# Patient Record
Sex: FEMALE | Race: WHITE | NOT HISPANIC OR LATINO | Employment: OTHER | URBAN - METROPOLITAN AREA
[De-identification: names, ages, dates, MRNs, and addresses within clinical notes are randomized per-mention and may not be internally consistent; named-entity substitution may affect disease eponyms.]

---

## 2017-02-23 ENCOUNTER — OFFICE VISIT (OUTPATIENT)
Dept: FAMILY MEDICINE CLINIC | Facility: CLINIC | Age: 67
End: 2017-02-23

## 2017-02-23 VITALS
SYSTOLIC BLOOD PRESSURE: 116 MMHG | HEIGHT: 62 IN | TEMPERATURE: 97.6 F | WEIGHT: 216 LBS | DIASTOLIC BLOOD PRESSURE: 80 MMHG | HEART RATE: 72 BPM | BODY MASS INDEX: 39.75 KG/M2

## 2017-02-23 DIAGNOSIS — M17.11 PRIMARY OSTEOARTHRITIS OF RIGHT KNEE: Primary | ICD-10-CM

## 2017-02-23 PROCEDURE — 99213 OFFICE O/P EST LOW 20 MIN: CPT | Performed by: INTERNAL MEDICINE

## 2017-02-23 RX ORDER — PRAVASTATIN SODIUM 40 MG
40 TABLET ORAL NIGHTLY
COMMUNITY
End: 2018-01-24 | Stop reason: SDUPTHER

## 2017-02-23 RX ORDER — BUMETANIDE 2 MG/1
2 TABLET ORAL 2 TIMES DAILY
COMMUNITY
End: 2018-01-24 | Stop reason: SDUPTHER

## 2017-02-23 RX ORDER — TADALAFIL 20 MG/1
20 TABLET ORAL 3 TIMES DAILY
COMMUNITY
End: 2018-01-24

## 2017-02-23 NOTE — PROGRESS NOTES
Subjective  and plain is right knee pain  Mariangel Nguyen is a 66 y.o. female.     History of Present Illness   Mariangel is here today for complaints of pain in her right knee.  This has been fairly long-standing.  She noticed it worsening after a fall approximately one year ago.  She has a prior history of congestive heart failure and pulmonary hypertension.  She therefore cannot take a whole lot of anti-inflammatory medications.  Her diabetes is currently being followed by .  Last hemoglobin A1c was 6.6.  Her chemistries looked okay.  Her cholesterol was slightly elevated on her current dose of pravastatin.   The following portions of the patient's history were reviewed and updated as appropriate: allergies, current medications, past medical history and problem list.    Review of Systems   Respiratory: Negative for chest tightness and shortness of breath.    Cardiovascular: Negative for chest pain.   Musculoskeletal: Positive for joint swelling.       Objective   Physical Exam   Neck: Carotid bruit is not present.   Cardiovascular: Normal rate, regular rhythm and normal heart sounds.    Pulmonary/Chest: Effort normal.   Musculoskeletal:   There is no joint effusion of the right knee.  There is tenderness to palpation of the medial joint line.  There is bone-on-bone crepitation with flexion and extension.   Vitals reviewed.      Assessment/Plan   Mariangel was seen today for follow-up.    Diagnoses and all orders for this visit:    Primary osteoarthritis of right knee      Mariangel is here today for evaluation of right knee pain.  She does have some bone-on-bone crepitation.  I am going to have her see an orthopedist about some possible injection therapy.  She is not really interested in having a knee replacement at this point in time.  With her history of congestive heart failure I am very reluctant to put her on an anti-inflammatory because of the potential for fluid retention.  Because of her diabetes I am  reluctant to use any steroid.

## 2017-04-14 ENCOUNTER — TELEPHONE (OUTPATIENT)
Dept: FAMILY MEDICINE CLINIC | Facility: CLINIC | Age: 67
End: 2017-04-14

## 2017-04-14 NOTE — TELEPHONE ENCOUNTER
----- Message from Debbie Reyes sent at 4/14/2017 11:24 AM EDT -----  Contact: PT  LAST VISIT SHE HAD KNEE PAIN, SHE IS UNABLE TO SEE DR EDEN. WAS WONDERING IF YOU COULD CALL SOMETHING IN FOR INFLAMMATION ?    [facility] [phone] TIMI   Message was left that I would send some diclofenac to the [facility].

## 2017-06-16 ENCOUNTER — TELEPHONE (OUTPATIENT)
Dept: FAMILY MEDICINE CLINIC | Facility: CLINIC | Age: 67
End: 2017-06-16

## 2017-06-16 NOTE — TELEPHONE ENCOUNTER
----- Message from Sondra Thompson sent at 6/16/2017 12:24 PM EDT -----  Patient would like something for inflammation until she is able to see  a knee specialist. She can be reached at [phone]. She uses [facility] - [phone].    Thanks,  Sandy  The patient did get some relief in April when she took diclofenac.  I will renew this.  She has an appointment with the orthopedist in July.

## 2017-10-12 ENCOUNTER — HOSPITAL ENCOUNTER (EMERGENCY)
Facility: HOSPITAL | Age: 67
Discharge: HOME | End: 2017-10-12
Attending: EMERGENCY MEDICINE | Admitting: EMERGENCY MEDICINE

## 2017-10-12 ENCOUNTER — APPOINTMENT (OUTPATIENT)
Dept: CT IMAGING | Facility: HOSPITAL | Age: 67
End: 2017-10-12

## 2017-10-12 VITALS
OXYGEN SATURATION: 94 % | BODY MASS INDEX: 39.75 KG/M2 | SYSTOLIC BLOOD PRESSURE: 178 MMHG | WEIGHT: 216 LBS | HEART RATE: 63 BPM | DIASTOLIC BLOOD PRESSURE: 102 MMHG | HEIGHT: 62 IN | RESPIRATION RATE: 16 BRPM | TEMPERATURE: 98 F

## 2017-10-12 DIAGNOSIS — N39.0 URINARY TRACT INFECTION IN FEMALE: ICD-10-CM

## 2017-10-12 DIAGNOSIS — N20.0 KIDNEY STONES: Primary | ICD-10-CM

## 2017-10-12 LAB
ALBUMIN SERPL-MCNC: 4.4 G/DL (ref 3.5–5.2)
ALBUMIN/GLOB SERPL: 1.2 G/DL
ALP SERPL-CCNC: 90 U/L (ref 39–117)
ALT SERPL W P-5'-P-CCNC: 21 U/L (ref 1–33)
ANION GAP SERPL CALCULATED.3IONS-SCNC: 12.4 MMOL/L
AST SERPL-CCNC: 14 U/L (ref 1–32)
BACTERIA UR QL AUTO: ABNORMAL /HPF
BASOPHILS # BLD AUTO: 0.06 10*3/MM3 (ref 0–0.2)
BASOPHILS NFR BLD AUTO: 0.6 % (ref 0–1.5)
BILIRUB SERPL-MCNC: 0.2 MG/DL (ref 0.1–1.2)
BILIRUB UR QL STRIP: NEGATIVE
BUN BLD-MCNC: 19 MG/DL (ref 8–23)
BUN/CREAT SERPL: 22.9 (ref 7–25)
CALCIUM SPEC-SCNC: 10 MG/DL (ref 8.6–10.5)
CHLORIDE SERPL-SCNC: 101 MMOL/L (ref 98–107)
CLARITY UR: ABNORMAL
CO2 SERPL-SCNC: 28.6 MMOL/L (ref 22–29)
COLOR UR: YELLOW
CREAT BLD-MCNC: 0.83 MG/DL (ref 0.57–1)
DEPRECATED RDW RBC AUTO: 48.5 FL (ref 37–54)
EOSINOPHIL # BLD AUTO: 0.27 10*3/MM3 (ref 0–0.7)
EOSINOPHIL NFR BLD AUTO: 2.8 % (ref 0.3–6.2)
ERYTHROCYTE [DISTWIDTH] IN BLOOD BY AUTOMATED COUNT: 14.8 % (ref 11.7–13)
GFR SERPL CREATININE-BSD FRML MDRD: 69 ML/MIN/1.73
GLOBULIN UR ELPH-MCNC: 3.7 GM/DL
GLUCOSE BLD-MCNC: 114 MG/DL (ref 65–99)
GLUCOSE UR STRIP-MCNC: NEGATIVE MG/DL
HCT VFR BLD AUTO: 42.3 % (ref 35.6–45.5)
HGB BLD-MCNC: 12.9 G/DL (ref 11.9–15.5)
HGB UR QL STRIP.AUTO: ABNORMAL
HOLD SPECIMEN: NORMAL
HOLD SPECIMEN: NORMAL
HYALINE CASTS UR QL AUTO: ABNORMAL /LPF
IMM GRANULOCYTES # BLD: 0.1 10*3/MM3 (ref 0–0.03)
IMM GRANULOCYTES NFR BLD: 1 % (ref 0–0.5)
KETONES UR QL STRIP: NEGATIVE
LEUKOCYTE ESTERASE UR QL STRIP.AUTO: ABNORMAL
LIPASE SERPL-CCNC: 30 U/L (ref 13–60)
LYMPHOCYTES # BLD AUTO: 2.27 10*3/MM3 (ref 0.9–4.8)
LYMPHOCYTES NFR BLD AUTO: 23.7 % (ref 19.6–45.3)
MCH RBC QN AUTO: 27.3 PG (ref 26.9–32)
MCHC RBC AUTO-ENTMCNC: 30.5 G/DL (ref 32.4–36.3)
MCV RBC AUTO: 89.6 FL (ref 80.5–98.2)
MONOCYTES # BLD AUTO: 0.78 10*3/MM3 (ref 0.2–1.2)
MONOCYTES NFR BLD AUTO: 8.2 % (ref 5–12)
NEUTROPHILS # BLD AUTO: 6.09 10*3/MM3 (ref 1.9–8.1)
NEUTROPHILS NFR BLD AUTO: 63.7 % (ref 42.7–76)
NITRITE UR QL STRIP: NEGATIVE
NRBC BLD MANUAL-RTO: 0.2 /100 WBC (ref 0–0)
PH UR STRIP.AUTO: 6 [PH] (ref 5–8)
PLATELET # BLD AUTO: 431 10*3/MM3 (ref 140–500)
PMV BLD AUTO: 9.6 FL (ref 6–12)
POTASSIUM BLD-SCNC: 4 MMOL/L (ref 3.5–5.2)
PROT SERPL-MCNC: 8.1 G/DL (ref 6–8.5)
PROT UR QL STRIP: ABNORMAL
RBC # BLD AUTO: 4.72 10*6/MM3 (ref 3.9–5.2)
RBC # UR: ABNORMAL /HPF
REF LAB TEST METHOD: ABNORMAL
SODIUM BLD-SCNC: 142 MMOL/L (ref 136–145)
SP GR UR STRIP: 1.02 (ref 1–1.03)
SQUAMOUS #/AREA URNS HPF: ABNORMAL /HPF
UROBILINOGEN UR QL STRIP: ABNORMAL
WBC NRBC COR # BLD: 9.57 10*3/MM3 (ref 4.5–10.7)
WBC UR QL AUTO: ABNORMAL /HPF
WHOLE BLOOD HOLD SPECIMEN: NORMAL
WHOLE BLOOD HOLD SPECIMEN: NORMAL

## 2017-10-12 PROCEDURE — 36415 COLL VENOUS BLD VENIPUNCTURE: CPT | Performed by: EMERGENCY MEDICINE

## 2017-10-12 PROCEDURE — 96365 THER/PROPH/DIAG IV INF INIT: CPT

## 2017-10-12 PROCEDURE — 99284 EMERGENCY DEPT VISIT MOD MDM: CPT

## 2017-10-12 PROCEDURE — 87086 URINE CULTURE/COLONY COUNT: CPT | Performed by: EMERGENCY MEDICINE

## 2017-10-12 PROCEDURE — 74176 CT ABD & PELVIS W/O CONTRAST: CPT

## 2017-10-12 PROCEDURE — 83690 ASSAY OF LIPASE: CPT | Performed by: EMERGENCY MEDICINE

## 2017-10-12 PROCEDURE — 81001 URINALYSIS AUTO W/SCOPE: CPT | Performed by: EMERGENCY MEDICINE

## 2017-10-12 PROCEDURE — 25010000002 CEFTRIAXONE PER 250 MG: Performed by: PHYSICIAN ASSISTANT

## 2017-10-12 PROCEDURE — 85025 COMPLETE CBC W/AUTO DIFF WBC: CPT | Performed by: EMERGENCY MEDICINE

## 2017-10-12 PROCEDURE — 80053 COMPREHEN METABOLIC PANEL: CPT | Performed by: EMERGENCY MEDICINE

## 2017-10-12 RX ORDER — KETOROLAC TROMETHAMINE 15 MG/ML
15 INJECTION, SOLUTION INTRAMUSCULAR; INTRAVENOUS ONCE
Status: DISCONTINUED | OUTPATIENT
Start: 2017-10-12 | End: 2017-10-12

## 2017-10-12 RX ORDER — ONDANSETRON 2 MG/ML
4 INJECTION INTRAMUSCULAR; INTRAVENOUS ONCE
Status: DISCONTINUED | OUTPATIENT
Start: 2017-10-12 | End: 2017-10-12

## 2017-10-12 RX ORDER — SULFAMETHOXAZOLE AND TRIMETHOPRIM 800; 160 MG/1; MG/1
1 TABLET ORAL 2 TIMES DAILY
Qty: 14 TABLET | Refills: 0 | Status: SHIPPED | OUTPATIENT
Start: 2017-10-12 | End: 2018-01-24

## 2017-10-12 RX ORDER — SODIUM CHLORIDE 0.9 % (FLUSH) 0.9 %
10 SYRINGE (ML) INJECTION AS NEEDED
Status: DISCONTINUED | OUTPATIENT
Start: 2017-10-12 | End: 2017-10-12 | Stop reason: HOSPADM

## 2017-10-12 RX ORDER — HYDROCODONE BITARTRATE AND ACETAMINOPHEN 5; 325 MG/1; MG/1
1 TABLET ORAL EVERY 6 HOURS PRN
COMMUNITY
Start: 2017-07-17 | End: 2018-01-24

## 2017-10-12 RX ORDER — CEFTRIAXONE SODIUM 1 G/50ML
1 INJECTION, SOLUTION INTRAVENOUS ONCE
Status: COMPLETED | OUTPATIENT
Start: 2017-10-12 | End: 2017-10-12

## 2017-10-12 RX ADMIN — CEFTRIAXONE SODIUM 1 G: 1 INJECTION, SOLUTION INTRAVENOUS at 19:09

## 2017-10-12 RX ADMIN — SODIUM CHLORIDE 1000 ML: 9 INJECTION, SOLUTION INTRAVENOUS at 19:09

## 2017-10-12 NOTE — ED PROVIDER NOTES
Pt presents complaining of right flank pain. Discussed with Pt that  CT scan looks normal and that the stent may be irritating her ureter and causing pain.  Informed Pt that we have a consult out to Urology for further evaluation. Upon physical exam, Pt has mild right side abdominal tenderness, but no CVA tenderness or fever.    I supervised care provided by the midlevel provider.    We have discussed this patient's history, physical exam, and treatment plan.   I have reviewed the note and personally saw and examined the patient and agree with the plan of care.    Documentation assistance provided by rob Cochran for Dr. Chandra.  Information recorded by the rob was done at my direction and has been verified and validated by me.       Susan Cochran  10/12/17 1932       Eusebio Chandra MD  10/13/17 1300

## 2017-10-13 LAB
BACTERIA SPEC AEROBE CULT: NORMAL
BACTERIA SPEC AEROBE CULT: NORMAL

## 2017-10-13 NOTE — DISCHARGE INSTRUCTIONS
Home, rest, keep well hydrated, medicine as prescribed, follow up with your urologist tomorrow for recheck, call first thing in the morning to schedule appointment.  Return to care if develop fever, unable to control pain, or with further concerns.

## 2017-10-16 ENCOUNTER — TELEPHONE (OUTPATIENT)
Dept: SOCIAL WORK | Facility: HOSPITAL | Age: 67
End: 2017-10-16

## 2017-10-19 ENCOUNTER — APPOINTMENT (OUTPATIENT)
Dept: PREADMISSION TESTING | Facility: HOSPITAL | Age: 67
End: 2017-10-19

## 2017-10-20 ENCOUNTER — APPOINTMENT (OUTPATIENT)
Dept: PREADMISSION TESTING | Facility: HOSPITAL | Age: 67
End: 2017-10-20

## 2017-10-20 VITALS
DIASTOLIC BLOOD PRESSURE: 110 MMHG | SYSTOLIC BLOOD PRESSURE: 152 MMHG | HEIGHT: 62 IN | OXYGEN SATURATION: 95 % | WEIGHT: 224 LBS | TEMPERATURE: 98.3 F | RESPIRATION RATE: 16 BRPM | HEART RATE: 61 BPM | BODY MASS INDEX: 41.22 KG/M2

## 2017-10-20 LAB
ANION GAP SERPL CALCULATED.3IONS-SCNC: 17.1 MMOL/L
BACTERIA UR QL AUTO: ABNORMAL /HPF
BILIRUB UR QL STRIP: NEGATIVE
BUN BLD-MCNC: 23 MG/DL (ref 8–23)
BUN/CREAT SERPL: 19.5 (ref 7–25)
CALCIUM SPEC-SCNC: 9.8 MG/DL (ref 8.6–10.5)
CHLORIDE SERPL-SCNC: 95 MMOL/L (ref 98–107)
CLARITY UR: ABNORMAL
CO2 SERPL-SCNC: 26.9 MMOL/L (ref 22–29)
COLOR UR: YELLOW
CREAT BLD-MCNC: 1.18 MG/DL (ref 0.57–1)
DEPRECATED RDW RBC AUTO: 48.1 FL (ref 37–54)
ERYTHROCYTE [DISTWIDTH] IN BLOOD BY AUTOMATED COUNT: 14.7 % (ref 11.7–13)
GFR SERPL CREATININE-BSD FRML MDRD: 46 ML/MIN/1.73
GLUCOSE BLD-MCNC: 129 MG/DL (ref 65–99)
GLUCOSE UR STRIP-MCNC: NEGATIVE MG/DL
HCT VFR BLD AUTO: 41.8 % (ref 35.6–45.5)
HGB BLD-MCNC: 12.8 G/DL (ref 11.9–15.5)
HGB UR QL STRIP.AUTO: ABNORMAL
HYALINE CASTS UR QL AUTO: ABNORMAL /LPF
KETONES UR QL STRIP: NEGATIVE
LEUKOCYTE ESTERASE UR QL STRIP.AUTO: ABNORMAL
MCH RBC QN AUTO: 27.8 PG (ref 26.9–32)
MCHC RBC AUTO-ENTMCNC: 30.6 G/DL (ref 32.4–36.3)
MCV RBC AUTO: 90.7 FL (ref 80.5–98.2)
NITRITE UR QL STRIP: NEGATIVE
PH UR STRIP.AUTO: <=5 [PH] (ref 5–8)
PLATELET # BLD AUTO: 406 10*3/MM3 (ref 140–500)
PMV BLD AUTO: 9.5 FL (ref 6–12)
POTASSIUM BLD-SCNC: 3.9 MMOL/L (ref 3.5–5.2)
PROT UR QL STRIP: NEGATIVE
RBC # BLD AUTO: 4.61 10*6/MM3 (ref 3.9–5.2)
RBC # UR: ABNORMAL /HPF
REF LAB TEST METHOD: ABNORMAL
SODIUM BLD-SCNC: 139 MMOL/L (ref 136–145)
SP GR UR STRIP: 1.01 (ref 1–1.03)
SQUAMOUS #/AREA URNS HPF: ABNORMAL /HPF
UROBILINOGEN UR QL STRIP: ABNORMAL
WBC NRBC COR # BLD: 7.99 10*3/MM3 (ref 4.5–10.7)
WBC UR QL AUTO: ABNORMAL /HPF

## 2017-10-20 PROCEDURE — 93010 ELECTROCARDIOGRAM REPORT: CPT | Performed by: INTERNAL MEDICINE

## 2017-10-20 NOTE — DISCHARGE INSTRUCTIONS
Take the following medications the morning of surgery with a small sip of water:  NORCO, ADCIRCA    PLEASE HOLD VOLTAREN UNTIL YOUR SURGERY    ARRIVE AT HOSPITAL AT 0700 AM ON October 23, 2017      General Instructions:  • Do not eat or drink anything after midnight the night before surgery.  • Infants may have breast milk up to four hours before surgery.  • Infants drinking formula may drink formula up to six hours before surgery.   • Patients who avoid smoking, chewing tobacco and alcohol for 4 weeks prior to surgery have a reduced risk of post-operative complications.  Quit smoking as many days before surgery as you can.  • Do not smoke, use chewing tobacco or drink alcohol the day of surgery.   • If applicable bring your C-PAP/ BI-PAP machine.  • Bring any papers given to you in the doctor’s office.  • Wear clean comfortable clothes and socks.  • Do not wear contact lenses or make-up.  Bring a case for your glasses.   • Bring crutches or walker if applicable.  • Remove all piercings.  Leave jewelry and any other valuables at home.  • The Pre-Admission Testing nurse will instruct you to bring medications if unable to obtain an accurate list in Pre-Admission Testing.        If you were given a blood bank ID arm band remember to bring it with you the day of surgery.    Preventing a Surgical Site Infection:  • For 2 to 3 days before surgery, avoid shaving with a razor because the razor can irritate skin and make it easier to develop an infection.  • The night prior to surgery sleep in a clean bed with clean clothing.  Do not allow pets to sleep with you.  • Shower on the morning of surgery using a fresh bar of anti-bacterial soap (such as Dial) and clean washcloth.  Dry with a clean towel and dress in clean clothing.  • Ask your surgeon if you will be receiving antibiotics prior to surgery.  • Make sure you, your family, and all healthcare providers clean their hands with soap and water or an alcohol based hand   before caring for you or your wound.    Day of surgery:  Upon arrival, a Pre-op nurse and Anesthesiologist will review your health history, obtain vital signs, and answer questions you may have.  The only belongings needed at this time will be your home medications and if applicable your C-PAP/BI-PAP machine.  If you are staying overnight your family can leave the rest of your belongings in the car and bring them to your room later.  A Pre-op nurse will start an IV and you may receive medication in preparation for surgery, including something to help you relax.  Your family will be able to see you in the Pre-op area.  While you are in surgery your family should notify the waiting room  if they leave the waiting room area and provide a contact phone number.    Please be aware that surgery does come with discomfort.  We want to make every effort to control your discomfort so please discuss any uncontrolled symptoms with your nurse.   Your doctor will most likely have prescribed pain medications.      If you are going home after surgery you will receive individualized written care instructions before being discharged.  A responsible adult must drive you to and from the hospital on the day of your surgery and stay with you for 24 hours.    If you are staying overnight following surgery, you will be transported to your hospital room following the recovery period.  [facility] has all private rooms.    If you have any questions please call Pre-Admission Testing at [phone].  Deductibles and co-payments are collected on the day of service. Please be prepared to pay the required co-pay, deductible or deposit on the day of service as defined by your plan.

## 2017-10-21 LAB
BACTERIA SPEC AEROBE CULT: NORMAL
BACTERIA SPEC AEROBE CULT: NORMAL

## 2017-10-23 ENCOUNTER — ANESTHESIA (OUTPATIENT)
Dept: PERIOP | Facility: HOSPITAL | Age: 67
End: 2017-10-23

## 2017-10-23 ENCOUNTER — HOSPITAL ENCOUNTER (OUTPATIENT)
Facility: HOSPITAL | Age: 67
Setting detail: HOSPITAL OUTPATIENT SURGERY
Discharge: HOME | End: 2017-10-23
Attending: UROLOGY | Admitting: UROLOGY

## 2017-10-23 ENCOUNTER — APPOINTMENT (OUTPATIENT)
Dept: GENERAL RADIOLOGY | Facility: HOSPITAL | Age: 67
End: 2017-10-23

## 2017-10-23 ENCOUNTER — ANESTHESIA EVENT (OUTPATIENT)
Dept: PERIOP | Facility: HOSPITAL | Age: 67
End: 2017-10-23

## 2017-10-23 VITALS
OXYGEN SATURATION: 94 % | RESPIRATION RATE: 16 BRPM | DIASTOLIC BLOOD PRESSURE: 98 MMHG | SYSTOLIC BLOOD PRESSURE: 168 MMHG | HEART RATE: 70 BPM | HEIGHT: 62 IN | BODY MASS INDEX: 41.59 KG/M2 | TEMPERATURE: 97.4 F | WEIGHT: 226 LBS

## 2017-10-23 DIAGNOSIS — N20.0 RENAL STONES: ICD-10-CM

## 2017-10-23 LAB — GLUCOSE BLDC GLUCOMTR-MCNC: 127 MG/DL (ref 70–130)

## 2017-10-23 PROCEDURE — 25010000002 GENTAMICIN PER 80 MG: Performed by: UROLOGY

## 2017-10-23 PROCEDURE — 25010000002 PROPOFOL 10 MG/ML EMULSION: Performed by: NURSE ANESTHETIST, CERTIFIED REGISTERED

## 2017-10-23 PROCEDURE — 0 IOPAMIDOL PER 1 ML: Performed by: UROLOGY

## 2017-10-23 PROCEDURE — C1894 INTRO/SHEATH, NON-LASER: HCPCS | Performed by: UROLOGY

## 2017-10-23 PROCEDURE — 25010000002 HYDRALAZINE PER 20 MG: Performed by: NURSE ANESTHETIST, CERTIFIED REGISTERED

## 2017-10-23 PROCEDURE — 82360 CALCULUS ASSAY QUANT: CPT | Performed by: UROLOGY

## 2017-10-23 PROCEDURE — 25010000002 MIDAZOLAM PER 1 MG: Performed by: ANESTHESIOLOGY

## 2017-10-23 PROCEDURE — 74420 UROGRAPHY RTRGR +-KUB: CPT

## 2017-10-23 PROCEDURE — 25010000002 FENTANYL CITRATE (PF) 100 MCG/2ML SOLUTION: Performed by: NURSE ANESTHETIST, CERTIFIED REGISTERED

## 2017-10-23 PROCEDURE — C1758 CATHETER, URETERAL: HCPCS | Performed by: UROLOGY

## 2017-10-23 PROCEDURE — C2617 STENT, NON-COR, TEM W/O DEL: HCPCS | Performed by: UROLOGY

## 2017-10-23 PROCEDURE — 82962 GLUCOSE BLOOD TEST: CPT

## 2017-10-23 PROCEDURE — 25010000003 CEFAZOLIN IN D5W 1 GM/50ML SOLUTION: Performed by: UROLOGY

## 2017-10-23 PROCEDURE — 25010000002 ONDANSETRON PER 1 MG: Performed by: NURSE ANESTHETIST, CERTIFIED REGISTERED

## 2017-10-23 PROCEDURE — C1769 GUIDE WIRE: HCPCS | Performed by: UROLOGY

## 2017-10-23 DEVICE — URETERAL STENT
Type: IMPLANTABLE DEVICE | Status: FUNCTIONAL
Brand: CONTOUR™

## 2017-10-23 RX ORDER — FENTANYL CITRATE 50 UG/ML
INJECTION, SOLUTION INTRAMUSCULAR; INTRAVENOUS AS NEEDED
Status: DISCONTINUED | OUTPATIENT
Start: 2017-10-23 | End: 2017-10-23 | Stop reason: SURG

## 2017-10-23 RX ORDER — PROMETHAZINE HYDROCHLORIDE 25 MG/ML
12.5 INJECTION, SOLUTION INTRAMUSCULAR; INTRAVENOUS ONCE AS NEEDED
Status: DISCONTINUED | OUTPATIENT
Start: 2017-10-23 | End: 2017-10-23 | Stop reason: HOSPADM

## 2017-10-23 RX ORDER — FENTANYL CITRATE 50 UG/ML
50 INJECTION, SOLUTION INTRAMUSCULAR; INTRAVENOUS
Status: DISCONTINUED | OUTPATIENT
Start: 2017-10-23 | End: 2017-10-23 | Stop reason: HOSPADM

## 2017-10-23 RX ORDER — HYDROCODONE BITARTRATE AND ACETAMINOPHEN 7.5; 325 MG/1; MG/1
1 TABLET ORAL ONCE AS NEEDED
Status: DISCONTINUED | OUTPATIENT
Start: 2017-10-23 | End: 2017-10-23 | Stop reason: HOSPADM

## 2017-10-23 RX ORDER — PROPOFOL 10 MG/ML
VIAL (ML) INTRAVENOUS AS NEEDED
Status: DISCONTINUED | OUTPATIENT
Start: 2017-10-23 | End: 2017-10-23 | Stop reason: SURG

## 2017-10-23 RX ORDER — PROMETHAZINE HYDROCHLORIDE 25 MG/1
25 TABLET ORAL ONCE AS NEEDED
Status: DISCONTINUED | OUTPATIENT
Start: 2017-10-23 | End: 2017-10-23 | Stop reason: HOSPADM

## 2017-10-23 RX ORDER — HYDROMORPHONE HYDROCHLORIDE 1 MG/ML
0.5 INJECTION, SOLUTION INTRAMUSCULAR; INTRAVENOUS; SUBCUTANEOUS
Status: DISCONTINUED | OUTPATIENT
Start: 2017-10-23 | End: 2017-10-23 | Stop reason: HOSPADM

## 2017-10-23 RX ORDER — DIPHENHYDRAMINE HYDROCHLORIDE 50 MG/ML
12.5 INJECTION INTRAMUSCULAR; INTRAVENOUS
Status: DISCONTINUED | OUTPATIENT
Start: 2017-10-23 | End: 2017-10-23 | Stop reason: HOSPADM

## 2017-10-23 RX ORDER — HYDRALAZINE HYDROCHLORIDE 20 MG/ML
5 INJECTION INTRAMUSCULAR; INTRAVENOUS
Status: DISCONTINUED | OUTPATIENT
Start: 2017-10-23 | End: 2017-10-23 | Stop reason: HOSPADM

## 2017-10-23 RX ORDER — SODIUM CHLORIDE 0.9 % (FLUSH) 0.9 %
1-10 SYRINGE (ML) INJECTION AS NEEDED
Status: DISCONTINUED | OUTPATIENT
Start: 2017-10-23 | End: 2017-10-23 | Stop reason: HOSPADM

## 2017-10-23 RX ORDER — PROMETHAZINE HYDROCHLORIDE 25 MG/1
12.5 TABLET ORAL ONCE AS NEEDED
Status: DISCONTINUED | OUTPATIENT
Start: 2017-10-23 | End: 2017-10-23 | Stop reason: HOSPADM

## 2017-10-23 RX ORDER — ONDANSETRON 2 MG/ML
INJECTION INTRAMUSCULAR; INTRAVENOUS AS NEEDED
Status: DISCONTINUED | OUTPATIENT
Start: 2017-10-23 | End: 2017-10-23 | Stop reason: SURG

## 2017-10-23 RX ORDER — FAMOTIDINE 10 MG/ML
20 INJECTION, SOLUTION INTRAVENOUS ONCE
Status: COMPLETED | OUTPATIENT
Start: 2017-10-23 | End: 2017-10-23

## 2017-10-23 RX ORDER — OXYCODONE AND ACETAMINOPHEN 7.5; 325 MG/1; MG/1
1 TABLET ORAL ONCE AS NEEDED
Status: DISCONTINUED | OUTPATIENT
Start: 2017-10-23 | End: 2017-10-23 | Stop reason: HOSPADM

## 2017-10-23 RX ORDER — PROMETHAZINE HYDROCHLORIDE 25 MG/1
25 SUPPOSITORY RECTAL ONCE AS NEEDED
Status: DISCONTINUED | OUTPATIENT
Start: 2017-10-23 | End: 2017-10-23 | Stop reason: HOSPADM

## 2017-10-23 RX ORDER — GENTAMICIN SULFATE 80 MG/100ML
80 INJECTION, SOLUTION INTRAVENOUS ONCE
Status: COMPLETED | OUTPATIENT
Start: 2017-10-23 | End: 2017-10-23

## 2017-10-23 RX ORDER — MIDAZOLAM HYDROCHLORIDE 1 MG/ML
1 INJECTION INTRAMUSCULAR; INTRAVENOUS
Status: DISCONTINUED | OUTPATIENT
Start: 2017-10-23 | End: 2017-10-23 | Stop reason: HOSPADM

## 2017-10-23 RX ORDER — LEVOFLOXACIN 500 MG/1
500 TABLET, FILM COATED ORAL DAILY
Qty: 10 TABLET | Refills: 0
Start: 2017-10-23 | End: 2017-10-30

## 2017-10-23 RX ORDER — FLUMAZENIL 0.1 MG/ML
0.2 INJECTION INTRAVENOUS AS NEEDED
Status: DISCONTINUED | OUTPATIENT
Start: 2017-10-23 | End: 2017-10-23 | Stop reason: HOSPADM

## 2017-10-23 RX ORDER — NALOXONE HCL 0.4 MG/ML
0.2 VIAL (ML) INJECTION AS NEEDED
Status: DISCONTINUED | OUTPATIENT
Start: 2017-10-23 | End: 2017-10-23 | Stop reason: HOSPADM

## 2017-10-23 RX ORDER — LIDOCAINE HYDROCHLORIDE 20 MG/ML
INJECTION, SOLUTION INFILTRATION; PERINEURAL AS NEEDED
Status: DISCONTINUED | OUTPATIENT
Start: 2017-10-23 | End: 2017-10-23 | Stop reason: SURG

## 2017-10-23 RX ORDER — MAGNESIUM HYDROXIDE 1200 MG/15ML
LIQUID ORAL AS NEEDED
Status: DISCONTINUED | OUTPATIENT
Start: 2017-10-23 | End: 2017-10-23 | Stop reason: HOSPADM

## 2017-10-23 RX ORDER — MIDAZOLAM HYDROCHLORIDE 1 MG/ML
2 INJECTION INTRAMUSCULAR; INTRAVENOUS
Status: DISCONTINUED | OUTPATIENT
Start: 2017-10-23 | End: 2017-10-23 | Stop reason: HOSPADM

## 2017-10-23 RX ORDER — LABETALOL HYDROCHLORIDE 5 MG/ML
5 INJECTION, SOLUTION INTRAVENOUS
Status: DISCONTINUED | OUTPATIENT
Start: 2017-10-23 | End: 2017-10-23 | Stop reason: HOSPADM

## 2017-10-23 RX ORDER — EPHEDRINE SULFATE 50 MG/ML
5 INJECTION, SOLUTION INTRAVENOUS ONCE AS NEEDED
Status: DISCONTINUED | OUTPATIENT
Start: 2017-10-23 | End: 2017-10-23 | Stop reason: HOSPADM

## 2017-10-23 RX ORDER — ONDANSETRON 2 MG/ML
4 INJECTION INTRAMUSCULAR; INTRAVENOUS ONCE AS NEEDED
Status: DISCONTINUED | OUTPATIENT
Start: 2017-10-23 | End: 2017-10-23 | Stop reason: HOSPADM

## 2017-10-23 RX ORDER — SODIUM CHLORIDE, SODIUM LACTATE, POTASSIUM CHLORIDE, CALCIUM CHLORIDE 600; 310; 30; 20 MG/100ML; MG/100ML; MG/100ML; MG/100ML
9 INJECTION, SOLUTION INTRAVENOUS CONTINUOUS
Status: DISCONTINUED | OUTPATIENT
Start: 2017-10-23 | End: 2017-10-23 | Stop reason: HOSPADM

## 2017-10-23 RX ADMIN — HYDRALAZINE HYDROCHLORIDE 5 MG: 20 INJECTION INTRAMUSCULAR; INTRAVENOUS at 11:55

## 2017-10-23 RX ADMIN — LIDOCAINE HYDROCHLORIDE 100 MG: 20 INJECTION, SOLUTION INFILTRATION; PERINEURAL at 10:08

## 2017-10-23 RX ADMIN — FENTANYL CITRATE 25 MCG: 50 INJECTION INTRAMUSCULAR; INTRAVENOUS at 10:33

## 2017-10-23 RX ADMIN — FAMOTIDINE 20 MG: 10 INJECTION, SOLUTION INTRAVENOUS at 08:53

## 2017-10-23 RX ADMIN — SODIUM CHLORIDE, POTASSIUM CHLORIDE, SODIUM LACTATE AND CALCIUM CHLORIDE 9 ML/HR: 600; 310; 30; 20 INJECTION, SOLUTION INTRAVENOUS at 07:52

## 2017-10-23 RX ADMIN — MIDAZOLAM 1 MG: 1 INJECTION INTRAMUSCULAR; INTRAVENOUS at 08:57

## 2017-10-23 RX ADMIN — MIDAZOLAM 2 MG: 1 INJECTION INTRAMUSCULAR; INTRAVENOUS at 08:53

## 2017-10-23 RX ADMIN — GENTAMICIN SULFATE 80 MG: 80 INJECTION, SOLUTION INTRAVENOUS at 08:03

## 2017-10-23 RX ADMIN — ONDANSETRON 4 MG: 2 INJECTION INTRAMUSCULAR; INTRAVENOUS at 10:31

## 2017-10-23 RX ADMIN — CEFAZOLIN SODIUM 1 G: 1 INJECTION, SOLUTION INTRAVENOUS at 10:12

## 2017-10-23 RX ADMIN — FENTANYL CITRATE 50 MCG: 50 INJECTION INTRAMUSCULAR; INTRAVENOUS at 10:13

## 2017-10-23 RX ADMIN — FENTANYL CITRATE 25 MCG: 50 INJECTION INTRAMUSCULAR; INTRAVENOUS at 10:40

## 2017-10-23 RX ADMIN — PROPOFOL 200 MG: 10 INJECTION, EMULSION INTRAVENOUS at 10:08

## 2017-10-23 NOTE — OP NOTE
Operative Report     [facility] OR    Patient: Mariangel Nguyen  Age:      67 y.o.  :     1950  Sex:      female    Medical Record:  [id number]    Date of Operation/Procedure:  10/23/2017    Pre-op Diagnosis:   Right renal stones    Post-Op Diagnosis Codes:   same    Pre-operative Diagnosis Free Text:  Right renal stones    Name of Operation/Procedure:  Procedure(s) and Anesthesia Type:     * CYSTO RT URETEROSCOPY LASER  STONE BASKETING RIGHT STENT EXCHANGE - General     * RIGHT RTG PYELOGRAM WITH INTERPRETATION    Findings/Complications:      Two stones in the right lower pole, fragmented and multiple fragments removed  Right stent with no tether  No complications    Description of procedure:     The patient was taken to the OR and placed under GA in lithotomy position.  The genitalia were prepped and draped in sterile fashion.  The 21 Fr cystoscope was introduced and pan-cystoscopy was performed using the 30 degree lens.  No tumors or stones were seen.  The pair of lower pole stones on the right were visible on fluoroscopy.  The previously placed right ureteral stent was grasped and removed to the meatus.  The old right stent was cannulated with a Sensor guidewire which was advanced into the kidney. It was difficult, however, to pass the UPJ with the wire, suggesting narrowing or residual stone fragments.  I used a 5 Fr Putney to help advance the wire into the proper position.  I injected the Putney with contrast to confirm location and calyceal anatomy.  INTERP:  Small renal pelvis, narrowed UPJ, long lower pole infundibulum to the stones, malrotation of the kidney, pair of filling defects in the lower pole.  I then replaced the wire and passed a ureteroscopic access sheath and a 2nd wire.  I then advanced the flexible ureteroscope to the proximal ureter over the working wire, although the UPJ was fairly narrow and initially I had difficulty passing the scope through this area.  I was able to get it to  pass safely in the end.      Once in the kidney, I saw some stone debris and old clot.  All of the calyces were investigated - no stones seen except those in the lower pole.  Using the 270 micron laser fiber, I was able to fragment the 6 mm and 8 mm stone fragments into multiple 3-4 mm stone fragments.  I then repeated passed the scope and removed stones via stone basketing.  Careful evaluation of the calyces again performed, no stone fragments > 2 mm seen.  I re-injected contrast - no residual filling defects seen.  I then placed a 8 x 24 ureteral stent, no tether.    Awakened and taken to RR in good condition, no complications.    Estimated Blood Loss:   minimal    Specimens: None    Fluids/Drains:  Right ureteral stent, no tether    Quinn Cason MD  10/23/2017  11:51 AM

## 2017-10-23 NOTE — PLAN OF CARE
Problem: Patient Care Overview (Adult)  Goal: Plan of Care Review  Outcome: Outcome(s) achieved Date Met:  10/23/17    10/23/17 4627   Coping/Psychosocial Response Interventions   Plan Of Care Reviewed With patient;daughter   Patient Care Overview   Progress improving   Outcome Evaluation   Outcome Summary/Follow up Plan ready for discharge       Goal: Adult Individualization and Mutuality  Outcome: Outcome(s) achieved Date Met:  10/23/17  Goal: Discharge Needs Assessment  Outcome: Outcome(s) achieved Date Met:  10/23/17    Problem: Perioperative Period (Adult)  Goal: Signs and Symptoms of Listed Potential Problems Will be Absent or Manageable (Perioperative Period)  Outcome: Outcome(s) achieved Date Met:  10/23/17

## 2017-10-23 NOTE — ANESTHESIA PROCEDURE NOTES
Airway  Urgency: elective    Date/Time: 10/23/2017 10:10 AM  Airway not difficult    General Information and Staff    Patient location during procedure: OR  Anesthesiologist: MARI SOUZA  CRNA: CLOVIS COPELAND    Indications and Patient Condition  Indications for airway management: airway protection    Preoxygenated: yes  Mask difficulty assessment: 0 - not attempted    Final Airway Details  Final airway type: supraglottic airway      Successful airway: classic  Size 5    Number of attempts at approach: 1    Additional Comments  Atraumatic. No dental damage noted.

## 2017-10-23 NOTE — ANESTHESIA POSTPROCEDURE EVALUATION
"Patient: Mariangel Nguyen    Procedure Summary     Date Anesthesia Start Anesthesia Stop Room / Location    10/23/17 0954 1152  [facility] OR [facility] / [facility]       Procedure Diagnosis Surgeon Provider    CYSTO RT URETEROSCOPY LASER  STONE BASKETING RIGHT STENT EXCHANGE (Right ) No diagnosis on file. MD Stefany Mcdermott MD          Anesthesia Type: general  Last vitals  BP   168/98 (10/23/17 1245)   Temp   36.3 °C (97.4 °F) (10/23/17 1220)   Pulse   70 (10/23/17 1245)   Resp   16 (10/23/17 1245)     SpO2   94 % (10/23/17 1245)     Post Anesthesia Care and Evaluation    Patient location during evaluation: bedside  Patient participation: complete - patient participated  Level of consciousness: awake  Pain score: 2  Pain management: adequate  Airway patency: patent  Anesthetic complications: No anesthetic complications    Cardiovascular status: acceptable  Respiratory status: acceptable  Hydration status: acceptable    Comments: /98  Pulse 70  Temp 36.3 °C (97.4 °F) (Oral)   Resp 16  Ht 62\" (157.5 cm)  Wt 226 lb (103 kg)  SpO2 94%  BMI 41.34 kg/m2        "

## 2017-10-23 NOTE — ANESTHESIA PREPROCEDURE EVALUATION
Anesthesia Evaluation     Patient summary reviewed and Nursing notes reviewed   NPO Solid Status: > 8 hours  NPO Liquid Status: > 2 hours     Airway   Mallampati: II  no difficulty expected  Dental - normal exam     Pulmonary     breath sounds clear to auscultation  (+) sleep apnea,   Cardiovascular     ECG reviewed  Rhythm: regular  Rate: normal    (+) hypertension, CHF, hyperlipidemia    ROS comment: No MI--CHF in past of unknown etiology    Neuro/Psych  GI/Hepatic/Renal/Endo    (+) morbid obesity, diabetes mellitus type 2,     Musculoskeletal     Abdominal    Substance History      OB/GYN          Other   (+) arthritis                                   Anesthesia Plan    ASA 3     general     intravenous induction   Anesthetic plan and risks discussed with patient.

## 2017-10-23 NOTE — H&P
FIRST UROLOGY CONSULT      Patient Identification:  NAME:  Mariangel Nguyen  Age:  67 y.o.   Sex:  female   :  1950   MRN:  [id number]       Chief complaint:  none    History of present illness:      68 yo female with h/o large right lower pole stone.  Had ESWL with stent x 2, partial fragmentation only.  Recent KUB shows 8 mm and 6 mm right lower pole fragments, recent CT suggests 1.2 cm right lower pole stone.  Here for cysto, right uscope/laser/stent exchange.    No recent fever, increased pain.  No n/v, no recent viral illnesses.    Past medical history:  Past Medical History:   Diagnosis Date   • Arthritis     KNEES   • CHF (congestive heart failure)    • Diabetes mellitus    • Hyperlipidemia    • Hypertension    • Kidney stones    • Pulmonary hypertension    • Sleep apnea    • UTI (urinary tract infection) 10/12/2017    ON BACTRIM. FINISH WITH ANBX ON 10/22/2017       Past surgical history:  Past Surgical History:   Procedure Laterality Date   • BLADDER SURGERY      BLADDER LIFT,    • COLON SURGERY      RECONSTRUCTION,    • HYSTERECTOMY         • ROTATOR CUFF REPAIR Right        Allergies:  Review of patient's allergies indicates no known allergies.    Home medications:  Prescriptions Prior to Admission   Medication Sig Dispense Refill Last Dose   • HYDROcodone-acetaminophen (NORCO) 5-325 MG per tablet Take 1 tablet by mouth Every 6 (Six) Hours As Needed for Moderate Pain .   Past Week at Unknown time   • metFORMIN (GLUCOPHAGE) 500 MG tablet Take 500 mg by mouth 2 (Two) Times a Day With Meals.   10/22/2017 at 2100   • potassium chloride (KAYCIEL) 20 MEQ/15ML (10%) solution Take 20 mEq by mouth 2 (Two) Times a Day.   10/22/2017 at 2100   • pravastatin (PRAVACHOL) 40 MG tablet Take 40 mg by mouth Every Night.   10/22/2017 at 0800   • sulfamethoxazole-trimethoprim (BACTRIM DS,SEPTRA DS) 800-160 MG per tablet Take 1 tablet by mouth 2 (Two) Times a Day. 14 tablet 0 10/19/2017   •  tadalafil (ADCIRCA) 20 MG tablet tablet Take 20 mg by mouth 3 (Three) Times a Day.   10/22/2017 at 2100   • bumetanide (BUMEX) 2 MG tablet Take 2 mg by mouth 2 (Two) Times a Day.   10/21/2017   • diclofenac (VOLTAREN) 50 MG EC tablet Take 50 mg by mouth 2 (Two) Times a Day. INSTRUCTED PATIENT TO HOLD PRIOR TO SURGERY   10/17/2017   • Ergocalciferol (VITAMIN D2 PO) Take 50,000 Units by mouth Every 7 (Seven) Days.   10/19/2017   • ONE TOUCH ULTRA TEST test strip    Taking   • ONETOUCH DELICA LANCETS 33G misc    Taking       Hospital medications:    ceFAZolin 1 g Intravenous Once       lactated ringers 9 mL/hr Last Rate: 9 mL/hr (10/23/17 0752)     fentanyl  •  midazolam **OR** midazolam  •  sodium chloride  •  sodium chloride    Family history:  Family History   Problem Relation Age of Onset   • Cancer Other    • Stroke Other    • Other Other      LUPUS ANTICOAGULANT   • Rheum arthritis Other    • Malig Hyperthermia Neg Hx        Social history:  Social History   Substance Use Topics   • Smoking status: Never Smoker   • Smokeless tobacco: Former User   • Alcohol use No       Review of systems:    Negative 12-system ROS except for the following:  No chest pain, cough, SOB, o/w neg      Objective:  TMax 24 hours:   Temp (24hrs), Av.7 °F (36.5 °C), Min:97.7 °F (36.5 °C), Max:97.7 °F (36.5 °C)      Vitals Ranges:   Temp:  [97.7 °F (36.5 °C)] 97.7 °F (36.5 °C)  Heart Rate:  [59-64] 59  Resp:  [20] 20  BP: (164)/(94) 164/94    Physical Exam:     General Appearance:    Alert, cooperative, in no acute distress   Head:    Normocephalic, without obvious abnormality, atraumatic   Eyes:          PERRL, conjunctivae and corneas clear   Back:     No CVA tenderness   Lungs:     Respirations unlabored, symmetric excursion    Heart:    RRR, intact peripheral pulses   Abdomen:     Soft, NDNT, no masses, no guarding   :    No pelvic examination performed   Neuro/Psych:   Orientation intact, mood/affect pleasant, no focal findings        Results review:   I reviewed the patient's new clinical results.    Data review:  Lab Results (last 24 hours)     Procedure Component Value Units Date/Time    POC Glucose Fingerstick [[id number]]  (Normal) Collected:  10/23/17 0710    Specimen:  Blood Updated:  10/23/17 0712     Glucose 127 mg/dL     Narrative:       Meter: [id number] : [id number] Jacquelyn PASCUAL           Imaging:  Imaging Results (last 24 hours)     ** No results found for the last 24 hours. **             Assessment:     Active Problems:    * No active hospital problems. *    Right renal stones    Plan:     Cysto, right uscope/laser, stone extraction, stent exchange  -IV Ancef/gent OCTOR  -RBO explained, signed IC  -All questions answered    Quinn Cason MD  10/23/17  9:48 AM

## 2017-11-02 LAB
CA PHOS CRY STONE QL IR: 5 %
COLOR STONE: NORMAL
COM CRY STONE QL IR: 95 %
COMPN STONE: NORMAL
Lab: NORMAL
Lab: NORMAL
NIDUS STONE QL: NORMAL
PATH REPORT.COMMENTS IMP SPEC: NORMAL
SIZE STONE: NORMAL MM
WT STONE: 153 MG

## 2018-01-23 ENCOUNTER — TELEPHONE (OUTPATIENT)
Dept: FAMILY MEDICINE CLINIC | Facility: CLINIC | Age: 68
End: 2018-01-23

## 2018-01-23 NOTE — TELEPHONE ENCOUNTER
Patient would like for you to call her tomorrow Wednesday it's very important regarding several prescriptions.    Call [phone] cell.    Thank you.    Scripts were called to her pharmacy.  She has an appointment next week.

## 2018-01-24 RX ORDER — POTASSIUM CHLORIDE 20MEQ/15ML
20 LIQUID (ML) ORAL DAILY
Qty: 473 ML | Refills: 5 | Status: SHIPPED | OUTPATIENT
Start: 2018-01-24 | End: 2018-07-18 | Stop reason: SDUPTHER

## 2018-01-24 RX ORDER — BUMETANIDE 2 MG/1
2 TABLET ORAL DAILY
Qty: 30 TABLET | Refills: 5 | Status: SHIPPED | OUTPATIENT
Start: 2018-01-24 | End: 2018-07-18 | Stop reason: SDUPTHER

## 2018-01-24 RX ORDER — PRAVASTATIN SODIUM 40 MG
40 TABLET ORAL NIGHTLY
Qty: 30 TABLET | Refills: 5 | Status: SHIPPED | OUTPATIENT
Start: 2018-01-24 | End: 2018-09-10 | Stop reason: SDUPTHER

## 2018-01-24 RX ORDER — BISOPROLOL FUMARATE 5 MG/1
5 TABLET, FILM COATED ORAL DAILY
Qty: 30 TABLET | Refills: 5 | Status: SHIPPED | OUTPATIENT
Start: 2018-01-24 | End: 2018-09-10 | Stop reason: SDUPTHER

## 2018-01-24 RX ORDER — SACCHAROMYCES BOULARDII 250 MG
250 CAPSULE ORAL 2 TIMES DAILY
Qty: 60 CAPSULE | Refills: 5 | Status: SHIPPED | OUTPATIENT
Start: 2018-01-24 | End: 2019-03-28

## 2018-01-24 RX ORDER — PANTOPRAZOLE SODIUM 40 MG/1
40 TABLET, DELAYED RELEASE ORAL DAILY
Qty: 30 TABLET | Refills: 5 | Status: SHIPPED | OUTPATIENT
Start: 2018-01-24 | End: 2018-09-10 | Stop reason: SDUPTHER

## 2018-01-24 RX ORDER — ERGOCALCIFEROL 1.25 MG/1
50000 CAPSULE ORAL WEEKLY
Qty: 13 CAPSULE | Refills: 1 | Status: SHIPPED | OUTPATIENT
Start: 2018-01-24 | End: 2020-10-09

## 2018-01-31 ENCOUNTER — OFFICE VISIT (OUTPATIENT)
Dept: FAMILY MEDICINE CLINIC | Facility: CLINIC | Age: 68
End: 2018-01-31

## 2018-01-31 VITALS
DIASTOLIC BLOOD PRESSURE: 82 MMHG | HEART RATE: 78 BPM | OXYGEN SATURATION: 87 % | BODY MASS INDEX: 39.75 KG/M2 | HEIGHT: 62 IN | SYSTOLIC BLOOD PRESSURE: 132 MMHG | TEMPERATURE: 97.8 F | WEIGHT: 216 LBS

## 2018-01-31 DIAGNOSIS — K63.1 SMALL BOWEL PERFORATION (HCC): ICD-10-CM

## 2018-01-31 DIAGNOSIS — E11.9 TYPE 2 DIABETES MELLITUS WITHOUT COMPLICATION, WITHOUT LONG-TERM CURRENT USE OF INSULIN (HCC): Primary | ICD-10-CM

## 2018-01-31 DIAGNOSIS — K55.069 OMENTAL INFARCTION (HCC): ICD-10-CM

## 2018-01-31 DIAGNOSIS — N17.9 AKI (ACUTE KIDNEY INJURY) (HCC): ICD-10-CM

## 2018-01-31 PROBLEM — E04.9 GOITER: Status: ACTIVE | Noted: 2017-06-15

## 2018-01-31 PROBLEM — Z98.890 HX OF LITHOTRIPSY: Status: ACTIVE | Noted: 2017-10-25

## 2018-01-31 PROBLEM — R79.89 LOW VITAMIN D LEVEL: Status: ACTIVE | Noted: 2017-02-08

## 2018-01-31 PROBLEM — J44.9 COPD (CHRONIC OBSTRUCTIVE PULMONARY DISEASE) (HCC): Status: ACTIVE | Noted: 2018-01-31

## 2018-01-31 PROBLEM — J44.9 OSA AND COPD OVERLAP SYNDROME (HCC): Status: ACTIVE | Noted: 2017-11-10

## 2018-01-31 PROBLEM — N17.0 ATN (ACUTE TUBULAR NECROSIS) (HCC): Status: ACTIVE | Noted: 2017-12-07

## 2018-01-31 PROBLEM — G47.33 OSA AND COPD OVERLAP SYNDROME (HCC): Status: ACTIVE | Noted: 2017-11-10

## 2018-01-31 PROBLEM — G62.9 NEUROPATHY: Status: ACTIVE | Noted: 2017-02-08

## 2018-01-31 PROBLEM — I50.23 ACUTE ON CHRONIC SYSTOLIC CHF (CONGESTIVE HEART FAILURE) (HCC): Status: ACTIVE | Noted: 2017-10-27

## 2018-01-31 PROCEDURE — 99214 OFFICE O/P EST MOD 30 MIN: CPT | Performed by: INTERNAL MEDICINE

## 2018-01-31 RX ORDER — SILDENAFIL CITRATE 20 MG/1
20 TABLET ORAL 3 TIMES DAILY
COMMUNITY
Start: 2018-01-11 | End: 2021-11-03 | Stop reason: SDUPTHER

## 2018-01-31 NOTE — PROGRESS NOTES
Subjective chief complaint is follow-up from hospitalization.  Mariangel Nguyen is a 67 y.o. female.     History of Present Illness   Mariangel is here today for follow-up.  On January 5 she was admitted to [facility] with an acute bowel obstruction.  There was some infarction and gangrene.  She had sepsis with Klebsiella.  She was on numerous pressors initially and required an intensive care unit stay.  She since has recovered.  She did have a V/Q lung scan consistent with a moderate sized defect is now on some Eliquis.  Her medications have been changed and we have reconciled these.  She has completed antibiotic therapy with Levaquin.  She likely no longer needs the probiotic.  Because of an elevated creatinine her metformin has been on hold.  She does report that she saw her kidney specialist the other day and he said her kidneys had improved.  Her ultrasound in December of the kidneys did not show any obstruction.  She does have a prior history of kidney stones.  Did develop some acute tubular necrosis perioperatively.  The following portions of the patient's history were reviewed and updated as appropriate: allergies, current medications, past family history, past medical history, past social history, past surgical history and problem list.  Current outpatient and discharge medications have been reconciled for the patient.  Waqar Burton MD    Review of Systems   Constitutional: Negative for chills and fever.   Respiratory: Negative for chest tightness and shortness of breath.    Cardiovascular: Negative for chest pain.   Gastrointestinal: Negative for abdominal distention, abdominal pain and diarrhea.       Objective   Physical Exam   Constitutional: She appears well-developed and well-nourished.   Eyes: No scleral icterus.   Cardiovascular: Normal rate and regular rhythm.    Pulmonary/Chest: Effort normal and breath sounds normal.   Abdominal: Soft. Bowel sounds are normal. There is no  tenderness. There is no rebound and no guarding.   Nursing note and vitals reviewed.      Assessment/Plan   Mariangel was seen today for follow-up.    Diagnoses and all orders for this visit:    Type 2 diabetes mellitus without complication, without long-term current use of insulin  -     Comprehensive Metabolic Panel  -     Hemoglobin A1c    BRODY (acute kidney injury)  -     CBC & Differential  -     Comprehensive Metabolic Panel    Small bowel perforation    Omental infarction     Mariangel is here today for follow-up.  We are going to check on status of her diabetes.  If her creatinine is above 1.5 we will not be able to use metformin.  It does appear that Tradjenta may be covered on her plan.

## 2018-02-01 LAB
ALBUMIN SERPL-MCNC: 4.1 G/DL (ref 3.6–4.8)
ALBUMIN/GLOB SERPL: 1.4 {RATIO} (ref 1.2–2.2)
ALP SERPL-CCNC: 88 IU/L (ref 39–117)
ALT SERPL-CCNC: 10 IU/L (ref 0–32)
AST SERPL-CCNC: 9 IU/L (ref 0–40)
BASOPHILS # BLD AUTO: 0 X10E3/UL (ref 0–0.2)
BASOPHILS NFR BLD AUTO: 0 %
BILIRUB SERPL-MCNC: 0.3 MG/DL (ref 0–1.2)
BUN SERPL-MCNC: 23 MG/DL (ref 8–27)
BUN/CREAT SERPL: 26 (ref 12–28)
CALCIUM SERPL-MCNC: 10.1 MG/DL (ref 8.7–10.3)
CHLORIDE SERPL-SCNC: 97 MMOL/L (ref 96–106)
CO2 SERPL-SCNC: 26 MMOL/L (ref 18–29)
CREAT SERPL-MCNC: 0.89 MG/DL (ref 0.57–1)
EOSINOPHIL # BLD AUTO: 0.4 X10E3/UL (ref 0–0.4)
EOSINOPHIL NFR BLD AUTO: 4 %
ERYTHROCYTE [DISTWIDTH] IN BLOOD BY AUTOMATED COUNT: 15.4 % (ref 12.3–15.4)
GFR SERPLBLD CREATININE-BSD FMLA CKD-EPI: 68 ML/MIN/1.73
GFR SERPLBLD CREATININE-BSD FMLA CKD-EPI: 79 ML/MIN/1.73
GLOBULIN SER CALC-MCNC: 3 G/DL (ref 1.5–4.5)
GLUCOSE SERPL-MCNC: 126 MG/DL (ref 65–99)
HBA1C MFR BLD: 6.5 % (ref 4.8–5.6)
HCT VFR BLD AUTO: 38.3 % (ref 34–46.6)
HGB BLD-MCNC: 11.5 G/DL (ref 11.1–15.9)
IMM GRANULOCYTES # BLD: 0 X10E3/UL (ref 0–0.1)
IMM GRANULOCYTES NFR BLD: 0 %
LYMPHOCYTES # BLD AUTO: 1.8 X10E3/UL (ref 0.7–3.1)
LYMPHOCYTES NFR BLD AUTO: 18 %
MCH RBC QN AUTO: 24.5 PG (ref 26.6–33)
MCHC RBC AUTO-ENTMCNC: 30 G/DL (ref 31.5–35.7)
MCV RBC AUTO: 82 FL (ref 79–97)
MONOCYTES # BLD AUTO: 0.7 X10E3/UL (ref 0.1–0.9)
MONOCYTES NFR BLD AUTO: 7 %
NEUTROPHILS # BLD AUTO: 7.2 X10E3/UL (ref 1.4–7)
NEUTROPHILS NFR BLD AUTO: 71 %
PLATELET # BLD AUTO: 582 X10E3/UL (ref 150–379)
POTASSIUM SERPL-SCNC: 3.9 MMOL/L (ref 3.5–5.2)
PROT SERPL-MCNC: 7.1 G/DL (ref 6–8.5)
RBC # BLD AUTO: 4.69 X10E6/UL (ref 3.77–5.28)
SODIUM SERPL-SCNC: 141 MMOL/L (ref 134–144)
WBC # BLD AUTO: 10 X10E3/UL (ref 3.4–10.8)

## 2018-03-12 ENCOUNTER — TELEPHONE (OUTPATIENT)
Dept: FAMILY MEDICINE CLINIC | Facility: CLINIC | Age: 68
End: 2018-03-12

## 2018-04-18 ENCOUNTER — OFFICE VISIT (OUTPATIENT)
Dept: FAMILY MEDICINE CLINIC | Facility: CLINIC | Age: 68
End: 2018-04-18

## 2018-04-18 VITALS
TEMPERATURE: 98.5 F | SYSTOLIC BLOOD PRESSURE: 118 MMHG | BODY MASS INDEX: 43.98 KG/M2 | OXYGEN SATURATION: 85 % | HEIGHT: 62 IN | DIASTOLIC BLOOD PRESSURE: 70 MMHG | HEART RATE: 80 BPM | WEIGHT: 239 LBS

## 2018-04-18 DIAGNOSIS — R09.02 HYPOXIA: ICD-10-CM

## 2018-04-18 DIAGNOSIS — J44.9 CHRONIC OBSTRUCTIVE PULMONARY DISEASE, UNSPECIFIED COPD TYPE (HCC): ICD-10-CM

## 2018-04-18 DIAGNOSIS — I27.20 PULMONARY HTN (HCC): Primary | ICD-10-CM

## 2018-04-18 PROCEDURE — 99213 OFFICE O/P EST LOW 20 MIN: CPT | Performed by: INTERNAL MEDICINE

## 2018-04-18 NOTE — PROGRESS NOTES
Subjective chief complaint is possible disability  Mariangel Nguyen is a 67 y.o. female.     History of Present Illness   Mariangel is here today for possible disability.  She still is considerably weak.  She has significant pulmonary hypertension.  Her oxygen level seems to be worse.  She is not on any home oxygen.  She is recently getting over a bout of bronchitis.  She was treated with a Z-Orion and some Tessalon Perles.  Apparently she was also given an inhaler but could not afford it.  She is seeing a diabetic specialist.  She had some recent laboratories performed.  She is still on the anemic side.  Her hemoglobin has improved from 8.6-10.3.  Her chest x-ray did show a enlarged heart.  It did show an enlargement of the pulmonary artery consistent with her pulmonary hypertension.  The following portions of the patient's history were reviewed and updated as appropriate: allergies, current medications, past medical history and problem list.    Review of Systems   Constitutional: Positive for fatigue.   Respiratory: Positive for shortness of breath.    Musculoskeletal: Positive for arthralgias.   Neurological: Positive for weakness.       Objective   Physical Exam   Constitutional:   She is overweight.   Eyes: Conjunctivae are normal. No scleral icterus.   Cardiovascular: Normal rate, regular rhythm and normal heart sounds.    Pulmonary/Chest: Effort normal and breath sounds normal. She has no wheezes. She has no rales.   Musculoskeletal: She exhibits edema.   She struggles to get up on the exam table.   Nursing note and vitals reviewed.        Assessment/Plan   Mariangel was seen today for medication problem.    Diagnoses and all orders for this visit:    Pulmonary HTN    Chronic obstructive pulmonary disease, unspecified COPD type    Hypoxia    Mariangel is here today for follow-up.  Her oxygen level still appears to be quite low.  She is struggling to get up on the exam table.  This minimal amount of effort seems to  cause incredible tachypnea.  I am going to prescribe some home oxygen.  I am going to give her an inhaler to use and see her back in one month.  I certainly do not think she can return to work.  We did discuss her following for disability.

## 2018-07-17 ENCOUNTER — TELEPHONE (OUTPATIENT)
Dept: FAMILY MEDICINE CLINIC | Facility: CLINIC | Age: 68
End: 2018-07-17

## 2018-07-17 NOTE — TELEPHONE ENCOUNTER
Patient would like to speak with you, in regards to she retired and has no insurance now but needs her BUMEX & POTASSIUM CHLORIDE. She asked for samples.    Please call her at her daughters cell #. She is aware your out of the office today.    DAUGHTER CHELLE #[phone].    Thank you.  I advised that I will refill the medications without an office visit.  We do not have samples.

## 2018-07-18 RX ORDER — POTASSIUM CHLORIDE 20MEQ/15ML
20 LIQUID (ML) ORAL DAILY
Qty: 473 ML | Refills: 5 | Status: SHIPPED | OUTPATIENT
Start: 2018-07-18 | End: 2018-07-23

## 2018-07-18 RX ORDER — BUMETANIDE 2 MG/1
2 TABLET ORAL DAILY
Qty: 30 TABLET | Refills: 5 | Status: SHIPPED | OUTPATIENT
Start: 2018-07-18 | End: 2020-10-09 | Stop reason: ALTCHOICE

## 2018-07-23 RX ORDER — POTASSIUM CHLORIDE 20 MEQ/1
20 TABLET, EXTENDED RELEASE ORAL DAILY
Qty: 30 TABLET | Refills: 5 | Status: SHIPPED | OUTPATIENT
Start: 2018-07-23 | End: 2019-02-11 | Stop reason: SDUPTHER

## 2018-09-11 RX ORDER — PANTOPRAZOLE SODIUM 40 MG/1
TABLET, DELAYED RELEASE ORAL
Qty: 30 TABLET | Refills: 5 | Status: SHIPPED | OUTPATIENT
Start: 2018-09-11 | End: 2019-03-28

## 2018-09-11 RX ORDER — BISOPROLOL FUMARATE 5 MG/1
TABLET, FILM COATED ORAL
Qty: 30 TABLET | Refills: 5 | Status: SHIPPED | OUTPATIENT
Start: 2018-09-11 | End: 2019-01-24

## 2018-09-11 RX ORDER — PRAVASTATIN SODIUM 40 MG
TABLET ORAL
Qty: 30 TABLET | Refills: 5 | Status: SHIPPED | OUTPATIENT
Start: 2018-09-11 | End: 2019-03-28

## 2019-01-24 ENCOUNTER — HOSPITAL ENCOUNTER (OUTPATIENT)
Dept: GENERAL RADIOLOGY | Facility: HOSPITAL | Age: 69
Discharge: HOME | End: 2019-01-24
Admitting: ORTHOPAEDIC SURGERY

## 2019-01-24 ENCOUNTER — HOSPITAL ENCOUNTER (OUTPATIENT)
Dept: GENERAL RADIOLOGY | Facility: HOSPITAL | Age: 69
Discharge: HOME | End: 2019-01-24

## 2019-01-24 ENCOUNTER — APPOINTMENT (OUTPATIENT)
Dept: PREADMISSION TESTING | Facility: HOSPITAL | Age: 69
End: 2019-01-24

## 2019-01-24 VITALS
BODY MASS INDEX: 46.56 KG/M2 | DIASTOLIC BLOOD PRESSURE: 87 MMHG | RESPIRATION RATE: 18 BRPM | SYSTOLIC BLOOD PRESSURE: 161 MMHG | HEART RATE: 65 BPM | HEIGHT: 62 IN | OXYGEN SATURATION: 93 % | WEIGHT: 253 LBS | TEMPERATURE: 97.2 F

## 2019-01-24 LAB
ANION GAP SERPL CALCULATED.3IONS-SCNC: 14.8 MMOL/L
BACTERIA UR QL AUTO: ABNORMAL /HPF
BILIRUB UR QL STRIP: NEGATIVE
BUN BLD-MCNC: 17 MG/DL (ref 8–23)
BUN/CREAT SERPL: 20.5 (ref 7–25)
CALCIUM SPEC-SCNC: 9.4 MG/DL (ref 8.6–10.5)
CHLORIDE SERPL-SCNC: 95 MMOL/L (ref 98–107)
CLARITY UR: CLEAR
CO2 SERPL-SCNC: 30.2 MMOL/L (ref 22–29)
COLOR UR: YELLOW
CREAT BLD-MCNC: 0.83 MG/DL (ref 0.57–1)
DEPRECATED RDW RBC AUTO: 45.5 FL (ref 37–54)
ERYTHROCYTE [DISTWIDTH] IN BLOOD BY AUTOMATED COUNT: 19.1 % (ref 11.7–13)
GFR SERPL CREATININE-BSD FRML MDRD: 69 ML/MIN/1.73
GLUCOSE BLD-MCNC: 150 MG/DL (ref 65–99)
GLUCOSE UR STRIP-MCNC: NEGATIVE MG/DL
HBA1C MFR BLD: 8.29 % (ref 4.8–5.6)
HCT VFR BLD AUTO: 34.2 % (ref 35.6–45.5)
HGB BLD-MCNC: 8.7 G/DL (ref 11.9–15.5)
HGB UR QL STRIP.AUTO: NEGATIVE
HYALINE CASTS UR QL AUTO: ABNORMAL /LPF
KETONES UR QL STRIP: NEGATIVE
LEUKOCYTE ESTERASE UR QL STRIP.AUTO: ABNORMAL
MCH RBC QN AUTO: 16.9 PG (ref 26.9–32)
MCHC RBC AUTO-ENTMCNC: 25.4 G/DL (ref 32.4–36.3)
MCV RBC AUTO: 66.3 FL (ref 80.5–98.2)
NITRITE UR QL STRIP: NEGATIVE
PH UR STRIP.AUTO: 7.5 [PH] (ref 5–8)
PLATELET # BLD AUTO: 534 10*3/MM3 (ref 140–500)
PMV BLD AUTO: 9 FL (ref 6–12)
POTASSIUM BLD-SCNC: 3.6 MMOL/L (ref 3.5–5.2)
PROT UR QL STRIP: ABNORMAL
RBC # BLD AUTO: 5.16 10*6/MM3 (ref 3.9–5.2)
RBC # UR: ABNORMAL /HPF
REF LAB TEST METHOD: ABNORMAL
SODIUM BLD-SCNC: 140 MMOL/L (ref 136–145)
SP GR UR STRIP: 1.01 (ref 1–1.03)
SQUAMOUS #/AREA URNS HPF: ABNORMAL /HPF
UROBILINOGEN UR QL STRIP: ABNORMAL
WBC NRBC COR # BLD: 9.43 10*3/MM3 (ref 4.5–10.7)
WBC UR QL AUTO: ABNORMAL /HPF

## 2019-01-24 PROCEDURE — 87186 SC STD MICRODIL/AGAR DIL: CPT | Performed by: ORTHOPAEDIC SURGERY

## 2019-01-24 PROCEDURE — 87086 URINE CULTURE/COLONY COUNT: CPT | Performed by: ORTHOPAEDIC SURGERY

## 2019-01-24 PROCEDURE — 83036 HEMOGLOBIN GLYCOSYLATED A1C: CPT | Performed by: ORTHOPAEDIC SURGERY

## 2019-01-24 PROCEDURE — 81001 URINALYSIS AUTO W/SCOPE: CPT | Performed by: ORTHOPAEDIC SURGERY

## 2019-01-24 PROCEDURE — 36415 COLL VENOUS BLD VENIPUNCTURE: CPT

## 2019-01-24 PROCEDURE — 87077 CULTURE AEROBIC IDENTIFY: CPT | Performed by: ORTHOPAEDIC SURGERY

## 2019-01-24 PROCEDURE — 80048 BASIC METABOLIC PNL TOTAL CA: CPT | Performed by: ORTHOPAEDIC SURGERY

## 2019-01-24 PROCEDURE — 71046 X-RAY EXAM CHEST 2 VIEWS: CPT

## 2019-01-24 PROCEDURE — 85027 COMPLETE CBC AUTOMATED: CPT | Performed by: ORTHOPAEDIC SURGERY

## 2019-01-24 PROCEDURE — 73560 X-RAY EXAM OF KNEE 1 OR 2: CPT

## 2019-01-24 RX ORDER — CHLORHEXIDINE GLUCONATE 500 MG/1
1 CLOTH TOPICAL TAKE AS DIRECTED
Status: ON HOLD | COMMUNITY
End: 2019-04-08

## 2019-01-24 ASSESSMENT — KOOS JR
KOOS JR SCORE: 20.941
KOOS JR SCORE: 25

## 2019-01-24 NOTE — DISCHARGE INSTRUCTIONS
Take the following medications the morning of surgery with a small sip of water: BRING MEDS AM OF SURGERY    ARRIVE AT 8AM    General Instructions:  • Do not eat solid food after midnight the night before surgery.  • You may drink clear liquids day of surgery but must stop at least one hour before your hospital arrival time.  • It is beneficial for you to have a clear drink that contains carbohydrates the day of surgery.  We suggest a 12 to 20 ounce bottle of Gatorade or Powerade for non-diabetic patients or a 12 to 20 ounce bottle of G2 or Powerade Zero for diabetic patients. (Pediatric patients, are not advised to drink a 12 to 20 ounce carbohydrate drink)    Clear liquids are liquids you can see through.  Nothing red in color.     Plain water                               Sports drinks  Sodas                                   Gelatin (Jell-O)  Fruit juices without pulp such as white grape juice and apple juice  Popsicles that contain no fruit or yogurt  Tea or coffee (no cream or milk added)  Gatorade / Powerade  G2 / Powerade Zero    • Infants may have breast milk up to four hours before surgery.  • Infants drinking formula may drink formula up to six hours before surgery.   • Patients who avoid smoking, chewing tobacco and alcohol for 4 weeks prior to surgery have a reduced risk of post-operative complications.  Quit smoking as many days before surgery as you can.  • Do not smoke, use chewing tobacco or drink alcohol the day of surgery.   • If applicable bring your C-PAP/ BI-PAP machine.  • Bring any papers given to you in the doctor’s office.  • Wear clean comfortable clothes and socks.  • Do not wear contact lenses or make-up.  Bring a case for your glasses.   • Bring crutches or walker if applicable.  • Remove all piercings.  Leave jewelry and any other valuables at home.  • Hair extensions with metal clips must be removed prior to surgery.  • The Pre-Admission Testing nurse will instruct you to bring  medications if unable to obtain an accurate list in Pre-Admission Testing.            Preventing a Surgical Site Infection:  • For 2 to 3 days before surgery, avoid shaving with a razor because the razor can irritate skin and make it easier to develop an infection.    • Any areas of open skin can increase the risk of a post-operative wound infection by allowing bacteria to enter and travel throughout the body.  Notify your surgeon if you have any skin wounds / rashes even if it is not near the expected surgical site.  The area will need assessed to determine if surgery should be delayed until it is healed.  • The night prior to surgery sleep in a clean bed with clean clothing.  Do not allow pets to sleep with you.  • Shower on the morning of surgery using a fresh bar of anti-bacterial soap (such as Dial) and clean washcloth.  Dry with a clean towel and dress in clean clothing.  • Ask your surgeon if you will be receiving antibiotics prior to surgery.  • Make sure you, your family, and all healthcare providers clean their hands with soap and water or an alcohol based hand  before caring for you or your wound.    Day of surgery:  Upon arrival, a Pre-op nurse and Anesthesiologist will review your health history, obtain vital signs, and answer questions you may have.  The only belongings needed at this time will be your home medications and if applicable your C-PAP/BI-PAP machine.  If you are staying overnight your family can leave the rest of your belongings in the car and bring them to your room later.  A Pre-op nurse will start an IV and you may receive medication in preparation for surgery, including something to help you relax.  Your family will be able to see you in the Pre-op area.  While you are in surgery your family should notify the waiting room  if they leave the waiting room area and provide a contact phone number.    Please be aware that surgery does come with discomfort.  We want to  make every effort to control your discomfort so please discuss any uncontrolled symptoms with your nurse.   Your doctor will most likely have prescribed pain medications.      If you are going home after surgery you will receive individualized written care instructions before being discharged.  A responsible adult must drive you to and from the hospital on the day of your surgery and stay with you for 24 hours.    If you are staying overnight following surgery, you will be transported to your hospital room following the recovery period.  [facility] has all private rooms.    You have received a list of surgical assistants for your reference.  If you have any questions please call Pre-Admission Testing at [phone].  Deductibles and co-payments are collected on the day of service. Please be prepared to pay the required co-pay, deductible or deposit on the day of service as defined by your plan.    2% CHLORAHEXIDINE GLUCONATE* CLOTH  Preparing or “prepping” skin before surgery can reduce the risk of infection at the surgical site. To make the process easier, [facility] has chosen disposable cloths moistened with a rinse-free, 2% Chlorhexidine Gluconate (CHG) antiseptic solution. The steps below outline the prepping process and should be carefully followed.        Use the prep cloth on the area that is circled in the diagram             Directions Night before Surgery  1) Shower using a fresh bar of anti-bacterial soap (such as Dial) and clean washcloth.  Use a clean towel to completely dry your skin.  2) Do not use any lotions, oils or creams on your skin.  3) Open the package and remove 1 cloth, wipe your skin for 30 seconds in a circular motion.  Allow to dry for 3 minutes.  4) Repeat #3 with second cloth.  5) Do not touch your eyes, ears, or mouth with the prep cloth.  6) Allow the wet prep solution to air dry.  7) Discard the prep cloth and wash your hands with soap and water.    8) Dress in clean bed clothes and sleep on fresh clean bed sheets.   9) You may experience some temporary itching after the prep.    Directions Day of Surgery  1) Repeat steps 1,2,3,4,5,6,7, and 9.   2) Dress in clean clothes before coming to the hospital.    BACTROBAN NASAL OINTMENT  There are many germs normally in your nose. Bactroban is an ointment that will help reduce these germs. Please follow these instructions for Bactroban use:      ____The day before surgery in the morning  Date________    ____The day before surgery in the evening              Date________    ____The day of surgery in the morning    Date________    **Squirt ½ package of Bactroban Ointment onto a cotton applicator and apply to inside of 1st nostril.  Squirt the remaining Bactroban and apply to the inside of the other nostril.

## 2019-01-26 LAB — BACTERIA SPEC AEROBE CULT: ABNORMAL

## 2019-02-04 ENCOUNTER — OFFICE VISIT (OUTPATIENT)
Dept: FAMILY MEDICINE CLINIC | Facility: CLINIC | Age: 69
End: 2019-02-04

## 2019-02-04 VITALS
DIASTOLIC BLOOD PRESSURE: 76 MMHG | SYSTOLIC BLOOD PRESSURE: 118 MMHG | OXYGEN SATURATION: 93 % | HEIGHT: 62 IN | TEMPERATURE: 97.4 F | BODY MASS INDEX: 46.27 KG/M2 | HEART RATE: 66 BPM

## 2019-02-04 DIAGNOSIS — F32.A ANXIETY AND DEPRESSION: Primary | ICD-10-CM

## 2019-02-04 DIAGNOSIS — F41.9 ANXIETY AND DEPRESSION: Primary | ICD-10-CM

## 2019-02-04 DIAGNOSIS — M17.0 PRIMARY OSTEOARTHRITIS OF BOTH KNEES: ICD-10-CM

## 2019-02-04 PROCEDURE — 99214 OFFICE O/P EST MOD 30 MIN: CPT | Performed by: INTERNAL MEDICINE

## 2019-02-04 RX ORDER — LEVOTHYROXINE SODIUM 0.03 MG/1
25 TABLET ORAL DAILY
COMMUNITY
End: 2020-04-20 | Stop reason: SDUPTHER

## 2019-02-04 RX ORDER — ESCITALOPRAM OXALATE 10 MG/1
10 TABLET ORAL DAILY
Qty: 30 TABLET | Refills: 5 | Status: SHIPPED | OUTPATIENT
Start: 2019-02-04 | End: 2019-03-11 | Stop reason: SDUPTHER

## 2019-02-04 RX ORDER — OXYCODONE HYDROCHLORIDE AND ACETAMINOPHEN 5; 325 MG/1; MG/1
1 TABLET ORAL EVERY 4 HOURS PRN
Qty: 18 TABLET | Refills: 0 | Status: ON HOLD | OUTPATIENT
Start: 2019-02-04 | End: 2019-04-08

## 2019-02-04 NOTE — PROGRESS NOTES
Subjective chief complaint is depression  Mariangel Nguyen is a 68 y.o. female.     History of Present Illness   Mariangel is here today for complaints of depression.  Her daughter is with her.  Her daughter said her mothers usually the happiest person that she knows.  However she recently had to stop working because of heart conditions and knee problems.  She worked in the same place for 32 years Mrs. the people that she worked with.  Her daughter reports that she is more angry and agitated since this happened.  He is going to be having some knee surgery.  She has bone-on-bone crepitation of both knees.  She is going to have her right knee done first.  She has been cleared by her lung doctor and cardiologist.  Past medical history is remarkable for cardiomyopathy and pulmonary hypertension.  The following portions of the patient's history were reviewed and updated as appropriate: allergies, current medications, past medical history and problem list.    Review of Systems   Respiratory: Positive for shortness of breath. Negative for chest tightness.    Cardiovascular: Negative for chest pain.   Musculoskeletal: Positive for gait problem and joint swelling.   Psychiatric/Behavioral: Positive for agitation, dysphoric mood and depressed mood.       Objective   Physical Exam   Constitutional: She is oriented to person, place, and time. She appears well-developed and well-nourished.   Cardiovascular: Normal rate, regular rhythm and normal heart sounds.   Pulmonary/Chest: Effort normal and breath sounds normal.   Musculoskeletal:   There is bilateral significant bone-on-bone crepitation.  There is tenderness of the medial and lateral joint lines bilaterally.   Neurological: She is alert and oriented to person, place, and time.   Nursing note and vitals reviewed.        Assessment/Plan   Mariangel was seen today for depression.    Diagnoses and all orders for this visit:    Anxiety and depression    Primary osteoarthritis of  both knees    Other orders  -     escitalopram (LEXAPRO) 10 MG tablet; Take 1 tablet by mouth Daily.  -     oxyCODONE-acetaminophen (PERCOCET) 5-325 MG per tablet; Take 1 tablet by mouth Every 4 (Four) Hours As Needed for Severe Pain .     Mariangel is here today for evaluation of depression.  I am going to prescribe some generic Lexapro.  She really has no alternatives to treat her severe osteoarthritis other than narcotic medications.  She is going to be very judicious with this.  She is going to try not to use more than one Percocet per day.  I am going to give her prescription for 18 tablets.  She has signed the appropriate agreement.

## 2019-02-06 ENCOUNTER — CONSULT (OUTPATIENT)
Dept: ONCOLOGY | Facility: CLINIC | Age: 69
End: 2019-02-06

## 2019-02-06 ENCOUNTER — APPOINTMENT (OUTPATIENT)
Dept: LAB | Facility: HOSPITAL | Age: 69
End: 2019-02-06

## 2019-02-06 VITALS
BODY MASS INDEX: 45.32 KG/M2 | HEART RATE: 103 BPM | DIASTOLIC BLOOD PRESSURE: 98 MMHG | WEIGHT: 246.3 LBS | RESPIRATION RATE: 18 BRPM | HEIGHT: 62 IN | TEMPERATURE: 98.3 F | OXYGEN SATURATION: 93 % | SYSTOLIC BLOOD PRESSURE: 148 MMHG

## 2019-02-06 DIAGNOSIS — D50.9 MICROCYTIC ANEMIA: ICD-10-CM

## 2019-02-06 DIAGNOSIS — I82.432 ACUTE DEEP VEIN THROMBOSIS (DVT) OF POPLITEAL VEIN OF LEFT LOWER EXTREMITY (HCC): Primary | ICD-10-CM

## 2019-02-06 DIAGNOSIS — D75.839 THROMBOCYTOSIS: ICD-10-CM

## 2019-02-06 DIAGNOSIS — D50.8 OTHER IRON DEFICIENCY ANEMIA: Primary | ICD-10-CM

## 2019-02-06 LAB
BASOPHILS # BLD AUTO: 0.09 10*3/MM3 (ref 0–0.1)
BASOPHILS NFR BLD AUTO: 0.9 % (ref 0–1.1)
DAT POLY-SP REAG RBC QL: NEGATIVE
DEPRECATED RDW RBC AUTO: 44.2 FL (ref 37–49)
EOSINOPHIL # BLD AUTO: 0.21 10*3/MM3 (ref 0–0.36)
EOSINOPHIL NFR BLD AUTO: 2 % (ref 1–5)
ERYTHROCYTE [DISTWIDTH] IN BLOOD BY AUTOMATED COUNT: 20.5 % (ref 11.7–14.5)
ERYTHROCYTE [SEDIMENTATION RATE] IN BLOOD: 30 MM/HR (ref 0–30)
F5 GENE MUT ANL BLD/T: NORMAL
FACTOR II, DNA ANALYSIS: NORMAL
FOLATE SERPL-MCNC: >20 NG/ML (ref 4.78–24.2)
HAPTOGLOB SERPL-MCNC: 324 MG/DL (ref 30–200)
HCT VFR BLD AUTO: 32.6 % (ref 34–45)
HGB BLD-MCNC: 8.6 G/DL (ref 11.5–14.9)
HGB RETIC QN AUTO: 18.2 PG (ref 29.8–36.1)
IMM GRANULOCYTES # BLD AUTO: 0.11 10*3/MM3 (ref 0–0.03)
IMM GRANULOCYTES NFR BLD AUTO: 1.1 % (ref 0–0.5)
IMM RETICS NFR: 36.8 % (ref 3–15.8)
LYMPHOCYTES # BLD AUTO: 1.73 10*3/MM3 (ref 1–3.5)
LYMPHOCYTES NFR BLD AUTO: 16.6 % (ref 20–49)
MCH RBC QN AUTO: 16.8 PG (ref 27–33)
MCHC RBC AUTO-ENTMCNC: 26.4 G/DL (ref 32–35)
MCV RBC AUTO: 63.5 FL (ref 83–97)
MONOCYTES # BLD AUTO: 0.88 10*3/MM3 (ref 0.25–0.8)
MONOCYTES NFR BLD AUTO: 8.4 % (ref 4–12)
NEUTROPHILS # BLD AUTO: 7.41 10*3/MM3 (ref 1.5–7)
NEUTROPHILS NFR BLD AUTO: 71 % (ref 39–75)
NRBC BLD AUTO-RTO: 0.3 /100 WBC (ref 0–0)
PLATELET # BLD AUTO: 694 10*3/MM3 (ref 150–375)
PMV BLD AUTO: 8.6 FL (ref 8.9–12.1)
RBC # BLD AUTO: 5.13 10*6/MM3 (ref 3.9–5)
RETICS/RBC NFR AUTO: 1.88 % (ref 0.6–2)
VIT B12 BLD-MCNC: 380 PG/ML (ref 211–946)
WBC NRBC COR # BLD: 10.43 10*3/MM3 (ref 4–10)

## 2019-02-06 PROCEDURE — 99205 OFFICE O/P NEW HI 60 MIN: CPT | Performed by: INTERNAL MEDICINE

## 2019-02-06 PROCEDURE — 85046 RETICYTE/HGB CONCENTRATE: CPT | Performed by: INTERNAL MEDICINE

## 2019-02-06 PROCEDURE — 80053 COMPREHEN METABOLIC PANEL: CPT | Performed by: INTERNAL MEDICINE

## 2019-02-06 PROCEDURE — 83540 ASSAY OF IRON: CPT | Performed by: INTERNAL MEDICINE

## 2019-02-06 PROCEDURE — 82607 VITAMIN B-12: CPT | Performed by: INTERNAL MEDICINE

## 2019-02-06 PROCEDURE — 83615 LACTATE (LD) (LDH) ENZYME: CPT | Performed by: INTERNAL MEDICINE

## 2019-02-06 PROCEDURE — 82728 ASSAY OF FERRITIN: CPT | Performed by: INTERNAL MEDICINE

## 2019-02-06 PROCEDURE — 36416 COLLJ CAPILLARY BLOOD SPEC: CPT | Performed by: INTERNAL MEDICINE

## 2019-02-06 PROCEDURE — 85652 RBC SED RATE AUTOMATED: CPT | Performed by: INTERNAL MEDICINE

## 2019-02-06 PROCEDURE — 85300 ANTITHROMBIN III ACTIVITY: CPT | Performed by: INTERNAL MEDICINE

## 2019-02-06 PROCEDURE — 85306 CLOT INHIBIT PROT S FREE: CPT | Performed by: INTERNAL MEDICINE

## 2019-02-06 PROCEDURE — 85303 CLOT INHIBIT PROT C ACTIVITY: CPT | Performed by: INTERNAL MEDICINE

## 2019-02-06 PROCEDURE — 82746 ASSAY OF FOLIC ACID SERUM: CPT | Performed by: INTERNAL MEDICINE

## 2019-02-06 PROCEDURE — 84466 ASSAY OF TRANSFERRIN: CPT | Performed by: INTERNAL MEDICINE

## 2019-02-06 PROCEDURE — 83010 ASSAY OF HAPTOGLOBIN QUANT: CPT | Performed by: INTERNAL MEDICINE

## 2019-02-06 PROCEDURE — 36415 COLL VENOUS BLD VENIPUNCTURE: CPT | Performed by: INTERNAL MEDICINE

## 2019-02-06 PROCEDURE — 86880 COOMBS TEST DIRECT: CPT | Performed by: INTERNAL MEDICINE

## 2019-02-06 PROCEDURE — 85025 COMPLETE CBC W/AUTO DIFF WBC: CPT | Performed by: INTERNAL MEDICINE

## 2019-02-07 LAB
ALBUMIN SERPL-MCNC: 4.2 G/DL (ref 3.5–5.2)
ALBUMIN/GLOB SERPL: 1.2 G/DL (ref 1.1–2.4)
ALP SERPL-CCNC: 91 U/L (ref 38–116)
ALT SERPL W P-5'-P-CCNC: 11 U/L (ref 0–33)
ANION GAP SERPL CALCULATED.3IONS-SCNC: 15.8 MMOL/L
AST SERPL-CCNC: 14 U/L (ref 0–32)
BILIRUB SERPL-MCNC: 0.4 MG/DL (ref 0.1–1.2)
BUN BLD-MCNC: 15 MG/DL (ref 6–20)
BUN/CREAT SERPL: 20.3 (ref 7.3–30)
CALCIUM SPEC-SCNC: 9.9 MG/DL (ref 8.5–10.2)
CARDIOLIPIN IGG SER IA-ACNC: <9 GPL U/ML (ref 0–14)
CARDIOLIPIN IGM SER IA-ACNC: <9 MPL U/ML (ref 0–12)
CHLORIDE SERPL-SCNC: 96 MMOL/L (ref 98–107)
CO2 SERPL-SCNC: 27.2 MMOL/L (ref 22–29)
CREAT BLD-MCNC: 0.74 MG/DL (ref 0.6–1.1)
FERRITIN SERPL-MCNC: <5 NG/ML
GFR SERPL CREATININE-BSD FRML MDRD: 79 ML/MIN/1.73
GLOBULIN UR ELPH-MCNC: 3.4 GM/DL (ref 1.8–3.5)
GLUCOSE BLD-MCNC: 123 MG/DL (ref 74–124)
IRON 24H UR-MRATE: 18 MCG/DL (ref 37–145)
IRON SATN MFR SERPL: 3 % (ref 14–48)
LDH SERPL-CCNC: 182 U/L (ref 99–259)
POTASSIUM BLD-SCNC: 3.8 MMOL/L (ref 3.5–4.7)
PROT SERPL-MCNC: 7.6 G/DL (ref 6.3–8)
SODIUM BLD-SCNC: 139 MMOL/L (ref 134–145)
TIBC SERPL-MCNC: 584 MCG/DL (ref 249–505)
TRANSFERRIN SERPL-MCNC: 417 MG/DL (ref 200–360)

## 2019-02-07 NOTE — PROGRESS NOTES
.     REASON FOR CONSULTATION:  Microcytic anemia, thrombocytosis  Provide an opinion on any further workup or treatment                             REQUESTING PHYSICIAN: Farzad Luong MD    RECORDS OBTAINED:  Records of the patients history including those obtained from the referring provider were reviewed and summarized in detail.    HISTORY OF PRESENT ILLNESS:  The patient is a 68 y.o. year old female who is here for an initial visit with the above-mentioned history.  The patient is accompanied by her daughter and grandson today.  The patient is a poor historian.  She has a history of CHF, SVT, diabetes mellitus type 2, obesity, obstructive sleep apnea, pulmonary hypertension, nephrolithiasis, COPD.  She had a previous abdominal surgery for hysterectomy with repair of her colon in 2001.  She developed an incarcerated abdominal hernia that required repair in late 2017 and subsequently was admitted in the [facility] system in early December 2017 with sepsis.  At that time, she was found to have on 12/1/17 DVT of the popliteal and calf veins on the left and on 12/2/17 had a VQ scan showing intermediate probability of pulmonary embolism.  She was anticoagulated and has continued on oral Eliquis.    The patient appears to have had a long-standing history of anemia.  Around the time of her hospitalization in late 2017, she had evidence of anemia with hemoglobin in the 7-80 range with a normal MCV, normal WBC.  She did have thrombocytosis with a platelet count in the high 400s to low 500,000 range.  Subsequent labs from 4/10/18 showed a hemoglobin of 10.5 with MCV that had declined to 77.2, platelet count of 498,000.  9/21/18, hemoglobin was 9.3 with MCV 71.7 and platelet count 568,000.  CBC 12/28/18 with hemoglobin 9.0, MCV 68.4, reticulocyte count 564,000.    The patient is currently being evaluated by Dr. Luong in orthopedics for a right total knee arthroplasty in the near future.  Labs on 1/24/19 showed a  hemoglobin that had declined further to 8.7 with MCV 60.3, platelet count 534,000.  She was referred to our office for additional evaluation prior to proceeding with knee surgery.  She has seen cardiology recently, Dr. Marx, who felt that she was able to undergo surgery.    The patient does know today that she has a family history of thrombosis with a daughter who has had DVT and pulmonary embolism and apparently has had an IVC filter placed.  She began with thrombosis in her 30s she believes.  She is unaware of any details.    The patient today reports continued difficulty with arthritic pain in her right knee and does ambulate with the use of a walker.  She has some chronic dyspnea on exertion.  She does have some chronic fatigue.  She denies evidence of any visible blood in her stool.  She has never undergone a colonoscopy or upper endoscopy in the past.        Past Medical History:   Diagnosis Date   • Arthritis     OSTEOARTHRITIS KNEES   • CHF (congestive heart failure) (CMS/HCC)    • COPD (chronic obstructive pulmonary disease) (CMS/HCC)    • Diabetes mellitus (CMS/HCC)    • Disease of thyroid gland    • H/O Abnormal Pap smear of cervix    • History of DVT (deep vein thrombosis)    • History of kidney stones    • History of pulmonary embolus (PE)    • History of sepsis    • History of small bowel obstruction    • History of snoring    • Hyperlipidemia    • Hypertension    • Knee pain, bilateral    • Neuropathy    • Pulmonary hypertension (CMS/HCC)    • Sleep apnea      Past Surgical History:   Procedure Laterality Date   • ABDOMINAL SURGERY  11/2017    Delayed closure of abdominal wound-wound vac placement, Dr. Nestor Howe   • BLADDER SURGERY      BLADDER LIFT, 2011   • CARDIAC CATHETERIZATION  2017    Normal coronary arteries, normal overall LVSF with wall motion abnormality   • COLON SURGERY      RECONSTRUCTION, 2001   • CYSTOSCOPY URETEROSCOPY LASER LITHOTRIPSY Right 10/23/2017    Procedure: CYSTO RT  URETEROSCOPY LASER  STONE BASKETING RIGHT STENT EXCHANGE;  Surgeon: Quinn Cason MD;  Location: [facility] OR;  Service:    • EXPLORATORY LAPAROTOMY     • HYSTERECTOMY      2001   • ROTATOR CUFF REPAIR Right 2016   • STEROID INJECTION KNEE      Had left knee injection 10/09/2018; right knee injection 10/02/2017       MEDICATIONS    Current Outpatient Medications:   •  apixaban (ELIQUIS) 5 MG tablet tablet, Take 1 tablet by mouth 2 (Two) Times a Day., Disp: 60 tablet, Rfl: 5  •  bumetanide (BUMEX) 2 MG tablet, Take 1 tablet by mouth Daily., Disp: 30 tablet, Rfl: 5  •  escitalopram (LEXAPRO) 10 MG tablet, Take 1 tablet by mouth Daily., Disp: 30 tablet, Rfl: 5  •  levothyroxine (SYNTHROID, LEVOTHROID) 25 MCG tablet, Take 25 mcg by mouth Daily., Disp: , Rfl:   •  mupirocin (BACTROBAN) 2 % nasal ointment, into the nostril(s) as directed by provider Take As Directed., Disp: , Rfl:   •  ONE TOUCH ULTRA TEST test strip, , Disp: , Rfl:   •  ONETOUCH DELICA LANCETS 33G misc, , Disp: , Rfl:   •  oxyCODONE-acetaminophen (PERCOCET) 5-325 MG per tablet, Take 1 tablet by mouth Every 4 (Four) Hours As Needed for Severe Pain ., Disp: 18 tablet, Rfl: 0  •  potassium chloride (K-DUR,KLOR-CON) 20 MEQ CR tablet, Take 1 tablet by mouth Daily. May substitute capsules, Disp: 30 tablet, Rfl: 5  •  pravastatin (PRAVACHOL) 40 MG tablet, TAKE ONE TABLET BY MOUTH EVERY NIGHT, Disp: 30 tablet, Rfl: 5  •  vitamin D (ERGOCALCIFEROL) 78856 units capsule capsule, Take 1 capsule by mouth 1 (One) Time Per Week., Disp: 13 capsule, Rfl: 1  •  Chlorhexidine Gluconate Cloth 2 % pads, Apply  topically Take As Directed., Disp: , Rfl:   •  pantoprazole (PROTONIX) 40 MG EC tablet, TAKE ONE TABLET BY MOUTH ONCE DAILY, Disp: 30 tablet, Rfl: 5  •  saccharomyces boulardii (FLORASTOR) 250 MG capsule, Take 1 capsule by mouth 2 (Two) Times a Day., Disp: 60 capsule, Rfl: 5  •  sildenafil (REVATIO) 20 MG tablet, Take 20 mg by mouth 3 (Three) Times a  "Day., Disp: , Rfl:     ALLERGIES:   No Known Allergies    SOCIAL HISTORY:       Social History     Socioeconomic History   • Marital status: Single     Spouse name: Not on file   • Number of children: Not on file   • Years of education: Not on file   • Highest education level: Not on file   Social Needs   • Financial resource strain: Not on file   • Food insecurity - worry: Not on file   • Food insecurity - inability: Not on file   • Transportation needs - medical: Not on file   • Transportation needs - non-medical: Not on file   Occupational History     Employer: [employer]   Tobacco Use   • Smoking status: Never Smoker   • Smokeless tobacco: Former User   Substance and Sexual Activity   • Alcohol use: No   • Drug use: No   • Sexual activity: Defer   Other Topics Concern   • Not on file   Social History Narrative   • Not on file         FAMILY HISTORY:  Family History   Problem Relation Age of Onset   • Cancer Other    • Stroke Other    • Other Other         LUPUS ANTICOAGULANT   • Rheum arthritis Other    • Heart disease Other    • Heart attack Other    • Lupus Mother    • Stomach cancer Father    • Malig Hyperthermia Neg Hx        REVIEW OF SYSTEMS:  A comprehensive review of systems was performed and was negative except as mentioned above in the HPI.         Vitals:    02/06/19 1557   BP: 148/98   Pulse: 103   Resp: 18   Temp: 98.3 °F (36.8 °C)   TempSrc: Oral   SpO2: 93%   Weight: 112 kg (246 lb 4.8 oz)   Height: 157.5 cm (62\")   PainSc:   9   PainLoc: Knee     Current Status 2/6/2019   ECOG score 1       Physical Exam   Constitutional: She is oriented to person, place, and time. She appears well-developed and well-nourished.   Patient ambulates with the use of a walker   HENT:   Mouth/Throat: Oropharynx is clear and moist.   Eyes: Conjunctivae are normal.   Neck: No thyromegaly present.   Cardiovascular: Normal rate and regular rhythm. Exam reveals no gallop and no friction rub.   No murmur " heard.  Pulmonary/Chest: No respiratory distress.   Decreased breath sounds bilaterally   Abdominal: Soft. Bowel sounds are normal. She exhibits no distension. There is no tenderness.   Midline surgical scar is noted.   Musculoskeletal: She exhibits edema.   Trace bilateral lower extremity edema   Lymphadenopathy:        Head (right side): No submandibular adenopathy present.     She has no cervical adenopathy.     She has no axillary adenopathy.        Right: No inguinal and no supraclavicular adenopathy present.        Left: No inguinal and no supraclavicular adenopathy present.   Neurological: She is alert and oriented to person, place, and time. She displays normal reflexes. No cranial nerve deficit. She exhibits normal muscle tone.   Skin: Skin is warm and dry. No rash noted.   Psychiatric: She has a normal mood and affect. Her behavior is normal.       RECENT LABS:        WBC   Date Value Ref Range Status   02/06/2019 10.43 (H) 4.00 - 10.00 10*3/mm3 Final   04/10/2018 7.3 4.5 - 11.0 10*3/uL Final     Hemoglobin   Date Value Ref Range Status   02/06/2019 8.6 (L) 11.5 - 14.9 g/dL Final   04/10/2018 10.5 (L) 12.0 - 16.0 g/dL Final     Platelets   Date Value Ref Range Status   02/06/2019 694 (H) 150 - 375 10*3/mm3 Final   04/10/2018 498 (H) 150 - 450 10*3/uL Final     Peripheral blood smear: I did review the patient's peripheral blood smear under the microscope today.  RBCs were microcytic and hypochromic with a few scattered elliptocytes noted.  WBCs with normal appearance and differential.  Platelets present in increased numbers with normal appearance.      Assessment/Plan     1. Anemia with severe microcytic indices:  · The patient has had a long-standing degree of anemia.  Anemia appears to have worsened following her hospitalization for bowel obstruction and sepsis in late 2017.  Previous hemoglobin in the 7-8 range during hospitalization in late 2017, subsequent increase into the 9-10 range in early 2018  with gradual decline into the current range 8-9.  Hemoglobin today 8.6.  · Over the past year, the patient has developed declining MCV which has gradually decreased to the current value of 63.5.  Suggestive of worsening iron deficiency.  · Patient has had a concurrent thrombocytosis with platelet count in the high 400-500,000 range, currently increased to 694,000.  Suspect related to underlying iron deficiency.  · There has been no visible evidence of GI blood loss however patient remains on chronic anticoagulation with Eliquis.  She has never undergone EGD or colonoscopy.  · We will send off an anemia evaluation today with iron panel, ferritin, reticulated hemoglobin, B12, folate.  We will also rule out any evidence of hemolysis with REBECCA, haptoglobin, LDH.  There is no evidence of underlying renal insufficiency as a contributing factor.  No obvious underlying inflammatory state/chronic disease.  We will check an ESR.  · I have asked the patient collect stool cards for occult blood ×3.  Given the suspicion for underlying iron deficiency she is being referred to GI to consider endoscopic evaluation with EGD and colonoscopy.  · In order to facilitate rapid correction of her iron deficiency for upcoming knee surgery, I have recommended that we administer IV iron in the form of Injectafer 750mg x 2 doses 1 week apart.  This is of course pending the patient's iron panel and ferritin results from today.  We will tentatively plan to begin iron infusion next week.  Anticipate improvement in hemoglobin within 2-3 weeks of initiation of IV iron.  2. Thrombocytosis:  · As above, the patient has had a platelet count in the high 400-500,000 range since late 2017.  This has progressively increased to the current value of 694.  · No obvious ongoing inflammatory state.  We will check an ESR today.  · Concurrent development of progressive microcytic anemia, suspect worsening iron deficiency.  · Thrombocytosis likely related to  progressive iron deficiency.  We will monitor platelet count after administration of IV iron in correction of hemoglobin.  If platelet count does not improve consider evaluation for underlying myeloproliferative disorder.  3. Left lower extremity DVT (popliteal/calf) with suspected pulmonary embolism:  · Patient was admitted in November/December 2017 with small bowel perforation requiring abdominal surgery and subsequent sepsis  · Lower extremity Doppler 12/1/17 with popliteal and calf DVT on the left  · V/Q scan 12/2/17 with intermediate probability for pulmonary embolism  · Patient was anticoagulated and has continued on treatment with Eliquis 5mg bid.  She is now over one year out from her thrombosis that was provoked.  · Patient reports that her daughter developed DVT and pulmonary embolism in her 30s and apparently has an IVC filter in place.  She is a poor historian, accuracy is unknown.  Unclear whether her daughter has been evaluated with hypercoagulable labs.  · Given the family history, we will proceed today with hypercoagulable evaluation with factor V Leiden, prothrombin gene mutation, protein C activity, protein S activity, antithrombin III, lupus anticoagulant, anticardiolipin antibodies, anti-beta 2G P1 antibodies.  · We will follow up results from the hypercoagulable evaluation when the patient returns in 3 weeks.  If there is no identifiable underlying hypercoagulable risk, we will discuss potential discontinuation of anticoagulation, especially if there is evidence of ongoing GI blood loss.  · We did discuss concern regarding risk for recurrent thrombosis following anticipated future right knee replacement surgery and she likely will require a brief period of anticoagulation until she is fully ambulatory.    Plan:  1. Anemia evaluation today with iron panel, ferritin, B12, folate, ESR, LDH, REBECCA, haptoglobin, reticulated hemoglobin  2. Hypercoagulable evaluation today with Dr. Hilary Teague, prothrombin  gene mutation, protein C activity, protein S activity, antithrombin III, his anticoagulant, anticardiolipin antibodies, anti-beta 2G P1 antibodies  3. Continue on Eliquis 5mg bid for now  4. The patient will collect and return stool cards for occult blood ×3  5. Referral to GI to consider EGD and colonoscopy for suspected iron deficiency  6. Pending iron panel and ferritin results from today we will tentatively plan for the patient to return in 1 week to begin IV Injectafer 750mg IV ×2 doses, one week apart.  7. M.D. visit in 3 weeks with CBC, iron panel, ferritin.  We will discuss management of anticoagulation at that time and also discuss timing and potential postoperative need for anticoagulation in regards to upcoming right knee replacement surgery.

## 2019-02-08 LAB
AT III PPP CHRO-ACNC: >140 % (ref 90–134)
PROT C ACT/NOR PPP: 192 % (ref 86–163)
PROT S ACT/NOR PPP: 127 % (ref 70–127)

## 2019-02-09 LAB
DRVVT IMM 1:2 NP PPP: 51.1 SEC (ref 0–47)
LA NT PLATELET PPP: 31.2 SEC (ref 0–51.9)
LUPUS ANTICOAGULANT REFLEX: ABNORMAL
SCREEN DRVVT: 0.8 RATIO (ref 0.8–1.2)
SCREEN DRVVT: 64.3 SEC (ref 0–47)

## 2019-02-10 LAB
B2 GLYCOPROT1 IGA SER-ACNC: <9 GPI IGA UNITS (ref 0–25)
B2 GLYCOPROT1 IGG SER-ACNC: <9 GPI IGG UNITS (ref 0–20)
B2 GLYCOPROT1 IGM SER-ACNC: <9 GPI IGM UNITS (ref 0–32)

## 2019-02-12 ENCOUNTER — INFUSION (OUTPATIENT)
Dept: ONCOLOGY | Facility: HOSPITAL | Age: 69
End: 2019-02-12

## 2019-02-12 ENCOUNTER — LAB (OUTPATIENT)
Dept: LAB | Facility: HOSPITAL | Age: 69
End: 2019-02-12

## 2019-02-12 VITALS
TEMPERATURE: 98.2 F | HEART RATE: 82 BPM | SYSTOLIC BLOOD PRESSURE: 148 MMHG | BODY MASS INDEX: 44.26 KG/M2 | DIASTOLIC BLOOD PRESSURE: 88 MMHG | WEIGHT: 242 LBS

## 2019-02-12 DIAGNOSIS — I82.432 ACUTE DEEP VEIN THROMBOSIS (DVT) OF POPLITEAL VEIN OF LEFT LOWER EXTREMITY (HCC): ICD-10-CM

## 2019-02-12 DIAGNOSIS — D50.9 MICROCYTIC ANEMIA: ICD-10-CM

## 2019-02-12 DIAGNOSIS — D50.9 IRON DEFICIENCY ANEMIA, UNSPECIFIED IRON DEFICIENCY ANEMIA TYPE: Primary | ICD-10-CM

## 2019-02-12 DIAGNOSIS — D75.839 THROMBOCYTOSIS: ICD-10-CM

## 2019-02-12 LAB
BASOPHILS # BLD AUTO: 0.11 10*3/MM3 (ref 0–0.2)
BASOPHILS NFR BLD AUTO: 1 % (ref 0–1.5)
DEPRECATED RDW RBC AUTO: 44.3 FL (ref 37–54)
EOSINOPHIL # BLD AUTO: 0.29 10*3/MM3 (ref 0–0.4)
EOSINOPHIL NFR BLD AUTO: 2.7 % (ref 0.3–6.2)
ERYTHROCYTE [DISTWIDTH] IN BLOOD BY AUTOMATED COUNT: 20.7 % (ref 12.3–15.4)
HCT VFR BLD AUTO: 32.3 % (ref 34–46.6)
HGB BLD-MCNC: 8.5 G/DL (ref 12–15.9)
IMM GRANULOCYTES # BLD AUTO: 0.08 10*3/MM3 (ref 0–0.05)
IMM GRANULOCYTES NFR BLD AUTO: 0.7 % (ref 0–0.5)
LYMPHOCYTES # BLD AUTO: 1.77 10*3/MM3 (ref 0.7–3.1)
LYMPHOCYTES NFR BLD AUTO: 16.3 % (ref 19.6–45.3)
MCH RBC QN AUTO: 16.6 PG (ref 26.6–33)
MCHC RBC AUTO-ENTMCNC: 26.3 G/DL (ref 31.5–35.7)
MCV RBC AUTO: 63.1 FL (ref 79–97)
MONOCYTES # BLD AUTO: 1.06 10*3/MM3 (ref 0.1–0.9)
MONOCYTES NFR BLD AUTO: 9.8 % (ref 5–12)
NEUTROPHILS # BLD AUTO: 7.53 10*3/MM3 (ref 1.4–7)
NEUTROPHILS NFR BLD AUTO: 69.5 % (ref 42.7–76)
NRBC BLD AUTO-RTO: 0.2 /100 WBC (ref 0–0)
PLATELET # BLD AUTO: 809 10*3/MM3 (ref 140–450)
PMV BLD AUTO: 8.6 FL (ref 6–12)
RBC # BLD AUTO: 5.12 10*6/MM3 (ref 3.77–5.28)
WBC NRBC COR # BLD: 10.84 10*3/MM3 (ref 3.4–10.8)

## 2019-02-12 PROCEDURE — 36416 COLLJ CAPILLARY BLOOD SPEC: CPT | Performed by: INTERNAL MEDICINE

## 2019-02-12 PROCEDURE — 63710000001 PROCHLORPERAZINE MALEATE PER 10 MG: Performed by: INTERNAL MEDICINE

## 2019-02-12 PROCEDURE — 25010000002 FERRIC CARBOXYMALTOSE 750 MG/15ML SOLUTION 15 ML VIAL: Performed by: INTERNAL MEDICINE

## 2019-02-12 PROCEDURE — 85025 COMPLETE CBC W/AUTO DIFF WBC: CPT | Performed by: INTERNAL MEDICINE

## 2019-02-12 PROCEDURE — 96374 THER/PROPH/DIAG INJ IV PUSH: CPT | Performed by: INTERNAL MEDICINE

## 2019-02-12 RX ORDER — PROCHLORPERAZINE MALEATE 10 MG
10 TABLET ORAL ONCE
Status: CANCELLED
Start: 2019-02-19 | End: 2019-02-19

## 2019-02-12 RX ORDER — SODIUM CHLORIDE 9 MG/ML
250 INJECTION, SOLUTION INTRAVENOUS ONCE
Status: COMPLETED | OUTPATIENT
Start: 2019-02-12 | End: 2019-02-12

## 2019-02-12 RX ORDER — SODIUM CHLORIDE 9 MG/ML
250 INJECTION, SOLUTION INTRAVENOUS ONCE
Status: CANCELLED | OUTPATIENT
Start: 2019-02-12

## 2019-02-12 RX ORDER — PROCHLORPERAZINE MALEATE 10 MG
10 TABLET ORAL ONCE
Status: COMPLETED | OUTPATIENT
Start: 2019-02-12 | End: 2019-02-12

## 2019-02-12 RX ORDER — PROCHLORPERAZINE MALEATE 10 MG
10 TABLET ORAL ONCE
Status: CANCELLED
Start: 2019-02-12 | End: 2019-02-12

## 2019-02-12 RX ORDER — SODIUM CHLORIDE 9 MG/ML
250 INJECTION, SOLUTION INTRAVENOUS ONCE
Status: CANCELLED | OUTPATIENT
Start: 2019-02-19

## 2019-02-12 RX ORDER — POTASSIUM CHLORIDE 750 MG/1
CAPSULE, EXTENDED RELEASE ORAL
Qty: 60 CAPSULE | Refills: 2 | Status: SHIPPED | OUTPATIENT
Start: 2019-02-12 | End: 2019-07-05 | Stop reason: SDUPTHER

## 2019-02-12 RX ADMIN — FERRIC CARBOXYMALTOSE INJECTION 750 MG: 50 INJECTION, SOLUTION INTRAVENOUS at 08:55

## 2019-02-12 RX ADMIN — PROCHLORPERAZINE MALEATE 10 MG: 10 TABLET, FILM COATED ORAL at 08:34

## 2019-02-12 RX ADMIN — SODIUM CHLORIDE 250 ML: 9 INJECTION, SOLUTION INTRAVENOUS at 08:55

## 2019-02-18 RX ORDER — APIXABAN 5 MG/1
TABLET, FILM COATED ORAL
Qty: 60 TABLET | Refills: 5 | Status: SHIPPED | OUTPATIENT
Start: 2019-02-18 | End: 2019-03-28

## 2019-02-19 ENCOUNTER — INFUSION (OUTPATIENT)
Dept: ONCOLOGY | Facility: HOSPITAL | Age: 69
End: 2019-02-19

## 2019-02-19 ENCOUNTER — LAB (OUTPATIENT)
Dept: LAB | Facility: HOSPITAL | Age: 69
End: 2019-02-19

## 2019-02-19 VITALS — DIASTOLIC BLOOD PRESSURE: 83 MMHG | HEART RATE: 85 BPM | SYSTOLIC BLOOD PRESSURE: 143 MMHG | TEMPERATURE: 98.1 F

## 2019-02-19 DIAGNOSIS — D50.9 MICROCYTIC ANEMIA: ICD-10-CM

## 2019-02-19 DIAGNOSIS — D50.9 IRON DEFICIENCY ANEMIA, UNSPECIFIED IRON DEFICIENCY ANEMIA TYPE: Primary | ICD-10-CM

## 2019-02-19 DIAGNOSIS — I82.432 ACUTE DEEP VEIN THROMBOSIS (DVT) OF POPLITEAL VEIN OF LEFT LOWER EXTREMITY (HCC): ICD-10-CM

## 2019-02-19 DIAGNOSIS — D75.839 THROMBOCYTOSIS: ICD-10-CM

## 2019-02-19 LAB
BASOPHILS # BLD AUTO: 0.12 10*3/MM3 (ref 0–0.2)
BASOPHILS NFR BLD AUTO: 1 % (ref 0–1.5)
DEPRECATED RDW RBC AUTO: 46.4 FL (ref 37–54)
EOSINOPHIL # BLD AUTO: 0.48 10*3/MM3 (ref 0–0.4)
EOSINOPHIL NFR BLD AUTO: 3.9 % (ref 0.3–6.2)
ERYTHROCYTE [DISTWIDTH] IN BLOOD BY AUTOMATED COUNT: 26.7 % (ref 12.3–15.4)
HCT VFR BLD AUTO: 34.8 % (ref 34–46.6)
HGB BLD-MCNC: 9.5 G/DL (ref 12–15.9)
IMM GRANULOCYTES # BLD AUTO: 0.11 10*3/MM3 (ref 0–0.05)
IMM GRANULOCYTES NFR BLD AUTO: 0.9 % (ref 0–0.5)
LYMPHOCYTES # BLD AUTO: 1.66 10*3/MM3 (ref 0.7–3.1)
LYMPHOCYTES NFR BLD AUTO: 13.7 % (ref 19.6–45.3)
MCH RBC QN AUTO: 18.7 PG (ref 26.6–33)
MCHC RBC AUTO-ENTMCNC: 27.3 G/DL (ref 31.5–35.7)
MCV RBC AUTO: 68.5 FL (ref 79–97)
MONOCYTES # BLD AUTO: 1.01 10*3/MM3 (ref 0.1–0.9)
MONOCYTES NFR BLD AUTO: 8.3 % (ref 5–12)
NEUTROPHILS # BLD AUTO: 8.78 10*3/MM3 (ref 1.4–7)
NEUTROPHILS NFR BLD AUTO: 72.2 % (ref 42.7–76)
NRBC BLD AUTO-RTO: 0.2 /100 WBC (ref 0–0)
PLATELET # BLD AUTO: 517 10*3/MM3 (ref 140–450)
PMV BLD AUTO: 8.7 FL (ref 6–12)
RBC # BLD AUTO: 5.08 10*6/MM3 (ref 3.77–5.28)
WBC NRBC COR # BLD: 12.16 10*3/MM3 (ref 3.4–10.8)

## 2019-02-19 PROCEDURE — 85025 COMPLETE CBC W/AUTO DIFF WBC: CPT

## 2019-02-19 PROCEDURE — 96374 THER/PROPH/DIAG INJ IV PUSH: CPT | Performed by: INTERNAL MEDICINE

## 2019-02-19 PROCEDURE — 63710000001 PROCHLORPERAZINE MALEATE PER 5 MG: Performed by: INTERNAL MEDICINE

## 2019-02-19 PROCEDURE — 25010000002 FERRIC CARBOXYMALTOSE 750 MG/15ML SOLUTION 15 ML VIAL: Performed by: INTERNAL MEDICINE

## 2019-02-19 RX ORDER — SODIUM CHLORIDE 9 MG/ML
250 INJECTION, SOLUTION INTRAVENOUS ONCE
Status: COMPLETED | OUTPATIENT
Start: 2019-02-19 | End: 2019-02-19

## 2019-02-19 RX ORDER — PROCHLORPERAZINE MALEATE 5 MG/1
10 TABLET ORAL ONCE
Status: COMPLETED | OUTPATIENT
Start: 2019-02-19 | End: 2019-02-19

## 2019-02-19 RX ADMIN — FERRIC CARBOXYMALTOSE INJECTION 750 MG: 50 INJECTION, SOLUTION INTRAVENOUS at 08:49

## 2019-02-19 RX ADMIN — PROCHLORPERAZINE MALEATE 10 MG: 5 TABLET, FILM COATED ORAL at 08:29

## 2019-02-19 RX ADMIN — SODIUM CHLORIDE 250 ML: 9 INJECTION, SOLUTION INTRAVENOUS at 08:29

## 2019-02-21 ENCOUNTER — OFFICE VISIT (OUTPATIENT)
Dept: GASTROENTEROLOGY | Facility: CLINIC | Age: 69
End: 2019-02-21

## 2019-02-21 ENCOUNTER — TELEPHONE (OUTPATIENT)
Dept: ONCOLOGY | Facility: HOSPITAL | Age: 69
End: 2019-02-21

## 2019-02-21 ENCOUNTER — TELEPHONE (OUTPATIENT)
Dept: GASTROENTEROLOGY | Facility: CLINIC | Age: 69
End: 2019-02-21

## 2019-02-21 VITALS
SYSTOLIC BLOOD PRESSURE: 146 MMHG | BODY MASS INDEX: 45.3 KG/M2 | WEIGHT: 246.2 LBS | DIASTOLIC BLOOD PRESSURE: 86 MMHG | HEIGHT: 62 IN

## 2019-02-21 DIAGNOSIS — D50.9 IRON DEFICIENCY ANEMIA, UNSPECIFIED IRON DEFICIENCY ANEMIA TYPE: ICD-10-CM

## 2019-02-21 DIAGNOSIS — Z12.11 ENCOUNTER FOR SCREENING FOR MALIGNANT NEOPLASM OF COLON: Primary | ICD-10-CM

## 2019-02-21 PROCEDURE — 99203 OFFICE O/P NEW LOW 30 MIN: CPT | Performed by: INTERNAL MEDICINE

## 2019-02-21 NOTE — TELEPHONE ENCOUNTER
Patient has been scheduled for an EGD and colonoscopy with Dr. Luong on 3/4/19. Is it okay to hold her Eliquis 5 days prior?

## 2019-02-21 NOTE — TELEPHONE ENCOUNTER
Message forwarded to Jamila Eng with GI where message originated from.     ----- Message from Raheem Leone Jr., MD sent at 2/21/2019  1:48 PM EST -----  5 days is not necessary, 2 days should suffice for eliquis.  ----- Message -----  From: Ritu Elder RN  Sent: 2/21/2019   1:28 PM  To: MD Dr. Vasu Araujo Jr., I can't tell if this message was sent to you or not?    Patient has been scheduled for an EGD and colonoscopy with Dr. Luong on 3/4/19. Is it okay to hold her Eliquis 5 days prior?

## 2019-02-21 NOTE — PROGRESS NOTES
PATIENT INFORMATION  Mariangel Nguyen       - 1950    CHIEF COMPLAINT  Chief Complaint   Patient presents with   • Colonoscopy       HISTORY OF PRESENT ILLNESS  HPI    69 yo with history of iron deficiency anemia sent to me for egd and cls. She is on eliquis for 2 years for PE and DVT.  bm are daily. No hematochezia or melena.  Hemoglobin recently:  Hemoglobin 12.0 - 15.9 g/dL 9.5 Abnormally low     Hematocrit 34.0 - 46.6 % 34.8    MCV 79.0 - 97.0 fL 68.5 Abnormally low       Labs trended for 10 months and looks like around 8.5 and 10 around 10 months ago. mcv is around 60's.  No change in bowel habits or stool caliber  No previous egd or cls.  Weight has been stable.  REVIEW OF SYSTEMS  Review of Systems   Respiratory: Positive for shortness of breath.    Psychiatric/Behavioral: The patient is nervous/anxious.    All other systems reviewed and are negative.        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]   • Small bowel perforation (CMS/HCC) [K63.1]   • ATN (acute tubular necrosis) (CMS/HCC) [N17.0]   • BRODY (acute kidney injury) (CMS/HCC) [N17.9]   • KAELA and COPD overlap syndrome (CMS/HCC) [G47.33, J44.9]   • Acute on chronic systolic CHF (congestive heart failure) (CMS/HCC) [I50.23]   • Hx of lithotripsy [Z98.890]   • Goiter [E04.9]   • Low vitamin D level [R79.89]   • Neuropathy [G62.9]   • Lymphedema [I89.0]   • Pulmonary HTN (CMS/HCC) [I27.20]   • Acute pyelonephritis [N10]   • Acute respiratory failure (CMS/HCC) [J96.00]   • Benign essential HTN [I10]   • Recurrent nephrolithiasis [N20.0]   • Diabetes mellitus (CMS/HCC) [E11.9]   • Edema [R60.9]   • Hyperlipidemia [E78.5]   •  Hypertension [I10]   • Rotator cuff tear arthropathy [M75.100, M12.819]   • Venous stasis dermatitis [I87.2]   • Varicose veins [I83.90]         PAST MEDICAL HISTORY  Past Medical History:   Diagnosis Date   • Arthritis     OSTEOARTHRITIS KNEES   • CHF (congestive heart failure) (CMS/HCC)    • COPD (chronic obstructive pulmonary disease) (CMS/HCC)    • Diabetes mellitus (CMS/HCC)    • Disease of thyroid gland    • H/O Abnormal Pap smear of cervix    • History of DVT (deep vein thrombosis)    • History of kidney stones    • History of pulmonary embolus (PE)    • History of sepsis    • History of small bowel obstruction    • History of snoring    • Hyperlipidemia    • Hypertension    • Knee pain, bilateral    • Neuropathy    • Pulmonary hypertension (CMS/[facility])    • Sleep apnea          SURGICAL HISTORY  Past Surgical History:   Procedure Laterality Date   • ABDOMINAL SURGERY  11/2017    Delayed closure of abdominal wound-wound vac placement, Dr. Nestor Howe   • BLADDER SURGERY      BLADDER LIFT, 2011   • CARDIAC CATHETERIZATION  2017    Normal coronary arteries, normal overall LVSF with wall motion abnormality   • COLON SURGERY      RECONSTRUCTION, 2001   • CYSTOSCOPY URETEROSCOPY LASER LITHOTRIPSY Right 10/23/2017    Procedure: CYSTO RT URETEROSCOPY LASER  STONE BASKETING RIGHT STENT EXCHANGE;  Surgeon: Quinn Cason MD;  Location: [facility];  Service:    • EXPLORATORY LAPAROTOMY     • HYSTERECTOMY      2001   • ROTATOR CUFF REPAIR Right 2016   • STEROID INJECTION KNEE      Had left knee injection 10/09/2018; right knee injection 10/02/2017         FAMILY HISTORY  Family History   Problem Relation Age of Onset   • Cancer Other    • Stroke Other    • Other Other         LUPUS ANTICOAGULANT   • Rheum arthritis Other    • Heart disease Other    • Heart attack Other    • Lupus Mother    • Stomach cancer Father    • Malig Hyperthermia Neg Hx    • Colon cancer Neg Hx    • Colon polyps Neg Hx           SOCIAL HISTORY  Social History     Occupational History     Employer: RETIRED   Tobacco Use   • Smoking status: Never Smoker   • Smokeless tobacco: Former User   Substance and Sexual Activity   • Alcohol use: No   • Drug use: No   • Sexual activity: Defer       Debilities/Disabilities Identified: None    Emotional Behavior: Appropriate    CURRENT MEDICATIONS    Current Outpatient Medications:   •  bumetanide (BUMEX) 2 MG tablet, Take 1 tablet by mouth Daily., Disp: 30 tablet, Rfl: 5  •  Chlorhexidine Gluconate Cloth 2 % pads, Apply  topically Take As Directed., Disp: , Rfl:   •  ELIQUIS 5 MG tablet tablet, TAKE ONE TABLET BY MOUTH TWICE DAILY, Disp: 60 tablet, Rfl: 5  •  escitalopram (LEXAPRO) 10 MG tablet, Take 1 tablet by mouth Daily., Disp: 30 tablet, Rfl: 5  •  levothyroxine (SYNTHROID, LEVOTHROID) 25 MCG tablet, Take 25 mcg by mouth Daily., Disp: , Rfl:   •  mupirocin (BACTROBAN) 2 % nasal ointment, into the nostril(s) as directed by provider Take As Directed., Disp: , Rfl:   •  ONE TOUCH ULTRA TEST test strip, , Disp: , Rfl:   •  ONETOUCH DELICA LANCETS 33G misc, , Disp: , Rfl:   •  oxyCODONE-acetaminophen (PERCOCET) 5-325 MG per tablet, Take 1 tablet by mouth Every 4 (Four) Hours As Needed for Severe Pain ., Disp: 18 tablet, Rfl: 0  •  pantoprazole (PROTONIX) 40 MG EC tablet, TAKE ONE TABLET BY MOUTH ONCE DAILY, Disp: 30 tablet, Rfl: 5  •  potassium chloride (MICRO-K) 10 MEQ CR capsule, TAKE 2 CAPSULES BY MOUTH ONCE DAILY, Disp: 60 capsule, Rfl: 2  •  pravastatin (PRAVACHOL) 40 MG tablet, TAKE ONE TABLET BY MOUTH EVERY NIGHT, Disp: 30 tablet, Rfl: 5  •  saccharomyces boulardii (FLORASTOR) 250 MG capsule, Take 1 capsule by mouth 2 (Two) Times a Day., Disp: 60 capsule, Rfl: 5  •  sildenafil (REVATIO) 20 MG tablet, Take 20 mg by mouth 3 (Three) Times a Day., Disp: , Rfl:   •  vitamin D (ERGOCALCIFEROL) 17921 units capsule capsule, Take 1 capsule by mouth 1 (One) Time Per Week., Disp: 13 capsule, Rfl:  "1    ALLERGIES  Patient has no known allergies.    VITALS  Vitals:    02/21/19 1243   BP: 146/86   Weight: 112 kg (246 lb 3.2 oz)   Height: 157.5 cm (62.01\")       LAST RESULTS   Lab on 02/19/2019   Component Date Value Ref Range Status   • WBC 02/19/2019 12.16* 3.40 - 10.80 10*3/mm3 Final   • RBC 02/19/2019 5.08  3.77 - 5.28 10*6/mm3 Final   • Hemoglobin 02/19/2019 9.5* 12.0 - 15.9 g/dL Final   • Hematocrit 02/19/2019 34.8  34.0 - 46.6 % Final   • MCV 02/19/2019 68.5* 79.0 - 97.0 fL Final   • MCH 02/19/2019 18.7* 26.6 - 33.0 pg Final   • MCHC 02/19/2019 27.3* 31.5 - 35.7 g/dL Final   • RDW 02/19/2019 26.7* 12.3 - 15.4 % Final   • RDW-SD 02/19/2019 46.4  37.0 - 54.0 fl Final   • MPV 02/19/2019 8.7  6.0 - 12.0 fL Final   • Platelets 02/19/2019 517* 140 - 450 10*3/mm3 Final   • Neutrophil % 02/19/2019 72.2  42.7 - 76.0 % Final   • Lymphocyte % 02/19/2019 13.7* 19.6 - 45.3 % Final   • Monocyte % 02/19/2019 8.3  5.0 - 12.0 % Final   • Eosinophil % 02/19/2019 3.9  0.3 - 6.2 % Final   • Basophil % 02/19/2019 1.0  0.0 - 1.5 % Final   • Immature Grans % 02/19/2019 0.9* 0.0 - 0.5 % Final   • Neutrophils, Absolute 02/19/2019 8.78* 1.40 - 7.00 10*3/mm3 Final   • Lymphocytes, Absolute 02/19/2019 1.66  0.70 - 3.10 10*3/mm3 Final   • Monocytes, Absolute 02/19/2019 1.01* 0.10 - 0.90 10*3/mm3 Final   • Eosinophils, Absolute 02/19/2019 0.48* 0.00 - 0.40 10*3/mm3 Final   • Basophils, Absolute 02/19/2019 0.12  0.00 - 0.20 10*3/mm3 Final   • Immature Grans, Absolute 02/19/2019 0.11* 0.00 - 0.05 10*3/mm3 Final   • nRBC 02/19/2019 0.2* 0.0 - 0.0 /100 WBC Final     Xr Knee 1 Or 2 View Right    Result Date: 1/24/2019  Narrative: PA AND LATERAL CHEST X-RAY AND 3 VIEWS OF THE RIGHT KNEE - 01/24/2019  CLINICAL HISTORY: Preop for right total knee surgery, history of shortness breath, CHF, diabetes and hypertension.  CHEST: There are no prior chest x-rays for comparison. The cardiomediastinal silhouette are mildly enlarged. Pulmonary " vasculature is within normal limits.  The lungs are clear, the costophrenic angles are sharp.  There is a screw projecting over the right humeral head presumably from prior right rotator cuff surgery.      Impression: 1. No active disease is seen in the chest. 2. Mild cardiomegaly. 3. Screw projects over the right humeral head likely from prior rotator cuff surgery.   RIGHT KNEE:  A total of 3 views of the right knee including AP and lateral views of the right knee and sunrise view of the patella are submitted for interpretation.  There is severe joint space narrowing in the medial compartment of the right knee with moderate marginal spurring. There is mild marginal spurring in the patellofemoral and lateral compartment compatible with tricompartmental osteoarthritic change. I see no acute fracture or malalignment of the bones of the right knee.  IMPRESSION:  Tricompartmental osteoarthritic changes in the right knee most pronounced in medial compartment as described.  No acute osseous abnormality was seen.  This report was finalized on 1/24/2019 9:19 PM by Dr. Wilbert Galeas M.D.      Xr Chest Pa & Lateral    Result Date: 1/24/2019  Narrative: PA AND LATERAL CHEST X-RAY AND 3 VIEWS OF THE RIGHT KNEE - 01/24/2019  CLINICAL HISTORY: Preop for right total knee surgery, history of shortness breath, CHF, diabetes and hypertension.  CHEST: There are no prior chest x-rays for comparison. The cardiomediastinal silhouette are mildly enlarged. Pulmonary vasculature is within normal limits.  The lungs are clear, the costophrenic angles are sharp.  There is a screw projecting over the right humeral head presumably from prior right rotator cuff surgery.      Impression: 1. No active disease is seen in the chest. 2. Mild cardiomegaly. 3. Screw projects over the right humeral head likely from prior rotator cuff surgery.   RIGHT KNEE:  A total of 3 views of the right knee including AP and lateral views of the right knee and sunrise  view of the patella are submitted for interpretation.  There is severe joint space narrowing in the medial compartment of the right knee with moderate marginal spurring. There is mild marginal spurring in the patellofemoral and lateral compartment compatible with tricompartmental osteoarthritic change. I see no acute fracture or malalignment of the bones of the right knee.  IMPRESSION:  Tricompartmental osteoarthritic changes in the right knee most pronounced in medial compartment as described.  No acute osseous abnormality was seen.  This report was finalized on 1/24/2019 9:19 PM by Dr. Wilbert Galeas M.D.        PHYSICAL EXAM  Physical Exam   Constitutional: She is oriented to person, place, and time. She appears well-developed and well-nourished. No distress.   HENT:   Head: Normocephalic and atraumatic.   Mouth/Throat: Oropharynx is clear and moist.   Eyes: EOM are normal. Pupils are equal, round, and reactive to light.   Neck: Normal range of motion. No tracheal deviation present.   Cardiovascular: Normal rate, regular rhythm, normal heart sounds and intact distal pulses. Exam reveals no gallop and no friction rub.   No murmur heard.  Pulmonary/Chest: Effort normal and breath sounds normal. No stridor. No respiratory distress. She has no wheezes. She has no rales. She exhibits no tenderness.   Abdominal: Soft. Bowel sounds are normal. She exhibits no distension. There is no tenderness. There is no rebound and no guarding.   Surgical scars noted, periumbilical hernia, soft and reducible   Musculoskeletal: She exhibits no edema.   Lymphadenopathy:     She has no cervical adenopathy.   Neurological: She is alert and oriented to person, place, and time.   Skin: Skin is warm. She is not diaphoretic.   Psychiatric: She has a normal mood and affect. Her behavior is normal. Judgment and thought content normal.   Nursing note and vitals reviewed.      ASSESSMENT  Diagnoses and all orders for this visit:    Encounter for  screening for malignant neoplasm of colon  -     Case Request; Standing    Iron deficiency anemia, unspecified iron deficiency anemia type  -     Occult Blood X 3, Stool - Stool, Per Rectum  -     Case Request; Standing    Other orders  -     Follow Anesthesia Guidelines / Standing Orders; Future  -     Implement Anesthesia orders day of procedure.; Standing  -     Obtain informed consent; Standing          PLAN  No Follow-up on file.    Indications, risks and procedure were discussed with the patient, including but not limited to, bleeding, infection, possibility of perforation and possible polypectomy. All of the patient's questions were answered, and signed informed consent was obtained and placed on the chart.    Contact Dr. Leone about getting off eliquis prior to scopes  Stool cards to be done-patient will get more cards at Dr. Pruitt office. She is not wanting me to order this/or pick it up here.

## 2019-02-21 NOTE — TELEPHONE ENCOUNTER
----- Message -----   From: Raheem Leone Jr., MD   Sent: 2/21/2019   1:48 PM   To: Ritu Elder RN     5 days is not necessary, 2 days should suffice for eliquis.   ----- Message -----   From: Ritu Elder RN   Sent: 2/21/2019   1:28 PM   To: MD Dr. Vasu Araujo Jr., I can't tell if this message was sent to you or not?     Patient has been scheduled for an EGD and colonoscopy with Dr. Luong on 3/4/19. Is it okay to hold her Eliquis 5 days prior?

## 2019-02-27 ENCOUNTER — LAB (OUTPATIENT)
Dept: LAB | Facility: HOSPITAL | Age: 69
End: 2019-02-27

## 2019-02-27 ENCOUNTER — OFFICE VISIT (OUTPATIENT)
Dept: ONCOLOGY | Facility: CLINIC | Age: 69
End: 2019-02-27

## 2019-02-27 VITALS
BODY MASS INDEX: 46.19 KG/M2 | WEIGHT: 251 LBS | SYSTOLIC BLOOD PRESSURE: 134 MMHG | OXYGEN SATURATION: 95 % | TEMPERATURE: 97.8 F | DIASTOLIC BLOOD PRESSURE: 65 MMHG | RESPIRATION RATE: 16 BRPM | HEART RATE: 80 BPM | HEIGHT: 62 IN

## 2019-02-27 DIAGNOSIS — I82.432 ACUTE DEEP VEIN THROMBOSIS (DVT) OF POPLITEAL VEIN OF LEFT LOWER EXTREMITY (HCC): Primary | ICD-10-CM

## 2019-02-27 DIAGNOSIS — D50.9 MICROCYTIC ANEMIA: ICD-10-CM

## 2019-02-27 DIAGNOSIS — D50.8 OTHER IRON DEFICIENCY ANEMIA: ICD-10-CM

## 2019-02-27 DIAGNOSIS — I82.432 ACUTE DEEP VEIN THROMBOSIS (DVT) OF POPLITEAL VEIN OF LEFT LOWER EXTREMITY (HCC): ICD-10-CM

## 2019-02-27 DIAGNOSIS — D75.839 THROMBOCYTOSIS: ICD-10-CM

## 2019-02-27 LAB
BASOPHILS # BLD AUTO: 0.08 10*3/MM3 (ref 0–0.2)
BASOPHILS NFR BLD AUTO: 1.1 % (ref 0–1.5)
COLLECT DATE SP2 STL: NORMAL
COLLECT DATE STL: NORMAL
EOSINOPHIL # BLD AUTO: 0.22 10*3/MM3 (ref 0–0.4)
EOSINOPHIL NFR BLD AUTO: 2.9 % (ref 0.3–6.2)
FERRITIN SERPL-MCNC: 617.4 NG/ML (ref 13–150)
HCT VFR BLD AUTO: 38.9 % (ref 34–46.6)
HEMOCCULT STL QL: NEGATIVE
HGB BLD-MCNC: 10.4 G/DL (ref 12–15.9)
IMM GRANULOCYTES # BLD AUTO: 0.07 10*3/MM3 (ref 0–0.05)
IMM GRANULOCYTES NFR BLD AUTO: 0.9 % (ref 0–0.5)
IRON 24H UR-MRATE: 59 MCG/DL (ref 37–145)
IRON SATN MFR SERPL: 16 % (ref 14–48)
LYMPHOCYTES # BLD AUTO: 1.16 10*3/MM3 (ref 0.7–3.1)
LYMPHOCYTES NFR BLD AUTO: 15.3 % (ref 19.6–45.3)
Lab: 12
Lab: 12
MCH RBC QN AUTO: 20.8 PG (ref 26.6–33)
MCHC RBC AUTO-ENTMCNC: 26.7 G/DL (ref 31.5–35.7)
MCV RBC AUTO: 78 FL (ref 79–97)
MONOCYTES # BLD AUTO: 0.57 10*3/MM3 (ref 0.1–0.9)
MONOCYTES NFR BLD AUTO: 7.5 % (ref 5–12)
NEUTROPHILS # BLD AUTO: 5.48 10*3/MM3 (ref 1.4–7)
NEUTROPHILS NFR BLD AUTO: 72.3 % (ref 42.7–76)
NRBC BLD AUTO-RTO: 0 /100 WBC (ref 0–0)
PLATELET # BLD AUTO: 404 10*3/MM3 (ref 140–450)
PMV BLD AUTO: 8.9 FL (ref 6–12)
RBC # BLD AUTO: 4.99 10*6/MM3 (ref 3.77–5.28)
TIBC SERPL-MCNC: 361 MCG/DL (ref 249–505)
TRANSFERRIN SERPL-MCNC: 258 MG/DL (ref 200–360)
WBC NRBC COR # BLD: 7.58 10*3/MM3 (ref 3.4–10.8)

## 2019-02-27 PROCEDURE — 85025 COMPLETE CBC W/AUTO DIFF WBC: CPT | Performed by: INTERNAL MEDICINE

## 2019-02-27 PROCEDURE — 36415 COLL VENOUS BLD VENIPUNCTURE: CPT | Performed by: INTERNAL MEDICINE

## 2019-02-27 PROCEDURE — 83540 ASSAY OF IRON: CPT | Performed by: INTERNAL MEDICINE

## 2019-02-27 PROCEDURE — 82728 ASSAY OF FERRITIN: CPT | Performed by: INTERNAL MEDICINE

## 2019-02-27 PROCEDURE — 82272 OCCULT BLD FECES 1-3 TESTS: CPT | Performed by: INTERNAL MEDICINE

## 2019-02-27 PROCEDURE — 99214 OFFICE O/P EST MOD 30 MIN: CPT | Performed by: INTERNAL MEDICINE

## 2019-02-27 PROCEDURE — 84466 ASSAY OF TRANSFERRIN: CPT | Performed by: INTERNAL MEDICINE

## 2019-02-28 ENCOUNTER — APPOINTMENT (OUTPATIENT)
Dept: ONCOLOGY | Facility: CLINIC | Age: 69
End: 2019-02-28

## 2019-02-28 ENCOUNTER — APPOINTMENT (OUTPATIENT)
Dept: LAB | Facility: HOSPITAL | Age: 69
End: 2019-02-28

## 2019-02-28 NOTE — PROGRESS NOTES
Subjective .     REASONS FOR FOLLOWUP:    1. Iron deficiency anemia:   · Long-standing chronic anemia with worsening in 2017 along with declining MCV.    · Associated thrombocytosis and pica symptoms  · Iron deficient 2/6/19 with ferritin less than 5, iron saturation 3%, hemoglobin 8.4, MCV 63.5.  · Received Injectafer 750mg IV 2/12 and 2/19/19  · Stool cards negative for occult blood x3 on 2/27/19  · Scheduled for EGD/colonoscopy 3/4/19  2. Left lower extremity DVT (popliteal/calf) with suspected pulmonary embolism:  · Identified 12/1/17 during hospitalization for small bowel perforation requiring abdominal surgery with subsequent sepsis (provoked).  · Left lower extremity popliteal/calf DVT 12/1/17  · V/Q scan 12/2/17 with intermediate probability for pulmonary embolism  · Continuation of anticoagulation with Eliquis 5 mg twice daily  · Hypercoagulable evaluation 12/6/19 negative  · Due to provoked nature of thrombosis, anticoagulation discontinued 12/27/19    HISTORY OF PRESENT ILLNESS:  The patient is a 68 y.o. year old female who is here for follow-up with the above-mentioned history.    History of Present Illness   the patient is here today for follow-up visit from initial consultation with laboratory studies to review.  In the interval she has continued on anticoagulation with Eliquis.  She has not experienced any obvious GI blood loss medically.  She was referred to GI and is scheduled for EGD and colonoscopy on 3/4/19.  She remains very anxious today.  She did receive IV iron without any complications since her last visit.  Today, she is seated in a wheelchair due to her osteoarthritic symptoms in her knee.  She does note that knee replacement surgery may be performed in the near future.    Past Medical History:   Diagnosis Date   • Arthritis     OSTEOARTHRITIS KNEES   • CHF (congestive heart failure) (CMS/Formerly [facility])    • COPD (chronic obstructive pulmonary disease) (CMS/Formerly [facility])    • Diabetes mellitus (CMS/[facility]ly [facility])     • Disease of thyroid gland    • H/O Abnormal Pap smear of cervix    • History of DVT (deep vein thrombosis)    • History of kidney stones    • History of pulmonary embolus (PE)    • History of sepsis    • History of small bowel obstruction    • History of snoring    • Hyperlipidemia    • Hypertension    • Knee pain, bilateral    • Neuropathy    • Pulmonary hypertension (CMS/HCC)    • Sleep apnea      Past Surgical History:   Procedure Laterality Date   • ABDOMINAL SURGERY  11/2017    Delayed closure of abdominal wound-wound vac placement, Dr. Nestor Howe   • BLADDER SURGERY      BLADDER LIFT, 2011   • CARDIAC CATHETERIZATION  2017    Normal coronary arteries, normal overall LVSF with wall motion abnormality   • COLON SURGERY      RECONSTRUCTION, 2001   • CYSTOSCOPY URETEROSCOPY LASER LITHOTRIPSY Right 10/23/2017    Procedure: CYSTO RT URETEROSCOPY LASER  STONE BASKETING RIGHT STENT EXCHANGE;  Surgeon: Quinn Cason MD;  Location: [facility];  Service:    • EXPLORATORY LAPAROTOMY     • HYSTERECTOMY      2001   • ROTATOR CUFF REPAIR Right 2016   • STEROID INJECTION KNEE      Had left knee injection 10/09/2018; right knee injection 10/02/2017         Current Outpatient Medications on File Prior to Visit   Medication Sig Dispense Refill   • bumetanide (BUMEX) 2 MG tablet Take 1 tablet by mouth Daily. 30 tablet 5   • Chlorhexidine Gluconate Cloth 2 % pads Apply  topically Take As Directed.     • ELIQUIS 5 MG tablet tablet TAKE ONE TABLET BY MOUTH TWICE DAILY 60 tablet 5   • escitalopram (LEXAPRO) 10 MG tablet Take 1 tablet by mouth Daily. 30 tablet 5   • levothyroxine (SYNTHROID, LEVOTHROID) 25 MCG tablet Take 25 mcg by mouth Daily.     • mupirocin (BACTROBAN) 2 % nasal ointment into the nostril(s) as directed by provider Take As Directed.     • ONE TOUCH ULTRA TEST test strip      • ONETOUCH DELICA LANCETS 33G misc      • oxyCODONE-acetaminophen (PERCOCET) 5-325 MG per tablet Take 1 tablet by mouth  Every 4 (Four) Hours As Needed for Severe Pain . 18 tablet 0   • pantoprazole (PROTONIX) 40 MG EC tablet TAKE ONE TABLET BY MOUTH ONCE DAILY 30 tablet 5   • polyethylene glycol (GoLYTELY) 236 g solution Drink 1/2 starting at 2:00pm the day prior. Drink remaining 1/2 at 10:00 pm. May add flavor packet. 4000 mL 0   • potassium chloride (MICRO-K) 10 MEQ CR capsule TAKE 2 CAPSULES BY MOUTH ONCE DAILY 60 capsule 2   • pravastatin (PRAVACHOL) 40 MG tablet TAKE ONE TABLET BY MOUTH EVERY NIGHT 30 tablet 5   • saccharomyces boulardii (FLORASTOR) 250 MG capsule Take 1 capsule by mouth 2 (Two) Times a Day. 60 capsule 5   • sildenafil (REVATIO) 20 MG tablet Take 20 mg by mouth 3 (Three) Times a Day.     • vitamin D (ERGOCALCIFEROL) 04185 units capsule capsule Take 1 capsule by mouth 1 (One) Time Per Week. 13 capsule 1     No current facility-administered medications on file prior to visit.        ALLERGIES:   No Known Allergies    Social History     Socioeconomic History   • Marital status: Single     Spouse name: Not on file   • Number of children: Not on file   • Years of education: High school   • Highest education level: Not on file   Social Needs   • Financial resource strain: Not on file   • Food insecurity - worry: Not on file   • Food insecurity - inability: Not on file   • Transportation needs - medical: Not on file   • Transportation needs - non-medical: Not on file   Occupational History     Employer: RETIRED   Tobacco Use   • Smoking status: Never Smoker   • Smokeless tobacco: Former User   Substance and Sexual Activity   • Alcohol use: No   • Drug use: No   • Sexual activity: Defer   Other Topics Concern   • Not on file   Social History Narrative   • Not on file     Family History   Problem Relation Age of Onset   • Cancer Other    • Stroke Other    • Other Other         LUPUS ANTICOAGULANT   • Rheum arthritis Other    • Heart disease Other    • Heart attack Other    • Lupus Mother    • Stomach cancer Father    •  Malig Hyperthermia Neg Hx    • Colon cancer Neg Hx    • Colon polyps Neg Hx               Review of Systems   Constitutional: Negative for activity change, appetite change, fatigue, fever and unexpected weight change.   HENT: Negative for congestion, mouth sores, nosebleeds, sore throat and voice change.    Respiratory: Negative for cough, shortness of breath and wheezing.    Cardiovascular: Negative for chest pain, palpitations and leg swelling.   Gastrointestinal: Negative for abdominal distention, abdominal pain, blood in stool, constipation, diarrhea, nausea and vomiting.   Endocrine: Negative for cold intolerance and heat intolerance.   Genitourinary: Negative for difficulty urinating, dysuria, frequency and hematuria.   Musculoskeletal: Positive for arthralgias. Negative for back pain, joint swelling and myalgias.   Skin: Negative for rash.   Neurological: Negative for dizziness, syncope, weakness, light-headedness, numbness and headaches.   Hematological: Negative for adenopathy. Does not bruise/bleed easily.   Psychiatric/Behavioral: Negative for confusion and sleep disturbance. The patient is nervous/anxious.          Objective      Vitals:    02/27/19 1004   BP: 134/65   Pulse: 80   Resp: 16   Temp: 97.8 °F (36.6 °C)   SpO2: 95%      Current Status 2/27/2019   ECOG score 2       Physical Exam   Constitutional: She is oriented to person, place, and time. She appears well-developed and well-nourished.   Patient is seated in a wheelchair in the office today.   HENT:   Mouth/Throat: Oropharynx is clear and moist.   Eyes: Conjunctivae are normal.   Neck: No thyromegaly present.   Cardiovascular: Normal rate and regular rhythm. Exam reveals no gallop and no friction rub.   No murmur heard.  Pulmonary/Chest: Breath sounds normal. No respiratory distress.   Abdominal: Soft. Bowel sounds are normal. She exhibits no distension. There is no tenderness.   Musculoskeletal: She exhibits no edema.   Lymphadenopathy:         Head (right side): No submandibular adenopathy present.     She has no cervical adenopathy.     She has no axillary adenopathy.        Right: No inguinal and no supraclavicular adenopathy present.        Left: No inguinal and no supraclavicular adenopathy present.   Neurological: She is alert and oriented to person, place, and time. She displays normal reflexes. No cranial nerve deficit. She exhibits normal muscle tone.   Skin: Skin is warm and dry. No rash noted.   Psychiatric: She has a normal mood and affect. Her behavior is normal.       RECENT LABS:  Hematology WBC   Date Value Ref Range Status   02/27/2019 7.58 3.40 - 10.80 10*3/mm3 Final   04/10/2018 7.3 4.5 - 11.0 10*3/uL Final     RBC   Date Value Ref Range Status   02/27/2019 4.99 3.77 - 5.28 10*6/mm3 Final   04/10/2018 4.69 4.0 - 6.0 10*6/uL Final     Hemoglobin   Date Value Ref Range Status   02/27/2019 10.4 (L) 12.0 - 15.9 g/dL Final   04/10/2018 10.5 (L) 12.0 - 16.0 g/dL Final     Hematocrit   Date Value Ref Range Status   02/27/2019 38.9 34.0 - 46.6 % Final   04/10/2018 36.2 33.0 - 51.0 % Final     Platelets   Date Value Ref Range Status   02/27/2019 404 140 - 450 10*3/mm3 Final   04/10/2018 498 (H) 150 - 450 10*3/uL Final        Lab Results   Component Value Date    GLUCOSE 123 02/06/2019    BUN 15 02/06/2019    CREATININE 0.74 02/06/2019    EGFRIFNONA 79 02/06/2019    EGFRIFAFRI 79 01/31/2018    BCR 20.3 02/06/2019    K 3.8 02/06/2019    CO2 27.2 02/06/2019    CALCIUM 9.9 02/06/2019    PROTENTOTREF 7.1 01/31/2018    ALBUMIN 4.20 02/06/2019    LABIL2 1.4 01/31/2018    AST 14 02/06/2019    ALT 11 02/06/2019           Assessment/Plan     1. Iron deficiency anemia:  · The patient has had a long-standing degree of anemia.  Anemia appears to have worsened following her hospitalization for bowel obstruction and sepsis in late 2017.  Previous hemoglobin in the 7-8 range during hospitalization in late 2017, subsequent increase into the 9-10 range in early  2018 with gradual decline into the current range 8-9.  Hemoglobin today 8.6.  · In 2018, the patient developed declining MCV which has gradually decreased to 63.5 2/6/19    · Patient has had a concurrent thrombocytosis with platelet count in the high 400-500,000 range, currently increased to 694,000. Related to underlying iron deficiency.  · Patient developed associated PICA symptom of ice craving.  · There has been no visible evidence of GI blood loss however patient remains on chronic anticoagulation with Eliquis.  She has never undergone EGD or colonoscopy.  Stool cards negative for occult blood on 2/27/19.  Referred to GI with plans for EGD/colonoscopy on 3/4/19 (no prior scopes).  · Anemia evaluation 2/6/19 at initial visit with hemoglobin 8.6, MCV 63.5, platelet count 694,000, iron 18, ferritin less than 5, iron saturation 3%, TIBC 584, B12 380, folate greater than 20, creatinine 0.74, ESR 30, REBECCA negative, haptoglobin 324, .  · In order to facilitate rapid correction of her iron deficiency for anticipated knee surgery, administered IV iron in the form of Injectafer 750mg on 2/12 and 2/19/19.  · The patient returns today in follow-up with hemoglobin improved at 10.4, MCV up to 78, normalization of platelet count of 404,000, resolution of ice craving following recent IV iron administration.  Stool cards were negative for occult blood today.  She has seen GI and is scheduled for EGD and colonoscopy on 3/4/19.  Labs today show that she is iron replete with ferritin of 617, iron saturation 16%, TIBC 361.  I did suggest that she return in 6 weeks with CBC.  There is a possibility she may undergo knee replacement surgery in the interim.  Anticipate further improvement in hemoglobin in the coming weeks.  2. Thrombocytosis:  · As above, the patient had a platelet count in the high 400-500,000 range since late 2017.  This progressively increased to 694,000 on 2/6/19.  · Secondary to iron deficiency  anemia  · Platelet count today has normalized at 404,000 following administration of IV iron.  3. Left lower extremity DVT (popliteal/calf) with suspected pulmonary embolism:  · Patient was admitted in November/December 2017 with small bowel perforation requiring abdominal surgery and subsequent sepsis  · Lower extremity Doppler 12/1/17 with popliteal and calf DVT on the left  · V/Q scan 12/2/17 with intermediate probability for pulmonary embolism  · Patient was anticoagulated and has continued on treatment with Eliquis 5mg bid.  She is now over one year out from her thrombosis that was provoked.  · Patient reports that her daughter developed DVT and pulmonary embolism in her 30s and apparently has an IVC filter in place.  She is a poor historian, accuracy is unknown.  Unclear whether her daughter has been evaluated with hypercoagulable labs.  · Given the family history, we did proceed with hypercoagulable evaluation 2/6/19: Negative factor V Leiden mutation, negative prothrombin gene mutation, protein C activity 192%, protein S activity 127%, Antithrombin III greater than 140, lupus anticoagulant negative, anticardiolipin antibody panel negative, anti-beta-2 GP 1 antibody panel negative.  · We did review the results from the patient's hypercoagulable evaluation which was negative.  Given the provoked nature of her previous thrombosis, and having been anticoagulated for over one year, I have recommended that she discontinue anticoagulation at this point.  We did discuss signs and symptoms of recurrent DVT and pulmonary embolism and the need to seek immediate medical attention if these occur.  · Patient notes that she may undergo upcoming knee replacement surgery.  We did discuss the need for her to pursue postoperative anticoagulation due to increased risk for recurrent thrombosis.  I have asked her to contact our office if knee replacement surgery is scheduled in the interval and I will discuss with her orthopedic  surgeon Dr. Luong.  Hopefully, she could receive prophylactic dose Eliquis beginning 24-48 hours postoperatively and continue until she is fully ambulatory.     Plan:  1. Discontinue anticoagulation with Eliquis.  Patient has completed over 1 year of anticoagulation for provoked DVT and suspected pulmonary embolism.  2. Proceed with EGD and colonoscopy as scheduled with GI on 3/4/19  3. Patient will notify us if she is scheduled for knee replacement surgery in the near future and I will discuss with her orthopedic surgeon postoperative anticoagulation due to increased risk of thrombosis.  4. MD visit in 6 weeks with CBC

## 2019-03-01 ENCOUNTER — ANESTHESIA EVENT (OUTPATIENT)
Dept: PERIOP | Facility: HOSPITAL | Age: 69
End: 2019-03-01

## 2019-03-04 ENCOUNTER — HOSPITAL ENCOUNTER (OUTPATIENT)
Facility: HOSPITAL | Age: 69
Setting detail: HOSPITAL OUTPATIENT SURGERY
Discharge: HOME | End: 2019-03-04
Attending: INTERNAL MEDICINE | Admitting: INTERNAL MEDICINE

## 2019-03-04 ENCOUNTER — ANESTHESIA (OUTPATIENT)
Dept: PERIOP | Facility: HOSPITAL | Age: 69
End: 2019-03-04

## 2019-03-04 VITALS
TEMPERATURE: 97 F | RESPIRATION RATE: 16 BRPM | SYSTOLIC BLOOD PRESSURE: 178 MMHG | HEART RATE: 76 BPM | DIASTOLIC BLOOD PRESSURE: 91 MMHG | OXYGEN SATURATION: 92 % | BODY MASS INDEX: 45.97 KG/M2 | WEIGHT: 251.4 LBS

## 2019-03-04 DIAGNOSIS — Z12.11 ENCOUNTER FOR SCREENING FOR MALIGNANT NEOPLASM OF COLON: ICD-10-CM

## 2019-03-04 DIAGNOSIS — D50.9 IRON DEFICIENCY ANEMIA, UNSPECIFIED IRON DEFICIENCY ANEMIA TYPE: ICD-10-CM

## 2019-03-04 PROCEDURE — 88305 TISSUE EXAM BY PATHOLOGIST: CPT | Performed by: INTERNAL MEDICINE

## 2019-03-04 PROCEDURE — 25010000002 PROPOFOL 10 MG/ML EMULSION: Performed by: NURSE ANESTHETIST, CERTIFIED REGISTERED

## 2019-03-04 PROCEDURE — 43239 EGD BIOPSY SINGLE/MULTIPLE: CPT | Performed by: INTERNAL MEDICINE

## 2019-03-04 PROCEDURE — 45380 COLONOSCOPY AND BIOPSY: CPT | Performed by: INTERNAL MEDICINE

## 2019-03-04 RX ORDER — MAGNESIUM HYDROXIDE 1200 MG/15ML
LIQUID ORAL AS NEEDED
Status: DISCONTINUED | OUTPATIENT
Start: 2019-03-04 | End: 2019-03-04 | Stop reason: HOSPADM

## 2019-03-04 RX ORDER — SODIUM CHLORIDE, SODIUM LACTATE, POTASSIUM CHLORIDE, CALCIUM CHLORIDE 600; 310; 30; 20 MG/100ML; MG/100ML; MG/100ML; MG/100ML
9 INJECTION, SOLUTION INTRAVENOUS CONTINUOUS PRN
Status: DISCONTINUED | OUTPATIENT
Start: 2019-03-04 | End: 2019-03-04 | Stop reason: HOSPADM

## 2019-03-04 RX ORDER — LIDOCAINE HYDROCHLORIDE 10 MG/ML
0.5 INJECTION, SOLUTION EPIDURAL; INFILTRATION; INTRACAUDAL; PERINEURAL ONCE AS NEEDED
Status: COMPLETED | OUTPATIENT
Start: 2019-03-04 | End: 2019-03-04

## 2019-03-04 RX ORDER — SODIUM CHLORIDE 9 MG/ML
40 INJECTION, SOLUTION INTRAVENOUS AS NEEDED
Status: DISCONTINUED | OUTPATIENT
Start: 2019-03-04 | End: 2019-03-04 | Stop reason: HOSPADM

## 2019-03-04 RX ORDER — PROPOFOL 10 MG/ML
VIAL (ML) INTRAVENOUS CONTINUOUS PRN
Status: DISCONTINUED | OUTPATIENT
Start: 2019-03-04 | End: 2019-03-04 | Stop reason: SURG

## 2019-03-04 RX ORDER — SODIUM CHLORIDE 0.9 % (FLUSH) 0.9 %
1-10 SYRINGE (ML) INJECTION AS NEEDED
Status: DISCONTINUED | OUTPATIENT
Start: 2019-03-04 | End: 2019-03-04 | Stop reason: HOSPADM

## 2019-03-04 RX ORDER — PROPOFOL 10 MG/ML
VIAL (ML) INTRAVENOUS AS NEEDED
Status: DISCONTINUED | OUTPATIENT
Start: 2019-03-04 | End: 2019-03-04 | Stop reason: SURG

## 2019-03-04 RX ORDER — SODIUM CHLORIDE 0.9 % (FLUSH) 0.9 %
3 SYRINGE (ML) INJECTION EVERY 12 HOURS SCHEDULED
Status: DISCONTINUED | OUTPATIENT
Start: 2019-03-04 | End: 2019-03-04 | Stop reason: HOSPADM

## 2019-03-04 RX ADMIN — PROPOFOL 30 MG: 10 INJECTION, EMULSION INTRAVENOUS at 13:11

## 2019-03-04 RX ADMIN — LIDOCAINE HYDROCHLORIDE 0.5 ML: 10 INJECTION, SOLUTION EPIDURAL; INFILTRATION; INTRACAUDAL; PERINEURAL at 12:30

## 2019-03-04 RX ADMIN — PROPOFOL 30 MG: 10 INJECTION, EMULSION INTRAVENOUS at 13:21

## 2019-03-04 RX ADMIN — PROPOFOL 50 MG: 10 INJECTION, EMULSION INTRAVENOUS at 13:06

## 2019-03-04 RX ADMIN — PROPOFOL 30 MG: 10 INJECTION, EMULSION INTRAVENOUS at 13:38

## 2019-03-04 RX ADMIN — PROPOFOL 30 MG: 10 INJECTION, EMULSION INTRAVENOUS at 13:33

## 2019-03-04 RX ADMIN — SODIUM CHLORIDE, POTASSIUM CHLORIDE, SODIUM LACTATE AND CALCIUM CHLORIDE: 600; 310; 30; 20 INJECTION, SOLUTION INTRAVENOUS at 12:57

## 2019-03-04 RX ADMIN — PROPOFOL 100 MCG/KG/MIN: 10 INJECTION, EMULSION INTRAVENOUS at 12:59

## 2019-03-04 RX ADMIN — SODIUM CHLORIDE, POTASSIUM CHLORIDE, SODIUM LACTATE AND CALCIUM CHLORIDE 9 ML/HR: 600; 310; 30; 20 INJECTION, SOLUTION INTRAVENOUS at 12:30

## 2019-03-04 RX ADMIN — PROPOFOL 30 MG: 10 INJECTION, EMULSION INTRAVENOUS at 13:28

## 2019-03-04 RX ADMIN — PROPOFOL 30 MG: 10 INJECTION, EMULSION INTRAVENOUS at 13:17

## 2019-03-04 RX ADMIN — PROPOFOL 50 MG: 10 INJECTION, EMULSION INTRAVENOUS at 12:59

## 2019-03-04 NOTE — ANESTHESIA POSTPROCEDURE EVALUATION
Patient: Mariangel Nguyen    Procedure Summary     Date:  03/04/19 Room / Location:  [facility] ENDOSCOPY 2 /  [facility]    Anesthesia Start:  1257 Anesthesia Stop:  1347    Procedures:       ESOPHAGOGASTRODUODENOSCOPY WITH BIOPSIES (N/A Esophagus)      COLONOSCOPY polypectomy (N/A ) Diagnosis:       Encounter for screening for malignant neoplasm of colon      Iron deficiency anemia, unspecified iron deficiency anemia type      (Encounter for screening for malignant neoplasm of colon [Z12.11])      (Iron deficiency anemia, unspecified iron deficiency anemia type [D50.9])    Surgeon:  Nai Luong MD Provider:  Rodney Low CRNA    Anesthesia Type:  MAC ASA Status:  3          Anesthesia Type: MAC  Last vitals  BP   156/91 (03/04/19 1403)   Temp   97 °F (36.1 °C) (03/04/19 1356)   Pulse   76 (03/04/19 1403)   Resp   16 (03/04/19 1403)     SpO2   91 % (03/04/19 1356)     Post Anesthesia Care and Evaluation    Patient location during evaluation: PHASE II  Patient participation: complete - patient participated  Level of consciousness: awake  Pain management: adequate  Airway patency: patent  Anesthetic complications: No anesthetic complications  PONV Status: none  Cardiovascular status: acceptable  Respiratory status: acceptable  Hydration status: acceptable

## 2019-03-04 NOTE — H&P
PATIENT INFORMATION  Mariangel Nguyen         - 1950     CHIEF COMPLAINT      Chief Complaint   Patient presents with   • Colonoscopy         HISTORY OF PRESENT ILLNESS  HPI     69 yo with history of iron deficiency anemia sent to me for egd and cls. She is on eliquis for 2 years for PE and DVT.  bm are daily. No hematochezia or melena.  Hemoglobin recently:  Hemoglobin 12.0 - 15.9 g/dL 9.5 Abnormally low     Hematocrit 34.0 - 46.6 % 34.8    MCV 79.0 - 97.0 fL 68.5 Abnormally low        Labs trended for 10 months and looks like around 8.5 and 10 around 10 months ago. mcv is around 60's.  No change in bowel habits or stool caliber  No previous egd or cls.  Weight has been stable.  REVIEW OF SYSTEMS  Review of Systems   Respiratory: Positive for shortness of breath.    Psychiatric/Behavioral: The patient is nervous/anxious.    All other systems reviewed and are negative.           ACTIVE PROBLEMS      Patient Active Problem List     Diagnosis   • Iron deficiency anemia [D50.9]   • Acute deep vein thrombosis (DVT) of popliteal vein of left lower extremity (CMS/HCC) [I82.432]   • Microcytic anemia [D50.9]   • Thrombocytosis (CMS/HCC) [D47.3]   • COPD (chronic obstructive pulmonary disease) (CMS/HCC) [J44.9]   • Omental infarction (CMS/HCC) [K55.069]   • Small bowel perforation (CMS/HCC) [K63.1]   • ATN (acute tubular necrosis) (CMS/HCC) [N17.0]   • BRODY (acute kidney injury) (CMS/HCC) [N17.9]   • KAELA and COPD overlap syndrome (CMS/HCC) [G47.33, J44.9]   • Acute on chronic systolic CHF (congestive heart failure) (CMS/HCC) [I50.23]   • Hx of lithotripsy [Z98.890]   • Goiter [E04.9]   • Low vitamin D level [R79.89]   • Neuropathy [G62.9]   • Lymphedema [I89.0]   • Pulmonary HTN (CMS/HCC) [I27.20]   • Acute pyelonephritis [N10]   • Acute respiratory failure (CMS/HCC) [J96.00]   • Benign essential HTN [I10]   • Recurrent nephrolithiasis [N20.0]   • Diabetes mellitus (CMS/HCC) [E11.9]   • Edema [R60.9]   •  Hyperlipidemia [E78.5]   • Hypertension [I10]   • Rotator cuff tear arthropathy [M75.100, M12.819]   • Venous stasis dermatitis [I87.2]   • Varicose veins [I83.90]            PAST MEDICAL HISTORY  Medical History        Past Medical History:   Diagnosis Date   • Arthritis       OSTEOARTHRITIS KNEES   • CHF (congestive heart failure) (CMS/[facility])     • COPD (chronic obstructive pulmonary disease) (CMS/[facility])     • Diabetes mellitus (CMS/[facility])     • Disease of thyroid gland     • H/O Abnormal Pap smear of cervix     • History of DVT (deep vein thrombosis)     • History of kidney stones     • History of pulmonary embolus (PE)     • History of sepsis     • History of small bowel obstruction     • History of snoring     • Hyperlipidemia     • Hypertension     • Knee pain, bilateral     • Neuropathy     • Pulmonary hypertension (CMS/[facility])     • Sleep apnea                 SURGICAL HISTORY  Surgical History         Past Surgical History:   Procedure Laterality Date   • ABDOMINAL SURGERY   11/2017     Delayed closure of abdominal wound-wound vac placement, Dr. Nestor Howe   • BLADDER SURGERY         BLADDER LIFT, 2011   • CARDIAC CATHETERIZATION   2017     Normal coronary arteries, normal overall LVSF with wall motion abnormality   • COLON SURGERY         RECONSTRUCTION, 2001   • CYSTOSCOPY URETEROSCOPY LASER LITHOTRIPSY Right 10/23/2017     Procedure: CYSTO RT URETEROSCOPY LASER  STONE BASKETING RIGHT STENT EXCHANGE;  Surgeon: Quinn Cason MD;  Location: [facility];  Service:    • EXPLORATORY LAPAROTOMY       • HYSTERECTOMY         2001   • ROTATOR CUFF REPAIR Right 2016   • STEROID INJECTION KNEE         Had left knee injection 10/09/2018; right knee injection 10/02/2017               FAMILY HISTORY        Family History   Problem Relation Age of Onset   • Cancer Other     • Stroke Other     • Other Other           LUPUS ANTICOAGULANT   • Rheum arthritis Other     • Heart disease Other     • Heart attack Other      • Lupus Mother     • Stomach cancer Father     • Malig Hyperthermia Neg Hx     • Colon cancer Neg Hx     • Colon polyps Neg Hx              SOCIAL HISTORY  Social History            Occupational History       Employer: RETIRED   Tobacco Use   • Smoking status: Never Smoker   • Smokeless tobacco: Former User   Substance and Sexual Activity   • Alcohol use: No   • Drug use: No   • Sexual activity: Defer         Debilities/Disabilities Identified: None     Emotional Behavior: Appropriate     CURRENT MEDICATIONS     Current Outpatient Medications:   •  bumetanide (BUMEX) 2 MG tablet, Take 1 tablet by mouth Daily., Disp: 30 tablet, Rfl: 5  •  Chlorhexidine Gluconate Cloth 2 % pads, Apply  topically Take As Directed., Disp: , Rfl:   •  ELIQUIS 5 MG tablet tablet, TAKE ONE TABLET BY MOUTH TWICE DAILY, Disp: 60 tablet, Rfl: 5  •  escitalopram (LEXAPRO) 10 MG tablet, Take 1 tablet by mouth Daily., Disp: 30 tablet, Rfl: 5  •  levothyroxine (SYNTHROID, LEVOTHROID) 25 MCG tablet, Take 25 mcg by mouth Daily., Disp: , Rfl:   •  mupirocin (BACTROBAN) 2 % nasal ointment, into the nostril(s) as directed by provider Take As Directed., Disp: , Rfl:   •  ONE TOUCH ULTRA TEST test strip, , Disp: , Rfl:   •  ONETOUCH DELICA LANCETS 33G misc, , Disp: , Rfl:   •  oxyCODONE-acetaminophen (PERCOCET) 5-325 MG per tablet, Take 1 tablet by mouth Every 4 (Four) Hours As Needed for Severe Pain ., Disp: 18 tablet, Rfl: 0  •  pantoprazole (PROTONIX) 40 MG EC tablet, TAKE ONE TABLET BY MOUTH ONCE DAILY, Disp: 30 tablet, Rfl: 5  •  potassium chloride (MICRO-K) 10 MEQ CR capsule, TAKE 2 CAPSULES BY MOUTH ONCE DAILY, Disp: 60 capsule, Rfl: 2  •  pravastatin (PRAVACHOL) 40 MG tablet, TAKE ONE TABLET BY MOUTH EVERY NIGHT, Disp: 30 tablet, Rfl: 5  •  saccharomyces boulardii (FLORASTOR) 250 MG capsule, Take 1 capsule by mouth 2 (Two) Times a Day., Disp: 60 capsule, Rfl: 5  •  sildenafil (REVATIO) 20 MG tablet, Take 20 mg by mouth 3 (Three) Times a  Day., Disp: , Rfl:   •  vitamin D (ERGOCALCIFEROL) 76630 units capsule capsule, Take 1 capsule by mouth 1 (One) Time Per Week., Disp: 13 capsule, Rfl: 1     ALLERGIES  Patient has no known allergies.     VITALS  BP (!) 186/104 (BP Location: Left arm, Patient Position: Lying)   Pulse 64   Temp 97.8 °F (36.6 °C) (Oral)   Resp 16   Wt 114 kg (251 lb 6.4 oz)   SpO2 90%   BMI 45.97 kg/m²               LAST RESULTS                   Lab on 02/19/2019   Component Date Value Ref Range Status   • WBC 02/19/2019 12.16* 3.40 - 10.80 10*3/mm3 Final   • RBC 02/19/2019 5.08  3.77 - 5.28 10*6/mm3 Final   • Hemoglobin 02/19/2019 9.5* 12.0 - 15.9 g/dL Final   • Hematocrit 02/19/2019 34.8  34.0 - 46.6 % Final   • MCV 02/19/2019 68.5* 79.0 - 97.0 fL Final   • MCH 02/19/2019 18.7* 26.6 - 33.0 pg Final   • MCHC 02/19/2019 27.3* 31.5 - 35.7 g/dL Final   • RDW 02/19/2019 26.7* 12.3 - 15.4 % Final   • RDW-SD 02/19/2019 46.4  37.0 - 54.0 fl Final   • MPV 02/19/2019 8.7  6.0 - 12.0 fL Final   • Platelets 02/19/2019 517* 140 - 450 10*3/mm3 Final   • Neutrophil % 02/19/2019 72.2  42.7 - 76.0 % Final   • Lymphocyte % 02/19/2019 13.7* 19.6 - 45.3 % Final   • Monocyte % 02/19/2019 8.3  5.0 - 12.0 % Final   • Eosinophil % 02/19/2019 3.9  0.3 - 6.2 % Final   • Basophil % 02/19/2019 1.0  0.0 - 1.5 % Final   • Immature Grans % 02/19/2019 0.9* 0.0 - 0.5 % Final   • Neutrophils, Absolute 02/19/2019 8.78* 1.40 - 7.00 10*3/mm3 Final   • Lymphocytes, Absolute 02/19/2019 1.66  0.70 - 3.10 10*3/mm3 Final   • Monocytes, Absolute 02/19/2019 1.01* 0.10 - 0.90 10*3/mm3 Final   • Eosinophils, Absolute 02/19/2019 0.48* 0.00 - 0.40 10*3/mm3 Final   • Basophils, Absolute 02/19/2019 0.12  0.00 - 0.20 10*3/mm3 Final   • Immature Grans, Absolute 02/19/2019 0.11* 0.00 - 0.05 10*3/mm3 Final   • nRBC 02/19/2019 0.2* 0.0 - 0.0 /100 WBC Final      Xr Knee 1 Or 2 View Right     Result Date: 1/24/2019  Narrative: PA AND LATERAL CHEST X-RAY AND 3 VIEWS OF THE RIGHT KNEE  - 01/24/2019  CLINICAL HISTORY: Preop for right total knee surgery, history of shortness breath, CHF, diabetes and hypertension.  CHEST: There are no prior chest x-rays for comparison. The cardiomediastinal silhouette are mildly enlarged. Pulmonary vasculature is within normal limits.  The lungs are clear, the costophrenic angles are sharp.  There is a screw projecting over the right humeral head presumably from prior right rotator cuff surgery.       Impression: 1. No active disease is seen in the chest. 2. Mild cardiomegaly. 3. Screw projects over the right humeral head likely from prior rotator cuff surgery.   RIGHT KNEE:  A total of 3 views of the right knee including AP and lateral views of the right knee and sunrise view of the patella are submitted for interpretation.  There is severe joint space narrowing in the medial compartment of the right knee with moderate marginal spurring. There is mild marginal spurring in the patellofemoral and lateral compartment compatible with tricompartmental osteoarthritic change. I see no acute fracture or malalignment of the bones of the right knee.  IMPRESSION:  Tricompartmental osteoarthritic changes in the right knee most pronounced in medial compartment as described.  No acute osseous abnormality was seen.  This report was finalized on 1/24/2019 9:19 PM by Dr. Wilbert Galeas M.D.       Xr Chest Pa & Lateral     Result Date: 1/24/2019  Narrative: PA AND LATERAL CHEST X-RAY AND 3 VIEWS OF THE RIGHT KNEE - 01/24/2019  CLINICAL HISTORY: Preop for right total knee surgery, history of shortness breath, CHF, diabetes and hypertension.  CHEST: There are no prior chest x-rays for comparison. The cardiomediastinal silhouette are mildly enlarged. Pulmonary vasculature is within normal limits.  The lungs are clear, the costophrenic angles are sharp.  There is a screw projecting over the right humeral head presumably from prior right rotator cuff surgery.       Impression: 1. No active  disease is seen in the chest. 2. Mild cardiomegaly. 3. Screw projects over the right humeral head likely from prior rotator cuff surgery.   RIGHT KNEE:  A total of 3 views of the right knee including AP and lateral views of the right knee and sunrise view of the patella are submitted for interpretation.  There is severe joint space narrowing in the medial compartment of the right knee with moderate marginal spurring. There is mild marginal spurring in the patellofemoral and lateral compartment compatible with tricompartmental osteoarthritic change. I see no acute fracture or malalignment of the bones of the right knee.  IMPRESSION:  Tricompartmental osteoarthritic changes in the right knee most pronounced in medial compartment as described.  No acute osseous abnormality was seen.  This report was finalized on 1/24/2019 9:19 PM by Dr. Wilbert Galeas M.D.          PHYSICAL EXAM  Physical Exam   Constitutional: She is oriented to person, place, and time. She appears well-developed and well-nourished. No distress.   HENT:   Head: Normocephalic and atraumatic.   Mouth/Throat: Oropharynx is clear and moist.   Eyes: EOM are normal. Pupils are equal, round, and reactive to light.   Neck: Normal range of motion. No tracheal deviation present.   Cardiovascular: Normal rate, regular rhythm, normal heart sounds and intact distal pulses. Exam reveals no gallop and no friction rub.   No murmur heard.  Pulmonary/Chest: Effort normal and breath sounds normal. No stridor. No respiratory distress. She has no wheezes. She has no rales. She exhibits no tenderness.   Abdominal: Soft. Bowel sounds are normal. She exhibits no distension. There is no tenderness. There is no rebound and no guarding.   Surgical scars noted, periumbilical hernia, soft and reducible   Musculoskeletal: She exhibits no edema.   Lymphadenopathy:     She has no cervical adenopathy.   Neurological: She is alert and oriented to person, place, and time.   Skin: Skin is  warm. She is not diaphoretic.   Psychiatric: She has a normal mood and affect. Her behavior is normal. Judgment and thought content normal.   Nursing note and vitals reviewed.        ASSESSMENT  Diagnoses and all orders for this visit:     Encounter for screening for malignant neoplasm of colon  -     Case Request; Standing     Iron deficiency anemia, unspecified iron deficiency anemia type  -     Occult Blood X 3, Stool - Stool, Per Rectum  -     Case Request; Standing     Other orders  -     Follow Anesthesia Guidelines / Standing Orders; Future  -     Implement Anesthesia orders day of procedure.; Standing  -     Obtain informed consent; Standing              PLAN  No Follow-up on file.     Indications, risks and procedure were discussed with the patient, including but not limited to, bleeding, infection, possibility of perforation and possible polypectomy. All of the patient's questions were answered, and signed informed consent was obtained and placed on the chart.     Contact Dr. Leone about getting off eliquis prior to scopes  Stool cards to be done-patient will get more cards at Dr. Pruitt office. She is not wanting me to order this/or pick it up here.

## 2019-03-04 NOTE — OP NOTE
Patient Name:  Mariangel Nguyen  YOB: 1950    Date of Procedure:  3/4/2019    Procedure Performed:  EGD and Colonoscopy   Indications:  Iron deficiency anemia    Pre-op Diagnosis:   Encounter for screening for malignant neoplasm of colon [Z12.11]  Iron deficiency anemia, unspecified iron deficiency anemia type [D50.9]    Post-Op Diagnosis Codes:     * Encounter for screening for malignant neoplasm of colon [Z12.11]     * Iron deficiency anemia, unspecified iron deficiency anemia type [D50.9]         Staff:  Surgeon(s):  Nai Luong MD         Consent: Procedure EGD and colonoscopy indications, risks and procedure were discussed with the patient, including but not limited to, bleeding, infection, possibility of perforation and possible polypectomy. All of the patient's questions were answered, and signed informed consent was obtained and placed on the chart.      Sedation: Sedation was provided by anesthesia.      Estimated Blood Loss: minimal    Specimens:   ID Type Source Tests Collected by Time   A : bx Tissue Small Intestine TISSUE PATHOLOGY EXAM Nai Luong MD 3/4/2019 1302   B : bx Tissue Stomach TISSUE PATHOLOGY EXAM Nai Luong MD 3/4/2019 1304   C : bx Tissue Esophagus, Distal TISSUE PATHOLOGY EXAM Nai Luong MD 3/4/2019 1306   D :  Polyp Large Intestine, Cecum TISSUE PATHOLOGY EXAM Nai Luong MD 3/4/2019 1326   E :  Polyp Large Intestine, Right / Ascending Colon TISSUE PATHOLOGY EXAM Nai Luong MD 3/4/2019 1329   F :  Polyp Large Intestine, Left / Descending Colon TISSUE PATHOLOGY EXAM Nai Luong MD 3/4/2019 1336   G : polyp at anastomosis Polyp Large Intestine TISSUE PATHOLOGY EXAM Nai Luong MD 3/4/2019 1340         Description of Procedure:   After excellent sedation a flexible endoscope was passed into the oropharynx into the distal esophagus.  The Z line was irregular biopsies are obtained using forceps to rule out Esposito's esophagus.  She does  have a sliding hiatal hernia that is noted.  The scope was then easily traversed into the stomach, into the antrum.  Mild gastritis is noted here this is biopsied using forceps.  The scope was retroflexed here straightened and passed into the duodenal bulb all the way into the second portion with ease.  Small bowel biopsies are obtained using forceps.  The entire examined small bowel mucosa overall appeared normal.  The scope was then slowly withdrawn out the patient with no immediate complications. She had some hypoxemia afterwards such that a face mask was used to help with oxygenation. After adequate recovery,  she was then turned around to the appropriate position and a digital rectal examination was performed on flexible colonoscope was inserted into the rectum passed to the cecum.  In the rectal area and the anastomosis is noted from her previous surgery.  The cecum was identified by both the ileocecal valve and the appendiceal orifice.  Terminal ileum was entered this appeared normal.  In the cecal vault there was a 3 mm polyp removed using forceps.  In the ascending colon there was another 3 mm polyp removed using forceps.  In the descending colon there was a 4 mm polyp removed using forceps.  In the rectal area at the level of her anastomosis there was a 3 mm polyp removed using forceps.  The scope was then slowly withdrawn to the rectum and retroflexed for internal hemorrhoids are noted.  The scope was straightened and withdrawn out the patient with no immediate complications and she tolerated procedure well.      Findings:   Hiatal hernia  Irregular Z line  Gastritis  Colon polyps removed with forceps  Internal hemorrhoids  We will await biopsy results and repeat colonoscopy will likely be recommended for 5 years.  Occult stools were negative.  She will follow-up with hematology for further workup of her anemia.  She is seen back in the office in follow-up also.  Complications: none        Nai Luong  MD     Date: 3/4/2019  Time: 1:50 PM

## 2019-03-04 NOTE — ANESTHESIA PREPROCEDURE EVALUATION
Anesthesia Evaluation     Patient summary reviewed and Nursing notes reviewed   NPO Solid Status: > 8 hours  NPO Liquid Status: > 8 hours           Airway   Mallampati: III  TM distance: >3 FB  Neck ROM: full  no difficulty expected and Possible difficult intubation  Dental - normal exam     Pulmonary     breath sounds clear to auscultation  (+) COPD ( uses o2 2L at home ), shortness of breath, sleep apnea (refuses sleep study.  ), decreased breath sounds,   Cardiovascular     ECG reviewed  Rhythm: regular  Rate: normal    (+) hypertension ( BP meds taken off 1 year ago) poorly controlled, CHF, hyperlipidemia,     ROS comment: No MI--CHF in past of unknown etiology    Neuro/Psych  (+) psychiatric history Anxiety,     GI/Hepatic/Renal/Endo    (+) morbid obesity, GERD well controlled,  diabetes mellitus type 2,     Musculoskeletal     (+) gait problem ( knee pain),   Abdominal    Substance History      OB/GYN          Other   (+) arthritis     ROS/Med Hx Other: Last eliquis 5 days ago    ECG 12 Lead   Order: [id number]   Status:  Final result   Visible to patient:  No (Not Released) Next appt:  03/11/2019 at 08:45 AM in Primary Care (Waqar Burton MD)     Narrative       RR Interval= 896 ms  WI Interval= 156 ms  QRSD Interval= 100 ms  QT Interval= 452 ms  QTc Interval= 478 ms  Heart Rate= 67 ms  P Axis= 72 deg  QRS Axis= -5 deg  T Wave Axis= 80 deg  I: 40 Axis= 29 deg  T: 40 Axis= -26 deg  ST Axis= 211 deg  SINUS RHYTHM  ABNRM R PROG, CONSIDER ASMI OR LEAD PLACEMENT  BORDERLINE T ABNORMALITIES, ANTERIOR LEADS  NO SIGNIFICANT CHANGE FROM PREVIOUS ECG  Electronically Signed by:  Willy Obrien ([facility]) 20-Oct-2017 22:47:25  Date and Time of Study: 2017-10-20 08:07:16    Specimen Collected: 10/20/17 08:07                          Anesthesia Plan    ASA 3     MAC     intravenous induction   Anesthetic plan, all risks, benefits, and alternatives have been provided, discussed and informed consent has been obtained with:  patient.  Use of blood products discussed with patient  Consented to blood products.

## 2019-03-06 LAB
CYTO UR: NORMAL
LAB AP CASE REPORT: NORMAL
PATH REPORT.FINAL DX SPEC: NORMAL
PATH REPORT.GROSS SPEC: NORMAL

## 2019-03-11 ENCOUNTER — OFFICE VISIT (OUTPATIENT)
Dept: FAMILY MEDICINE CLINIC | Facility: CLINIC | Age: 69
End: 2019-03-11

## 2019-03-11 VITALS
HEIGHT: 62 IN | DIASTOLIC BLOOD PRESSURE: 80 MMHG | HEART RATE: 92 BPM | TEMPERATURE: 97.6 F | WEIGHT: 242.4 LBS | BODY MASS INDEX: 44.61 KG/M2 | SYSTOLIC BLOOD PRESSURE: 140 MMHG

## 2019-03-11 DIAGNOSIS — F32.A ANXIETY AND DEPRESSION: Primary | ICD-10-CM

## 2019-03-11 DIAGNOSIS — F41.9 ANXIETY AND DEPRESSION: Primary | ICD-10-CM

## 2019-03-11 DIAGNOSIS — I10 ESSENTIAL HYPERTENSION: ICD-10-CM

## 2019-03-11 PROCEDURE — 99213 OFFICE O/P EST LOW 20 MIN: CPT | Performed by: INTERNAL MEDICINE

## 2019-03-11 RX ORDER — ESCITALOPRAM OXALATE 10 MG/1
15 TABLET ORAL DAILY
Qty: 45 TABLET | Refills: 5 | Status: SHIPPED | OUTPATIENT
Start: 2019-03-11 | End: 2019-09-04 | Stop reason: SDUPTHER

## 2019-03-11 RX ORDER — BISOPROLOL FUMARATE 5 MG/1
5 TABLET, FILM COATED ORAL DAILY
Qty: 30 TABLET | Refills: 5 | Status: SHIPPED | OUTPATIENT
Start: 2019-03-11 | End: 2019-05-09

## 2019-03-11 NOTE — PROGRESS NOTES
Subjective Chief complaint is follow-up for anxiety and depression  Mariangel Nguyen is a 68 y.o. female.     History of Present Illness   Mariangel is here today for follow-up on her anxiety and depression.  At her last visit we did initiate some generic Lexapro.  She does feel better.  She seems to be improved according to family members.  Family members think she may need just a little bit more of a dose.  Her blood pressure is a little bit on the elevated side here today.  Her bisoprolol was stopped when she had low blood pressures related to her anemia.  I believe this may need to be reinstituted.  The following portions of the patient's history were reviewed and updated as appropriate: allergies, current medications, past family history, past medical history, past social history, past surgical history and problem list.    Review of Systems   Respiratory: Negative for chest tightness and shortness of breath.    Cardiovascular: Negative for chest pain.       Objective   Physical Exam   Constitutional: She appears well-developed and well-nourished.   Cardiovascular: Normal rate, regular rhythm and normal heart sounds.   Pulmonary/Chest: Effort normal and breath sounds normal.   Psychiatric: She has a normal mood and affect. Her behavior is normal.   Nursing note and vitals reviewed.        Assessment/Plan   Mariangel was seen today for follow-up.    Diagnoses and all orders for this visit:    Anxiety and depression    Essential hypertension    Other orders  -     bisoprolol (ZEBeta) 5 MG tablet; Take 1 tablet by mouth Daily.  -     escitalopram (LEXAPRO) 10 MG tablet; Take 1.5 tablets by mouth Daily.    Mariangel is here today for follow-up.  She seems to be doing fairly well in terms of her anxiety and depression.  I am going to have her take a pill and a half of the Lexapro and see if that will help a little bit more.  We are going to reinstitute her bisoprolol for her blood pressure.

## 2019-03-26 ENCOUNTER — APPOINTMENT (OUTPATIENT)
Dept: PREADMISSION TESTING | Facility: HOSPITAL | Age: 69
End: 2019-03-26

## 2019-03-27 ENCOUNTER — OFFICE VISIT (OUTPATIENT)
Dept: ONCOLOGY | Facility: CLINIC | Age: 69
End: 2019-03-27

## 2019-03-27 ENCOUNTER — LAB (OUTPATIENT)
Dept: LAB | Facility: HOSPITAL | Age: 69
End: 2019-03-27

## 2019-03-27 VITALS
OXYGEN SATURATION: 93 % | WEIGHT: 237.9 LBS | TEMPERATURE: 97.6 F | DIASTOLIC BLOOD PRESSURE: 82 MMHG | BODY MASS INDEX: 43.78 KG/M2 | HEIGHT: 62 IN | RESPIRATION RATE: 16 BRPM | HEART RATE: 81 BPM | SYSTOLIC BLOOD PRESSURE: 159 MMHG

## 2019-03-27 DIAGNOSIS — D75.839 THROMBOCYTOSIS: ICD-10-CM

## 2019-03-27 DIAGNOSIS — I82.432 ACUTE DEEP VEIN THROMBOSIS (DVT) OF POPLITEAL VEIN OF LEFT LOWER EXTREMITY (HCC): Primary | ICD-10-CM

## 2019-03-27 DIAGNOSIS — D50.8 OTHER IRON DEFICIENCY ANEMIA: ICD-10-CM

## 2019-03-27 LAB
BASOPHILS # BLD AUTO: 0.07 10*3/MM3 (ref 0–0.2)
BASOPHILS NFR BLD AUTO: 0.7 % (ref 0–1.5)
EOSINOPHIL # BLD AUTO: 0.32 10*3/MM3 (ref 0–0.4)
EOSINOPHIL NFR BLD AUTO: 3.1 % (ref 0.3–6.2)
ERYTHROCYTE [DISTWIDTH] IN BLOOD BY AUTOMATED COUNT: ABNORMAL % (ref 12.3–15.4)
HCT VFR BLD AUTO: 39.4 % (ref 34–46.6)
HGB BLD-MCNC: 12.1 G/DL (ref 12–15.9)
IMM GRANULOCYTES # BLD AUTO: 0.06 10*3/MM3 (ref 0–0.05)
IMM GRANULOCYTES NFR BLD AUTO: 0.6 % (ref 0–0.5)
LYMPHOCYTES # BLD AUTO: 1.38 10*3/MM3 (ref 0.7–3.1)
LYMPHOCYTES NFR BLD AUTO: 13.5 % (ref 19.6–45.3)
MCH RBC QN AUTO: 24.5 PG (ref 26.6–33)
MCHC RBC AUTO-ENTMCNC: 30.7 G/DL (ref 31.5–35.7)
MCV RBC AUTO: 79.9 FL (ref 79–97)
MONOCYTES # BLD AUTO: 0.73 10*3/MM3 (ref 0.1–0.9)
MONOCYTES NFR BLD AUTO: 7.2 % (ref 5–12)
NEUTROPHILS # BLD AUTO: 7.63 10*3/MM3 (ref 1.4–7)
NEUTROPHILS NFR BLD AUTO: 74.9 % (ref 42.7–76)
NRBC BLD AUTO-RTO: 0 /100 WBC (ref 0–0)
PLATELET # BLD AUTO: 396 10*3/MM3 (ref 140–450)
PMV BLD AUTO: 9.7 FL (ref 6–12)
RBC # BLD AUTO: 4.93 10*6/MM3 (ref 3.77–5.28)
WBC NRBC COR # BLD: 10.19 10*3/MM3 (ref 3.4–10.8)

## 2019-03-27 PROCEDURE — 99214 OFFICE O/P EST MOD 30 MIN: CPT | Performed by: INTERNAL MEDICINE

## 2019-03-27 PROCEDURE — 36415 COLL VENOUS BLD VENIPUNCTURE: CPT | Performed by: INTERNAL MEDICINE

## 2019-03-27 PROCEDURE — 85025 COMPLETE CBC W/AUTO DIFF WBC: CPT | Performed by: INTERNAL MEDICINE

## 2019-03-27 NOTE — PROGRESS NOTES
Subjective .     REASONS FOR FOLLOWUP:    1. Iron deficiency anemia:   · Long-standing chronic anemia with worsening in 2017 along with declining MCV.    · Associated thrombocytosis and pica symptoms  · Iron deficient 2/6/19 with ferritin less than 5, iron saturation 3%, hemoglobin 8.4, MCV 63.5.  · Received Injectafer 750mg IV 2/12 and 2/19/19  · Stool cards negative for occult blood x3 on 2/27/19  · EGD/colonoscopy 3/4/19 with hiatal hernia, gastritis, benign colonic polyps, no bleeding source identified.  2. Left lower extremity DVT (popliteal/calf) with suspected pulmonary embolism:  · Identified 12/1/17 during hospitalization for small bowel perforation requiring abdominal surgery with subsequent sepsis (provoked).  · Left lower extremity popliteal/calf DVT 12/1/17  · V/Q scan 12/2/17 with intermediate probability for pulmonary embolism  · Continuation of anticoagulation with Eliquis 5 mg twice daily  · Hypercoagulable evaluation 12/6/19 negative  · Due to provoked nature of thrombosis, anticoagulation discontinued 12/27/19  · Plan brief anticoagulation times 6 weeks following right total knee arthroplasty scheduled 4/8/19.    HISTORY OF PRESENT ILLNESS:  The patient is a 68 y.o. year old female who is here for follow-up with the above-mentioned history.    History of Present Illness   the patient is here today for follow-up visit from initial consultation with laboratory studies and endoscopy results to review.  His remained off anticoagulation.  She did undergo EGD and colonoscopy on 3/4/19 with findings of hiatal hernia, gastritis, and colonic polyps, benign.  The patient has improved since receiving IV iron and reports resolution of her ice craving/PICA symptoms.  She is scheduled for her right total knee arthroplasty on 4/8/19.  She remains in a wheelchair today due to her severe osteoarthritis.  She has no new complaints today.    Past Medical History:   Diagnosis Date   • Arthritis     OSTEOARTHRITIS  KNEES   • CHF (congestive heart failure) (CMS/HCC)    • Colon polyp    • COPD (chronic obstructive pulmonary disease) (CMS/HCC)    • Diabetes mellitus (CMS/HCC)    • Disease of thyroid gland    • H/O Abnormal Pap smear of cervix    • History of DVT (deep vein thrombosis)    • History of kidney stones    • History of pulmonary embolus (PE)    • History of sepsis    • History of small bowel obstruction    • History of snoring    • Hyperlipidemia    • Hypertension    • Knee pain, bilateral    • Neuropathy    • Pulmonary hypertension (CMS/HCC)    • Sleep apnea      Past Surgical History:   Procedure Laterality Date   • ABDOMINAL SURGERY  11/2017    Delayed closure of abdominal wound-wound vac placement, Dr. Nestor Howe   • BLADDER SURGERY      BLADDER LIFT, 2011   • CARDIAC CATHETERIZATION  2017    Normal coronary arteries, normal overall LVSF with wall motion abnormality   • COLON SURGERY      RECONSTRUCTION, 2001   • COLONOSCOPY N/A 3/4/2019    Procedure: COLONOSCOPY polypectomy;  Surgeon: Nai Luong MD;  Location: [facility];  Service: Gastroenterology   • CYSTOSCOPY URETEROSCOPY LASER LITHOTRIPSY Right 10/23/2017    Procedure: CYSTO RT URETEROSCOPY LASER  STONE BASKETING RIGHT STENT EXCHANGE;  Surgeon: Quinn Cason MD;  Location: [facility] OR;  Service:    • ENDOSCOPY N/A 3/4/2019    Procedure: ESOPHAGOGASTRODUODENOSCOPY WITH BIOPSIES;  Surgeon: Nai Luong MD;  Location: [facility];  Service: Gastroenterology   • EXPLORATORY LAPAROTOMY     • HYSTERECTOMY      2001   • ROTATOR CUFF REPAIR Right 2016   • STEROID INJECTION KNEE      Had left knee injection 10/09/2018; right knee injection 10/02/2017         Current Outpatient Medications on File Prior to Visit   Medication Sig Dispense Refill   • bisoprolol (ZEBeta) 5 MG tablet Take 1 tablet by mouth Daily. 30 tablet 5   • bumetanide (BUMEX) 2 MG tablet Take 1 tablet by mouth Daily. 30 tablet 5   • escitalopram (LEXAPRO) 10 MG tablet Take 1.5  tablets by mouth Daily. 45 tablet 5   • levothyroxine (SYNTHROID, LEVOTHROID) 25 MCG tablet Take 25 mcg by mouth Daily.     • oxyCODONE-acetaminophen (PERCOCET) 5-325 MG per tablet Take 1 tablet by mouth Every 4 (Four) Hours As Needed for Severe Pain . 18 tablet 0   • pantoprazole (PROTONIX) 40 MG EC tablet TAKE ONE TABLET BY MOUTH ONCE DAILY 30 tablet 5   • potassium chloride (MICRO-K) 10 MEQ CR capsule TAKE 2 CAPSULES BY MOUTH ONCE DAILY 60 capsule 2   • pravastatin (PRAVACHOL) 40 MG tablet TAKE ONE TABLET BY MOUTH EVERY NIGHT 30 tablet 5   • sildenafil (REVATIO) 20 MG tablet Take 20 mg by mouth 3 (Three) Times a Day.     • vitamin D (ERGOCALCIFEROL) 76789 units capsule capsule Take 1 capsule by mouth 1 (One) Time Per Week. 13 capsule 1   • Chlorhexidine Gluconate Cloth 2 % pads Apply  topically Take As Directed.     • ELIQUIS 5 MG tablet tablet TAKE ONE TABLET BY MOUTH TWICE DAILY 60 tablet 5   • mupirocin (BACTROBAN) 2 % nasal ointment into the nostril(s) as directed by provider Take As Directed.     • ONE TOUCH ULTRA TEST test strip      • ONETOUCH DELICA LANCETS 33G misc      • polyethylene glycol (GoLYTELY) 236 g solution Drink 1/2 starting at 2:00pm the day prior. Drink remaining 1/2 at 10:00 pm. May add flavor packet. 4000 mL 0   • saccharomyces boulardii (FLORASTOR) 250 MG capsule Take 1 capsule by mouth 2 (Two) Times a Day. 60 capsule 5     No current facility-administered medications on file prior to visit.        ALLERGIES:   No Known Allergies    Social History     Socioeconomic History   • Marital status: Single     Spouse name: Not on file   • Number of children: Not on file   • Years of education: High school   • Highest education level: Not on file   Occupational History     Employer: RETIRED   Tobacco Use   • Smoking status: Never Smoker   • Smokeless tobacco: Never Used   Substance and Sexual Activity   • Alcohol use: Yes     Comment: rare   • Drug use: No   • Sexual activity: Defer     Family  History   Problem Relation Age of Onset   • Cancer Other    • Stroke Other    • Other Other         LUPUS ANTICOAGULANT   • Rheum arthritis Other    • Heart disease Other    • Heart attack Other    • Lupus Mother    • Stomach cancer Father    • Malig Hyperthermia Neg Hx    • Colon cancer Neg Hx    • Colon polyps Neg Hx               Review of Systems   Constitutional: Negative for activity change, appetite change, fatigue, fever and unexpected weight change.   HENT: Negative for congestion, mouth sores, nosebleeds, sore throat and voice change.    Respiratory: Negative for cough, shortness of breath and wheezing.    Cardiovascular: Negative for chest pain, palpitations and leg swelling.   Gastrointestinal: Negative for abdominal distention, abdominal pain, blood in stool, constipation, diarrhea, nausea and vomiting.   Endocrine: Negative for cold intolerance and heat intolerance.   Genitourinary: Negative for difficulty urinating, dysuria, frequency and hematuria.   Musculoskeletal: Positive for arthralgias. Negative for back pain, joint swelling and myalgias.   Skin: Negative for rash.   Neurological: Negative for dizziness, syncope, weakness, light-headedness, numbness and headaches.   Hematological: Negative for adenopathy. Does not bruise/bleed easily.   Psychiatric/Behavioral: Negative for confusion and sleep disturbance. The patient is nervous/anxious.          Objective      Vitals:    03/27/19 1345   BP: 159/82   Pulse: 81   Resp: 16   Temp: 97.6 °F (36.4 °C)   SpO2: 93%      Current Status 3/27/2019   ECOG score 2   Pain 0/10    Physical Exam   Constitutional: She is oriented to person, place, and time. She appears well-developed and well-nourished.   Patient is seated in a wheelchair in the office today.   HENT:   Mouth/Throat: Oropharynx is clear and moist.   Eyes: Conjunctivae are normal.   Neck: No thyromegaly present.   Cardiovascular: Normal rate and regular rhythm. Exam reveals no gallop and no  friction rub.   No murmur heard.  Pulmonary/Chest: Breath sounds normal. No respiratory distress.   Abdominal: Soft. Bowel sounds are normal. She exhibits no distension. There is no tenderness.   Musculoskeletal: She exhibits no edema.   Lymphadenopathy:        Head (right side): No submandibular adenopathy present.     She has no cervical adenopathy.     She has no axillary adenopathy.        Right: No inguinal and no supraclavicular adenopathy present.        Left: No inguinal and no supraclavicular adenopathy present.   Neurological: She is alert and oriented to person, place, and time. She displays normal reflexes. No cranial nerve deficit. She exhibits normal muscle tone.   Skin: Skin is warm and dry. No rash noted.   Psychiatric: She has a normal mood and affect. Her behavior is normal.       RECENT LABS:  Hematology WBC   Date Value Ref Range Status   03/27/2019 10.19 3.40 - 10.80 10*3/mm3 Final   04/10/2018 7.3 4.5 - 11.0 10*3/uL Final     RBC   Date Value Ref Range Status   03/27/2019 4.93 3.77 - 5.28 10*6/mm3 Final   04/10/2018 4.69 4.0 - 6.0 10*6/uL Final     Hemoglobin   Date Value Ref Range Status   03/27/2019 12.1 12.0 - 15.9 g/dL Final   04/10/2018 10.5 (L) 12.0 - 16.0 g/dL Final     Hematocrit   Date Value Ref Range Status   03/27/2019 39.4 34.0 - 46.6 % Final   04/10/2018 36.2 33.0 - 51.0 % Final     Platelets   Date Value Ref Range Status   03/27/2019 396 140 - 450 10*3/mm3 Final   04/10/2018 498 (H) 150 - 450 10*3/uL Final        Lab Results   Component Value Date    GLUCOSE 123 02/06/2019    BUN 15 02/06/2019    CREATININE 0.74 02/06/2019    EGFRIFNONA 79 02/06/2019    EGFRIFAFRI 79 01/31/2018    BCR 20.3 02/06/2019    K 3.8 02/06/2019    CO2 27.2 02/06/2019    CALCIUM 9.9 02/06/2019    PROTENTOTREF 7.1 01/31/2018    ALBUMIN 4.20 02/06/2019    LABIL2 1.4 01/31/2018    AST 14 02/06/2019    ALT 11 02/06/2019           Assessment/Plan     1. Iron deficiency anemia:  · The patient has had a  long-standing degree of anemia.  Anemia appears to have worsened following her hospitalization for bowel obstruction and sepsis in late 2017.  Previous hemoglobin in the 7-8 range during hospitalization in late 2017, subsequent increase into the 9-10 range in early 2018 with gradual decline into the 8-9 range.  · In 2018, the patient developed declining MCV which has gradually decreased to 63.5 2/6/19    · Patient had concurrent thrombocytosis with platelet count in the high 400-500,000 range, subsequently increased to 600,000 range. Related to underlying iron deficiency.  · Patient developed associated PICA symptom of ice craving.  · There was no visible evidence of GI blood loss however patient remains on chronic anticoagulation with Eliquis.  She had never undergone EGD or colonoscopy.  Stool cards negative for occult blood on 2/27/19.    · Anemia evaluation 2/6/19 at initial visit with hemoglobin 8.6, MCV 63.5, platelet count 694,000, iron 18, ferritin less than 5, iron saturation 3%, TIBC 584, B12 380, folate greater than 20, creatinine 0.74, ESR 30, REBECCA negative, haptoglobin 324, .  · In order to facilitate rapid correction of her iron deficiency for anticipated knee surgery, administered IV iron in the form of Injectafer 750mg on 2/12 and 2/19/19.  · EGD and colonoscopy 3/4/19 with no evidence of bleeding source, evidence of hiatal hernia, gastritis, benign colonic polyps.  · Patient returns today in follow-up with significant improvement in hemoglobin up to 12.1 currently, improvement in MCV up to 79.9.  Her ice craving/pica symptoms have resolved.  She notes improved fatigue.  We will plan to repeat iron studies and hemoglobin in 2 months.  Anticipate transient worsening of anemia following upcoming knee replacement surgery.  2. Thrombocytosis:  · As above, the patient had a platelet count in the high 400-500,000 range since late 2017.  This progressively increased to 809,000 on 2/12/19.  · Secondary  to iron deficiency anemia  · Platelet count improved following IV iron administration  · Platelet count 396,000 today  3. Left lower extremity DVT (popliteal/calf) with suspected pulmonary embolism:  · Patient was admitted in November/December 2017 with small bowel perforation requiring abdominal surgery and subsequent sepsis  · Lower extremity Doppler 12/1/17 with popliteal and calf DVT on the left  · V/Q scan 12/2/17 with intermediate probability for pulmonary embolism  · Patient was anticoagulated and has continued on treatment with Eliquis 5mg bid.  She is now over one year out from her thrombosis that was provoked.  · Patient reports that her daughter developed DVT and pulmonary embolism in her 30s and apparently has an IVC filter in place.  She is a poor historian, accuracy is unknown.  Unclear whether her daughter has been evaluated with hypercoagulable labs.  · Given the family history, we did proceed with hypercoagulable evaluation 2/6/19: Negative factor V Leiden mutation, negative prothrombin gene mutation, protein C activity 192%, protein S activity 127%, Antithrombin III greater than 140, lupus anticoagulant negative, anticardiolipin antibody panel negative, anti-beta-2 GP 1 antibody panel negative.  · Hypercoagulable evaluation was negative.  Given the provoked nature of her previous thrombosis, and having been anticoagulated for over one year, on 2/27/19, recommended that she discontinue anticoagulation.  We did discuss signs and symptoms of recurrent DVT and pulmonary embolism and the need to seek immediate medical attention if these occur.  · Today, the patient is doing well off anticoagulation since 2/27/19.  We did discuss management following her upcoming right total knee arthroplasty scheduled for 4/8/19 with Dr. Luong.  I did contact Dr. Luong' nurse practitioner today and discussed recommendations for postoperative anticoagulation with Eliquis.  She stated they will plan to begin Eliquis at  hospital discharge and continue for 6 weeks postoperatively until the patient is ambulatory.     Plan:  1. Proceed with right total knee arthroplasty as scheduled 4/8/19.  Anticipate patient will resume Eliquis 5 mg twice daily at time of hospital discharge and continue for 6 weeks postoperatively per above discussion with orthopedics today.  2. MD visit in 2 months.  1 week prior we will draw labs with CBC, iron panel, ferritin.

## 2019-03-28 ENCOUNTER — HOSPITAL ENCOUNTER (OUTPATIENT)
Dept: GENERAL RADIOLOGY | Facility: HOSPITAL | Age: 69
Discharge: HOME | End: 2019-03-28

## 2019-03-28 ENCOUNTER — HOSPITAL ENCOUNTER (OUTPATIENT)
Dept: GENERAL RADIOLOGY | Facility: HOSPITAL | Age: 69
Discharge: HOME | End: 2019-03-28
Admitting: ORTHOPAEDIC SURGERY

## 2019-03-28 ENCOUNTER — APPOINTMENT (OUTPATIENT)
Dept: PREADMISSION TESTING | Facility: HOSPITAL | Age: 69
End: 2019-03-28

## 2019-03-28 VITALS
RESPIRATION RATE: 16 BRPM | BODY MASS INDEX: 45.27 KG/M2 | TEMPERATURE: 97.6 F | WEIGHT: 246 LBS | SYSTOLIC BLOOD PRESSURE: 183 MMHG | OXYGEN SATURATION: 94 % | DIASTOLIC BLOOD PRESSURE: 94 MMHG | HEIGHT: 62 IN | HEART RATE: 73 BPM

## 2019-03-28 DIAGNOSIS — M17.11 PRIMARY OSTEOARTHRITIS OF RIGHT KNEE: ICD-10-CM

## 2019-03-28 LAB
BACTERIA UR QL AUTO: ABNORMAL /HPF
BILIRUB UR QL STRIP: NEGATIVE
CLARITY UR: ABNORMAL
COLOR UR: YELLOW
GLUCOSE UR STRIP-MCNC: NEGATIVE MG/DL
HBA1C MFR BLD: 6.02 % (ref 4.8–5.6)
HGB UR QL STRIP.AUTO: ABNORMAL
HYALINE CASTS UR QL AUTO: ABNORMAL /LPF
KETONES UR QL STRIP: NEGATIVE
LEUKOCYTE ESTERASE UR QL STRIP.AUTO: ABNORMAL
NITRITE UR QL STRIP: NEGATIVE
PH UR STRIP.AUTO: 6.5 [PH] (ref 5–8)
PROT UR QL STRIP: ABNORMAL
RBC # UR: ABNORMAL /HPF
REF LAB TEST METHOD: ABNORMAL
SP GR UR STRIP: 1.02 (ref 1–1.03)
SQUAMOUS #/AREA URNS HPF: ABNORMAL /HPF
UROBILINOGEN UR QL STRIP: ABNORMAL
WBC UR QL AUTO: ABNORMAL /HPF

## 2019-03-28 PROCEDURE — 87186 SC STD MICRODIL/AGAR DIL: CPT | Performed by: ORTHOPAEDIC SURGERY

## 2019-03-28 PROCEDURE — 81001 URINALYSIS AUTO W/SCOPE: CPT | Performed by: ORTHOPAEDIC SURGERY

## 2019-03-28 PROCEDURE — 83036 HEMOGLOBIN GLYCOSYLATED A1C: CPT | Performed by: ORTHOPAEDIC SURGERY

## 2019-03-28 PROCEDURE — 87086 URINE CULTURE/COLONY COUNT: CPT | Performed by: ORTHOPAEDIC SURGERY

## 2019-03-28 PROCEDURE — 73560 X-RAY EXAM OF KNEE 1 OR 2: CPT

## 2019-03-28 PROCEDURE — 87077 CULTURE AEROBIC IDENTIFY: CPT | Performed by: ORTHOPAEDIC SURGERY

## 2019-03-28 PROCEDURE — 71046 X-RAY EXAM CHEST 2 VIEWS: CPT

## 2019-03-28 RX ORDER — PRAVASTATIN SODIUM 40 MG
40 TABLET ORAL NIGHTLY
COMMUNITY
End: 2021-03-05 | Stop reason: SDUPTHER

## 2019-03-28 RX ORDER — PANTOPRAZOLE SODIUM 40 MG/1
40 TABLET, DELAYED RELEASE ORAL DAILY
COMMUNITY
End: 2019-05-30 | Stop reason: SDUPTHER

## 2019-03-28 ASSESSMENT — KOOS JR
KOOS JR SCORE: 21
KOOS JR SCORE: 34.174

## 2019-03-30 LAB — BACTERIA SPEC AEROBE CULT: ABNORMAL

## 2019-04-08 ENCOUNTER — ANESTHESIA (OUTPATIENT)
Dept: PERIOP | Facility: HOSPITAL | Age: 69
End: 2019-04-08

## 2019-04-08 ENCOUNTER — APPOINTMENT (OUTPATIENT)
Dept: GENERAL RADIOLOGY | Facility: HOSPITAL | Age: 69
End: 2019-04-08

## 2019-04-08 ENCOUNTER — HOSPITAL ENCOUNTER (INPATIENT)
Facility: HOSPITAL | Age: 69
LOS: 7 days | Discharge: NURSING, REHAB OR LONG-TERM CARE | End: 2019-04-15
Attending: ORTHOPAEDIC SURGERY | Admitting: ORTHOPAEDIC SURGERY

## 2019-04-08 ENCOUNTER — ANESTHESIA EVENT (OUTPATIENT)
Dept: PERIOP | Facility: HOSPITAL | Age: 69
End: 2019-04-08

## 2019-04-08 DIAGNOSIS — Z96.651 HISTORY OF TOTAL RIGHT KNEE REPLACEMENT: Primary | ICD-10-CM

## 2019-04-08 DIAGNOSIS — R26.2 DIFFICULTY WALKING: ICD-10-CM

## 2019-04-08 DIAGNOSIS — J44.9 OSA AND COPD OVERLAP SYNDROME (HCC): ICD-10-CM

## 2019-04-08 DIAGNOSIS — G47.33 OSA AND COPD OVERLAP SYNDROME (HCC): ICD-10-CM

## 2019-04-08 PROBLEM — M17.11 OSTEOARTHRITIS OF RIGHT KNEE: Status: ACTIVE | Noted: 2019-04-08

## 2019-04-08 LAB
GLUCOSE BLDC GLUCOMTR-MCNC: 118 MG/DL (ref 70–130)
GLUCOSE BLDC GLUCOMTR-MCNC: 130 MG/DL (ref 70–130)
GLUCOSE BLDC GLUCOMTR-MCNC: 136 MG/DL (ref 70–130)
GLUCOSE BLDC GLUCOMTR-MCNC: 168 MG/DL (ref 70–130)
GLUCOSE BLDC GLUCOMTR-MCNC: 181 MG/DL (ref 70–130)
HCT VFR BLD AUTO: 38.7 % (ref 34–46.6)
HGB BLD-MCNC: 11.6 G/DL (ref 12–15.9)

## 2019-04-08 PROCEDURE — 97110 THERAPEUTIC EXERCISES: CPT

## 2019-04-08 PROCEDURE — C1776 JOINT DEVICE (IMPLANTABLE): HCPCS | Performed by: ORTHOPAEDIC SURGERY

## 2019-04-08 PROCEDURE — 63710000001 INSULIN LISPRO (HUMAN) PER 5 UNITS: Performed by: ORTHOPAEDIC SURGERY

## 2019-04-08 PROCEDURE — 97161 PT EVAL LOW COMPLEX 20 MIN: CPT

## 2019-04-08 PROCEDURE — C1713 ANCHOR/SCREW BN/BN,TIS/BN: HCPCS | Performed by: ORTHOPAEDIC SURGERY

## 2019-04-08 PROCEDURE — 0SRC0J9 REPLACEMENT OF RIGHT KNEE JOINT WITH SYNTHETIC SUBSTITUTE, CEMENTED, OPEN APPROACH: ICD-10-PCS | Performed by: ORTHOPAEDIC SURGERY

## 2019-04-08 PROCEDURE — 25010000002 FENTANYL CITRATE (PF) 100 MCG/2ML SOLUTION: Performed by: NURSE ANESTHETIST, CERTIFIED REGISTERED

## 2019-04-08 PROCEDURE — 25010000003 BUPIVACAINE LIPOSOME 1.3 % SUSPENSION 20 ML VIAL: Performed by: ORTHOPAEDIC SURGERY

## 2019-04-08 PROCEDURE — 73560 X-RAY EXAM OF KNEE 1 OR 2: CPT

## 2019-04-08 PROCEDURE — 25010000002 PROPOFOL 10 MG/ML EMULSION: Performed by: NURSE ANESTHETIST, CERTIFIED REGISTERED

## 2019-04-08 PROCEDURE — 94799 UNLISTED PULMONARY SVC/PX: CPT

## 2019-04-08 PROCEDURE — 85018 HEMOGLOBIN: CPT | Performed by: ORTHOPAEDIC SURGERY

## 2019-04-08 PROCEDURE — 25010000002 ONDANSETRON PER 1 MG: Performed by: NURSE ANESTHETIST, CERTIFIED REGISTERED

## 2019-04-08 PROCEDURE — 85014 HEMATOCRIT: CPT | Performed by: ORTHOPAEDIC SURGERY

## 2019-04-08 PROCEDURE — 25010000002 CEFAZOLIN PER 500 MG: Performed by: ORTHOPAEDIC SURGERY

## 2019-04-08 PROCEDURE — C9290 INJ, BUPIVACAINE LIPOSOME: HCPCS | Performed by: ORTHOPAEDIC SURGERY

## 2019-04-08 PROCEDURE — 25010000003 CEFAZOLIN IN DEXTROSE 2-4 GM/100ML-% SOLUTION: Performed by: ORTHOPAEDIC SURGERY

## 2019-04-08 PROCEDURE — 82962 GLUCOSE BLOOD TEST: CPT

## 2019-04-08 DEVICE — IMPLANTABLE DEVICE: Type: IMPLANTABLE DEVICE | Site: KNEE | Status: FUNCTIONAL

## 2019-04-08 DEVICE — PALACOS® R IS A FAST-CURING, RADIOPAQUE, POLY(METHYL METHACRYLATE)-BASED BONE CEMENT.PALACOS ® R CONTAINS THE X-RAY CONTRAST MEDIUM ZIRCONIUM DIOXIDE. TO IMPROVE VISIBILITY IN THE SURGICAL FIELD PALACOS ® R HAS BEEN COLOURED WITH CHLOROPHYLL (E141). THE BONE CEMENT IS PREPARED DIRECTLY BEFORE USE BY MIXING A POLYMER POWDER COMPONENT WITH A LIQUID MONOMER COMPONENT. A DUCTILE DOUGH FORMS WHICH CURES WITHIN A FEW MINUTES.
Type: IMPLANTABLE DEVICE | Site: KNEE | Status: FUNCTIONAL
Brand: PALACOS®

## 2019-04-08 DEVICE — CAP PAT KN VE/TM UPCHRG: Type: IMPLANTABLE DEVICE | Site: KNEE | Status: FUNCTIONAL

## 2019-04-08 DEVICE — CAP TOTL KN CMT PRIMARY: Type: IMPLANTABLE DEVICE | Site: KNEE | Status: FUNCTIONAL

## 2019-04-08 DEVICE — PAT KN PERSONA VE CRS/LNK CMT 8X29MM: Type: IMPLANTABLE DEVICE | Site: KNEE | Status: FUNCTIONAL

## 2019-04-08 RX ORDER — TRANEXAMIC ACID 100 MG/ML
INJECTION, SOLUTION INTRAVENOUS AS NEEDED
Status: DISCONTINUED | OUTPATIENT
Start: 2019-04-08 | End: 2019-04-08 | Stop reason: HOSPADM

## 2019-04-08 RX ORDER — LABETALOL HYDROCHLORIDE 5 MG/ML
5 INJECTION, SOLUTION INTRAVENOUS
Status: DISCONTINUED | OUTPATIENT
Start: 2019-04-08 | End: 2019-04-08 | Stop reason: HOSPADM

## 2019-04-08 RX ORDER — PROPOFOL 10 MG/ML
VIAL (ML) INTRAVENOUS AS NEEDED
Status: DISCONTINUED | OUTPATIENT
Start: 2019-04-08 | End: 2019-04-08 | Stop reason: SURG

## 2019-04-08 RX ORDER — CEFAZOLIN SODIUM 2 G/100ML
2 INJECTION, SOLUTION INTRAVENOUS ONCE
Status: COMPLETED | OUTPATIENT
Start: 2019-04-08 | End: 2019-04-08

## 2019-04-08 RX ORDER — DIPHENHYDRAMINE HCL 25 MG
25 CAPSULE ORAL
Status: DISCONTINUED | OUTPATIENT
Start: 2019-04-08 | End: 2019-04-08 | Stop reason: HOSPADM

## 2019-04-08 RX ORDER — UREA 10 %
1 LOTION (ML) TOPICAL NIGHTLY PRN
Status: DISCONTINUED | OUTPATIENT
Start: 2019-04-08 | End: 2019-04-09

## 2019-04-08 RX ORDER — HYDRALAZINE HYDROCHLORIDE 20 MG/ML
5 INJECTION INTRAMUSCULAR; INTRAVENOUS
Status: DISCONTINUED | OUTPATIENT
Start: 2019-04-08 | End: 2019-04-08 | Stop reason: HOSPADM

## 2019-04-08 RX ORDER — FAMOTIDINE 10 MG/ML
20 INJECTION, SOLUTION INTRAVENOUS ONCE
Status: COMPLETED | OUTPATIENT
Start: 2019-04-08 | End: 2019-04-08

## 2019-04-08 RX ORDER — PROMETHAZINE HYDROCHLORIDE 25 MG/ML
12.5 INJECTION, SOLUTION INTRAMUSCULAR; INTRAVENOUS ONCE AS NEEDED
Status: DISCONTINUED | OUTPATIENT
Start: 2019-04-08 | End: 2019-04-08 | Stop reason: HOSPADM

## 2019-04-08 RX ORDER — MIDAZOLAM HYDROCHLORIDE 1 MG/ML
1 INJECTION INTRAMUSCULAR; INTRAVENOUS
Status: DISCONTINUED | OUTPATIENT
Start: 2019-04-08 | End: 2019-04-08 | Stop reason: HOSPADM

## 2019-04-08 RX ORDER — BISACODYL 10 MG
10 SUPPOSITORY, RECTAL RECTAL DAILY PRN
Status: DISCONTINUED | OUTPATIENT
Start: 2019-04-08 | End: 2019-04-15 | Stop reason: HOSPADM

## 2019-04-08 RX ORDER — ONDANSETRON 2 MG/ML
4 INJECTION INTRAMUSCULAR; INTRAVENOUS EVERY 6 HOURS PRN
Status: DISCONTINUED | OUTPATIENT
Start: 2019-04-08 | End: 2019-04-15 | Stop reason: HOSPADM

## 2019-04-08 RX ORDER — FENTANYL CITRATE 50 UG/ML
50 INJECTION, SOLUTION INTRAMUSCULAR; INTRAVENOUS
Status: DISCONTINUED | OUTPATIENT
Start: 2019-04-08 | End: 2019-04-08 | Stop reason: HOSPADM

## 2019-04-08 RX ORDER — ONDANSETRON 2 MG/ML
INJECTION INTRAMUSCULAR; INTRAVENOUS AS NEEDED
Status: DISCONTINUED | OUTPATIENT
Start: 2019-04-08 | End: 2019-04-08 | Stop reason: SURG

## 2019-04-08 RX ORDER — HYDROCODONE BITARTRATE AND ACETAMINOPHEN 7.5; 325 MG/1; MG/1
1 TABLET ORAL ONCE AS NEEDED
Status: DISCONTINUED | OUTPATIENT
Start: 2019-04-08 | End: 2019-04-08 | Stop reason: HOSPADM

## 2019-04-08 RX ORDER — OXYCODONE HYDROCHLORIDE AND ACETAMINOPHEN 5; 325 MG/1; MG/1
2 TABLET ORAL EVERY 4 HOURS PRN
Status: DISCONTINUED | OUTPATIENT
Start: 2019-04-08 | End: 2019-04-09

## 2019-04-08 RX ORDER — NICOTINE POLACRILEX 4 MG
15 LOZENGE BUCCAL
Status: DISCONTINUED | OUTPATIENT
Start: 2019-04-08 | End: 2019-04-15 | Stop reason: HOSPADM

## 2019-04-08 RX ORDER — PRAVASTATIN SODIUM 20 MG
40 TABLET ORAL NIGHTLY
Status: DISCONTINUED | OUTPATIENT
Start: 2019-04-08 | End: 2019-04-15 | Stop reason: HOSPADM

## 2019-04-08 RX ORDER — FAMOTIDINE 40 MG/1
40 TABLET, FILM COATED ORAL DAILY
Status: DISCONTINUED | OUTPATIENT
Start: 2019-04-08 | End: 2019-04-10

## 2019-04-08 RX ORDER — SODIUM CHLORIDE 0.9 % (FLUSH) 0.9 %
3 SYRINGE (ML) INJECTION EVERY 12 HOURS SCHEDULED
Status: DISCONTINUED | OUTPATIENT
Start: 2019-04-08 | End: 2019-04-15 | Stop reason: HOSPADM

## 2019-04-08 RX ORDER — NALOXONE HCL 0.4 MG/ML
0.2 VIAL (ML) INJECTION AS NEEDED
Status: DISCONTINUED | OUTPATIENT
Start: 2019-04-08 | End: 2019-04-08 | Stop reason: HOSPADM

## 2019-04-08 RX ORDER — DEXTROSE MONOHYDRATE 25 G/50ML
25 INJECTION, SOLUTION INTRAVENOUS
Status: DISCONTINUED | OUTPATIENT
Start: 2019-04-08 | End: 2019-04-15 | Stop reason: HOSPADM

## 2019-04-08 RX ORDER — ACETAMINOPHEN 325 MG/1
650 TABLET ORAL ONCE AS NEEDED
Status: DISCONTINUED | OUTPATIENT
Start: 2019-04-08 | End: 2019-04-08 | Stop reason: HOSPADM

## 2019-04-08 RX ORDER — MAGNESIUM HYDROXIDE 1200 MG/15ML
LIQUID ORAL AS NEEDED
Status: DISCONTINUED | OUTPATIENT
Start: 2019-04-08 | End: 2019-04-08 | Stop reason: HOSPADM

## 2019-04-08 RX ORDER — NALOXONE HCL 0.4 MG/ML
0.1 VIAL (ML) INJECTION
Status: DISCONTINUED | OUTPATIENT
Start: 2019-04-08 | End: 2019-04-09

## 2019-04-08 RX ORDER — FLUMAZENIL 0.1 MG/ML
0.2 INJECTION INTRAVENOUS AS NEEDED
Status: DISCONTINUED | OUTPATIENT
Start: 2019-04-08 | End: 2019-04-08 | Stop reason: HOSPADM

## 2019-04-08 RX ORDER — ONDANSETRON 2 MG/ML
4 INJECTION INTRAMUSCULAR; INTRAVENOUS ONCE AS NEEDED
Status: DISCONTINUED | OUTPATIENT
Start: 2019-04-08 | End: 2019-04-08 | Stop reason: HOSPADM

## 2019-04-08 RX ORDER — BISOPROLOL FUMARATE 5 MG/1
5 TABLET, FILM COATED ORAL DAILY
Status: DISCONTINUED | OUTPATIENT
Start: 2019-04-08 | End: 2019-04-14

## 2019-04-08 RX ORDER — PROMETHAZINE HYDROCHLORIDE 25 MG/1
25 TABLET ORAL ONCE AS NEEDED
Status: DISCONTINUED | OUTPATIENT
Start: 2019-04-08 | End: 2019-04-08 | Stop reason: HOSPADM

## 2019-04-08 RX ORDER — HYDROMORPHONE HYDROCHLORIDE 1 MG/ML
0.5 INJECTION, SOLUTION INTRAMUSCULAR; INTRAVENOUS; SUBCUTANEOUS
Status: DISCONTINUED | OUTPATIENT
Start: 2019-04-08 | End: 2019-04-09

## 2019-04-08 RX ORDER — LEVOTHYROXINE SODIUM 0.03 MG/1
25 TABLET ORAL DAILY
Status: DISCONTINUED | OUTPATIENT
Start: 2019-04-08 | End: 2019-04-15 | Stop reason: HOSPADM

## 2019-04-08 RX ORDER — DIPHENHYDRAMINE HYDROCHLORIDE 50 MG/ML
12.5 INJECTION INTRAMUSCULAR; INTRAVENOUS
Status: DISCONTINUED | OUTPATIENT
Start: 2019-04-08 | End: 2019-04-08 | Stop reason: HOSPADM

## 2019-04-08 RX ORDER — BUMETANIDE 2 MG/1
2 TABLET ORAL DAILY
Status: DISCONTINUED | OUTPATIENT
Start: 2019-04-08 | End: 2019-04-08

## 2019-04-08 RX ORDER — LIDOCAINE HYDROCHLORIDE 20 MG/ML
INJECTION, SOLUTION INFILTRATION; PERINEURAL AS NEEDED
Status: DISCONTINUED | OUTPATIENT
Start: 2019-04-08 | End: 2019-04-08 | Stop reason: SURG

## 2019-04-08 RX ORDER — CEFAZOLIN SODIUM IN 0.9 % NACL 3 G/100 ML
3 INTRAVENOUS SOLUTION, PIGGYBACK (ML) INTRAVENOUS EVERY 8 HOURS
Status: COMPLETED | OUTPATIENT
Start: 2019-04-08 | End: 2019-04-09

## 2019-04-08 RX ORDER — FENTANYL CITRATE 50 UG/ML
INJECTION, SOLUTION INTRAMUSCULAR; INTRAVENOUS AS NEEDED
Status: DISCONTINUED | OUTPATIENT
Start: 2019-04-08 | End: 2019-04-08 | Stop reason: SURG

## 2019-04-08 RX ORDER — ALBUTEROL SULFATE 2.5 MG/3ML
2.5 SOLUTION RESPIRATORY (INHALATION) EVERY 4 HOURS PRN
COMMUNITY
End: 2022-05-03 | Stop reason: SDDI

## 2019-04-08 RX ORDER — SODIUM CHLORIDE, SODIUM LACTATE, POTASSIUM CHLORIDE, CALCIUM CHLORIDE 600; 310; 30; 20 MG/100ML; MG/100ML; MG/100ML; MG/100ML
9 INJECTION, SOLUTION INTRAVENOUS CONTINUOUS
Status: DISCONTINUED | OUTPATIENT
Start: 2019-04-08 | End: 2019-04-08 | Stop reason: HOSPADM

## 2019-04-08 RX ORDER — ONDANSETRON 4 MG/1
4 TABLET, FILM COATED ORAL EVERY 6 HOURS PRN
Status: DISCONTINUED | OUTPATIENT
Start: 2019-04-08 | End: 2019-04-15 | Stop reason: HOSPADM

## 2019-04-08 RX ORDER — OXYCODONE AND ACETAMINOPHEN 7.5; 325 MG/1; MG/1
1 TABLET ORAL ONCE AS NEEDED
Status: DISCONTINUED | OUTPATIENT
Start: 2019-04-08 | End: 2019-04-08 | Stop reason: HOSPADM

## 2019-04-08 RX ORDER — SODIUM CHLORIDE 0.9 % (FLUSH) 0.9 %
1-10 SYRINGE (ML) INJECTION AS NEEDED
Status: DISCONTINUED | OUTPATIENT
Start: 2019-04-08 | End: 2019-04-08 | Stop reason: HOSPADM

## 2019-04-08 RX ORDER — SENNA AND DOCUSATE SODIUM 50; 8.6 MG/1; MG/1
2 TABLET, FILM COATED ORAL 2 TIMES DAILY
Status: DISCONTINUED | OUTPATIENT
Start: 2019-04-08 | End: 2019-04-15 | Stop reason: HOSPADM

## 2019-04-08 RX ORDER — PANTOPRAZOLE SODIUM 40 MG/1
40 TABLET, DELAYED RELEASE ORAL DAILY
Status: DISCONTINUED | OUTPATIENT
Start: 2019-04-08 | End: 2019-04-15 | Stop reason: HOSPADM

## 2019-04-08 RX ORDER — POTASSIUM CHLORIDE 750 MG/1
20 CAPSULE, EXTENDED RELEASE ORAL DAILY
Status: DISCONTINUED | OUTPATIENT
Start: 2019-04-08 | End: 2019-04-08

## 2019-04-08 RX ORDER — MIDAZOLAM HYDROCHLORIDE 1 MG/ML
2 INJECTION INTRAMUSCULAR; INTRAVENOUS
Status: DISCONTINUED | OUTPATIENT
Start: 2019-04-08 | End: 2019-04-08 | Stop reason: HOSPADM

## 2019-04-08 RX ORDER — BUMETANIDE 2 MG/1
2 TABLET ORAL DAILY
Status: DISCONTINUED | OUTPATIENT
Start: 2019-04-08 | End: 2019-04-15 | Stop reason: HOSPADM

## 2019-04-08 RX ORDER — SILDENAFIL CITRATE 20 MG/1
20 TABLET ORAL 3 TIMES DAILY
Status: DISCONTINUED | OUTPATIENT
Start: 2019-04-08 | End: 2019-04-15 | Stop reason: HOSPADM

## 2019-04-08 RX ORDER — SODIUM CHLORIDE, SODIUM LACTATE, POTASSIUM CHLORIDE, CALCIUM CHLORIDE 600; 310; 30; 20 MG/100ML; MG/100ML; MG/100ML; MG/100ML
100 INJECTION, SOLUTION INTRAVENOUS CONTINUOUS
Status: CANCELLED | OUTPATIENT
Start: 2019-04-08

## 2019-04-08 RX ORDER — ONDANSETRON 4 MG/1
4 TABLET, ORALLY DISINTEGRATING ORAL EVERY 6 HOURS PRN
Status: DISCONTINUED | OUTPATIENT
Start: 2019-04-08 | End: 2019-04-15 | Stop reason: HOSPADM

## 2019-04-08 RX ORDER — OXYCODONE AND ACETAMINOPHEN 10; 325 MG/1; MG/1
2 TABLET ORAL EVERY 4 HOURS PRN
Status: DISCONTINUED | OUTPATIENT
Start: 2019-04-08 | End: 2019-04-09

## 2019-04-08 RX ORDER — ACETAMINOPHEN 325 MG/1
325 TABLET ORAL EVERY 4 HOURS PRN
Status: DISCONTINUED | OUTPATIENT
Start: 2019-04-08 | End: 2019-04-09

## 2019-04-08 RX ORDER — PROMETHAZINE HYDROCHLORIDE 25 MG/1
25 SUPPOSITORY RECTAL ONCE AS NEEDED
Status: DISCONTINUED | OUTPATIENT
Start: 2019-04-08 | End: 2019-04-08 | Stop reason: HOSPADM

## 2019-04-08 RX ORDER — LIDOCAINE HYDROCHLORIDE 10 MG/ML
0.5 INJECTION, SOLUTION EPIDURAL; INFILTRATION; INTRACAUDAL; PERINEURAL ONCE AS NEEDED
Status: DISCONTINUED | OUTPATIENT
Start: 2019-04-08 | End: 2019-04-08 | Stop reason: HOSPADM

## 2019-04-08 RX ORDER — ROCURONIUM BROMIDE 10 MG/ML
INJECTION, SOLUTION INTRAVENOUS AS NEEDED
Status: DISCONTINUED | OUTPATIENT
Start: 2019-04-08 | End: 2019-04-08 | Stop reason: SURG

## 2019-04-08 RX ORDER — ALBUTEROL SULFATE 2.5 MG/3ML
2.5 SOLUTION RESPIRATORY (INHALATION) EVERY 4 HOURS PRN
Status: DISCONTINUED | OUTPATIENT
Start: 2019-04-08 | End: 2019-04-15 | Stop reason: HOSPADM

## 2019-04-08 RX ORDER — EPHEDRINE SULFATE 50 MG/ML
5 INJECTION, SOLUTION INTRAVENOUS ONCE AS NEEDED
Status: DISCONTINUED | OUTPATIENT
Start: 2019-04-08 | End: 2019-04-08 | Stop reason: HOSPADM

## 2019-04-08 RX ORDER — SODIUM CHLORIDE 0.9 % (FLUSH) 0.9 %
1-10 SYRINGE (ML) INJECTION AS NEEDED
Status: DISCONTINUED | OUTPATIENT
Start: 2019-04-08 | End: 2019-04-15 | Stop reason: HOSPADM

## 2019-04-08 RX ORDER — GLYCOPYRROLATE 0.2 MG/ML
INJECTION INTRAMUSCULAR; INTRAVENOUS AS NEEDED
Status: DISCONTINUED | OUTPATIENT
Start: 2019-04-08 | End: 2019-04-08 | Stop reason: SURG

## 2019-04-08 RX ADMIN — SODIUM CHLORIDE 3 G: 9 INJECTION, SOLUTION INTRAVENOUS at 23:38

## 2019-04-08 RX ADMIN — POTASSIUM CHLORIDE 20 MEQ: 750 CAPSULE, EXTENDED RELEASE ORAL at 12:17

## 2019-04-08 RX ADMIN — ESCITALOPRAM OXALATE 15 MG: 5 TABLET, FILM COATED ORAL at 12:17

## 2019-04-08 RX ADMIN — INSULIN LISPRO 2 UNITS: 100 INJECTION, SOLUTION INTRAVENOUS; SUBCUTANEOUS at 12:17

## 2019-04-08 RX ADMIN — SILDENAFIL 20 MG: 20 TABLET, FILM COATED ORAL at 23:37

## 2019-04-08 RX ADMIN — SUGAMMADEX 500 MG: 100 INJECTION, SOLUTION INTRAVENOUS at 08:23

## 2019-04-08 RX ADMIN — SILDENAFIL 20 MG: 20 TABLET, FILM COATED ORAL at 12:17

## 2019-04-08 RX ADMIN — ROCURONIUM BROMIDE 50 MG: 10 INJECTION INTRAVENOUS at 07:10

## 2019-04-08 RX ADMIN — ONDANSETRON 4 MG: 2 INJECTION INTRAMUSCULAR; INTRAVENOUS at 08:22

## 2019-04-08 RX ADMIN — SODIUM CHLORIDE, PRESERVATIVE FREE 3 ML: 5 INJECTION INTRAVENOUS at 23:37

## 2019-04-08 RX ADMIN — GLYCOPYRROLATE 0.2 MG: 0.2 INJECTION INTRAMUSCULAR; INTRAVENOUS at 07:49

## 2019-04-08 RX ADMIN — METFORMIN HYDROCHLORIDE 1000 MG: 1000 TABLET ORAL at 17:34

## 2019-04-08 RX ADMIN — SUGAMMADEX 200 MG: 100 INJECTION, SOLUTION INTRAVENOUS at 08:53

## 2019-04-08 RX ADMIN — CEFAZOLIN SODIUM 2 G: 2 INJECTION, SOLUTION INTRAVENOUS at 07:06

## 2019-04-08 RX ADMIN — OXYCODONE AND ACETAMINOPHEN 2 TABLET: 5; 325 TABLET ORAL at 15:28

## 2019-04-08 RX ADMIN — PROPOFOL 200 MG: 10 INJECTION, EMULSION INTRAVENOUS at 07:10

## 2019-04-08 RX ADMIN — FENTANYL CITRATE 100 MCG: 50 INJECTION INTRAMUSCULAR; INTRAVENOUS at 07:07

## 2019-04-08 RX ADMIN — SILDENAFIL 20 MG: 20 TABLET, FILM COATED ORAL at 16:34

## 2019-04-08 RX ADMIN — SODIUM CHLORIDE 3 G: 9 INJECTION, SOLUTION INTRAVENOUS at 15:03

## 2019-04-08 RX ADMIN — LIDOCAINE HYDROCHLORIDE 100 MG: 20 INJECTION, SOLUTION INFILTRATION; PERINEURAL at 07:10

## 2019-04-08 RX ADMIN — OXYCODONE HYDROCHLORIDE AND ACETAMINOPHEN 2 TABLET: 10; 325 TABLET ORAL at 10:48

## 2019-04-08 RX ADMIN — LEVOTHYROXINE SODIUM 25 MCG: 25 TABLET ORAL at 12:17

## 2019-04-08 RX ADMIN — FAMOTIDINE 20 MG: 10 INJECTION INTRAVENOUS at 06:41

## 2019-04-08 RX ADMIN — OXYCODONE AND ACETAMINOPHEN 2 TABLET: 5; 325 TABLET ORAL at 19:42

## 2019-04-08 RX ADMIN — SODIUM CHLORIDE, POTASSIUM CHLORIDE, SODIUM LACTATE AND CALCIUM CHLORIDE 9 ML/HR: 600; 310; 30; 20 INJECTION, SOLUTION INTRAVENOUS at 06:41

## 2019-04-08 RX ADMIN — SENNOSIDES AND DOCUSATE SODIUM 2 TABLET: 8.6; 5 TABLET ORAL at 23:37

## 2019-04-08 NOTE — OP NOTE
TOTAL KNEE ARTHROPLASTY  Procedure Note    Mariangel Nguyen  4/8/2019    Pre-op Diagnosis: Osteoarthritis Right  knee primary generalized, vanadium allergy.  Post-op Diagnosis:Same  Procedure: Right  Total Knee Arthroplasty, Posterior stabilized rotating platform, Cemented.  Surgeon:  Farzad Luong MD  Anesthesia: General, Anesthesiologist: Quinn Eng MD  CRNA: Tami Glass CRNA  Staff: Circulator: Ritu Hernandez RN  Scrub Person: Becca Pritchard  Vendor Representative: Jessica Levy Will  Assistant: Raheem Samuel DO CFA  Estimated Blood Loss:minimal  Specimens:* No orders in the log *   Drains: none  Complications: None    Components Utilized:    Implant Name Type Inv. Item Serial No.  Lot No. LRB No. Used   CMT BONE PALACOS R HI/VISC 1X40 - [id number] Implant CMT BONE PALACOS R HI/VISC 1X40  [facility] [id number] Right 2   COMP FEM NXGN GSF/OPTN LPS/FLX E RT - [id number] Implant COMP FEM NXGN GSF/OPTN LPS/FLX E RT  [employer] [id number] Right 1   STEM TIB NEXGEN FLUT MOBL OPTN PLT SZ4 - [id number] Implant STEM TIB NEXGEN FLUT MOBL OPTN PLT SZ4  [employer] [id number] Right 1   PAT KN PERSONA VE CRS/LNK CMT 29MM - [id number] Implant PAT KN PERSONA VE CRS/LNK CMT 29MM  [employer] [id number] Right 1   NEXTGEN LPS MOBILE ARTICULAR SURFACE SIZE E 10MM    [employer] [id number] Right 1       Indication for Procedure:   The patient is a 68 y.o. female presents today for a total knee arthroplasty procedure because of failure to conservatively manage the patients pain for arthritis.  The patient was educated in risks of surgery that could include possible risk of infection, deep venous thrombosis, pulmonary embolism, fracture, neurovascular injury, leg length discrepancy, dislocation, possible persistent pain, need for additional surgeries, anesthetic risks, medical risks including heart attack and stroke, and death.  The discussion occurred in the office  pre-operatively, and patient had the opportunity to ask questions, and concerns about the proposed surgery.  The patient also understood that medicine is not an exact science, and that outcomes of the surgical procedure may be less than desired. The patient wished to proceed.      Protocols for intravenous antibiotics and venous thrombosis were followed for this patient.  IV antibiotics were infused prior to surgery and will be discontinued within 24 hours of completion of the surgical procedure.  Thrombosis prophylaxis will be initiated within 24 hours of the completion of the surgical procedure.      Procedure:   After the patient was identified in the preoperative area, and the surgical site confirmed and marked, the patient was brought to the operating room on a stretcher.  They were placed supine on the operating room table and the above anesthetic was placed uneventfully.  A time-out procedure was performed.  The intravenous ancef antibiotics infusion was completed.  A non-sterile tourniquet was placed on the operative thigh, and was prepped and draped in the usual sterile fashion.  An Esmarch was to exsanguinate the limb, and the tourniquet was inflated.     A 10 blade scalpel was used to make a longitudinal incision from above the patella to medial to the tibial tubercle.  A medial josy-patellar arthrotomy was performed with another 10 blade scalpel.  The medial joint line was elevated subperiosteally with electrocautery and a bruner elevator.  The patellar fat pad was removed.  The patella was everted and osteotomy cut completed with oscillating saw.  The patella guide and drill was used to finish preparing the patella.  A patella protecting guide was placed, and the patella was everted off the side of the femur laterally.     The knee was then flexed and Zia's line was drawn on the distal femur with electrocautery.  Then the drill was placed into the distal femur into the medullary canal.  The  intramedullary distal femoral valgus cutting guide set to 5 degrees of femoral valgus was then placed into the medullary canal.  It was then pinned and the oscillating saw was used to make the osteotomy.  Then the anterior referencing distal femoral guide was then placed and the above femoral size was measured and 3 degrees of external rotation were drilled.  The 4 in 1 femoral cutting guide was placed and pinned and the oscillating saw was used to make the appropriate cuts.     Then the extramedullary cutting guide was placed aligned with the tibial eminence superiorly and the tibial shaft distally, pinned 4 mm from the medial tibial plateau.  The oscillating saw was then used to complete the osteotomy cuts.   A laminar  was then placed medially and then laterally to remove the medial and lateral meniscus and the posterior osteophytes.  Then the tibial baseplate was placed on the tibial surface with a drop jacey to confirm an appropriate cut and size of implant.  It was then pinned externally rotated to the medial third of the tibial tubercle.  The femoral component was impacted and the box cut was completed with the reciprocating saw.  Then trial reduction was then performed with the above implants and was found to be stable in all planes.  The patella tracked centrally on the trochlear groove.    The lug holes were drilled in the distal femur and the tibia was drilled and broached in the typical fashion.  The trial components were then removed and the final implants were confirmed and opened.  The bone surfaces were then irrigated and dried.  Palacos cement was then mixed and placed on the cut bone surfaces and back side of the implants.  The implants were then impacted into place, and the trial polyethylene spacer was placed and the knee was placed into extension.  A stack of towels was placed under the ankle and the cement was allowed to harden. The tourniquet was then dropped.  Hemostasis was obtained.   The wound was then irrigated with the pulse lavage irrigation system with a 3 liter mixture of betadine and normal saline.  The local mixture was injected throughout the knee for post-operative pain control.  The final polyethylene implant was then inserted and the knee was flexed to 90 degrees and the arthrotomy was closed with #1 Strata-fix suture.  The deep dermis was closed with #1 strata-fix, and the skin was closed with 3-0 Monocryl suture.  Skin glue was applied and sterile dressing were applied.   Sponge and needle counts were completed and were correct.  The patient was awaken from anesthetic and was returned to recovery in stable condition.    Farzad Luong MD     Date: 4/8/2019  Time: 8:27 AM

## 2019-04-08 NOTE — ANESTHESIA PROCEDURE NOTES
Airway  Urgency: elective    Date/Time: 4/8/2019 7:12 AM    General Information and Staff    Patient location during procedure: OR  Anesthesiologist: Quinn Eng MD  CRNA: Tami Glass CRNA    Indications and Patient Condition  Indications for airway management: airway protection    Preoxygenated: yes  Mask difficulty assessment: 3 - difficult mask (inadequate, unstable or two providers) +/- NMBA    Final Airway Details  Final airway type: endotracheal airway      Successful airway: ETT  Cuffed: yes   Successful intubation technique: direct laryngoscopy  Facilitating devices/methods: Bougie  Endotracheal tube insertion site: oral  Blade: Tena  Blade size: 2  ETT size (mm): 7.0  Cormack-Lehane Classification: grade IIa - partial view of glottis  Placement verified by: chest auscultation and capnometry   Cuff volume (mL): 7  Measured from: lips  ETT to lips (cm): 21  Number of attempts at approach: 1    Additional Comments  EBBS: ETCO2+: Atraumatic intubation; Lips and teeth same as pre op

## 2019-04-08 NOTE — ANESTHESIA POSTPROCEDURE EVALUATION
"Patient: Mariangel Nguyen    Procedure Summary     Date:  04/08/19 Room / Location:  [facility] OR [address] [place] [address] MAIN OR    Anesthesia Start:  0706 Anesthesia Stop:  0912    Procedure:  RIGHT TOTAL KNEE ARTHROPLASTY (Right Knee) Diagnosis:      Surgeon:  Farzad Luong MD Provider:  Quinn Eng MD    Anesthesia Type:  general ASA Status:  3          Anesthesia Type: general  Last vitals  BP   134/78 (04/08/19 0945)   Temp   36.8 °C (98.3 °F) (04/08/19 0903)   Pulse   76 (04/08/19 0945)   Resp   16 (04/08/19 0945)     SpO2   (!) 87 % (04/08/19 0945)     Post Anesthesia Care and Evaluation    Patient location during evaluation: bedside  Patient participation: complete - patient participated  Level of consciousness: awake and alert  Pain management: adequate  Airway patency: patent  Anesthetic complications: No anesthetic complications    Cardiovascular status: acceptable  Respiratory status: acceptable  Hydration status: acceptable    Comments: /78 (BP Location: Right arm, Patient Position: Lying)   Pulse 76   Temp 36.8 °C (98.3 °F) (Oral)   Resp 16   Ht 157.5 cm (62\")   Wt 111 kg (244 lb 9.6 oz)   SpO2 (!) 87%   BMI 44.74 kg/m²       "

## 2019-04-08 NOTE — H&P
ORTHOPEDIC SURGERY PRE-OP HISTORY AND PHYSICAL      Patient: Mariangel Nguyen  Date of Admission: 4/8/2019  5:22 AM  YOB: 1950  Medical Record Number: [id number]  Attending Physician: Farzad Luong MD  Consulting Physician: Farzad Luong MD    CHIEF COMPLIANT: Right Knee Pain.    HISTORY OF PRESENT ILLINESS: Patient is a 68 y.o. year old female presents to [facility] with above complaints.  The patient failed conservative treatment and patient requested surgical intervention.  The patient presents for a  Right total knee.    ALLERGIES: No Known Allergies    HOME MEDICATIONS:  Medications Prior to Admission   Medication Sig Dispense Refill Last Dose   • albuterol (PROVENTIL) (2.5 MG/3ML) 0.083% nebulizer solution Take 2.5 mg by nebulization Every 4 (Four) Hours As Needed for Wheezing.   4/1/2019 at Unknown time   • bisoprolol (ZEBeta) 5 MG tablet Take 1 tablet by mouth Daily. 30 tablet 5 4/8/2019 at 0400   • bumetanide (BUMEX) 2 MG tablet Take 1 tablet by mouth Daily. 30 tablet 5 4/7/2019 at 1600   • Chlorhexidine Gluconate Cloth 2 % pads Apply 1 application topically Take As Directed.   4/8/2019 at 0300   • escitalopram (LEXAPRO) 10 MG tablet Take 1.5 tablets by mouth Daily. 45 tablet 5 4/7/2019 at 0800   • levothyroxine (SYNTHROID, LEVOTHROID) 25 MCG tablet Take 25 mcg by mouth Daily.   4/7/2019 at 0800   • metFORMIN (GLUCOPHAGE) 1000 MG tablet Take 1,000 mg by mouth 2 (Two) Times a Day With Meals.   4/7/2019 at 2000   • mupirocin (BACTROBAN) 2 % nasal ointment 1 application into the nostril(s) as directed by provider Take As Directed.   4/8/2019 at 0300   • oxyCODONE-acetaminophen (PERCOCET) 5-325 MG per tablet Take 1 tablet by mouth Every 4 (Four) Hours As Needed for Severe Pain . 18 tablet 0 4/6/2019   • pantoprazole (PROTONIX) 40 MG EC tablet Take 40 mg by mouth Daily.   4/8/2019 at 0400   • pravastatin (PRAVACHOL) 40 MG tablet Take 40 mg by mouth Every Night.   4/7/2019  at 2000   • sildenafil (REVATIO) 20 MG tablet Take 20 mg by mouth 3 (Three) Times a Day.   4/8/2019 at 0400   • vitamin D (ERGOCALCIFEROL) 55392 units capsule capsule Take 1 capsule by mouth 1 (One) Time Per Week. 13 capsule 1 4/4/2019   • ONE TOUCH ULTRA TEST test strip    Not Taking   • ONETOUCH DELICA LANCETS 33G misc    Not Taking   • potassium chloride (MICRO-K) 10 MEQ CR capsule TAKE 2 CAPSULES BY MOUTH ONCE DAILY 60 capsule 2 Taking       Past Medical History:   Diagnosis Date   • Arthritis     OSTEOARTHRITIS KNEES   • CHF (congestive heart failure) (CMS/HCC)    • Colon polyp    • COPD (chronic obstructive pulmonary disease) (CMS/HCC)    • Diabetes mellitus (CMS/HCC)    • Disease of thyroid gland    • H/O Abnormal Pap smear of cervix    • History of DVT (deep vein thrombosis)    • History of kidney stones    • History of pulmonary embolus (PE)    • History of sepsis    • History of small bowel obstruction    • History of snoring    • Hyperlipidemia    • Hypertension    • Knee pain, bilateral    • Neuropathy    • On home oxygen therapy     2L NC   • Pulmonary hypertension (CMS/HCC)    • Sleep apnea      Past Surgical History:   Procedure Laterality Date   • ABDOMINAL SURGERY  11/2017    Delayed closure of abdominal wound-wound vac placement, Dr. Nestor Howe   • BLADDER SURGERY      BLADDER LIFT, 2011   • CARDIAC CATHETERIZATION  2017    Normal coronary arteries, normal overall LVSF with wall motion abnormality   • COLON SURGERY      RECONSTRUCTION, 2001   • COLONOSCOPY N/A 3/4/2019    Procedure: COLONOSCOPY polypectomy;  Surgeon: Nai Luong MD;  Location: [facility] OR;  Service: Gastroenterology   • CYSTOSCOPY URETEROSCOPY LASER LITHOTRIPSY Right 10/23/2017    Procedure: CYSTO RT URETEROSCOPY LASER  STONE BASKETING RIGHT STENT EXCHANGE;  Surgeon: Quinn Cason MD;  Location: [facility] OR;  Service:    • ENDOSCOPY N/A 3/4/2019    Procedure: ESOPHAGOGASTRODUODENOSCOPY WITH BIOPSIES;  Surgeon:  Nai Luong MD;  Location: [facility];  Service: Gastroenterology   • EXPLORATORY LAPAROTOMY     • HYSTERECTOMY      2001   • ROTATOR CUFF REPAIR Right 2016   • STEROID INJECTION KNEE      Had left knee injection 10/09/2018; right knee injection 10/02/2017     Social History     Occupational History     Employer: RETIRED   Tobacco Use   • Smoking status: Never Smoker   • Smokeless tobacco: Never Used   Substance and Sexual Activity   • Alcohol use: Yes     Comment: 1-2X YEAR   • Drug use: No   • Sexual activity: Defer      Social History     Social History Narrative   • Not on file     Family History   Problem Relation Age of Onset   • Cancer Other    • Stroke Other    • Other Other         LUPUS ANTICOAGULANT   • Rheum arthritis Other    • Heart disease Other    • Heart attack Other    • Lupus Mother    • Stomach cancer Father    • Malig Hyperthermia Neg Hx    • Colon cancer Neg Hx    • Colon polyps Neg Hx        REVIEW OF SYSTEMS:    HEENT: Patient denies any headaches, vision changes, change in hearing, or tinnitus, Patient denies epistaxis, sinus pain, hoarseness, or dysphagia   Pulmonary: Patient denies any cough, congestion, acute change in SOA or wheezing.   Cardiovascular: Patient denies any change in chest pain, dyspnea, palpitations, weakness, intolerance of exercise, varicosities, change in murmur   Gastrointestinal:  Patient denies change in appetite, melena, change in bowel habits.   Genital/Urinary: Patient denies dysuria, change in color of urine, change in frequency of urination, pain with urgency, change in incontinence, retention.   Musculoskeletal: Patient denies complaints of acute changes in symptoms of other joints not mentioned above.   Neurological: Patient denies changes in dizziness, tremor, ataxia, or difficulty in speaking or changes in memory.   Endocrine system: Patient denies acute changes in tremors, palpitations, polyuria, polydipsia, polyphagia, diaphoresis, exophthalmos, or  "goiter.   Psychological: Patient denies thoughts/plans or harming self or other; denies acute changes in depression,  insomnia, night terrors, yaniv, disorientation.   Skin: Patient denies any bruising, rashes, discoloration, pruritus,or wounds not mentioned in history of present illness or chief complaint above.   Hematopoietic: Patient denies current bleeding, epistaxis, hematuria, or melena.    PHYSICAL EXAM:   Vitals:  Vitals:    04/08/19 0536 04/08/19 0546 04/08/19 0548 04/08/19 0609   BP:  (!) 183/103  150/80   BP Location:  Right arm  Right arm   Patient Position:  Lying  Lying   Pulse:  66  56   Resp:  20  18   Temp: 97.7 °F (36.5 °C)      TempSrc: Oral      SpO2:  (!) 88% 94% 94%   Weight: 111 kg (244 lb 9.6 oz)      Height: 157.5 cm (62\")          General:  68 y.o. female who appears about stated age.    Alert, cooperative, in no acute distress                       Head:    Normocephalic, without obvious abnormality, atraumatic   Eyes:            Lids and lashes normal, conjunctivae and sclerae normal, no         icterus, no pallor, corneas clear, PERRLA   Ears:    Ears appear intact with no abnormalities noted   Throat:   No oral lesions, no thrush, oral mucosa moist   Neck:   No adenopathy, supple, trachea midline, no JVD   Back:     Limited exam shows no severe kyphosis present,no visible           erythema, no excessive  tenderness to palpation.    Lungs:     Respirations regular, even and unlabored.     Heart:    Normal rate, Pulses palpable   Chest Wall:    No abnormalities observed.   Abdomen:     Normal bowel sounds, no masses, no organomegaly, soft              non-tender, non-distended, no guarding, no rebound                      tenderness   Rectal:     Deferred   Pulses:   Pulses palpable and equal bilaterally   Skin:   No bleeding, bruising or rash   Lymph nodes:   No palpable adenopathy   Extremities:     Right Knee: Skin intact.  Painful ROM.  NVI distally.  Has bilateral lower extremity " edema, pitting with diffuse erythema on bilateral lower calves, Non tender erythema.      DIAGNOSTIC TEST:  Admission on 04/08/2019   Component Date Value Ref Range Status   • Glucose 04/08/2019 130  70 - 130 mg/dL Final       No results found.      ASSESSMENT:  Right Knee Osteoarthritis  Vanadium allergy    Patient Active Problem List   Diagnosis   • Diabetes mellitus (CMS/HCC)   • Edema   • Hyperlipidemia   • Hypertension   • Rotator cuff tear arthropathy   • Venous stasis dermatitis   • Varicose veins   • Acute on chronic systolic CHF (congestive heart failure) (CMS/HCC)   • Acute pyelonephritis   • Acute respiratory failure (CMS/HCC)   • BRODY (acute kidney injury) (CMS/HCC)   • ATN (acute tubular necrosis) (CMS/HCC)   • Benign essential HTN   • COPD (chronic obstructive pulmonary disease) (CMS/HCC)   • Goiter   • Hx of lithotripsy   • Low vitamin D level   • Lymphedema   • Neuropathy   • Omental infarction (CMS/HCC)   • KAELA and COPD overlap syndrome (CMS/HCC)   • Pulmonary HTN (CMS/HCC)   • Recurrent nephrolithiasis   • Small bowel perforation (CMS/HCC)   • Acute deep vein thrombosis (DVT) of popliteal vein of left lower extremity (CMS/HCC)   • Microcytic anemia   • Thrombocytosis (CMS/HCC)   • Iron deficiency anemia   • Encounter for screening for malignant neoplasm of colon   • Anxiety and depression   • Osteoarthritis of right knee       PLAN:    Right TKA.  Will use PS rotating platform TKA with cobalt chrome tibia to avoid vanadium allergy.    Risks and benefits of surgical intervention were discussed in detail with the patient.  Risks of infection, fracture, dislocation, extremity length discrepancy, neurovascular injury, persistent pain, medical risks, anesthetic risk, need for additional surgery, deep venous thrombosis, pulmonary embolism and death.      The above diagnosis and treatment plan was discussed with the patient and/or family.  They were educated in both non-surgical and surgical treatment  options for their condition.   They were given the opportunity to ask questions and were answered to their satisfaction.  They agreed to proceed with the above treatment plan.        Farzad Luong MD  Date: 4/8/2019

## 2019-04-08 NOTE — CONSULTS
[facility]    Reason for Consult: pulmonary hypertension    HPI:  Mrs. Nguyen is a 69yo WF, she is a lifelong nonsmoker.  She has a history of pulmonary hypertension.  She is followed by Dr. Hernandez. I reviewed some records from [facility].  She had a critical care admission at one point with volume overload and normal lv function.  She was given diuresis and started on revatio.  I don't see any RHC in the [place] system, she does not recollect one.  She does have a history of KAELA and is non compliant with her CPAP.  She says someone once told her she had COPD, but she is puzzled by this as she is a lifelong non smoker.  She does not appear to be on home inhalers.    Sheis s/p R TKA, now is is doing well post operatively on just a few liters.  She reports her breathing feels at baseline. She has no increased cough or chest pain or shortness of breath.    Past Medical History:   Diagnosis Date   • Arthritis     OSTEOARTHRITIS KNEES   • CHF (congestive heart failure) (CMS/[facility])    • Colon polyp    • COPD (chronic obstructive pulmonary disease) (CMS/[facility])    • Diabetes mellitus (CMS/[facility])    • Disease of thyroid gland    • H/O Abnormal Pap smear of cervix    • History of DVT (deep vein thrombosis)    • History of kidney stones    • History of pulmonary embolus (PE)    • History of sepsis    • History of small bowel obstruction    • History of snoring    • Hyperlipidemia    • Hypertension    • Knee pain, bilateral    • Neuropathy    • On home oxygen therapy     2L NC   • Pulmonary hypertension (CMS/[facility])    • Sleep apnea      Social History     Socioeconomic History   • Marital status: Single     Spouse name: Not on file   • Number of children: Not on file   • Years of education: High school   • Highest education level: Not on file   Occupational History     Employer: RETIRED   Tobacco Use   • Smoking status: Never Smoker   • Smokeless tobacco: Never Used   Substance and Sexual Activity   • Alcohol use: Yes      Comment: 1-2X YEAR   • Drug use: No   • Sexual activity: Defer     Family History   Problem Relation Age of Onset   • Cancer Other    • Stroke Other    • Other Other         LUPUS ANTICOAGULANT   • Rheum arthritis Other    • Heart disease Other    • Heart attack Other    • Lupus Mother    • Stomach cancer Father    • Malig Hyperthermia Neg Hx    • Colon cancer Neg Hx    • Colon polyps Neg Hx      MEDS: reviewed  ALL: NKDA  ROS: 12 point negative except as in HPI    Vital Sign Min/Max for last 24 hours  Temp  Min: 96.8 °F (36 °C)  Max: 98.6 °F (37 °C)   BP  Min: 130/70  Max: 191/95   Pulse  Min: 56  Max: 99   Resp  Min: 16  Max: 20   SpO2  Min: 86 %  Max: 94 %   Flow (L/min)  Min: 2  Max: 15   Weight  Min: 111 kg (244 lb 9.6 oz)  Max: 111 kg (244 lb 9.6 oz)     GEN:  , appears ill, obese, AxOx3  HEENT: PERRL, EOMI, no icterus, mmm, no jvd, trachea midline, neck supple  CHEST: CTA bilat, no wheezes, no crackles, no use of accessory muscles  CV: RRR, no m/g/r  ABD: soft, nt, nd +bs, no hepatosplenomegaly  EXT: no c/c/e  SKIN: no rashes, no xanthomas, nl turgor  LYMPH: no palpable cervical or supraclavicular lymphadenopathy  NEURO: CN 2-12 intact and symmetric bilaterally  PSYCH: nl affect, nl orientation, nl judgement, nl mood  MSK: some kyphosis, moves all 4 extremities.    Lab:  hgb 11.6    A/P:  1. Pulmonary Hypertension - uncertain WHO Group.  Would continue her home revatio and bumex for now.  Suggest outpatient RHC  2. Chronic hypoxemic respiratory failure -- continue oxygen, pulmonary toilet as able  3. KAELA -- non compliant with pap  4. Chronic diastolic chf  5. Obesity --

## 2019-04-08 NOTE — ANESTHESIA PREPROCEDURE EVALUATION
Anesthesia Evaluation     Patient summary reviewed and Nursing notes reviewed   NPO Solid Status: > 8 hours  NPO Liquid Status: > 2 hours           Airway   Mallampati: II  Neck ROM: full  No difficulty expected  Dental - normal exam     Pulmonary     breath sounds clear to auscultation  (+) COPD, sleep apnea,     ROS comment: On home O2  Cardiovascular     Rhythm: regular    (+) hypertension, CHF, PVD, DVT, hyperlipidemia,       Neuro/Psych  (+) psychiatric history Anxiety and Depression,     GI/Hepatic/Renal/Endo    (+) obesity, morbid obesity,  renal disease, diabetes mellitus,     Musculoskeletal     Abdominal   (+) obese,    Substance History      OB/GYN          Other   (+) arthritis                   Anesthesia Plan    ASA 3     general     intravenous induction   Anesthetic plan, all risks, benefits, and alternatives have been provided, discussed and informed consent has been obtained with: patient.

## 2019-04-08 NOTE — PLAN OF CARE
Problem: Patient Care Overview  Goal: Plan of Care Review   04/08/19 6273   Coping/Psychosocial   Plan of Care Reviewed With patient   OTHER   Outcome Summary Pt. presents with typical post op impairments related to TKR surgery that include decreased ROM, strength, and overall balance. Pt. will benefit from skilled inpt. P.T. to address her functional deficits and to assist pt. in regaining her maximum level of independence with functional mobility.

## 2019-04-08 NOTE — PLAN OF CARE
Problem: Patient Care Overview  Goal: Plan of Care Review  Outcome: Ongoing (interventions implemented as appropriate)   04/08/19 1820   Coping/Psychosocial   Plan of Care Reviewed With patient   OTHER   Outcome Summary Postop RTKA. Pt arrived c/o significant pain, more tolerable now after PO analgesic given. Pt remains significantly drowsy but easily arousable. Up to chair. Ambulates with walker. Hx DM, ac/hs accucheck with sliding scale coverage and metformin. Pt wears O2 at 2L at home. Plans to DC home with home health tomorrow if medically stable.   Plan of Care Review   Progress improving     Goal: Individualization and Mutuality  Outcome: Ongoing (interventions implemented as appropriate)    Goal: Discharge Needs Assessment  Outcome: Ongoing (interventions implemented as appropriate)    Goal: Interprofessional Rounds/Family Conf  Outcome: Ongoing (interventions implemented as appropriate)      Problem: Fall Risk (Adult)  Goal: Identify Related Risk Factors and Signs and Symptoms  Outcome: Outcome(s) achieved Date Met: 04/08/19 04/08/19 1820   Fall Risk (Adult)   Related Risk Factors (Fall Risk) age-related changes;culprit medication(s);depression/anxiety;gait/mobility problems;environment unfamiliar   Signs and Symptoms (Fall Risk) presence of risk factors     Goal: Absence of Fall  Outcome: Ongoing (interventions implemented as appropriate)   04/08/19 1820   Fall Risk (Adult)   Absence of Fall achieves outcome       Problem: Knee Arthroplasty (Total, Partial) (Adult)  Goal: Signs and Symptoms of Listed Potential Problems Will be Absent, Minimized or Managed (Knee Arthroplasty)  Outcome: Ongoing (interventions implemented as appropriate)   04/08/19 1820   Goal/Outcome Evaluation   Problems Assessed (Knee Arthroplasty) all   Problems Present (Knee Arthroplasty) functional deficit;pain;range of motion decreased;situational response     Goal: Anesthesia/Sedation Recovery  Outcome: Ongoing (interventions  implemented as appropriate)   04/08/19 1820   Goal/Outcome Evaluation   Anesthesia/Sedation Recovery progressing toward baseline       Problem: Chronic Obstructive Pulmonary Disease (Adult)  Goal: Signs and Symptoms of Listed Potential Problems Will be Absent, Minimized or Managed (Chronic Obstructive Pulmonary Disease)  Outcome: Ongoing (interventions implemented as appropriate)   04/08/19 1820   Goal/Outcome Evaluation   Problems Assessed (Chronic Obstructive Pulmonary Disease (COPD)) all

## 2019-04-08 NOTE — THERAPY EVALUATION
Acute Care - Physical Therapy Initial Evaluation  [facility]     Patient Name: Mariangel Nguyen  : 1950  MRN: [id number]  Today's Date: 2019   Onset of Illness/Injury or Date of Surgery: 19  Date of Referral to PT: 19  Referring Physician: Farzad Drew      Admit Date: 2019    Visit Dx:     ICD-10-CM ICD-9-CM   1. Difficulty walking R26.2 719.7     Patient Active Problem List   Diagnosis   • Diabetes mellitus (CMS/HCC)   • Edema   • Hyperlipidemia   • Hypertension   • Rotator cuff tear arthropathy   • Venous stasis dermatitis   • Varicose veins   • Acute on chronic systolic CHF (congestive heart failure) (CMS/HCC)   • Acute pyelonephritis   • Acute respiratory failure (CMS/HCC)   • BRODY (acute kidney injury) (CMS/HCC)   • ATN (acute tubular necrosis) (CMS/HCC)   • Benign essential HTN   • COPD (chronic obstructive pulmonary disease) (CMS/HCC)   • Goiter   • Hx of lithotripsy   • Low vitamin D level   • Lymphedema   • Neuropathy   • Omental infarction (CMS/HCC)   • KAELA and COPD overlap syndrome (CMS/HCC)   • Pulmonary HTN (CMS/HCC)   • Recurrent nephrolithiasis   • Small bowel perforation (CMS/HCC)   • Acute deep vein thrombosis (DVT) of popliteal vein of left lower extremity (CMS/HCC)   • Microcytic anemia   • Thrombocytosis (CMS/HCC)   • Iron deficiency anemia   • Encounter for screening for malignant neoplasm of colon   • Anxiety and depression   • Osteoarthritis of right knee     Past Medical History:   Diagnosis Date   • Arthritis     OSTEOARTHRITIS KNEES   • CHF (congestive heart failure) (CMS/HCC)    • Colon polyp    • COPD (chronic obstructive pulmonary disease) (CMS/HCC)    • Diabetes mellitus (CMS/HCC)    • Disease of thyroid gland    • H/O Abnormal Pap smear of cervix    • History of DVT (deep vein thrombosis)    • History of kidney stones    • History of pulmonary embolus (PE)    • History of sepsis    • History of small bowel obstruction    • History of snoring    •  Hyperlipidemia    • Hypertension    • Knee pain, bilateral    • Neuropathy    • On home oxygen therapy     2L NC   • Pulmonary hypertension (CMS/HCC)    • Sleep apnea      Past Surgical History:   Procedure Laterality Date   • ABDOMINAL SURGERY  11/2017    Delayed closure of abdominal wound-wound vac placement, Dr. Nestor Howe   • BLADDER SURGERY      BLADDER LIFT, 2011   • CARDIAC CATHETERIZATION  2017    Normal coronary arteries, normal overall LVSF with wall motion abnormality   • COLON SURGERY      RECONSTRUCTION, 2001   • COLONOSCOPY N/A 3/4/2019    Procedure: COLONOSCOPY polypectomy;  Surgeon: Nai Luong MD;  Location: [facility] OR;  Service: Gastroenterology   • CYSTOSCOPY URETEROSCOPY LASER LITHOTRIPSY Right 10/23/2017    Procedure: CYSTO RT URETEROSCOPY LASER  STONE BASKETING RIGHT STENT EXCHANGE;  Surgeon: Quinn Cason MD;  Location: [facility] OR;  Service:    • ENDOSCOPY N/A 3/4/2019    Procedure: ESOPHAGOGASTRODUODENOSCOPY WITH BIOPSIES;  Surgeon: Nai Luong MD;  Location: [facility] OR;  Service: Gastroenterology   • EXPLORATORY LAPAROTOMY     • HYSTERECTOMY      2001   • ROTATOR CUFF REPAIR Right 2016   • STEROID INJECTION KNEE      Had left knee injection 10/09/2018; right knee injection 10/02/2017        PT ASSESSMENT (last 12 hours)      Physical Therapy Evaluation     Row Name 04/08/19 1324          PT Evaluation Time/Intention    Subjective Information  complains of;pain;fatigue  -MS     Document Type  evaluation  -MS     Mode of Treatment  physical therapy;individual therapy  -MS     Patient Effort  good  -MS     Row Name 04/08/19 1322          General Information    Onset of Illness/Injury or Date of Surgery  04/08/19  -MS     Referring Physician  Farzad Drew  -MS     Patient Observations  agree to therapy;cooperative  -MS     Prior Level of Function  independent:  -MS     Equipment Currently Used at Home  none  -MS     Pertinent History of Current Functional Problem   Pt. is s/p Right TKR  -MS     Existing Precautions/Restrictions  fall  (Significant)   -MS     Equipment Ordered for Patient  -- Pt. already owns a Rwx for home use.  -MS     Risks Reviewed  patient:  -MS     Benefits Reviewed  patient:  -MS     Barriers to Rehab  none identified  -MS     Row Name 04/08/19 1324          Cognitive Assessment/Intervention- PT/OT    Orientation Status (Cognition)  oriented x 3  -MS     Follows Commands (Cognition)  WNL  -MS     Personal Safety Interventions  fall prevention program maintained;gait belt;nonskid shoes/slippers when out of bed;supervised activity  -MS     Row Name 04/08/19 1324          Mobility Assessment/Treatment    Extremity Weight-bearing Status  right lower extremity  -MS     Right Lower Extremity (Weight-bearing Status)  weight-bearing as tolerated (WBAT)  -MS     Row Name 04/08/19 1324          Bed Mobility Assessment/Treatment    Bed Mobility Assessment/Treatment  supine-sit;sit-supine  -MS     Supine-Sit [place] (Bed Mobility)  minimum assist (75% patient effort)  -MS     Row Name 04/08/19 1324          Transfer Assessment/Treatment    Transfer Assessment/Treatment  sit-stand transfer;stand-sit transfer  -MS     Sit-Stand [place] (Transfers)  minimum assist (75% patient effort);2 person assist  -MS     Stand-Sit [place] (Transfers)  minimum assist (75% patient effort);2 person assist  -MS     Row Name 04/08/19 1324          Sit-Stand Transfer    Assistive Device (Sit-Stand Transfers)  walker, front-wheeled  -MS     Row Name 04/08/19 1324          Stand-Sit Transfer    Assistive Device (Stand-Sit Transfers)  walker, front-wheeled  -MS     Row Name 04/08/19 1324          Gait/Stairs Assessment/Training    [place] Level (Gait)  minimum assist (75% patient effort);2 person assist  -MS     Assistive Device (Gait)  walker, front-wheeled  -MS     Distance in Feet (Gait)  6 feet  -MS     Pattern (Gait)  step-to  -MS     Deviations/Abnormal Patterns  (Gait)  antalgic;stride length decreased  -MS     Bilateral Gait Deviations  knee buckling, right side  -MS     Row Name 04/08/19 1324          General ROM    GENERAL ROM COMMENTS  BUE/LLE (WFL's)  -MS     Row Name 04/08/19 1324          MMT (Manual Muscle Testing)    General MMT Comments  BUE/LLE (3+/5)  -MS     Row Name 04/08/19 1324          Therapeutic Exercise    Comment (Therapeutic Exercise)  Right TKR protocol x 10 reps completed  -MS     Row Name 04/08/19 1324          Pain Assessment    Additional Documentation  Pain Scale: Numbers Pre/Post-Treatment (Group)  -MS     Row Name 04/08/19 1324          Pain Scale: Numbers Pre/Post-Treatment    Pain Scale: Numbers, Pretreatment  7/10  -MS     Pain Scale: Numbers, Post-Treatment  4/10  -MS     Pain Location - Side  Right  -MS     Pain Location  knee  -MS     Pain Intervention(s)  Medication (See MAR);Cold applied;Repositioned;Elevated;Rest  -MS     Row Name             Wound 04/08/19 0742 Right knee incision    Wound - Properties Group Date first assessed: 04/08/19  -EG Time first assessed: 0742  -EG Side: Right  -EG Location: knee  -EG Type: incision  -EG    Row Name 04/08/19 1324          Physical Therapy Clinical Impression    Date of Referral to PT  04/08/19  -MS     Criteria for Skilled Interventions Met (PT Clinical Impression)  treatment indicated  -MS     Rehab Potential (PT Clinical Summary)  good, to achieve stated therapy goals  -MS     Row Name 04/08/19 1324          Physical Therapy Goals    Bed Mobility Goal Selection (PT)  bed mobility, PT goal 1  -MS     Transfer Goal Selection (PT)  transfer, PT goal 1  -MS     Gait Training Goal Selection (PT)  gait training, PT goal 1  -MS     ROM Goal Selection (PT)  ROM, PT goal 1  -MS     Stairs Goal Selection (PT)  stairs, PT goal 1  -MS     Additional Documentation  ROM Goal Selection (PT) (Row);Stairs Goal Selection (PT) (Row)  -MS     Row Name 04/08/19 1324          Bed Mobility Goal 1 (PT)     Activity/Assistive Device (Bed Mobility Goal 1, PT)  bed mobility activities, all  -MS     [place] Level/Cues Needed (Bed Mobility Goal 1, PT)  independent  -MS     Time Frame (Bed Mobility Goal 1, PT)  long term goal (LTG);3 days  -MS     Row Name 04/08/19 1324          Transfer Goal 1 (PT)    Activity/Assistive Device (Transfer Goal 1, PT)  transfers, all;walker, rolling  -MS     [place] Level/Cues Needed (Transfer Goal 1, PT)  independent  -MS     Time Frame (Transfer Goal 1, PT)  long term goal (LTG);2 - 3 days  -MS     Row Name 04/08/19 1324          Gait Training Goal 1 (PT)    Activity/Assistive Device (Gait Training Goal 1, PT)  gait (walking locomotion);walker, rolling  -MS     [place] Level (Gait Training Goal 1, PT)  independent  -MS     Distance (Gait Goal 1, PT)  100 feet  -MS     Time Frame (Gait Training Goal 1, PT)  long term goal (LTG);2 - 3 days  -MS     Row Name 04/08/19 1324          ROM Goal 1 (PT)    ROM Goal 1 (PT)  Right knee AROM (5, 85)  -MS     Time Frame (ROM Goal 1, PT)  long term goal (LTG);2 - 3 days  -MS     Row Name 04/08/19 1324          Stairs Goal 1 (PT)    Activity/Assistive Device (Stairs Goal 1, PT)  stairs, all skills  -MS     [place] Level/Cues Needed (Stairs Goal 1, PT)  contact guard assist  -MS     Number of Stairs (Stairs Goal 1, PT)  2 No HR  -MS     Time Frame (Stairs Goal 1, PT)  long term goal (LTG);3 days  -MS     Row Name 04/08/19 1324          Positioning and Restraints    Pre-Treatment Position  in bed  -MS     Post Treatment Position  chair  -MS     In Chair  notified nsg;reclined;sitting;call light within reach;encouraged to call for assist;exit alarm on;with family/caregiver All lines intact. Ice pack to Right knee.  -MS       User Key  (r) = Recorded By, (t) = Taken By, (c) = Cosigned By    Initials Name Provider Type    Dillon De Anda, PT Physical Therapist    Ritu العلي RN Registered Nurse        Physical Therapy  Education     Title: PT OT SLP Therapies (Done)     Topic: Physical Therapy (Done)     Point: Mobility training (Done)     Learning Progress Summary           Patient Acceptance, E,D, VU,NR by MS at 4/8/2019  1:30 PM                   Point: Home exercise program (Done)     Learning Progress Summary           Patient Acceptance, E,D, VU,NR by MS at 4/8/2019  1:30 PM                   Point: Body mechanics (Done)     Learning Progress Summary           Patient Acceptance, E,D, VU,NR by MS at 4/8/2019  1:30 PM                   Point: Precautions (Done)     Learning Progress Summary           Patient Acceptance, E,D, VU,NR by MS at 4/8/2019  1:30 PM                               User Key     Initials Effective Dates Name Provider Type Discipline    MS 04/03/18 -  Dillon Menjivar, PT Physical Therapist PT              PT Recommendation and Plan  Anticipated Discharge Disposition (PT): home with assist, home with home health  Planned Therapy Interventions (PT Eval): balance training, bed mobility training, gait training, home exercise program, patient/family education, postural re-education, ROM (range of motion), strengthening, stair training, transfer training  Therapy Frequency (PT Clinical Impression): 2 times/day  Outcome Summary/Treatment Plan (PT)  Anticipated Discharge Disposition (PT): home with assist, home with home health  Plan of Care Reviewed With: patient  Outcome Summary: Pt. presents with typical post op impairments related to TKR surgery that include decreased ROM, strength, and overall balance.  Pt. will benefit from skilled inpt. P.T. to address her functional deficits and to assist pt. in regaining her maximum level of independence with functional mobility.   Outcome Measures     Row Name 04/08/19 1300             How much help from another person do you currently need...    Turning from your back to your side while in flat bed without using bedrails?  3  -MS      Moving from lying on back to  sitting on the side of a flat bed without bedrails?  3  -MS      Moving to and from a bed to a chair (including a wheelchair)?  3  -MS      Standing up from a chair using your arms (e.g., wheelchair, bedside chair)?  3  -MS      Climbing 3-5 steps with a railing?  2  -MS      To walk in hospital room?  3  -MS      AM-PAC 6 Clicks Score  17  -MS         Functional Assessment    Outcome Measure Options  AM-PAC 6 Clicks Basic Mobility (PT)  -MS        User Key  (r) = Recorded By, (t) = Taken By, (c) = Cosigned By    Initials Name Provider Type    Dillon De Anda, PT Physical Therapist         Time Calculation:   PT Charges     Row Name 04/08/19 1331             Time Calculation    Start Time  1305  -MS      Stop Time  1323  -MS      Time Calculation (min)  18 min  -MS      PT Received On  04/08/19  -MS      PT - Next Appointment  04/09/19  -MS      PT Goal Re-Cert Due Date  04/10/19  -MS         Time Calculation- PT    Total Timed Code Minutes- PT  15 minute(s)  -MS        User Key  (r) = Recorded By, (t) = Taken By, (c) = Cosigned By    Initials Name Provider Type    Dillon De Anda, PT Physical Therapist        Therapy Charges for Today     Code Description Service Date Service Provider Modifiers Qty    73341267295 HC PT EVAL LOW COMPLEXITY 1 4/8/2019 Dillon Menjivar, PT GP 1    26453246345 HC PT THER PROC EA 15 MIN 4/8/2019 Dillon Menjivar, PT GP 1    85920991873 HC PT THER SUPP EA 15 MIN 4/8/2019 Dillon Menjivar, PT GP 1          PT G-Codes  Outcome Measure Options: AM-PAC 6 Clicks Basic Mobility (PT)  AM-PAC 6 Clicks Score: 17      Dillon Menjivar, PT  4/8/2019

## 2019-04-08 NOTE — CONSULTS
CONSULT NOTE    INTERNAL MEDICINE   [facility]       Patient Identification:  Name: Mariangel Nguyen  Age: 68 y.o.  Sex: female  :  1950  MRN: [id number]             Date of Consultation: 2019      Primary Care Physician: Waqar Burton MD                               Requesting Physician: Dr. Farzad Luong  Reason for Consultation: Medical management    Chief Complaint:  Knee pain     History of Present Illness:   Ms Nguyen is a 68-year-old female who is now postoperative day 0 from a right total knee arthroplasty secondary to end-stage osteoarthritis.  This patient failed medical management and ultimately surgical correction was endorsed.  She does have a past history of DVT and has been placed on Eliquis for DVT prophylaxis postoperatively.  She is not really voicing any complaints at this time other than some right knee discomfort.  She denies any chest pain shortness of breath nausea or vomiting.  She admits to having diabetes as well.  She has a past history of pulmonary hypertension and claims her pulmonologist is with [facility].  This is the reason she takes the Revatio 3 times daily.  She also admits that she has a past history of obstructive sleep apnea but is noncompliant with use of a CPAP mask.  Her above comorbidities are compounded by diabetes mellitus of which she only uses metformin twice daily.  She denies any current postoperative nausea nor she had any issues with emesis.  She feels quite dry at this time and denies any recent swelling.  Pulmonology has also been consulted while here upon review of the orders.       Past Medical History:  Past Medical History:   Diagnosis Date   • Arthritis     OSTEOARTHRITIS KNEES   • CHF (congestive heart failure) (CMS/HCC)    • Colon polyp    • COPD (chronic obstructive pulmonary disease) (CMS/[facility])    • Diabetes mellitus (CMS/[facility])    • Disease of thyroid gland    • H/O Abnormal Pap smear of cervix    • History of DVT  (deep vein thrombosis)    • History of kidney stones    • History of pulmonary embolus (PE)    • History of sepsis    • History of small bowel obstruction    • History of snoring    • Hyperlipidemia    • Hypertension    • Knee pain, bilateral    • Neuropathy    • On home oxygen therapy     2L NC   • Pulmonary hypertension (CMS/HCC)    • Sleep apnea      Past Surgical History:  Past Surgical History:   Procedure Laterality Date   • ABDOMINAL SURGERY  11/2017    Delayed closure of abdominal wound-wound vac placement, Dr. Nestor Howe   • BLADDER SURGERY      BLADDER LIFT, 2011   • CARDIAC CATHETERIZATION  2017    Normal coronary arteries, normal overall LVSF with wall motion abnormality   • COLON SURGERY      RECONSTRUCTION, 2001   • COLONOSCOPY N/A 3/4/2019    Procedure: COLONOSCOPY polypectomy;  Surgeon: Nai Luong MD;  Location: [facility] OR;  Service: Gastroenterology   • CYSTOSCOPY URETEROSCOPY LASER LITHOTRIPSY Right 10/23/2017    Procedure: CYSTO RT URETEROSCOPY LASER  STONE BASKETING RIGHT STENT EXCHANGE;  Surgeon: Quinn Cason MD;  Location: [facility] OR;  Service:    • ENDOSCOPY N/A 3/4/2019    Procedure: ESOPHAGOGASTRODUODENOSCOPY WITH BIOPSIES;  Surgeon: Nai Luong MD;  Location: [facility] OR;  Service: Gastroenterology   • EXPLORATORY LAPAROTOMY     • HYSTERECTOMY      2001   • ROTATOR CUFF REPAIR Right 2016   • STEROID INJECTION KNEE      Had left knee injection 10/09/2018; right knee injection 10/02/2017      Home Meds:  Medications Prior to Admission   Medication Sig Dispense Refill Last Dose   • albuterol (PROVENTIL) (2.5 MG/3ML) 0.083% nebulizer solution Take 2.5 mg by nebulization Every 4 (Four) Hours As Needed for Wheezing.   4/1/2019 at Unknown time   • Apixaban (ELIQUIS PO) Take 5 mg by mouth 2 (Two) Times a Day. Pt unsure of dose.    3/18/2019   • bisoprolol (ZEBeta) 5 MG tablet Take 1 tablet by mouth Daily. 30 tablet 5 4/8/2019 at 0400   • bumetanide (BUMEX) 2 MG tablet Take  1 tablet by mouth Daily. 30 tablet 5 4/7/2019 at 1600   • escitalopram (LEXAPRO) 10 MG tablet Take 1.5 tablets by mouth Daily. 45 tablet 5 4/7/2019 at 0800   • levothyroxine (SYNTHROID, LEVOTHROID) 25 MCG tablet Take 25 mcg by mouth Daily.   4/7/2019 at 0800   • metFORMIN (GLUCOPHAGE) 1000 MG tablet Take 1,000 mg by mouth 2 (Two) Times a Day With Meals.   4/7/2019 at 2000   • pantoprazole (PROTONIX) 40 MG EC tablet Take 40 mg by mouth Daily.   4/8/2019 at 0400   • pravastatin (PRAVACHOL) 40 MG tablet Take 40 mg by mouth Every Night.   4/7/2019 at 2000   • sildenafil (REVATIO) 20 MG tablet Take 20 mg by mouth 3 (Three) Times a Day.   4/8/2019 at 0400   • vitamin D (ERGOCALCIFEROL) 58137 units capsule capsule Take 1 capsule by mouth 1 (One) Time Per Week. 13 capsule 1 4/4/2019   • ONE TOUCH ULTRA TEST test strip    Not Taking   • ONETOUCH DELICA LANCETS 33G misc    Not Taking   • potassium chloride (MICRO-K) 10 MEQ CR capsule TAKE 2 CAPSULES BY MOUTH ONCE DAILY 60 capsule 2 Taking     Current Meds:     Current Facility-Administered Medications:   •  acetaminophen (TYLENOL) tablet 325 mg, 325 mg, Oral, Q4H PRN, Farzad Luong MD  •  albuterol (PROVENTIL) nebulizer solution 0.083% 2.5 mg/3mL, 2.5 mg, Nebulization, Q4H PRN, Farzad Luong MD  •  [START ON 4/9/2019] apixaban (ELIQUIS) tablet 2.5 mg, 2.5 mg, Oral, Q12H, Farzad Luong MD  •  bisacodyl (DULCOLAX) suppository 10 mg, 10 mg, Rectal, Daily PRN, Farzad Luong MD  •  bisoprolol (ZEBeta) tablet 5 mg, 5 mg, Oral, Daily, Farzad Luong MD  •  bumetanide (BUMEX) tablet 2 mg, 2 mg, Oral, Daily, Farzad Luong MD  •  ceFAZolin in Sodium Chloride (ANCEF) IVPB solution 3 g, 3 g, Intravenous, Q8H, Farzad Luong MD, Last Rate: 200 mL/hr at 04/08/19 1503, 3 g at 04/08/19 1503  •  dextrose (D50W) 25 g/ 50mL Intravenous Solution 25 g, 25 g, Intravenous, Q15 Min PRN, Farzad Luong MD  •  dextrose (GLUTOSE) oral gel 15 g, 15 g, Oral, Q15 Min PRN, Farzad Luong MD  •   escitalopram (LEXAPRO) tablet 15 mg, 15 mg, Oral, Daily, Farzad Luong MD, 15 mg at 04/08/19 1217  •  famotidine (PEPCID) tablet 40 mg, 40 mg, Oral, Daily, Farzad Luong MD  •  glucagon (human recombinant) (GLUCAGEN DIAGNOSTIC) injection 1 mg, 1 mg, Subcutaneous, PRN, Farzad Luong MD  •  HYDROmorphone (DILAUDID) injection 0.5 mg, 0.5 mg, Intravenous, Q2H PRN **AND** naloxone (NARCAN) injection 0.1 mg, 0.1 mg, Intravenous, Q5 Min PRN, Farzad Luong MD  •  insulin lispro (humaLOG) injection 0-9 Units, 0-9 Units, Subcutaneous, 4x Daily With Meals & Nightly, Farzad Luong MD, 2 Units at 04/08/19 1217  •  levothyroxine (SYNTHROID, LEVOTHROID) tablet 25 mcg, 25 mcg, Oral, Daily, Farzad Luong MD, 25 mcg at 04/08/19 1217  •  magnesium hydroxide (MILK OF MAGNESIA) suspension 2400 mg/10mL 10 mL, 10 mL, Oral, Daily PRN, Farzad Luong MD  •  melatonin tablet 1 mg, 1 mg, Oral, Nightly PRN, Farzad Luong MD  •  metFORMIN (GLUCOPHAGE) tablet 1,000 mg, 1,000 mg, Oral, BID With Meals, Farzad Luong MD  •  ondansetron (ZOFRAN) tablet 4 mg, 4 mg, Oral, Q6H PRN **OR** ondansetron ODT (ZOFRAN-ODT) disintegrating tablet 4 mg, 4 mg, Oral, Q6H PRN **OR** ondansetron (ZOFRAN) injection 4 mg, 4 mg, Intravenous, Q6H PRN, Farzad Luong MD  •  oxyCODONE-acetaminophen (PERCOCET)  MG per tablet 2 tablet, 2 tablet, Oral, Q4H PRN, Farzad Luong MD, 2 tablet at 04/08/19 1048  •  oxyCODONE-acetaminophen (PERCOCET) 5-325 MG per tablet 2 tablet, 2 tablet, Oral, Q4H PRN, Farzad Luong MD  •  pantoprazole (PROTONIX) EC tablet 40 mg, 40 mg, Oral, Daily, Farzad Luong MD  •  potassium chloride (MICRO-K) CR capsule 20 mEq, 20 mEq, Oral, Daily, Farzad Luong MD, 20 mEq at 04/08/19 1217  •  pravastatin (PRAVACHOL) tablet 40 mg, 40 mg, Oral, Nightly, Farzad Luong MD  •  sennosides-docusate sodium (SENOKOT-S) 8.6-50 MG tablet 2 tablet, 2 tablet, Oral, BID, Farzad Luong MD  •  sildenafil (REVATIO) tablet 20 mg, 20 mg, Oral, TID, Cherise  "MD Farzad, 20 mg at 04/08/19 1217  •  sodium chloride 0.9 % flush 1-10 mL, 1-10 mL, Intravenous, PRN, Farzad Luong MD  •  sodium chloride 0.9 % flush 3 mL, 3 mL, Intravenous, Q12H, Farzad Luong MD  Allergies:  No Known Allergies  Social History:   Social History     Socioeconomic History   • Marital status: Single     Spouse name: Not on file   • Number of children: Not on file   • Years of education: High school   • Highest education level: Not on file   Occupational History     Employer: RETIRED   Tobacco Use   • Smoking status: Never Smoker   • Smokeless tobacco: Never Used   Substance and Sexual Activity   • Alcohol use: Yes     Comment: 1-2X YEAR   • Drug use: No   • Sexual activity: Defer     Family History:  Family History   Problem Relation Age of Onset   • Cancer Other    • Stroke Other    • Other Other         LUPUS ANTICOAGULANT   • Rheum arthritis Other    • Heart disease Other    • Heart attack Other    • Lupus Mother    • Stomach cancer Father    • Malig Hyperthermia Neg Hx    • Colon cancer Neg Hx    • Colon polyps Neg Hx           Review of Systems  See history of present illness and past medical history.  Patient denies any recent fever chills night sweats nausea vomiting focal change to vision smell taste or sound.  Denies any chest pain palpitations cough or shortness of breath above baseline.  Denies any recent fluid overload abdominal pain or swelling diarrhea dysuria preoperatively denied any issues with bruising or bleeding.  Remainder of ROS is negative.      Vitals:   /68   Pulse 72   Temp 96.8 °F (36 °C) (Oral)   Resp 16   Ht 157.5 cm (62\")   Wt 111 kg (244 lb 9.6 oz)   SpO2 90%   BMI 44.74 kg/m²   I/O:     Intake/Output Summary (Last 24 hours) at 4/8/2019 1516  Last data filed at 4/8/2019 1146  Gross per 24 hour   Intake 800 ml   Output 475 ml   Net 325 ml     Exam:  General Appearance:    Alert, cooperative, no distress, AOx3, family member x2 at bedside   Head:    " Normocephalic, without obvious abnormality, atraumatic   Eyes:    PERRL, conjunctiva/corneas clear, EOM's intact, both eyes   Ears:    Normal external ear canals, both ears   Nose:   Nares normal, septum midline, mucosa normal, no drainage    or sinus tenderness   Throat:   Lips, tongue, gums normal; oral mucosa pink and moist   Neck:   Supple, no LAD/JVD       Lungs:     Clear to auscultation bilaterally, respirations unlabored   Chest Wall:    No tenderness or deformity    Heart:    Regular rate and rhythm, S1 and S2 normal   Abdomen:     Soft, non-tender, bowel sounds active all four quadrants,     no masses, truncally obese   Extremities:  Right knee with Ace wrap and brace with ice, no signs of fluid overload remaining extremities           Neurologic:   CNII-XII intact, no focal deficits noted       Data Review:  Labs in chart were reviewed.            Imaging Results (last 7 days)     Procedure Component Value Units Date/Time    XR Knee 1 or 2 View Right [[id number]] Collected:  04/08/19 0951     Updated:  04/08/19 1011    Narrative:       Right knee radiograph     HISTORY:Postop right knee     TECHNIQUE: AP and lateral radiograph the right knee     COMPARISON:Right knee radiographs 03/28/2019     FINDINGS:  Post surgical changes from recent right total knee arthroplasty are  present. The hardware is intact. There is no new periprosthetic  fracture.       Impression:       Postoperative changes from recent right total knee  arthroplasty without findings of immediate complication.     This report was finalized on 4/8/2019 10:08 AM by Dr. Christopher Kern M.D.           Past Medical History:   Diagnosis Date   • Arthritis     OSTEOARTHRITIS KNEES   • CHF (congestive heart failure) (CMS/HCC)    • Colon polyp    • COPD (chronic obstructive pulmonary disease) (CMS/HCC)    • Diabetes mellitus (CMS/HCC)    • Disease of thyroid gland    • H/O Abnormal Pap smear of cervix    • History of DVT (deep vein thrombosis)    •  History of kidney stones    • History of pulmonary embolus (PE)    • History of sepsis    • History of small bowel obstruction    • History of snoring    • Hyperlipidemia    • Hypertension    • Knee pain, bilateral    • Neuropathy    • On home oxygen therapy     2L NC   • Pulmonary hypertension (CMS/HCC)    • Sleep apnea        Assessment:  Active Hospital Problems    Diagnosis  POA   • Osteoarthritis of right knee [M17.11]  Yes      Resolved Hospital Problems   No resolved problems to display.       Plan:  HTN/Pulm HTN - hold Bumex for now with parameters written of remaining meds   -outpt Pulm w/ [facility]     Noncompliance w/ KAELA/CPAP/Chronic Hypoxic Respiratory failure    PHX DVT -agree w/ Eliquis for DVT ppx     Chronic Diastolic CHF     DM2/Morbid obesity - OK w/ PO - add ssi while postop and monitor     POD0 - R-TKA -monitor hemoglobin daily    Pepcid for GI prophylaxis    Thank you much for the consult.  [facility] will continue to follow give medical recommendations accordingly    Favio Escobar MD   4/8/2019  3:16 PM

## 2019-04-09 LAB
ANION GAP SERPL CALCULATED.3IONS-SCNC: 19.2 MMOL/L
ARTERIAL PATENCY WRIST A: ABNORMAL
ATMOSPHERIC PRESS: 743.1 MMHG
ATMOSPHERIC PRESS: 743.3 MMHG
BASE EXCESS BLDA CALC-SCNC: 6.5 MMOL/L (ref 0–2)
BASE EXCESS BLDV CALC-SCNC: 4.4 MMOL/L
BDY SITE: ABNORMAL
BDY SITE: ABNORMAL
BUN BLD-MCNC: 10 MG/DL (ref 8–23)
BUN/CREAT SERPL: 15.9 (ref 7–25)
CALCIUM SPEC-SCNC: 8.8 MG/DL (ref 8.6–10.5)
CHLORIDE SERPL-SCNC: 95 MMOL/L (ref 98–107)
CO2 SERPL-SCNC: 24.8 MMOL/L (ref 22–29)
CREAT BLD-MCNC: 0.63 MG/DL (ref 0.57–1)
GAS FLOW AIRWAY: 5 LPM
GFR SERPL CREATININE-BSD FRML MDRD: 95 ML/MIN/1.73
GLUCOSE BLD-MCNC: 167 MG/DL (ref 65–99)
GLUCOSE BLDC GLUCOMTR-MCNC: 125 MG/DL (ref 70–130)
GLUCOSE BLDC GLUCOMTR-MCNC: 158 MG/DL (ref 70–130)
GLUCOSE BLDC GLUCOMTR-MCNC: 175 MG/DL (ref 70–130)
GLUCOSE BLDC GLUCOMTR-MCNC: 187 MG/DL (ref 70–130)
HCO3 BLDA-SCNC: 33.7 MMOL/L (ref 22–28)
HCO3 BLDV-SCNC: 35.4 MMOL/L (ref 22–28)
HCT VFR BLD AUTO: 42.7 % (ref 34–46.6)
HGB BLD-MCNC: 12.3 G/DL (ref 12–15.9)
HOROWITZ INDEX BLD+IHG-RTO: 60 %
MODALITY: ABNORMAL
MODALITY: ABNORMAL
O2 A-A PPRESDIFF RESPIRATORY: 0.3 MMHG
PCO2 BLDA: 57.8 MM HG (ref 35–45)
PCO2 BLDV: 83 MM HG (ref 41–51)
PH BLDA: 7.37 PH UNITS (ref 7.35–7.45)
PH BLDV: 7.24 PH UNITS (ref 7.31–7.41)
PO2 BLDA: 112 MM HG (ref 80–100)
PO2 BLDV: 21.7 MM HG (ref 35–45)
POTASSIUM BLD-SCNC: 4.3 MMOL/L (ref 3.5–5.2)
SAO2 % BLDCOA: 25.6 % (ref 92–99)
SAO2 % BLDCOA: 98.1 % (ref 92–99)
SET MECH RESP RATE: 16
SODIUM BLD-SCNC: 139 MMOL/L (ref 136–145)
TOTAL RATE: 18 BREATHS/MINUTE
TOTAL RATE: 20 BREATHS/MINUTE
VENTILATOR MODE: ABNORMAL
VT ON VENT VENT: 500 ML

## 2019-04-09 PROCEDURE — 82803 BLOOD GASES ANY COMBINATION: CPT

## 2019-04-09 PROCEDURE — 85014 HEMATOCRIT: CPT | Performed by: ORTHOPAEDIC SURGERY

## 2019-04-09 PROCEDURE — 80048 BASIC METABOLIC PNL TOTAL CA: CPT | Performed by: ORTHOPAEDIC SURGERY

## 2019-04-09 PROCEDURE — 94799 UNLISTED PULMONARY SVC/PX: CPT

## 2019-04-09 PROCEDURE — 82962 GLUCOSE BLOOD TEST: CPT

## 2019-04-09 PROCEDURE — 25010000003 CEFTRIAXONE PER 250 MG: Performed by: INTERNAL MEDICINE

## 2019-04-09 PROCEDURE — 63710000001 INSULIN LISPRO (HUMAN) PER 5 UNITS: Performed by: ORTHOPAEDIC SURGERY

## 2019-04-09 PROCEDURE — 25010000002 VANCOMYCIN: Performed by: ORTHOPAEDIC SURGERY

## 2019-04-09 PROCEDURE — 94640 AIRWAY INHALATION TREATMENT: CPT

## 2019-04-09 PROCEDURE — 36600 WITHDRAWAL OF ARTERIAL BLOOD: CPT

## 2019-04-09 PROCEDURE — 99223 1ST HOSP IP/OBS HIGH 75: CPT | Performed by: INTERNAL MEDICINE

## 2019-04-09 PROCEDURE — 97110 THERAPEUTIC EXERCISES: CPT

## 2019-04-09 PROCEDURE — 85018 HEMOGLOBIN: CPT | Performed by: ORTHOPAEDIC SURGERY

## 2019-04-09 PROCEDURE — 94660 CPAP INITIATION&MGMT: CPT

## 2019-04-09 PROCEDURE — 25010000002 NALOXONE PER 1 MG: Performed by: ORTHOPAEDIC SURGERY

## 2019-04-09 RX ORDER — CEFTRIAXONE SODIUM 2 G/50ML
2 INJECTION, SOLUTION INTRAVENOUS EVERY 24 HOURS
Status: COMPLETED | OUTPATIENT
Start: 2019-04-09 | End: 2019-04-11

## 2019-04-09 RX ORDER — ACETAMINOPHEN 325 MG/1
650 TABLET ORAL EVERY 4 HOURS PRN
Status: DISCONTINUED | OUTPATIENT
Start: 2019-04-09 | End: 2019-04-10

## 2019-04-09 RX ORDER — TRAMADOL HYDROCHLORIDE 50 MG/1
50 TABLET ORAL EVERY 6 HOURS PRN
Status: DISCONTINUED | OUTPATIENT
Start: 2019-04-09 | End: 2019-04-10

## 2019-04-09 RX ADMIN — VANCOMYCIN HYDROCHLORIDE 2250 MG: 1 INJECTION, POWDER, LYOPHILIZED, FOR SOLUTION INTRAVENOUS at 09:49

## 2019-04-09 RX ADMIN — SENNOSIDES AND DOCUSATE SODIUM 2 TABLET: 8.6; 5 TABLET ORAL at 08:06

## 2019-04-09 RX ADMIN — METFORMIN HYDROCHLORIDE 1000 MG: 1000 TABLET ORAL at 17:43

## 2019-04-09 RX ADMIN — INSULIN LISPRO 2 UNITS: 100 INJECTION, SOLUTION INTRAVENOUS; SUBCUTANEOUS at 08:06

## 2019-04-09 RX ADMIN — SILDENAFIL 20 MG: 20 TABLET, FILM COATED ORAL at 16:36

## 2019-04-09 RX ADMIN — APIXABAN 2.5 MG: 2.5 TABLET, FILM COATED ORAL at 08:06

## 2019-04-09 RX ADMIN — ESCITALOPRAM OXALATE 15 MG: 5 TABLET, FILM COATED ORAL at 08:06

## 2019-04-09 RX ADMIN — METFORMIN HYDROCHLORIDE 1000 MG: 1000 TABLET ORAL at 08:06

## 2019-04-09 RX ADMIN — BUMETANIDE 2 MG: 2 TABLET ORAL at 08:06

## 2019-04-09 RX ADMIN — CEFTRIAXONE SODIUM 2 G: 2 INJECTION, SOLUTION INTRAVENOUS at 14:55

## 2019-04-09 RX ADMIN — PANTOPRAZOLE SODIUM 40 MG: 40 TABLET, DELAYED RELEASE ORAL at 08:08

## 2019-04-09 RX ADMIN — LEVOTHYROXINE SODIUM 25 MCG: 25 TABLET ORAL at 08:06

## 2019-04-09 RX ADMIN — NALOXONE HYDROCHLORIDE 0.1 MG: 0.4 INJECTION, SOLUTION INTRAMUSCULAR; INTRAVENOUS; SUBCUTANEOUS at 09:57

## 2019-04-09 RX ADMIN — SODIUM CHLORIDE, PRESERVATIVE FREE 3 ML: 5 INJECTION INTRAVENOUS at 08:08

## 2019-04-09 RX ADMIN — OXYCODONE AND ACETAMINOPHEN 2 TABLET: 5; 325 TABLET ORAL at 08:06

## 2019-04-09 RX ADMIN — BISOPROLOL FUMARATE 5 MG: 5 TABLET ORAL at 08:06

## 2019-04-09 RX ADMIN — ALBUTEROL SULFATE 2.5 MG: 2.5 SOLUTION RESPIRATORY (INHALATION) at 09:17

## 2019-04-09 RX ADMIN — SILDENAFIL 20 MG: 20 TABLET, FILM COATED ORAL at 08:06

## 2019-04-09 NOTE — PROGRESS NOTES
[facility]     Mar/chart reviewed  F/u chronic respiratory failure.  Was pretty sedated earlier this morning having apneas.  Got placed on pap and given narcan  She is doing better now, she is alert on nasal cannula  Discussed the importance of use of pap therapy with her    Vital Sign Min/Max for last 24 hours  Temp  Min: 97.4 °F (36.3 °C)  Max: 100 °F (37.8 °C)   BP  Min: 126/80  Max: 152/70   Pulse  Min: 59  Max: 83   Resp  Min: 18  Max: 22   SpO2  Min: 85 %  Max: 95 %   Flow (L/min)  Min: 4  Max: 5   No Data Recorded     Nad, axox3,   perrl, eomi, no icterus,  mmm, no jvd, trachea midline, neck supple,  chest decreased bilaterally, no crackles, no wheezes,   rrr,   soft, nt, nd +bs,  no c/c/ trace edema    Labs:  7.37/57/112 on bipap    A/P:  1. Pulmonary Hypertension - uncertain WHO Group.  Would continue her home revatio and bumex for now.  Suggest outpatient [facility]  2. Chronic hypoxemic respiratory failure -- continue oxygen, pulmonary toilet as able  3. KAELA -- non compliant with pap  4. Chronic diastolic chf  5. Obesity --     Agree with Dr. poole regarding narcotics. Her gas shows compensated hypercapnia, likely at baseline.  Ideally she should be wearing her bipap with sleep anyways.

## 2019-04-09 NOTE — PROGRESS NOTES
Pharmacy to dose Vancomycin    Day 1  Consult for Dr. Luong  Treating: Bone and Joint infection  Goal trough: 15-20 mcg/mL  Other antimicrobials: None  Current ordered antibiotics DOT: 7 days, stop date 4/16/19    Per [facility] pharmacy vancomycin dosing policy, pharmacy to dose vancomycin consult order was placed.    Pt  has a past medical history of Arthritis, CHF (congestive heart failure) (CMS/[facility]), Colon polyp, COPD (chronic obstructive pulmonary disease) (CMS/[facility]), Diabetes mellitus (CMS/[facility]), Disease of thyroid gland, H/O Abnormal Pap smear of cervix, History of DVT (deep vein thrombosis), History of kidney stones, History of pulmonary embolus (PE), History of sepsis, History of small bowel obstruction, History of snoring, Hyperlipidemia, Hypertension, Knee pain, bilateral, Neuropathy, On home oxygen therapy, Pulmonary hypertension (CMS/[facility]), and Sleep apnea.    Anti-Infectives (From admission, onward)    Ordered     Dose/Rate Route Frequency Start Stop    04/09/19 0921  vancomycin 1250 mg/250 mL 0.9% NS IVPB ([facility])     Ordering Provider:  Farzad Luong MD    1,250 mg  over 125 Minutes Intravenous Every 12 Hours 04/09/19 2200 04/16/19 0959    04/09/19 0921  vancomycin 2250 mg/500 mL 0.9% NS IVPB ([facility])     Ordering Provider:  Farzad Luong MD    20 mg/kg × 111 kg  over 225 Minutes Intravenous Once 04/09/19 1015      04/09/19 0918  Pharmacy to dose vancomycin     Ordering Provider:  Farzad Luong MD     Does not apply Continuous PRN 04/09/19 0918 04/16/19 0917    04/08/19 1017  ceFAZolin in Sodium Chloride (ANCEF) IVPB solution 3 g     Ordering Provider:  Farzad Luong MD    3 g  200 mL/hr over 30 Minutes Intravenous Every 8 Hours 04/08/19 1500 04/09/19 0008    04/08/19 0527  ceFAZolin in dextrose (ANCEF) IVPB solution 2 g     Ordering Provider:  Farzad Luong MD    2 g  over 30 Minutes Intravenous Once 04/08/19 0529 04/08/19 0706           Relevant clinical data and objective history reviewed:  68 y.o.  "female 157.5 cm (62\") 111 kg (244 lb 9.6 oz)  Body mass index is 44.74 kg/m².  Results from last 7 days   Lab Units 04/09/19  0458   CREATININE mg/dL 0.63     Estimated Creatinine Clearance: 81.4 mL/min (by C-G formula based on SCr of 0.63 mg/dL).    No results found for: WBC  Temp Readings from Last 3 Encounters:   04/09/19 97.7 °F (36.5 °C) (Oral)   03/28/19 97.6 °F (36.4 °C) (Oral)   03/27/19 97.6 °F (36.4 °C) (Oral)        No results found for: VANCOTROUGH  No results found for: JR    Baseline cultures:  3/28 Ucx: >100K Proteus mirabilis (R to Macrobid/tetracycline, S to Amp/Kefzol/LVQ/Bactrim)    Assessment:  POD#1, s/p TKA on 4/8  Dr. Luong noted lapse of sterilization process, ID consulted to assess need for prolonged antibiotics  Per [facility] pharmacy vancomycin dosing policy, pharmacy to dose vancomycin consult ordered and pharmacy will manage vancomycin dosing during pt's hospital stay.    Plan:   1. Considering pt's risk/concern of infection, will stop vancomycin 1750 mg IV Q12H (no doses of vancomycin given yet) and give Vancomycin loading dose of 2250 mg IV X 1 (20 mg/kg) today, followed by Vancomycin 1250 mg IV Q12H (~13 mg/kg/dose) starting tonight, given pt's morbid obese weight (increased risk of accumulation with subsequent dosing).  2. Will order vancomycin trough prior to the 5th total dose on 4/11 @ 0930 to monitor therapeutic drug concentration. (trough goal of 15-20)  3. Will keep monitoring pt's renal function every 24 hours for the first 3 days and then at least every 48 hours per [facility]'s dosing recommendations. SCr with AM labs ordered for tomorrow.  4. Encourage hydration to help prevent toxic accumulation; monitor for s/sxn of toxicity including increase in SCr and decrease in UOP.    Pharmacy will continue to follow daily while on vancomycin and adjust as needed. Thank you for allowing us to participate in this patient's care.    Jade Lundberg, PharmD, " BCPS  04/09/19 9:24 AM

## 2019-04-09 NOTE — PLAN OF CARE
Problem: Patient Care Overview  Goal: Plan of Care Review   04/09/19 0351   Coping/Psychosocial   Plan of Care Reviewed With patient   OTHER   Outcome Summary Post-op RTK on 4/8/19. Pain controlled with PO pain medication.Drowsy after pain medication but easily arousable. Patient more comfortable sleeping in chair. Accu checks, no sliding scale was needed. 02 at 4 L and wears 2L at home. Plan is to D/c with home health.    Plan of Care Review   Progress improving

## 2019-04-09 NOTE — PROGRESS NOTES
[facility]charge Planning Assessment  [facility]     Patient Name: Mariangel Nguyen  MRN: [id number]  Today's Date: 4/9/2019    Admit Date: 4/8/2019    Discharge Needs Assessment     Row Name 04/09/19 1006       Living Environment    Lives With  child(angel), adult;grandchild(angel)    Current Living Arrangements  home/apartment/condo    Primary Care Provided by  self    Provides Primary Care For  no one    Family Caregiver if Needed  child(angel), adult    Family Caregiver Names  Lives with daughter Tita Nguyen and 16 year old grandson    Quality of Family Relationships  helpful    Able to Return to Prior Arrangements  yes       Resource/Environmental Concerns    Resource/Environmental Concerns  none       Transition Planning    Patient/Family Anticipates Transition to  home with family    Patient/Family Anticipated Services at Transition  home health care    Transportation Anticipated  family or friend will provide       Discharge Needs Assessment    Readmission Within the Last 30 Days  no previous admission in last 30 days    Concerns to be Addressed  basic needs    Equipment Currently Used at Home  walker, rolling;cane, straight;shower chair;oxygen;nebulizer O2 from [employer]    Equipment Needed After Discharge  commode    Discharge Facility/Level of Care Needs  home with home health        Discharge Plan     Row Name 04/09/19 1008       Plan    Plan  Return home with daughter and [facility]    Patient/Family in Agreement with Plan  yes    Plan Comments  Spoke with patient at bedside.  Patient lives with daughter Tita Nguyen [phone], and 16 year old grandson.  Patient uses a walker, has a cane, a nebulizer and O2 from [employer].  She has 2 steps into the house and has borrowed a shower chair.  Patient plans to go to [facility] on [facility] in [place] and would like to use [facility] at home also - left message for Key.  She has been to [facility] in the past for rehab.  CCP will  follow.             Home Medical Care      Service Provider Request Status Selected Services Address Phone Number Fax Number    [facility] Pending - Request Sent N/A [address], [place] [id number] [phone] --      Therapy      No service coordination in this encounter.      Community Resources      No service coordination in this encounter.          Demographic Summary     Row Name 04/09/19 1005       General Information    Admission Type  inpatient    Arrived From  home    Referral Source  admission list    Reason for Consult  discharge planning    Preferred Language  English     Used During This Interaction  no        Functional Status     Row Name 04/09/19 1005       Functional Status    Usual Activity Tolerance  moderate    Current Activity Tolerance  fair       Functional Status, IADL    Medications  independent    Meal Preparation  independent    Housekeeping  independent;assistive person    Laundry  assistive person;independent    Shopping  independent;assistive person       Mental Status    General Appearance WDL  WDL       Mental Status Summary    Recent Changes in Mental Status/Cognitive Functioning  no changes                Becky S. [place], RN

## 2019-04-09 NOTE — DISCHARGE PLACEMENT REQUEST
"Jorge Nguyen (68 y.o. Female)     Date of Birth Social Security Number Address Home Phone MRN    1950  [address]  [place] [id number] [phone] [id number]    Church Marital Status          [employer] Single       Admission Date Admission Type Admitting Provider Attending Provider Department, Room/Bed    4/8/19 Elective Farzad Luong MD Rhoads, David, MD [address], [address]    Discharge Date Discharge Disposition Discharge Destination                       Attending Provider:  Farzad Luong MD    Allergies:  No Known Allergies    Isolation:  None   Infection:  None   Code Status:  CPR    Ht:  157.5 cm (62\")   Wt:  111 kg (244 lb 9.6 oz)    Admission Cmt:  None   Principal Problem:  None                Active Insurance as of 4/8/2019     Primary Coverage     Payor Plan Insurance Group Employer/Plan Group    MEDICARE MEDICARE A & B      Payor Plan Address Payor Plan Phone Number Payor Plan Fax Number Effective Dates    [address] [phone]  12/1/2017 - None Entered    [place] [address]       Subscriber Name Subscriber Birth Date Member ID       JORGE NGUYEN 1950 [id number]                 Emergency Contacts      (Rel.) Home Phone Work Phone Mobile Phone    Tita Nguyen (Daughter) [phone] -- --            "

## 2019-04-09 NOTE — PLAN OF CARE
Problem: Patient Care Overview  Goal: Plan of Care Review  Outcome: Ongoing (interventions implemented as appropriate)   04/09/19 4404   Coping/Psychosocial   Plan of Care Reviewed With patient   OTHER   Outcome Summary Pt very drowsy and in and out of sleep this afternoon working with PT. Pt able to perform some TKR protocol exercises with assistance needed.    Plan of Care Review   Progress improving

## 2019-04-09 NOTE — PROGRESS NOTES
Procedure(s):  RIGHT TOTAL KNEE ARTHROPLASTY     LOS: 1 day     Subjective :   Complains of pain, controlled with meds.      I was notified last evening around 10 pm that some of the carmelitat's  operative surgical pans may not have completed the sterilization process in the autoclave.  This increases the patient's risk of infection.  The patient was notified of [facility] lapse of sterilization process.  The patient voiced understanding.      Objective :    Vital signs in last 24 hours:  Vitals:    04/08/19 1923 04/08/19 2015 04/08/19 2358 04/09/19 0317   BP: 149/81  151/82 152/70   BP Location: Right arm  Right arm Right arm   Patient Position: Sitting  Sitting Sitting   Pulse: 68  76 80   Resp: 18  18 20   Temp: 97.4 °F (36.3 °C)  97.7 °F (36.5 °C) 98.6 °F (37 °C)   TempSrc: Oral  Oral Oral   SpO2: 93% 92% 90% 90%   Weight:       Height:           PHYSICAL EXAM:  Patient is calm, in no acute distress, awake and oriented x 3.  Dressing is clean, dry and intact.  No signs of infection.  Swelling is appropriate in amount.  Ecchymosis is appropriate in amount.  Homans test is negative.  Patient is neurovascularly intact distally.    LABS:  Results from last 7 days   Lab Units 04/09/19  0458   HEMOGLOBIN g/dL 12.3   HEMATOCRIT % 42.7     Results from last 7 days   Lab Units 04/09/19  0458   SODIUM mmol/L 139   POTASSIUM mmol/L 4.3   CHLORIDE mmol/L 95*   CO2 mmol/L 24.8   BUN mg/dL 10   CREATININE mg/dL 0.63   GLUCOSE mg/dL 167*   CALCIUM mg/dL 8.8             ASSESSMENT:  Status post Procedure(s):  RIGHT TOTAL KNEE ARTHROPLASTY     Elevated risk of infection due to sub-optimal sterilization processes.      Plan:  Continue Physical Therapy, increase mobility and range of motion as tolerated.  Continue SCDs, Continue Eliquis for DVT prophylaxis while inpatient.    Dispo planning for home vs rehab.    Farzad Luong MD    Date: 4/9/2019  Time: 7:26 AM

## 2019-04-09 NOTE — PLAN OF CARE
Problem: Patient Care Overview  Goal: Plan of Care Review  Outcome: Ongoing (interventions implemented as appropriate)   04/09/19 1945   Coping/Psychosocial   Plan of Care Reviewed With patient   OTHER   Outcome Summary transferred to ICU for increasing CO2 levels, slept well on bipap for 5 hours but CO2 levels still increased, A&Ox4, x3 transfers on bsc, very very anxious regarding care, educated on bipap use and monitoring   Plan of Care Review   Progress improving     Goal: Individualization and Mutuality  Outcome: Ongoing (interventions implemented as appropriate)      Problem: Fall Risk (Adult)  Goal: Absence of Fall  Outcome: Ongoing (interventions implemented as appropriate)      Problem: Knee Arthroplasty (Total, Partial) (Adult)  Goal: Signs and Symptoms of Listed Potential Problems Will be Absent, Minimized or Managed (Knee Arthroplasty)  Outcome: Ongoing (interventions implemented as appropriate)    Goal: Anesthesia/Sedation Recovery  Outcome: Outcome(s) achieved Date Met: 04/09/19      Problem: Skin Injury Risk (Adult)  Goal: Identify Related Risk Factors and Signs and Symptoms  Outcome: Outcome(s) achieved Date Met: 04/09/19    Goal: Skin Health and Integrity  Outcome: Ongoing (interventions implemented as appropriate)

## 2019-04-09 NOTE — THERAPY TREATMENT NOTE
Acute Care - Physical Therapy Treatment Note  [facility]     Patient Name: Mariangel Nguyen  : 1950  MRN: [id number]  Today's Date: 2019  Onset of Illness/Injury or Date of Surgery: 19  Date of Referral to PT: 19  Referring Physician: Farzad Drew    Admit Date: 2019    Visit Dx:    ICD-10-CM ICD-9-CM   1. Difficulty walking R26.2 719.7     Patient Active Problem List   Diagnosis   • Diabetes mellitus (CMS/HCC)   • Edema   • Hyperlipidemia   • Hypertension   • Rotator cuff tear arthropathy   • Venous stasis dermatitis   • Varicose veins   • Acute on chronic systolic CHF (congestive heart failure) (CMS/HCC)   • Acute pyelonephritis   • Acute respiratory failure (CMS/HCC)   • BRODY (acute kidney injury) (CMS/HCC)   • ATN (acute tubular necrosis) (CMS/HCC)   • Benign essential HTN   • COPD (chronic obstructive pulmonary disease) (CMS/HCC)   • Goiter   • Hx of lithotripsy   • Low vitamin D level   • Lymphedema   • Neuropathy   • Omental infarction (CMS/HCC)   • KAELA and COPD overlap syndrome (CMS/HCC)   • Pulmonary HTN (CMS/HCC)   • Recurrent nephrolithiasis   • Small bowel perforation (CMS/HCC)   • Acute deep vein thrombosis (DVT) of popliteal vein of left lower extremity (CMS/HCC)   • Microcytic anemia   • Thrombocytosis (CMS/HCC)   • Iron deficiency anemia   • Encounter for screening for malignant neoplasm of colon   • Anxiety and depression   • Osteoarthritis of right knee       Therapy Treatment    Rehabilitation Treatment Summary     Row Name 19 1635             Treatment Time/Intention    Discipline  physical therapy assistant  -      Document Type  therapy note (daily note)  -      Subjective Information  complains of;fatigue;pain  -      Mode of Treatment  physical therapy  -      Patient/Family Observations  pt sitting up in chair in room   -      Care Plan Review  patient/other agree to care plan  -      Therapy Frequency (PT Clinical Impression)  2  times/day  -EH      Patient Effort  adequate  -      Existing Precautions/Restrictions  fall  -EH      Recorded by [] Vaishali Gerber, PTA 04/09/19 1637      Row Name 04/09/19 1635             Cognitive Assessment/Intervention- PT/OT    Orientation Status (Cognition)  oriented x 3  -      Follows Commands (Cognition)  follows one step commands;75-90% accuracy;delayed response/completion;repetition of directions required;physical/tactile prompts required;verbal cues/prompting required  -      Personal Safety Interventions  fall prevention program maintained;gait belt;nonskid shoes/slippers when out of bed  -EH      Recorded by [] Vaishali Gerber PTA 04/09/19 1637      Row Name 04/09/19 1635             Therapeutic Exercise    Comment (Therapeutic Exercise)  Right LE: quad sets, heel slides, hip abd/add; bilateral AP X10 to 15 reps pt in and out: AAROM  -      Recorded by [] Vaishali Gerber PTA 04/09/19 1637      Row Name 04/09/19 1635             Positioning and Restraints    Pre-Treatment Position  sitting in chair/recliner  -      Post Treatment Position  chair  -EH      In Chair  reclined;call light within reach;encouraged to call for assist;with family/caregiver  -EH      Recorded by [] Vaishali Gerber PTA 04/09/19 1637      Row Name                Wound 04/08/19 0742 Right knee incision    Wound - Properties Group Date first assessed: 04/08/19 [EG] Time first assessed: 0742 [EG] Side: Right [EG] Location: knee [EG] Type: incision [EG] Recorded by:  [EG] Ritu Hernandez RN 04/08/19 0742      User Key  (r) = Recorded By, (t) = Taken By, (c) = Cosigned By    Initials Name Effective Dates Discipline     Vaishali Gerber PTA 08/19/18 -  PT    EG Ritu Hernandez RN 07/10/17 -  Nurse          Wound 04/08/19 0742 Right knee incision (Active)   Dressing Appearance dry;intact 4/9/2019  4:27 PM   Closure JENELLE 4/9/2019  4:27 PM   Base dressing in place, unable to visualize 4/9/2019  4:27 PM   Drainage  Amount none 4/9/2019  4:27 PM   Dressing Care, Wound elastic bandage;multi-layer wrap 4/9/2019  8:06 AM           Physical Therapy Education     Title: PT OT SLP Therapies (Done)     Topic: Physical Therapy (Done)     Point: Mobility training (Done)     Learning Progress Summary           Patient Acceptance, E,D,TB, VU,NR by  at 4/9/2019  4:34 PM    Acceptance, E,D, VU,NR by MS at 4/8/2019  1:30 PM                   Point: Home exercise program (Done)     Learning Progress Summary           Patient Acceptance, E,D,TB, VU,NR by  at 4/9/2019  4:34 PM    Acceptance, E,D, VU,NR by MS at 4/8/2019  1:30 PM                   Point: Body mechanics (Done)     Learning Progress Summary           Patient Acceptance, E,D,TB, VU,NR by  at 4/9/2019  4:34 PM    Acceptance, E,D, VU,NR by MS at 4/8/2019  1:30 PM                   Point: Precautions (Done)     Learning Progress Summary           Patient Acceptance, E,D,TB, VU,NR by  at 4/9/2019  4:34 PM    Acceptance, E,D, VU,NR by MS at 4/8/2019  1:30 PM                               User Key     Initials Effective Dates Name Provider Type Discipline    MS 04/03/18 -  Dillon Menjivar, PT Physical Therapist PT     08/19/18 -  Vaishali Gerber PTA Physical Therapy Assistant PT                PT Recommendation and Plan  Therapy Frequency (PT Clinical Impression): 2 times/day  Plan of Care Reviewed With: patient  Progress: improving  Outcome Summary: Pt very drowsy and in and out of sleep this afternoon working with PT.  Pt able to perform some TKR protocol exercises with assistance needed.   Outcome Measures     Row Name 04/09/19 1600 04/08/19 1300          How much help from another person do you currently need...    Turning from your back to your side while in flat bed without using bedrails?  3  -EH  3  -MS     Moving from lying on back to sitting on the side of a flat bed without bedrails?  3  -EH  3  -MS     Moving to and from a bed to a chair (including a wheelchair)?   3  -EH  3  -MS     Standing up from a chair using your arms (e.g., wheelchair, bedside chair)?  3  -EH  3  -MS     Climbing 3-5 steps with a railing?  2  -EH  2  -MS     To walk in hospital room?  3  -EH  3  -MS     AM-PAC 6 Clicks Score  17  -  17  -MS        Functional Assessment    Outcome Measure Options  --  AM-PAC 6 Clicks Basic Mobility (PT)  -MS       User Key  (r) = Recorded By, (t) = Taken By, (c) = Cosigned By    Initials Name Provider Type    MS Dillon Menjivar MIRYAM, PT Physical Therapist     Vaishali Gerber PTA Physical Therapy Assistant         Time Calculation:   PT Charges     Row Name 04/09/19 1633             Time Calculation    Start Time  1623  -      Stop Time  1632  -      Time Calculation (min)  9 min  -      PT Received On  04/09/19  -      PT - Next Appointment  04/10/19  -         Time Calculation- PT    Total Timed Code Minutes- PT  9 minute(s)  -        User Key  (r) = Recorded By, (t) = Taken By, (c) = Cosigned By    Initials Name Provider Type     Vaishali Gerber PTA Physical Therapy Assistant        Therapy Charges for Today     Code Description Service Date Service Provider Modifiers Qty    [id number] HC PT THER PROC EA 15 MIN 4/9/2019 Vaishali Gerber PTA GP 1          PT G-Codes  Outcome Measure Options: AM-PAC 6 Clicks Basic Mobility (PT)  AM-PAC 6 Clicks Score: 17    Vaishali Gerber PTA  4/9/2019

## 2019-04-09 NOTE — NURSING NOTE
Spoke with Dr Luong and explained patient falls asleep and desats unless constantly stimulated. Sats falling as low as 82% on 5L high flow nasal cannula when sleeping. 92%-94% when awake. Dr Luong agrees with Narcan administration and D/kamlesh narcotics.  As this nurse was preparing to administer Narcan Dr Escobar was at the bedside who also agreed with Narcan administration as well as BiPap which had already been called for just waiting for a response from Dr Hardy (office paged at [phone]), after Narcan administration pt more awake and sats better but still low 90's high 80's at times. Repaged Dr Hardy overhead at 1013 am. Awaiting call from pulmonary

## 2019-04-09 NOTE — PROGRESS NOTES
Dr. MARIANA Collins    [facility] INTENSIVE CARE    4/9/2019    Patient ID:  Name:  Mariangel Nguyen  MRN:  [id number]  1950  68 y.o.  female            CC/Reason for visit: Acute on chronic respiratory failure    HPI: Called due to rapid response.  Patient on [facility] after right total knee arthroplasty.  Receiving narcotics for pain.  She became extremely somnolent, confused.  Sudden in onset.  Blood gas was drawn and her CO2 had been increasing above her usual hypercapnia.  She does have some chronic respiratory failure with retention but after applying bilevel noninvasive ventilation the patient's ABG showed worsening hypercapnia with a PCO2 of 86.  At this point we decided to transfer her down to the intensive care unit where she was going to receive further care.  On interview the patient is somnolent and confused.    I reviewed the chart and reviewed the notes dictated by my colleague earlier today, Dr. Dread Marino.  I reviewed operative note and progress notes of other physicians dictated today.    I discussed the case at the bedside with the family and nurse in the ICU.    ROS: Unobtainable    Vitals:  Vitals:    04/09/19 1144 04/09/19 1535 04/09/19 1627 04/09/19 1844   BP:  126/80  132/74   BP Location:  Right arm  Right arm   Patient Position:  Sitting  Lying   Pulse:  59  60   Resp:  18  20   Temp:  100 °F (37.8 °C)  97.5 °F (36.4 °C)   TempSrc:  Oral  Oral   SpO2: 94% 95% 94% 98%   Weight:       Height:         FiO2 (%): 60 %     Body mass index is 44.74 kg/m².    Intake/Output Summary (Last 24 hours) at 4/9/2019 1922  Last data filed at 4/9/2019 1736  Gross per 24 hour   Intake 360 ml   Output 550 ml   Net -190 ml       Exam:  GEN:  Somnolence.  Barely arousable.  Obese female.  Very confused, not consistently following commands  EYES:    anicteric sclera bilat  ENT:    External ears/nose normal, OP clear  NECK:  Supple, midline trachea  LUNGS: Very shallow breathing and slow  breathing.  Poor air entry bilaterally.  No wheezing  CV:  Normal S1S2, without murmur, 1+ ankle edema bilaterally  ABD:  Obese abdomen, nontender  EXT:  No cyanosis or clubbing    Scheduled meds:    apixaban 2.5 mg Oral Q12H   bisoprolol 5 mg Oral Daily   bumetanide 2 mg Oral Daily   ceftriaxone 2 g Intravenous Q24H   escitalopram 15 mg Oral Daily   famotidine 40 mg Oral Daily   insulin lispro 0-9 Units Subcutaneous 4x Daily With Meals & Nightly   levothyroxine 25 mcg Oral Daily   metFORMIN 1,000 mg Oral BID With Meals   pantoprazole 40 mg Oral Daily   pravastatin 40 mg Oral Nightly   sennosides-docusate sodium 2 tablet Oral BID   sildenafil 20 mg Oral TID   sodium chloride 3 mL Intravenous Q12H   vancomycin 1,250 mg Intravenous Q12H     IV meds:                        Pharmacy to dose vancomycin        Data Review:   I reviewed the patient's medications and new clinical results.  Lab Results   Component Value Date    CALCIUM 8.8 04/09/2019     Results from last 7 days   Lab Units 04/09/19  0458 04/08/19  1047   SODIUM mmol/L 139  --    POTASSIUM mmol/L 4.3  --    CHLORIDE mmol/L 95*  --    CO2 mmol/L 24.8  --    BUN mg/dL 10  --    CREATININE mg/dL 0.63  --    CALCIUM mg/dL 8.8  --    GLUCOSE mg/dL 167*  --    HEMOGLOBIN g/dL 12.3 11.6*                   Results from last 7 days   Lab Units 04/09/19  1750 04/09/19  1031   PH, ARTERIAL pH units  --  7.373   PCO2, ARTERIAL mm Hg  --  57.8*   PO2 ART mm Hg  --  112.0*   FLOW RATE lpm 5  --    MODALITY  Cannula BiPap   O2 SATURATION CALC % 25.6* 98.1         ASSESSMENT:   Acute on chronic respiratory failure exacerbated by narcotics after surgery  Obesity hypoventilation syndrome  Pulmonary hypertension   Osteoarthritis of right knee  Obstructive sleep apnea, noncompliant with positive airway pressure therapy  Chronic diastolic CHF  Obesity complicating all aspects of medical care  Status post right total knee arthroplasty day 0    PLAN:  Patient will be transferred  to the intensive care unit.  We will continue bilevel noninvasive positive pressure ventilation with BiPAP machine.  Stop administration of narcotics.  Give IV Narcan if patient's mental status is not improved.  If the patient's status worsens, we will proceed with intubation and mechanical ventilation.  Keep oxygen saturations between 88 and 92%, avoid hyperoxia which will lead to worsening hypercapnia.    Total critical care time 35 minutes, excluding any separately billable procedure time        Alexandre Collins MD  4/9/2019

## 2019-04-09 NOTE — PROGRESS NOTES
"    DAILY PROGRESS NOTE  [facility]    Patient Identification:  Name: Mariangel Nguyen  Age: 68 y.o.  Sex: female  :  1950  MRN: [id number]         Primary Care Physician: Waqar Burton MD    Subjective:  Interval History: Oversedated this morning.  Discussed case with nurse at bedside.  Oxygen saturations were decreased and patient would had to be stimulated and then O2 levels with subsequently rise.  Narcan was getting ready to be dosed and respiratory was also at bedside.    Objective: Patient quite anxious this morning and asking appropriate questions at times such as \"Am I going to die\" -I reassured patient     Scheduled Meds:  apixaban 2.5 mg Oral Q12H   bisoprolol 5 mg Oral Daily   bumetanide 2 mg Oral Daily   escitalopram 15 mg Oral Daily   famotidine 40 mg Oral Daily   insulin lispro 0-9 Units Subcutaneous 4x Daily With Meals & Nightly   levothyroxine 25 mcg Oral Daily   metFORMIN 1,000 mg Oral BID With Meals   pantoprazole 40 mg Oral Daily   pravastatin 40 mg Oral Nightly   sennosides-docusate sodium 2 tablet Oral BID   sildenafil 20 mg Oral TID   sodium chloride 3 mL Intravenous Q12H   vancomycin 1,250 mg Intravenous Q12H   vancomycin 20 mg/kg Intravenous Once     Continuous Infusions:  Pharmacy to dose vancomycin        Vital signs in last 24 hours:  Temp:  [96.8 °F (36 °C)-98.6 °F (37 °C)] 97.7 °F (36.5 °C)  Heart Rate:  [64-83] 66  Resp:  [16-22] 20  BP: (138-154)/(68-82) 146/78    Intake/Output:    Intake/Output Summary (Last 24 hours) at 2019 1015  Last data filed at 2019  Gross per 24 hour   Intake 600 ml   Output 1050 ml   Net -450 ml       Exam:  /78 (BP Location: Right arm, Patient Position: Lying)   Pulse 66   Temp 97.7 °F (36.5 °C) (Oral)   Resp 20   Ht 157.5 cm (62\")   Wt 111 kg (244 lb 9.6 oz)   SpO2 90%   BMI 44.74 kg/m²     General Appearance:    Alert, cooperative, anxious, conversational though a bit more confused today          "                 Head:    Normocephalic, without obvious abnormality, atraumatic                        Throat:   Lips, tongue, gums normal; oral mucosa pink and moist                           Neck:   Supple, no JVD                         Lungs:    Decreased bases r to auscultation bilaterally, respirations unlabored                 Chest Wall:    No tenderness or deformity                          Heart:    Regular rate and rhythm, S1 and S2 normal                  Abdomen:     Soft, non-tender, bowel sounds active                 Extremities: Right lower extremity in Ace, no cyanosis or edema                        Pulses:   Pulses palpable in lower extremities                  Neurologic:   CNII-XII intact, grossly moving all     Data Review:  Labs in chart were reviewed.    Assessment:  Active Hospital Problems    Diagnosis  POA   • Osteoarthritis of right knee [M17.11]  Yes      Resolved Hospital Problems   No resolved problems to display.       Plan:    Postoperative oversedation/toxic encephalopathy/Acute on Chronic Hypoxic Respiratory failure secondary to pre-existing lung disease compounded by need for pain medication   -Check ABG and notify pulmonology as may need to consider BiPAP   -DC narcotics especially IV Dilaudid -we will give low-dose tramadol and increase Tylenol as needed for discomfort and pain      HTN/Pulm HTN - resumed Bumex with parameters              -Pulm w/ [facility] - LPA also consulted while inpatient     Noncompliance w/ KAELA/CPAP/Chronic Hypoxic Respiratory failure   -Is willing to try BiPAP at this time     PHX DVT -agree w/ Eliquis for DVT ppx      Chronic Diastolic CHF      DM2/Morbid obesity - OK w/ PO - add ssi while postop and monitor      POD1 - R-TKA - no post op acute blood loss     Pepcid for GI prophylaxis        Favio Escobar MD  4/9/2019  10:15 AM

## 2019-04-09 NOTE — CONSULTS
"Referring Provider: Farzad Luong MD  [address] [facility] [address]  [place] [address]    Reason for Consultation: assess need for prolonged antibiotic due to instruments not being properly sterilize by hospital.  s/p TKA    History of present illness:  Mrs Nguyen is a 68 YOF who I am asked to evaluate and give opinion for assess need for prolonged antibiotic due to instruments not being properly sterilize by hospital s/p TKA. History is obtained from the patient, family, and review of the old medical records which I summarize/synthesize as follows: She was admitted on 4/8/19 and underwent R TKA due to failed conservative management. There were no complications mentioned in the operative note. I am asked to see the patient re: an instrument staerility issue. Per my d/w infection control who has discussed the matter in depth with the sterilization department, the sequence of events is that the autoclave proceeded normally through the first two stages of sterilization. Then during the 3rd (drying phase), the machine \"alarmed\" about 6 minutes early as if the process had been completed which was not the case. However, it had completed all but 6 minutes out of a 45+ minute drying procedure. Additionally, there were two indicators that showed the sterilization process had been completed. There were no implantable pieces of material (screws, rods, etc) in this pan that was used nor was the implant itself involved.     The patient has normal, expected post-operative pain but did require Narcan and CPA due to somnolence related to narcotics superimposed upon baseline KAELA.     She denies a history of Hep B, Hep C or HIV.    Past Medical History:   Diagnosis Date   • Arthritis     OSTEOARTHRITIS KNEES   • CHF (congestive heart failure) (CMS/HCC)    • Colon polyp    • COPD (chronic obstructive pulmonary disease) (CMS/HCC)    • Diabetes mellitus (CMS/HCC)    • Disease of thyroid gland    • H/O Abnormal Pap smear of cervix  "   • History of DVT (deep vein thrombosis)    • History of kidney stones    • History of pulmonary embolus (PE)    • History of sepsis    • History of small bowel obstruction    • History of snoring    • Hyperlipidemia    • Hypertension    • Knee pain, bilateral    • Neuropathy    • On home oxygen therapy     2L NC   • Pulmonary hypertension (CMS/HCC)    • Sleep apnea        Past Surgical History:   Procedure Laterality Date   • ABDOMINAL SURGERY  11/2017    Delayed closure of abdominal wound-wound vac placement, Dr. Nestor Howe   • BLADDER SURGERY      BLADDER LIFT, 2011   • CARDIAC CATHETERIZATION  2017    Normal coronary arteries, normal overall LVSF with wall motion abnormality   • COLON SURGERY      RECONSTRUCTION, 2001   • COLONOSCOPY N/A 3/4/2019    Procedure: COLONOSCOPY polypectomy;  Surgeon: Nai Luong MD;  Location: [facility] OR;  Service: Gastroenterology   • CYSTOSCOPY URETEROSCOPY LASER LITHOTRIPSY Right 10/23/2017    Procedure: CYSTO RT URETEROSCOPY LASER  STONE BASKETING RIGHT STENT EXCHANGE;  Surgeon: Quinn Cason MD;  Location: [facility] OR;  Service:    • ENDOSCOPY N/A 3/4/2019    Procedure: ESOPHAGOGASTRODUODENOSCOPY WITH BIOPSIES;  Surgeon: Nai Luong MD;  Location: [facility] OR;  Service: Gastroenterology   • EXPLORATORY LAPAROTOMY     • HYSTERECTOMY      2001   • ROTATOR CUFF REPAIR Right 2016   • STEROID INJECTION KNEE      Had left knee injection 10/09/2018; right knee injection 10/02/2017       Social History:  Retired  Lives in [place], [place]    Family History:  Daughter is living and heatlhy    Allergies:  No Antibiotic Allergies    Medications:    Current Facility-Administered Medications:   •  acetaminophen (TYLENOL) tablet 650 mg, 650 mg, Oral, Q4H PRN, Favio Escobar MD  •  albuterol (PROVENTIL) nebulizer solution 0.083% 2.5 mg/3mL, 2.5 mg, Nebulization, Q4H PRN, Farzad Luong MD, 2.5 mg at 04/09/19 0917  •  apixaban (ELIQUIS) tablet 2.5 mg, 2.5 mg, Oral,  Q12H, Farzad Luong MD, 2.5 mg at 04/09/19 0806  •  bisacodyl (DULCOLAX) suppository 10 mg, 10 mg, Rectal, Daily PRN, Farzad Luong MD  •  bisoprolol (ZEBeta) tablet 5 mg, 5 mg, Oral, Daily, Favio Escobar MD, 5 mg at 04/09/19 0806  •  bumetanide (BUMEX) tablet 2 mg, 2 mg, Oral, Daily, Dread Hardy MD, 2 mg at 04/09/19 0806  •  dextrose (D50W) 25 g/ 50mL Intravenous Solution 25 g, 25 g, Intravenous, Q15 Min PRN, Farzad Luong MD  •  dextrose (GLUTOSE) oral gel 15 g, 15 g, Oral, Q15 Min PRN, Farzad Luong MD  •  escitalopram (LEXAPRO) tablet 15 mg, 15 mg, Oral, Daily, Farzad Luong MD, 15 mg at 04/09/19 0806  •  famotidine (PEPCID) tablet 40 mg, 40 mg, Oral, Daily, Farzad Luong MD  •  glucagon (human recombinant) (GLUCAGEN DIAGNOSTIC) injection 1 mg, 1 mg, Subcutaneous, PRN, Farzad Luong MD  •  insulin lispro (humaLOG) injection 0-9 Units, 0-9 Units, Subcutaneous, 4x Daily With Meals & Nightly, Farzad Luong MD, 2 Units at 04/09/19 0806  •  levothyroxine (SYNTHROID, LEVOTHROID) tablet 25 mcg, 25 mcg, Oral, Daily, Farzad Luong MD, 25 mcg at 04/09/19 0806  •  magnesium hydroxide (MILK OF MAGNESIA) suspension 2400 mg/10mL 10 mL, 10 mL, Oral, Daily PRN, Farzad Luong MD  •  metFORMIN (GLUCOPHAGE) tablet 1,000 mg, 1,000 mg, Oral, BID With Meals, Farzad Luong MD, 1,000 mg at 04/09/19 0806  •  ondansetron (ZOFRAN) tablet 4 mg, 4 mg, Oral, Q6H PRN **OR** ondansetron ODT (ZOFRAN-ODT) disintegrating tablet 4 mg, 4 mg, Oral, Q6H PRN **OR** ondansetron (ZOFRAN) injection 4 mg, 4 mg, Intravenous, Q6H PRN, Farzad Luong MD  •  pantoprazole (PROTONIX) EC tablet 40 mg, 40 mg, Oral, Daily, Farzad Luong MD, 40 mg at 04/09/19 0808  •  Pharmacy to dose vancomycin, , Does not apply, Continuous PRN, Farzad Luong MD  •  pravastatin (PRAVACHOL) tablet 40 mg, 40 mg, Oral, Nightly, Farzad Luong MD  •  sennosides-docusate sodium (SENOKOT-S) 8.6-50 MG tablet 2 tablet, 2 tablet, Oral, BID, Farzad Luong MD, 2  tablet at 04/09/19 0806  •  sildenafil (REVATIO) tablet 20 mg, 20 mg, Oral, TID, Farzad Luong MD, 20 mg at 04/09/19 0806  •  sodium chloride 0.9 % flush 1-10 mL, 1-10 mL, Intravenous, PRN, Farzad Luong MD  •  sodium chloride 0.9 % flush 3 mL, 3 mL, Intravenous, Q12H, Farzad Luong MD, 3 mL at 04/09/19 0808  •  traMADol (ULTRAM) tablet 50 mg, 50 mg, Oral, Q6H PRN, Favio Escobar MD  •  vancomycin 1250 mg/250 mL 0.9% NS IVPB ([facility]), 1,250 mg, Intravenous, Q12H, Farzad Luong MD  •  vancomycin 2250 mg/500 mL 0.9% NS IVPB ([facility]), 20 mg/kg, Intravenous, Once, Farzad Luong MD, 2,250 mg at 04/09/19 0949    Review of Systems  All systems were reviewed and are negative unless otherwise stated above in the HPI    Objective   Vital Signs   Temp:  [97.4 °F (36.3 °C)-98.6 °F (37 °C)] 97.7 °F (36.5 °C)  Heart Rate:  [64-83] 66  Resp:  [16-22] 20  BP: (138-152)/(68-82) 146/78  FiO2 (%):  [60 %] 60 %    Physical Exam:   General: on CPAP, sleepy, NAD  Head: Normocephalic  Eyes: no scleral icterus, no conjunctival pallor, no conjunctival hemorrhages.   ENT: CPAP mask present  Neck: Supple  Cardiovascular: NR, RR, no murmurs, rubs, or gallops; no LE edema  Respiratory: mild tachypnea, no wheezing  GI: Abdomen is soft, non-tender, non-distended, normal bowel sounds in all four quadrants  : no Elizabeth catheter present  Musculoskeletal: R knee bandaged  Skin: No rashes, lesions, or embolic phenomenon  Neurological: Alert and oriented x 3, motor strength 5/5 in all four extremities  Psychiatric: calm and pleasant  Vasc: no cyanosis    Labs:     Lab Results   Component Value Date    WBC 10.19 03/27/2019    HGB 12.3 04/09/2019    HCT 42.7 04/09/2019    MCV 79.9 03/27/2019     03/27/2019       Lab Results   Component Value Date    GLUCOSE 167 (H) 04/09/2019    BUN 10 04/09/2019    CREATININE 0.63 04/09/2019    EGFRIFNONA 95 04/09/2019    EGFRIFAFRI 79 01/31/2018    BCR 15.9 04/09/2019    CO2 24.8 04/09/2019    CALCIUM 8.8  04/09/2019    PROTENTOTREF 7.1 01/31/2018    ALBUMIN 4.20 02/06/2019    LABIL2 1.4 01/31/2018    AST 14 02/06/2019    ALT 11 02/06/2019       Microbiology:  3/28 UCx: >100k Proteus mirabilis (R - nitrofurantoin and doxycyline)    Radiology (personally reviewed report):  XR knee with knee prosthesis in place    Assessment/Plan   1. Possible exposure to infection due to autoclave malfunction  2. Asymptomatic bacteriuria  3. S/P R knee arthroplasty    Based on the fact that the sterilization process was completed and all but 6 minutes of the drying phase was completed, I think it is very unlikely that the patient was exposed to any pathogens. I am going to check a baseline Hep B, Hep C, and HIV test. I will also test again in my clinic in about 6 weeks. Out of an abundance of caution, I am also going to recommend 3 days of vancomycin dosed for goal trough 15-20 and ceftriaxone 2 g IV q24h.     No antibiotics needed for the positive urine culture. She has no urinary symptoms.    Thank you for this consult. ID will follow. Discussed with Dr Luong.

## 2019-04-10 ENCOUNTER — APPOINTMENT (OUTPATIENT)
Dept: GENERAL RADIOLOGY | Facility: HOSPITAL | Age: 69
End: 2019-04-10

## 2019-04-10 LAB
ANION GAP SERPL CALCULATED.3IONS-SCNC: 19.6 MMOL/L
BUN BLD-MCNC: 15 MG/DL (ref 8–23)
BUN/CREAT SERPL: 18.1 (ref 7–25)
CALCIUM SPEC-SCNC: 9.5 MG/DL (ref 8.6–10.5)
CHLORIDE SERPL-SCNC: 96 MMOL/L (ref 98–107)
CO2 SERPL-SCNC: 23.4 MMOL/L (ref 22–29)
CREAT BLD-MCNC: 0.71 MG/DL (ref 0.57–1)
CREAT BLD-MCNC: 0.83 MG/DL (ref 0.57–1)
GFR SERPL CREATININE-BSD FRML MDRD: 69 ML/MIN/1.73
GFR SERPL CREATININE-BSD FRML MDRD: 82 ML/MIN/1.73
GLUCOSE BLD-MCNC: 153 MG/DL (ref 65–99)
GLUCOSE BLDC GLUCOMTR-MCNC: 116 MG/DL (ref 70–130)
GLUCOSE BLDC GLUCOMTR-MCNC: 158 MG/DL (ref 70–130)
GLUCOSE BLDC GLUCOMTR-MCNC: 167 MG/DL (ref 70–130)
HBV SURFACE AB SER RIA-ACNC: NORMAL
HBV SURFACE AG SERPL QL IA: NORMAL
HCT VFR BLD AUTO: 40 % (ref 34–46.6)
HCV AB SER DONR QL: NORMAL
HGB BLD-MCNC: 11.5 G/DL (ref 12–15.9)
HIV1+2 AB SER QL: NORMAL
POTASSIUM BLD-SCNC: 4.8 MMOL/L (ref 3.5–5.2)
SODIUM BLD-SCNC: 139 MMOL/L (ref 136–145)

## 2019-04-10 PROCEDURE — 63710000001 INSULIN LISPRO (HUMAN) PER 5 UNITS: Performed by: ORTHOPAEDIC SURGERY

## 2019-04-10 PROCEDURE — 85018 HEMOGLOBIN: CPT | Performed by: ORTHOPAEDIC SURGERY

## 2019-04-10 PROCEDURE — 82565 ASSAY OF CREATININE: CPT | Performed by: INTERNAL MEDICINE

## 2019-04-10 PROCEDURE — 94799 UNLISTED PULMONARY SVC/PX: CPT

## 2019-04-10 PROCEDURE — 99232 SBSQ HOSP IP/OBS MODERATE 35: CPT | Performed by: INTERNAL MEDICINE

## 2019-04-10 PROCEDURE — 80048 BASIC METABOLIC PNL TOTAL CA: CPT | Performed by: INTERNAL MEDICINE

## 2019-04-10 PROCEDURE — 94660 CPAP INITIATION&MGMT: CPT

## 2019-04-10 PROCEDURE — 71045 X-RAY EXAM CHEST 1 VIEW: CPT

## 2019-04-10 PROCEDURE — 25010000002 VANCOMYCIN 10 G RECONSTITUTED SOLUTION: Performed by: ORTHOPAEDIC SURGERY

## 2019-04-10 PROCEDURE — 97110 THERAPEUTIC EXERCISES: CPT

## 2019-04-10 PROCEDURE — 25010000003 CEFTRIAXONE PER 250 MG: Performed by: INTERNAL MEDICINE

## 2019-04-10 PROCEDURE — 85014 HEMATOCRIT: CPT | Performed by: ORTHOPAEDIC SURGERY

## 2019-04-10 PROCEDURE — 82962 GLUCOSE BLOOD TEST: CPT

## 2019-04-10 RX ORDER — NAPROXEN 500 MG/1
500 TABLET ORAL 2 TIMES DAILY WITH MEALS
Status: DISCONTINUED | OUTPATIENT
Start: 2019-04-10 | End: 2019-04-15 | Stop reason: HOSPADM

## 2019-04-10 RX ORDER — ACETAMINOPHEN 325 MG/1
650 TABLET ORAL EVERY 4 HOURS PRN
Status: DISCONTINUED | OUTPATIENT
Start: 2019-04-10 | End: 2019-04-15 | Stop reason: HOSPADM

## 2019-04-10 RX ORDER — BUMETANIDE 0.25 MG/ML
2 INJECTION INTRAMUSCULAR; INTRAVENOUS ONCE
Status: COMPLETED | OUTPATIENT
Start: 2019-04-10 | End: 2019-04-10

## 2019-04-10 RX ADMIN — SILDENAFIL 20 MG: 20 TABLET, FILM COATED ORAL at 08:15

## 2019-04-10 RX ADMIN — SENNOSIDES AND DOCUSATE SODIUM 2 TABLET: 8.6; 5 TABLET ORAL at 00:31

## 2019-04-10 RX ADMIN — METFORMIN HYDROCHLORIDE 1000 MG: 1000 TABLET ORAL at 08:15

## 2019-04-10 RX ADMIN — CEFTRIAXONE SODIUM 2 G: 2 INJECTION, SOLUTION INTRAVENOUS at 11:58

## 2019-04-10 RX ADMIN — APIXABAN 2.5 MG: 2.5 TABLET, FILM COATED ORAL at 22:15

## 2019-04-10 RX ADMIN — INSULIN LISPRO 2 UNITS: 100 INJECTION, SOLUTION INTRAVENOUS; SUBCUTANEOUS at 08:15

## 2019-04-10 RX ADMIN — NAPROXEN 500 MG: 500 TABLET ORAL at 16:51

## 2019-04-10 RX ADMIN — SENNOSIDES AND DOCUSATE SODIUM 2 TABLET: 8.6; 5 TABLET ORAL at 22:15

## 2019-04-10 RX ADMIN — LEVOTHYROXINE SODIUM 25 MCG: 25 TABLET ORAL at 08:15

## 2019-04-10 RX ADMIN — INSULIN LISPRO 2 UNITS: 100 INJECTION, SOLUTION INTRAVENOUS; SUBCUTANEOUS at 11:58

## 2019-04-10 RX ADMIN — SENNOSIDES AND DOCUSATE SODIUM 2 TABLET: 8.6; 5 TABLET ORAL at 08:15

## 2019-04-10 RX ADMIN — NAPROXEN 500 MG: 500 TABLET ORAL at 08:15

## 2019-04-10 RX ADMIN — SODIUM CHLORIDE, PRESERVATIVE FREE 3 ML: 5 INJECTION INTRAVENOUS at 20:45

## 2019-04-10 RX ADMIN — APIXABAN 2.5 MG: 2.5 TABLET, FILM COATED ORAL at 08:15

## 2019-04-10 RX ADMIN — PRAVASTATIN SODIUM 40 MG: 20 TABLET ORAL at 00:31

## 2019-04-10 RX ADMIN — ACETAMINOPHEN 650 MG: 325 TABLET, FILM COATED ORAL at 16:50

## 2019-04-10 RX ADMIN — BISOPROLOL FUMARATE 5 MG: 5 TABLET ORAL at 08:15

## 2019-04-10 RX ADMIN — ACETAMINOPHEN 650 MG: 325 TABLET, FILM COATED ORAL at 08:15

## 2019-04-10 RX ADMIN — BUMETANIDE 2 MG: 0.25 INJECTION INTRAMUSCULAR; INTRAVENOUS at 13:12

## 2019-04-10 RX ADMIN — METFORMIN HYDROCHLORIDE 1000 MG: 1000 TABLET ORAL at 16:51

## 2019-04-10 RX ADMIN — SILDENAFIL 20 MG: 20 TABLET, FILM COATED ORAL at 22:15

## 2019-04-10 RX ADMIN — BUMETANIDE 2 MG: 2 TABLET ORAL at 08:15

## 2019-04-10 RX ADMIN — SILDENAFIL 20 MG: 20 TABLET, FILM COATED ORAL at 16:50

## 2019-04-10 RX ADMIN — VANCOMYCIN HYDROCHLORIDE 1250 MG: 10 INJECTION, POWDER, LYOPHILIZED, FOR SOLUTION INTRAVENOUS at 00:31

## 2019-04-10 RX ADMIN — PANTOPRAZOLE SODIUM 40 MG: 40 TABLET, DELAYED RELEASE ORAL at 08:15

## 2019-04-10 RX ADMIN — APIXABAN 2.5 MG: 2.5 TABLET, FILM COATED ORAL at 00:31

## 2019-04-10 RX ADMIN — SODIUM CHLORIDE, PRESERVATIVE FREE 3 ML: 5 INJECTION INTRAVENOUS at 08:16

## 2019-04-10 RX ADMIN — SILDENAFIL 20 MG: 20 TABLET, FILM COATED ORAL at 00:31

## 2019-04-10 RX ADMIN — PRAVASTATIN SODIUM 40 MG: 20 TABLET ORAL at 22:15

## 2019-04-10 RX ADMIN — ESCITALOPRAM OXALATE 15 MG: 5 TABLET, FILM COATED ORAL at 08:15

## 2019-04-10 NOTE — THERAPY TREATMENT NOTE
Acute Care - Physical Therapy Treatment Note  [facility]     Patient Name: Mariangel Nguyen  : 1950  MRN: [id number]  Today's Date: 4/10/2019  Onset of Illness/Injury or Date of Surgery: 19  Date of Referral to PT: 19  Referring Physician: Farzad Drew    Admit Date: 2019    Visit Dx:    ICD-10-CM ICD-9-CM   1. Difficulty walking R26.2 719.7     Patient Active Problem List   Diagnosis   • Diabetes mellitus (CMS/HCC)   • Edema   • Hyperlipidemia   • Hypertension   • Rotator cuff tear arthropathy   • Venous stasis dermatitis   • Varicose veins   • Acute on chronic systolic CHF (congestive heart failure) (CMS/HCC)   • Acute pyelonephritis   • Acute respiratory failure (CMS/HCC)   • BRODY (acute kidney injury) (CMS/HCC)   • ATN (acute tubular necrosis) (CMS/HCC)   • Benign essential HTN   • COPD (chronic obstructive pulmonary disease) (CMS/HCC)   • Goiter   • Hx of lithotripsy   • Low vitamin D level   • Lymphedema   • Neuropathy   • Omental infarction (CMS/HCC)   • KAELA and COPD overlap syndrome (CMS/HCC)   • Pulmonary HTN (CMS/HCC)   • Recurrent nephrolithiasis   • Small bowel perforation (CMS/HCC)   • Acute deep vein thrombosis (DVT) of popliteal vein of left lower extremity (CMS/HCC)   • Microcytic anemia   • Thrombocytosis (CMS/HCC)   • Iron deficiency anemia   • Encounter for screening for malignant neoplasm of colon   • Anxiety and depression   • Osteoarthritis of right knee       Therapy Treatment    Rehabilitation Treatment Summary     Row Name 04/10/19 1437 04/10/19 1140          Treatment Time/Intention    Discipline  physical therapist  -CH  physical therapist  -CH     Document Type  therapy note (daily note)  -CH  therapy note (daily note)  -CH     Subjective Information  no complaints  -CH  no complaints  -CH     Mode of Treatment  physical therapy  -CH  physical therapy  -CH     Patient/Family Observations  pt supine in bed, no acute distress noted at rest  -CH  pt supine in  bed, on bipap (RN removed at beginning of treatment), no acute  distress noted at rest  -     Patient Effort  --  good  -CH     Comment  TKA protocol performed this PM as exercises were deferred this AM secondary to bathroom needs  -  --     Existing Precautions/Restrictions  --  fall;weight bearing WBAT on R LE  -CH     Recorded by [] Анна Pruitt, PT 04/10/19 1530 [] Анна Pruitt, PT 04/10/19 1333     Row Name 04/10/19 1437 04/10/19 1140          Cognitive Assessment/Intervention    Additional Documentation  Cognitive Assessment/Intervention (Group)  -CH  Cognitive Assessment/Intervention (Group)  -CH     Recorded by [] Анна Pruitt, PT 04/10/19 1530 [] Анна Pruitt, PT 04/10/19 1333     Row Name 04/10/19 1437 04/10/19 1140          Cognitive Assessment/Intervention- PT/OT    Orientation Status (Cognition)  oriented x 3  -CH  oriented x 3  -CH     Follows Commands (Cognition)  WFL  -  follows one step commands;75-90% accuracy pt anxious and impulsive, had to repeat safety cues  -     Personal Safety Interventions  fall prevention program maintained  -  fall prevention program maintained;gait belt;nonskid shoes/slippers when out of bed  -CH     Recorded by [] Анна Pruitt, PT 04/10/19 1530 [] Анна Pruitt, PT 04/10/19 1333     Row Name 04/10/19 1140             Bed Mobility Assessment/Treatment    Bed Mobility Assessment/Treatment  supine-sit;sit-supine  -      Supine-Sit Climax (Bed Mobility)  verbal cues;nonverbal cues (demo/gesture);maximum assist (25% patient effort);2 person assist  -      Sit-Supine Climax (Bed Mobility)  verbal cues;nonverbal cues (demo/gesture);moderate assist (50% patient effort);2 person assist  -      Recorded by [] Анна Pruitt, PT 04/10/19 1333      Row Name 04/10/19 1140             Sit-Stand Transfer    Sit-Stand Climax (Transfers)  verbal cues;nonverbal cues (demo/gesture);moderate assist (50%  patient effort);2 person assist  -      Assistive Device (Sit-Stand Transfers)  walker, front-wheeled  -CH      Recorded by [CH] Анна Pruitt, PT 04/10/19 1333      Row Name 04/10/19 1140             Stand-Sit Transfer    Stand-Sit Seneca (Transfers)  verbal cues;nonverbal cues (demo/gesture);maximum assist (25% patient effort);2 person assist  -CH      Assistive Device (Stand-Sit Transfers)  walker, front-wheeled  -CH      Recorded by [CH] Анна Pruitt, PT 04/10/19 1333      Row Name 04/10/19 1140             Gait/Stairs Assessment/Training    Seneca Level (Gait)  verbal cues;nonverbal cues (demo/gesture);moderate assist (50% patient effort);maximum assist (25% patient effort);2 person assist  -CH      Assistive Device (Gait)  walker, front-wheeled  -CH      Distance in Feet (Gait)  3 ft attempted to get to chair, but had to return to bed  -      Deviations/Abnormal Patterns (Gait)  kam decreased;gait speed decreased;stride length decreased  -      Bilateral Gait Deviations  weight shift ability decreased;forward flexed posture  -      Comment (Gait/Stairs)  attempted to take steps to chair, pt having difficulty weight shifting and advancing feet. Pt kept pushing walker too far infront of her and letting go of walker. required repeated cues for safety, pt had to be pulled back to bed to avoid fall.  -CH      Recorded by [CH] Анна Pruitt, PT 04/10/19 1333      Row Name 04/10/19 1437 04/10/19 1140          Therapeutic Exercise    Comment (Therapeutic Exercise)  15 reps R TKA protocol  -  exercises deferred during AM session due to pt needing to be cleaned upon return to bed.  -CH     Recorded by [] Анна Pruitt, PT 04/10/19 1530 [] Анна Pruitt, PT 04/10/19 1333     Row Name 04/10/19 1437 04/10/19 1140          Positioning and Restraints    Pre-Treatment Position  in bed  -CH  in bed  -CH     Post Treatment Position  bed  -CH  bed  -CH     In Bed   supine;call light within reach;encouraged to call for assist  -CH  supine;call light within reach;encouraged to call for assist;with nsg  -CH     Recorded by [EMEKA] Анна Pruitt, PT 04/10/19 1530 [EMEKA] Анна Pruitt, PT 04/10/19 1333     Row Name 04/10/19 1437 04/10/19 1140          Pain Assessment    Additional Documentation  Pain Scale: Numbers Pre/Post-Treatment (Group)  -CH  Pain Scale: Numbers Pre/Post-Treatment (Group)  -CH     Recorded by [EMEKA] Анна Pruitt, PT 04/10/19 1530 [CH] Анна Pruitt, PT 04/10/19 1333     Row Name 04/10/19 1437 04/10/19 1140          Pain Scale: Numbers Pre/Post-Treatment    Pain Scale: Numbers, Pretreatment  0/10 - no pain  -CH  0/10 - no pain  -CH     Recorded by [EMEKA] Анна Pruitt, PT 04/10/19 1530 [CH] Анна Pruitt, PT 04/10/19 1333     Row Name                Wound 04/08/19 0742 Right knee incision    Wound - Properties Group Date first assessed: 04/08/19 [EG] Time first assessed: 0742 [EG] Side: Right [EG] Location: knee [EG] Type: incision [EG] Recorded by:  [EG] Ritu Hernandez, RN 04/08/19 0742    Row Name 04/10/19 1437 04/10/19 1140          Plan of Care Review    Plan of Care Reviewed With  patient  -CH  patient  -CH     Recorded by [EMEKA] Анна Pruitt, PT 04/10/19 1530 [CH] Анна Pruitt, PT 04/10/19 1333     Row Name 04/10/19 1437 04/10/19 1140          Outcome Summary/Treatment Plan (PT)    Anticipated Discharge Disposition (PT)  skilled nursing facility  -CH  skilled nursing facility  -CH     Recorded by [EMEKA] Анна Pruitt, PT 04/10/19 1530 [EMEKA] Анна Pruitt, PT 04/10/19 1333       User Key  (r) = Recorded By, (t) = Taken By, (c) = Cosigned By    Initials Name Effective Dates Discipline     Анна Pruitt, PT 04/03/18 -  PT    Ritu العلي, RN 07/10/17 -  Nurse          Wound 04/08/19 0742 Right knee incision (Active)   Dressing Appearance dry;intact 4/10/2019 12:00 PM   Closure JENELLE 4/10/2019  4:00 AM    Base dressing in place, unable to visualize 4/10/2019 12:00 PM   Drainage Amount none 4/10/2019  4:00 AM   Dressing Care, Wound elastic bandage 4/10/2019 12:00 AM       Rehab Goal Summary     Row Name 04/10/19 1500             Physical Therapy Goals    Bed Mobility Goal Selection (PT)  bed mobility, PT goal 1  -CH      Transfer Goal Selection (PT)  transfer, PT goal 1  -CH      Gait Training Goal Selection (PT)  gait training, PT goal 1  -CH      ROM Goal Selection (PT)  ROM, PT goal 1  -CH      Stairs Goal Selection (PT)  stairs, PT goal 1  -CH         Bed Mobility Goal 1 (PT)    Time Frame (Bed Mobility Goal 1, PT)  1 week  -CH      Progress/Outcomes (Bed Mobility Goal 1, PT)  goal ongoing  -CH         Transfer Goal 1 (PT)    Time Frame (Transfer Goal 1, PT)  1 week  -CH      Progress/Outcome (Transfer Goal 1, PT)  goal ongoing  -CH         Gait Training Goal 1 (PT)    Time Frame (Gait Training Goal 1, PT)  1 week  -CH      Progress/Outcome (Gait Training Goal 1, PT)  goal ongoing  -CH         ROM Goal 1 (PT)    Time Frame (ROM Goal 1, PT)  1 week  -CH      Progress/Outcome (ROM Goal 1, PT)  goal ongoing  -CH         Stairs Goal 1 (PT)    Time Frame (Stairs Goal 1, PT)  1 week  -CH      Progress/Outcome (Stairs Goal 1, PT)  goal ongoing  -        User Key  (r) = Recorded By, (t) = Taken By, (c) = Cosigned By    Initials Name Provider Type Discipline     Анна Pruitt PT Physical Therapist PT          Physical Therapy Education     Title: PT OT SLP Therapies (Done)     Topic: Physical Therapy (Done)     Point: Mobility training (Done)     Learning Progress Summary           Patient Acceptance, E,TB,D, VU,NR by  at 4/10/2019  1:34 PM    Acceptance, E,D,TB, VU,NR by  at 4/9/2019  4:34 PM    Acceptance, E,D, VU,NR by MS at 4/8/2019  1:30 PM                   Point: Home exercise program (Done)     Learning Progress Summary           Patient Acceptance, E,TB,D, VU,NR by  at 4/10/2019  3:30 PM     Acceptance, E,D,TB, VU,NR by  at 4/9/2019  4:34 PM    Acceptance, E,D, VU,NR by MS at 4/8/2019  1:30 PM                   Point: Body mechanics (Done)     Learning Progress Summary           Patient Acceptance, E,TB,D, VU,NR by  at 4/10/2019  1:34 PM    Acceptance, E,D,TB, VU,NR by  at 4/9/2019  4:34 PM    Acceptance, E,D, VU,NR by MS at 4/8/2019  1:30 PM                   Point: Precautions (Done)     Learning Progress Summary           Patient Acceptance, E,TB,D, VU,NR by  at 4/10/2019  1:34 PM    Acceptance, E,D,TB, VU,NR by  at 4/9/2019  4:34 PM    Acceptance, E,D, VU,NR by MS at 4/8/2019  1:30 PM                               User Key     Initials Effective Dates Name Provider Type Discipline     04/03/18 -  Анна Pruitt, PT Physical Therapist PT    MS 04/03/18 -  Dillon Menjivar, PT Physical Therapist PT     08/19/18 -  Vaishali Gerber PTA Physical Therapy Assistant PT                PT Recommendation and Plan  Anticipated Discharge Disposition (PT): skilled nursing facility  Outcome Summary/Treatment Plan (PT)  Anticipated Discharge Disposition (PT): skilled nursing facility  Plan of Care Reviewed With: patient  Outcome Summary: Pt with impaired balance and gait due to difficulty weight shifting. Pt also with decreased safety awareness and difficulty following instructions due to anxiety and impulsivity. PT will continue to follwo to address strength, mobility, and gait.  Outcome Measures     Row Name 04/10/19 1300 04/09/19 1600 04/08/19 1300       How much help from another person do you currently need...    Turning from your back to your side while in flat bed without using bedrails?  3  -  3  -EH  3  -MS    Moving from lying on back to sitting on the side of a flat bed without bedrails?  3  -  3  -EH  3  -MS    Moving to and from a bed to a chair (including a wheelchair)?  3  -  3  -EH  3  -MS    Standing up from a chair using your arms (e.g., wheelchair, bedside chair)?  3  -   3  -EH  3  -MS    Climbing 3-5 steps with a railing?  2  -CH  2  -EH  2  -MS    To walk in hospital room?  3  -CH  3  -EH  3  -MS    AM-PAC 6 Clicks Score  17  -CH  17  -EH  17  -MS       Functional Assessment    Outcome Measure Options  AM-PAC 6 Clicks Basic Mobility (PT)  -  --  AM-PAC 6 Clicks Basic Mobility (PT)  -MS      User Key  (r) = Recorded By, (t) = Taken By, (c) = Cosigned By    Initials Name Provider Type     Анна Pruitt, PT Physical Therapist    MS Dillon Menjivar, PT Physical Therapist     Vaishali Gerber, PTA Physical Therapy Assistant         Time Calculation:   PT Charges     Row Name 04/10/19 1532 04/10/19 1336          Time Calculation    Start Time  1426  -  1135  -CH     Stop Time  1437  -CH  1200  -CH     Time Calculation (min)  11 min  -CH  25 min  -     PT Received On  04/10/19  -  04/10/19  -     PT - Next Appointment  04/11/19  -  04/10/19  -     PT Goal Re-Cert Due Date  04/17/19  -  --        Time Calculation- PT    Total Timed Code Minutes- PT  11 minute(s)  -CH  25 minute(s)  -       User Key  (r) = Recorded By, (t) = Taken By, (c) = Cosigned By    Initials Name Provider Type     Анна Pruitt, PT Physical Therapist        Therapy Charges for Today     Code Description Service Date Service Provider Modifiers Qty    35790608237 HC PT THER PROC EA 15 MIN 4/10/2019 Анна Pruitt, PT GP 2    39257271936 HC PT THER SUPP EA 15 MIN 4/10/2019 Анна Pruitt, PT GP 2    87842719151 HC PT THER PROC EA 15 MIN 4/10/2019 Анна Pruitt, PT GP 1          PT G-Codes  Outcome Measure Options: AM-PAC 6 Clicks Basic Mobility (PT)  AM-PAC 6 Clicks Score: Elizabeth Pruitt, PT  4/10/2019

## 2019-04-10 NOTE — PROGRESS NOTES
Procedure(s):  RIGHT TOTAL KNEE ARTHROPLASTY     LOS: 2 days     Subjective :   Complains of pain, doesn't like bipap.  In ICU secondary to respiratory failure.      Objective :    Vital signs in last 24 hours:  Vitals:    04/10/19 0305 04/10/19 0310 04/10/19 0405 04/10/19 0505   BP: 108/59  118/67 105/73   Pulse: 66  67 75   Resp:       Temp:  98.9 °F (37.2 °C)     TempSrc:  Oral     SpO2: 93%  94% 91%   Weight:       Height:           PHYSICAL EXAM:  Patient is calm, in no acute distress, awake and oriented x 3.  Dressing is clean, dry and intact.  No signs of infection.  Swelling is appropriate in amount.  Ecchymosis is appropriate in amount.  Homans test is negative.  Patient is neurovascularly intact distally.    LABS:  Results from last 7 days   Lab Units 04/10/19  0505   HEMOGLOBIN g/dL 11.5*   HEMATOCRIT % 40.0     Results from last 7 days   Lab Units 04/10/19  0505 04/09/19  0458   SODIUM mmol/L  --  139   POTASSIUM mmol/L  --  4.3   CHLORIDE mmol/L  --  95*   CO2 mmol/L  --  24.8   BUN mg/dL  --  10   CREATININE mg/dL 0.71 0.63   GLUCOSE mg/dL  --  167*   CALCIUM mg/dL  --  8.8             ASSESSMENT:  Status post Procedure(s):  RIGHT TOTAL KNEE ARTHROPLASTY      Plan:  All narcotics stopped.  Naprosyn and tylenol for pain.  Continue ICU.  Appreciate other assistance.   Continue Physical Therapy, increase mobility and range of motion as tolerated.  Continue SCDs, Continue Eliquis for DVT prophylaxis while inpatient.    Dispo planning for home vs rehab.    Farzad Luong MD    Date: 4/10/2019  Time: 6:25 AM

## 2019-04-10 NOTE — PROGRESS NOTES
LOS: 2 days     Chief Complaint:  Follow-up possible exposure    Interval History:  Moved to ICU for hypercapneic respiratory failure. Briefly on BiPAP. Off of it this morning. Tolerating vancomycin and ceftriaxone w/o rash or diarrhea. Baseline HIV, HBV, and HCV testing negative. Less SOA this AM. Asking to go home. Dull intermittent R knee pain expected post-op. No associated fevers.    ROS: no n/v    Vital Signs  Temp:  [97.3 °F (36.3 °C)-100 °F (37.8 °C)] 98.9 °F (37.2 °C)  Heart Rate:  [59-83] 75  Resp:  [18-22] 20  BP: (104-146)/(59-80) 105/73  FiO2 (%):  [60 %] 60 %    Physical Exam:  General: awake, alert, eating breakfast  Head: Normocephalic  Eyes: no scleral icterus  ENT: MMM, no thrush  Neck: Supple  Cardiovascular: NR, RR, no murmurs, rubs, or gallops; no LE edema  Respiratory: few scattered rales, no wheeze, on nasal cannula  GI: Abdomen is soft, non-tender, non-distended  Musculoskeletal: R knee bandaged  Skin: No rashes, lesions  Neurological: Alert and oriented x 3, motor strength 5/5 in upper extremities  Psychiatric: calm and pleasant    Antibiotics:  -pharmacy to dose vancomycin  - cefTRIAXone 2 g IV q24h    LABS:  Hct, BMP reviewed today  Lab Results   Component Value Date    WBC 10.19 03/27/2019    HGB 11.5 (L) 04/10/2019    HCT 40.0 04/10/2019    MCV 79.9 03/27/2019     03/27/2019     Lab Results   Component Value Date    GLUCOSE 167 (H) 04/09/2019    BUN 10 04/09/2019    CREATININE 0.71 04/10/2019    EGFRIFNONA 82 04/10/2019    EGFRIFAFRI 79 01/31/2018    BCR 15.9 04/09/2019    CO2 24.8 04/09/2019    CALCIUM 8.8 04/09/2019    PROTENTOTREF 7.1 01/31/2018    ALBUMIN 4.20 02/06/2019    LABIL2 1.4 01/31/2018    AST 14 02/06/2019    ALT 11 02/06/2019   No results found for: ADRIANAAK, VANCJR PEÑA      Hep B sAb neg  Hep B sAg neg  Hep C Ab negative  HIV Ab negative    Microbiology:  3/28 UCx: >100k Proteus mirabilis (R - nitrofurantoin and doxycyline)     Radiology  (prior):  XR knee with knee prosthesis in place    Assessment/Plan   1. Possible exposure to infection due to autoclave malfunction  2. Asymptomatic bacteriuria  3. S/P R knee arthroplasty  4. Hypercapneic respiratory failure - new     Based on the fact that the sterilization process was completed and all but 6 minutes of the drying phase was completed, I think it is very unlikely that the patient was exposed to any pathogens. Out of an abundance of caution, I recommended 3 days of vancomycin dosed for goal trough 15-20 and ceftriaxone 2 g IV q24h with stop date 4/11/19. It looks like the vancomycin was stopped by her orthopedist this morning. I also checked baseline HIV, Hep B and Hep C which were negative. I will check them again in the office in 6 weeks.      No antibiotics needed for the positive urine culture. She has no urinary symptoms.     I'll sign off with plan in place but please call me at [phone] if any further questions or concerns.

## 2019-04-10 NOTE — PLAN OF CARE
Problem: Patient Care Overview  Goal: Plan of Care Review  Outcome: Ongoing (interventions implemented as appropriate)   04/10/19 6717   Coping/Psychosocial   Plan of Care Reviewed With patient   OTHER   Outcome Summary Pt with impaired balance and gait due to difficulty weight shifting. Pt also with decreased safety awareness and difficulty following instructions due to anxiety and impulsivity. PT will continue to follwo to address strength, mobility, and gait.

## 2019-04-10 NOTE — PLAN OF CARE
Problem: Patient Care Overview  Goal: Plan of Care Review   04/10/19 1322   Coping/Psychosocial   Plan of Care Reviewed With patient;family   OTHER   Outcome Summary on and off bipap for meals; still requiring hi-flow of 10 liters off bipap; very limited mobility; will need rehab post hospital stay; ice to knee throughout shift; pain managed with naprosyn and tylenol   Plan of Care Review   Progress improving

## 2019-04-10 NOTE — PLAN OF CARE
Problem: Patient Care Overview  Goal: Plan of Care Review  Outcome: Ongoing (interventions implemented as appropriate)   04/10/19 5800   OTHER   Outcome Summary Pt wearing V-60 for most of the day. On 10 HFNC when eating or taking medication. Will continue to monitor.

## 2019-04-10 NOTE — PROGRESS NOTES
[facility]     Mar/chart reviewed  F/u chronic respiratory failure.  Got too much narcotics, combined with chronic hypercapnia and KAELA she developed acute hypercapnia and transferred to ICU overnight.  Appears better again now.    Vital Sign Min/Max for last 24 hours  Temp  Min: 97.3 °F (36.3 °C)  Max: 100 °F (37.8 °C)   BP  Min: 104/59  Max: 132/74   Pulse  Min: 59  Max: 75   Resp  Min: 18  Max: 22   SpO2  Min: 85 %  Max: 98 %   Flow (L/min)  Min: 4  Max: 10   No Data Recorded     Nad, axox3,   perrl, eomi, no icterus,  mmm, no jvd, trachea midline, neck supple,  Chest decreased bilaterally, no crackles, no wheezes,   rrr,   soft, nt, nd +bs,  no c/c/ 1+ edema    Labs:  hgb 11.5    A/P:  1. Pulmonary Hypertension - uncertain WHO Group.  Would continue her home revatio and bumex for now.  Suggest outpatient RHC  2. Chronic hypoxemic respiratory failure -- continue oxygen, pulmonary toilet as able -- keep oxygen 88-92%  3. KAELA -- continue bipap at HS and prn; avoid hyperoxia and excessive narcotics.   4. Chronic diastolic chf  5. Obesity --     Observe in icu today  Check CXR and give extra dose of bumex.    D/w ALVINO

## 2019-04-10 NOTE — THERAPY TREATMENT NOTE
Acute Care - Physical Therapy Treatment Note  [facility]     Patient Name: Mariangel Nguyen  : 1950  MRN: [id number]  Today's Date: 4/10/2019  Onset of Illness/Injury or Date of Surgery: 19  Date of Referral to PT: 19  Referring Physician: Farzad Drew    Admit Date: 2019    Visit Dx:    ICD-10-CM ICD-9-CM   1. Difficulty walking R26.2 719.7     Patient Active Problem List   Diagnosis   • Diabetes mellitus (CMS/HCC)   • Edema   • Hyperlipidemia   • Hypertension   • Rotator cuff tear arthropathy   • Venous stasis dermatitis   • Varicose veins   • Acute on chronic systolic CHF (congestive heart failure) (CMS/HCC)   • Acute pyelonephritis   • Acute respiratory failure (CMS/HCC)   • BRODY (acute kidney injury) (CMS/HCC)   • ATN (acute tubular necrosis) (CMS/HCC)   • Benign essential HTN   • COPD (chronic obstructive pulmonary disease) (CMS/HCC)   • Goiter   • Hx of lithotripsy   • Low vitamin D level   • Lymphedema   • Neuropathy   • Omental infarction (CMS/HCC)   • KAELA and COPD overlap syndrome (CMS/HCC)   • Pulmonary HTN (CMS/HCC)   • Recurrent nephrolithiasis   • Small bowel perforation (CMS/HCC)   • Acute deep vein thrombosis (DVT) of popliteal vein of left lower extremity (CMS/HCC)   • Microcytic anemia   • Thrombocytosis (CMS/HCC)   • Iron deficiency anemia   • Encounter for screening for malignant neoplasm of colon   • Anxiety and depression   • Osteoarthritis of right knee       Therapy Treatment    Rehabilitation Treatment Summary     Row Name 04/10/19 1140             Treatment Time/Intention    Discipline  physical therapist  -CH      Document Type  therapy note (daily note)  -CH      Subjective Information  no complaints  -CH      Mode of Treatment  physical therapy  -CH      Patient/Family Observations  pt supine in bed, on bipap (RN removed at beginning of treatment), no acute  distress noted at rest  -CH      Patient Effort  good  -CH      Existing Precautions/Restrictions   fall;weight bearing WBAT on R LE  -CH      Recorded by [] Анна Pruitt, PT 04/10/19 1333      Row Name 04/10/19 1140             Cognitive Assessment/Intervention    Additional Documentation  Cognitive Assessment/Intervention (Group)  -CH      Recorded by [] Анна Pruitt, PT 04/10/19 1333      Row Name 04/10/19 1140             Cognitive Assessment/Intervention- PT/OT    Orientation Status (Cognition)  oriented x 3  -CH      Follows Commands (Cognition)  follows one step commands;75-90% accuracy pt anxious and impulsive, had to repeat safety cues  -CH      Personal Safety Interventions  fall prevention program maintained;gait belt;nonskid shoes/slippers when out of bed  -CH      Recorded by [] Анна Pruitt, PT 04/10/19 1333      Row Name 04/10/19 1140             Bed Mobility Assessment/Treatment    Bed Mobility Assessment/Treatment  supine-sit;sit-supine  -CH      Supine-Sit Follett (Bed Mobility)  verbal cues;nonverbal cues (demo/gesture);maximum assist (25% patient effort);2 person assist  -CH      Sit-Supine Follett (Bed Mobility)  verbal cues;nonverbal cues (demo/gesture);moderate assist (50% patient effort);2 person assist  -CH      Recorded by [] Анна Pruitt, PT 04/10/19 1333      Row Name 04/10/19 1140             Sit-Stand Transfer    Sit-Stand Follett (Transfers)  verbal cues;nonverbal cues (demo/gesture);moderate assist (50% patient effort);2 person assist  -      Assistive Device (Sit-Stand Transfers)  walker, front-wheeled  -CH      Recorded by [] Анна Pruitt, PT 04/10/19 1333      Row Name 04/10/19 1140             Stand-Sit Transfer    Stand-Sit Follett (Transfers)  verbal cues;nonverbal cues (demo/gesture);maximum assist (25% patient effort);2 person assist  -      Assistive Device (Stand-Sit Transfers)  walker, front-wheeled  -CH      Recorded by [] Анна Pruitt, PT 04/10/19 1333      Row Name 04/10/19 1140              Gait/Stairs Assessment/Training    Emmalena Level (Gait)  verbal cues;nonverbal cues (demo/gesture);moderate assist (50% patient effort);maximum assist (25% patient effort);2 person assist  -      Assistive Device (Gait)  walker, front-wheeled  -      Distance in Feet (Gait)  3 ft attempted to get to chair, but had to return to bed  -      Deviations/Abnormal Patterns (Gait)  kam decreased;gait speed decreased;stride length decreased  -      Bilateral Gait Deviations  weight shift ability decreased;forward flexed posture  -CH      Comment (Gait/Stairs)  attempted to take steps to chair, pt having difficulty weight shifting and advancing feet. Pt kept pushing walker too far infront of her and letting go of walker. required repeated cues for safety, pt had to be pulled back to bed to avoid fall.  -CH      Recorded by [] Анна Pruitt, PT 04/10/19 1333      Row Name 04/10/19 1140             Therapeutic Exercise    Comment (Therapeutic Exercise)  exercises deferred during AM session due to pt needing to be cleaned upon return to bed.  -CH      Recorded by [] Анна Pruitt, PT 04/10/19 1333      Row Name 04/10/19 1140             Positioning and Restraints    Pre-Treatment Position  in bed  -CH      Post Treatment Position  bed  -CH      In Bed  supine;call light within reach;encouraged to call for assist;with nsg  -CH      Recorded by [] Анна Pruitt, PT 04/10/19 1333      Row Name 04/10/19 1140             Pain Assessment    Additional Documentation  Pain Scale: Numbers Pre/Post-Treatment (Group)  -CH      Recorded by [] Анна Pruitt, PT 04/10/19 1333      Row Name 04/10/19 1140             Pain Scale: Numbers Pre/Post-Treatment    Pain Scale: Numbers, Pretreatment  0/10 - no pain  -CH      Recorded by [] Анна Pruitt, PT 04/10/19 1333      Row Name                Wound 04/08/19 0742 Right knee incision    Wound - Properties Group Date first assessed: 04/08/19 [EG]  Time first assessed: 0742 [EG] Side: Right [EG] Location: knee [EG] Type: incision [EG] Recorded by:  [EG] Ritu Hernandez RN 04/08/19 0742    Row Name 04/10/19 1140             Plan of Care Review    Plan of Care Reviewed With  patient  -CH      Recorded by [] Анна Pruitt, PT 04/10/19 1333      Row Name 04/10/19 1140             Outcome Summary/Treatment Plan (PT)    Anticipated Discharge Disposition (PT)  skilled nursing facility  -      Recorded by [] Анна Pruitt, PT 04/10/19 1333        User Key  (r) = Recorded By, (t) = Taken By, (c) = Cosigned By    Initials Name Effective Dates Discipline     Анна Pruitt, PT 04/03/18 -  PT    EG Ritu Hernandez RN 07/10/17 -  Nurse          Wound 04/08/19 0742 Right knee incision (Active)   Dressing Appearance dry;intact 4/10/2019 12:00 PM   Closure JENELLE 4/10/2019  4:00 AM   Base dressing in place, unable to visualize 4/10/2019 12:00 PM   Drainage Amount none 4/10/2019  4:00 AM   Dressing Care, Wound elastic bandage 4/10/2019 12:00 AM           Physical Therapy Education     Title: PT OT SLP Therapies (Done)     Topic: Physical Therapy (Done)     Point: Mobility training (Done)     Learning Progress Summary           Patient Acceptance, E,TB,D, VU,NR by  at 4/10/2019  1:34 PM    Acceptance, E,D,TB, VU,NR by  at 4/9/2019  4:34 PM    Acceptance, E,D, VU,NR by MS at 4/8/2019  1:30 PM                   Point: Home exercise program (Done)     Learning Progress Summary           Patient Acceptance, E,D,TB, VU,NR by  at 4/9/2019  4:34 PM    Acceptance, E,D, VU,NR by MS at 4/8/2019  1:30 PM                   Point: Body mechanics (Done)     Learning Progress Summary           Patient Acceptance, E,TB,D, VU,NR by  at 4/10/2019  1:34 PM    Acceptance, E,D,TB, VU,NR by  at 4/9/2019  4:34 PM    Acceptance, E,D, VU,NR by MS at 4/8/2019  1:30 PM                   Point: Precautions (Done)     Learning Progress Summary           Patient  Acceptance, E,TB,D, VU,NR by  at 4/10/2019  1:34 PM    Acceptance, E,D,TB, VU,NR by  at 4/9/2019  4:34 PM    Acceptance, E,D, VU,NR by MS at 4/8/2019  1:30 PM                               User Key     Initials Effective Dates Name Provider Type Discipline     04/03/18 -  Анна Pruitt, PT Physical Therapist PT    MS 04/03/18 -  Dillon Menjivar PT Physical Therapist PT     08/19/18 -  Vaishali Gerber PTA Physical Therapy Assistant PT                PT Recommendation and Plan  Anticipated Discharge Disposition (PT): skilled nursing facility  Outcome Summary/Treatment Plan (PT)  Anticipated Discharge Disposition (PT): skilled nursing facility  Plan of Care Reviewed With: patient  Outcome Summary: Pt with impaired balance and gait due to difficulty weight shifting. Pt also with decreased safety awareness and difficulty following instructions due to anxiety and impulsivity. PT will continue to follwo to address strength, mobility, and gait.  Outcome Measures     Row Name 04/10/19 1300 04/09/19 1600 04/08/19 1300       How much help from another person do you currently need...    Turning from your back to your side while in flat bed without using bedrails?  3  -  3  -EH  3  -MS    Moving from lying on back to sitting on the side of a flat bed without bedrails?  3  -  3  -EH  3  -MS    Moving to and from a bed to a chair (including a wheelchair)?  3  -  3  -EH  3  -MS    Standing up from a chair using your arms (e.g., wheelchair, bedside chair)?  3  -  3  -EH  3  -MS    Climbing 3-5 steps with a railing?  2  -  2  -EH  2  -MS    To walk in hospital room?  3  -  3  -EH  3  -MS    AM-PAC 6 Clicks Score  17  -  17  -EH  17  -MS       Functional Assessment    Outcome Measure Options  AM-PAC 6 Clicks Basic Mobility (PT)  -  --  AM-PAC 6 Clicks Basic Mobility (PT)  -MS      User Key  (r) = Recorded By, (t) = Taken By, (c) = Cosigned By    Initials Name Provider Type     Анна Prutit, PT  Physical Therapist    Dillon De Anda, PT Physical Therapist    Vaishali Gutierrez, PTA Physical Therapy Assistant         Time Calculation:   PT Charges     Row Name 04/10/19 1336             Time Calculation    Start Time  1135  -      Stop Time  1200  -      Time Calculation (min)  25 min  -      PT Received On  04/10/19  -      PT - Next Appointment  04/10/19  -         Time Calculation- PT    Total Timed Code Minutes- PT  25 minute(s)  -        User Key  (r) = Recorded By, (t) = Taken By, (c) = Cosigned By    Initials Name Provider Type    Анна Damon, PT Physical Therapist        Therapy Charges for Today     Code Description Service Date Service Provider Modifiers Qty    77089784077 HC PT THER PROC EA 15 MIN 4/10/2019 Анна Pruitt, PT GP 2    14725202413 HC PT THER SUPP EA 15 MIN 4/10/2019 Анна Pruitt, PT GP 2          PT G-Codes  Outcome Measure Options: AM-PAC 6 Clicks Basic Mobility (PT)  AM-PAC 6 Clicks Score: 17    Анна Pruitt, PT  4/10/2019

## 2019-04-10 NOTE — PROGRESS NOTES
Chart reviewed -required BiPAP given hypercapnia made worse by anesthesia and narcotics    Patient transferred to ICU -Dr. Hardy managing his intensivist while in the unit    [facility] to sign off -please call back if additional assistance is needed post ICU transfer

## 2019-04-11 ENCOUNTER — APPOINTMENT (OUTPATIENT)
Dept: CT IMAGING | Facility: HOSPITAL | Age: 69
End: 2019-04-11

## 2019-04-11 LAB
ANION GAP SERPL CALCULATED.3IONS-SCNC: 8.6 MMOL/L
BASOPHILS # BLD AUTO: 0.05 10*3/MM3 (ref 0–0.2)
BASOPHILS NFR BLD AUTO: 0.5 % (ref 0–1.5)
BUN BLD-MCNC: 21 MG/DL (ref 8–23)
BUN/CREAT SERPL: 25 (ref 7–25)
CALCIUM SPEC-SCNC: 8.5 MG/DL (ref 8.6–10.5)
CHLORIDE SERPL-SCNC: 94 MMOL/L (ref 98–107)
CO2 SERPL-SCNC: 33.4 MMOL/L (ref 22–29)
CREAT BLD-MCNC: 0.84 MG/DL (ref 0.57–1)
DEPRECATED RDW RBC AUTO: 82.4 FL (ref 37–54)
EOSINOPHIL # BLD AUTO: 0.5 10*3/MM3 (ref 0–0.4)
EOSINOPHIL NFR BLD AUTO: 5.2 % (ref 0.3–6.2)
ERYTHROCYTE [DISTWIDTH] IN BLOOD BY AUTOMATED COUNT: 27 % (ref 12.3–15.4)
GFR SERPL CREATININE-BSD FRML MDRD: 68 ML/MIN/1.73
GLUCOSE BLD-MCNC: 137 MG/DL (ref 65–99)
GLUCOSE BLDC GLUCOMTR-MCNC: 113 MG/DL (ref 70–130)
GLUCOSE BLDC GLUCOMTR-MCNC: 134 MG/DL (ref 70–130)
GLUCOSE BLDC GLUCOMTR-MCNC: 137 MG/DL (ref 70–130)
GLUCOSE BLDC GLUCOMTR-MCNC: 143 MG/DL (ref 70–130)
GLUCOSE BLDC GLUCOMTR-MCNC: 145 MG/DL (ref 70–130)
HCT VFR BLD AUTO: 36.6 % (ref 34–46.6)
HGB BLD-MCNC: 10.5 G/DL (ref 12–15.9)
IMM GRANULOCYTES # BLD AUTO: 0.05 10*3/MM3 (ref 0–0.05)
IMM GRANULOCYTES NFR BLD AUTO: 0.5 % (ref 0–0.5)
LYMPHOCYTES # BLD AUTO: 0.66 10*3/MM3 (ref 0.7–3.1)
LYMPHOCYTES NFR BLD AUTO: 6.9 % (ref 19.6–45.3)
MCH RBC QN AUTO: 25.4 PG (ref 26.6–33)
MCHC RBC AUTO-ENTMCNC: 28.7 G/DL (ref 31.5–35.7)
MCV RBC AUTO: 88.4 FL (ref 79–97)
MONOCYTES # BLD AUTO: 0.99 10*3/MM3 (ref 0.1–0.9)
MONOCYTES NFR BLD AUTO: 10.3 % (ref 5–12)
NEUTROPHILS # BLD AUTO: 7.38 10*3/MM3 (ref 1.4–7)
NEUTROPHILS NFR BLD AUTO: 76.6 % (ref 42.7–76)
NRBC BLD AUTO-RTO: 0.1 /100 WBC (ref 0–0)
PLATELET # BLD AUTO: 332 10*3/MM3 (ref 140–450)
PMV BLD AUTO: 9.4 FL (ref 6–12)
POTASSIUM BLD-SCNC: 4.1 MMOL/L (ref 3.5–5.2)
RBC # BLD AUTO: 4.14 10*6/MM3 (ref 3.77–5.28)
SODIUM BLD-SCNC: 136 MMOL/L (ref 136–145)
WBC NRBC COR # BLD: 9.63 10*3/MM3 (ref 3.4–10.8)

## 2019-04-11 PROCEDURE — 85025 COMPLETE CBC W/AUTO DIFF WBC: CPT | Performed by: INTERNAL MEDICINE

## 2019-04-11 PROCEDURE — 82962 GLUCOSE BLOOD TEST: CPT

## 2019-04-11 PROCEDURE — 25010000003 CEFTRIAXONE PER 250 MG: Performed by: INTERNAL MEDICINE

## 2019-04-11 PROCEDURE — 80048 BASIC METABOLIC PNL TOTAL CA: CPT | Performed by: INTERNAL MEDICINE

## 2019-04-11 PROCEDURE — 0 IOPAMIDOL PER 1 ML: Performed by: ORTHOPAEDIC SURGERY

## 2019-04-11 PROCEDURE — 94660 CPAP INITIATION&MGMT: CPT

## 2019-04-11 PROCEDURE — 97110 THERAPEUTIC EXERCISES: CPT

## 2019-04-11 PROCEDURE — 71275 CT ANGIOGRAPHY CHEST: CPT

## 2019-04-11 PROCEDURE — 94799 UNLISTED PULMONARY SVC/PX: CPT

## 2019-04-11 RX ORDER — NYSTATIN 100000 [USP'U]/G
POWDER TOPICAL EVERY 12 HOURS SCHEDULED
Status: DISCONTINUED | OUTPATIENT
Start: 2019-04-11 | End: 2019-04-15 | Stop reason: HOSPADM

## 2019-04-11 RX ADMIN — APIXABAN 2.5 MG: 2.5 TABLET, FILM COATED ORAL at 20:27

## 2019-04-11 RX ADMIN — CEFTRIAXONE SODIUM 2 G: 2 INJECTION, SOLUTION INTRAVENOUS at 11:55

## 2019-04-11 RX ADMIN — PANTOPRAZOLE SODIUM 40 MG: 40 TABLET, DELAYED RELEASE ORAL at 08:05

## 2019-04-11 RX ADMIN — SILDENAFIL 20 MG: 20 TABLET, FILM COATED ORAL at 16:58

## 2019-04-11 RX ADMIN — BISOPROLOL FUMARATE 5 MG: 5 TABLET ORAL at 08:05

## 2019-04-11 RX ADMIN — SENNOSIDES AND DOCUSATE SODIUM 2 TABLET: 8.6; 5 TABLET ORAL at 20:27

## 2019-04-11 RX ADMIN — NYSTATIN: 100000 POWDER TOPICAL at 15:30

## 2019-04-11 RX ADMIN — ACETAMINOPHEN 650 MG: 325 TABLET, FILM COATED ORAL at 08:05

## 2019-04-11 RX ADMIN — SODIUM CHLORIDE, PRESERVATIVE FREE 3 ML: 5 INJECTION INTRAVENOUS at 20:28

## 2019-04-11 RX ADMIN — SILDENAFIL 20 MG: 20 TABLET, FILM COATED ORAL at 20:27

## 2019-04-11 RX ADMIN — NAPROXEN 500 MG: 500 TABLET ORAL at 08:05

## 2019-04-11 RX ADMIN — APIXABAN 2.5 MG: 2.5 TABLET, FILM COATED ORAL at 08:05

## 2019-04-11 RX ADMIN — METFORMIN HYDROCHLORIDE 1000 MG: 1000 TABLET ORAL at 08:05

## 2019-04-11 RX ADMIN — LEVOTHYROXINE SODIUM 25 MCG: 25 TABLET ORAL at 08:05

## 2019-04-11 RX ADMIN — ESCITALOPRAM OXALATE 15 MG: 5 TABLET, FILM COATED ORAL at 08:05

## 2019-04-11 RX ADMIN — SODIUM CHLORIDE, PRESERVATIVE FREE 3 ML: 5 INJECTION INTRAVENOUS at 08:06

## 2019-04-11 RX ADMIN — PRAVASTATIN SODIUM 40 MG: 20 TABLET ORAL at 20:27

## 2019-04-11 RX ADMIN — SENNOSIDES AND DOCUSATE SODIUM 2 TABLET: 8.6; 5 TABLET ORAL at 08:05

## 2019-04-11 RX ADMIN — NYSTATIN: 100000 POWDER TOPICAL at 20:29

## 2019-04-11 RX ADMIN — IOPAMIDOL 95 ML: 755 INJECTION, SOLUTION INTRAVENOUS at 15:45

## 2019-04-11 RX ADMIN — NAPROXEN 500 MG: 500 TABLET ORAL at 16:58

## 2019-04-11 RX ADMIN — SILDENAFIL 20 MG: 20 TABLET, FILM COATED ORAL at 08:05

## 2019-04-11 RX ADMIN — BUMETANIDE 2 MG: 2 TABLET ORAL at 08:05

## 2019-04-11 NOTE — DISCHARGE PLACEMENT REQUEST
"Jorge Nguyen (68 y.o. Female)     Date of Birth Social Security Number Address Home Phone MRN    1950  [address]  [place] [id number] [phone] [id number]    [employer]abapt[employer] Marital Status          Mandaen Single       Admission Date Admission Type Admitting Provider Attending Provider Department, Room/Bed    4/8/19 Elective Farzad Luong MD Rhoads, David, MD [facility] INTENSIVE CARE, I372/1    Discharge Date Discharge Disposition Discharge Destination                       Attending Provider:  Farzad Luong MD    Allergies:  No Known Allergies    Isolation:  None   Infection:  None   Code Status:  CPR    Ht:  157.5 cm (62\")   Wt:  111 kg (244 lb 9.6 oz)    Admission Cmt:  None   Principal Problem:  None                Active Insurance as of 4/8/2019     Primary Coverage     Payor Plan Insurance Group Employer/Plan Group    MEDICARE MEDICARE A & B      Payor Plan Address Payor Plan Phone Number Payor Plan Fax Number Effective Dates    [address] [phone]  12/1/2017 - None Entered    [place] [address]       Subscriber Name Subscriber Birth Date Member ID       JORGE NGUYEN 1950 [id number]                 Emergency Contacts      (Rel.) Home Phone Work Phone Mobile Phone    Tita Nguyen (Daughter) [phone] -- --              "

## 2019-04-11 NOTE — THERAPY TREATMENT NOTE
Acute Care - Physical Therapy Treatment Note  [facility]     Patient Name: Mariangel Nguyen  : 1950  MRN: [id number]  Today's Date: 2019  Onset of Illness/Injury or Date of Surgery: 19  Date of Referral to PT: 19  Referring Physician: Farzad Drew    Admit Date: 2019    Visit Dx:    ICD-10-CM ICD-9-CM   1. Difficulty walking R26.2 719.7     Patient Active Problem List   Diagnosis   • Diabetes mellitus (CMS/HCC)   • Edema   • Hyperlipidemia   • Hypertension   • Rotator cuff tear arthropathy   • Venous stasis dermatitis   • Varicose veins   • Acute on chronic systolic CHF (congestive heart failure) (CMS/HCC)   • Acute pyelonephritis   • Acute respiratory failure (CMS/HCC)   • BRODY (acute kidney injury) (CMS/HCC)   • ATN (acute tubular necrosis) (CMS/HCC)   • Benign essential HTN   • COPD (chronic obstructive pulmonary disease) (CMS/HCC)   • Goiter   • Hx of lithotripsy   • Low vitamin D level   • Lymphedema   • Neuropathy   • Omental infarction (CMS/HCC)   • KAELA and COPD overlap syndrome (CMS/HCC)   • Pulmonary HTN (CMS/HCC)   • Recurrent nephrolithiasis   • Small bowel perforation (CMS/HCC)   • Acute deep vein thrombosis (DVT) of popliteal vein of left lower extremity (CMS/HCC)   • Microcytic anemia   • Thrombocytosis (CMS/HCC)   • Iron deficiency anemia   • Encounter for screening for malignant neoplasm of colon   • Anxiety and depression   • Osteoarthritis of right knee       Therapy Treatment    Rehabilitation Treatment Summary     Row Name 19 1555 19 1137          Treatment Time/Intention    Discipline  physical therapist  -CH  physical therapist  -CH     Document Type  therapy note (daily note)  -CH  therapy note (daily note)  -CH     Subjective Information  no complaints  -CH  complains of;pain  -CH     Mode of Treatment  physical therapy  -CH  physical therapy  -CH     Patient/Family Observations  pt supine in bed, no acute distress noted at rest  -CH  pt supine  in bed, no acute distress noted at rest, family present  -CH     Patient Effort  good  -CH  good  -CH     Existing Precautions/Restrictions  fall WBAT  -CH  fall WBAT  -CH     Recorded by [CH] Анна Pruitt, PT 04/11/19 1559 [CH] Анна Pruitt, PT 04/11/19 1150     Row Name 04/11/19 1137             Cognitive Assessment/Intervention    Additional Documentation  Cognitive Assessment/Intervention (Group)  -CH      Recorded by [CH] Анна Pruitt, PT 04/11/19 1150      Row Name 04/11/19 1555 04/11/19 1137          Cognitive Assessment/Intervention- PT/OT    Orientation Status (Cognition)  oriented x 3  -CH  oriented x 3  -CH     Follows Commands (Cognition)  WFL  -CH  WFL  -CH     Personal Safety Interventions  fall prevention program maintained;gait belt;nonskid shoes/slippers when out of bed  -CH  fall prevention program maintained;gait belt;nonskid shoes/slippers when out of bed  -CH     Recorded by [CH] Анна Pruitt, PT 04/11/19 1559 [CH] Анна Pruitt, PT 04/11/19 1150     Row Name 04/11/19 1555 04/11/19 1137          Bed Mobility Assessment/Treatment    Supine-Sit [place] (Bed Mobility)  verbal cues;nonverbal cues (demo/gesture);moderate assist (50% patient effort)  -  verbal cues;nonverbal cues (demo/gesture);maximum assist (25% patient effort)  -     Sit-Supine [place] (Bed Mobility)  not tested sitting in chair  -  verbal cues;nonverbal cues (demo/gesture);moderate assist (50% patient effort);2 person assist  -CH     Recorded by [CH] Анна Pruitt, PT 04/11/19 1559 [CH] Анна Pruitt, PT 04/11/19 1150     Row Name 04/11/19 1555             Transfer Assessment/Treatment    Comment (Transfers)  pt required repeated verbal cuing for hand placement and safety  -CH      Recorded by [CH] Анна Pruitt, PT 04/11/19 1559      Row Name 04/11/19 1555 04/11/19 1137          Sit-Stand Transfer    Sit-Stand [place] (Transfers)  verbal cues;nonverbal cues  (demo/gesture);minimum assist (75% patient effort)  -  verbal cues;nonverbal cues (demo/gesture);moderate assist (50% patient effort)  -     Assistive Device (Sit-Stand Transfers)  walker, front-wheeled  -CH  walker, front-wheeled  -CH     Recorded by [] Анна Pruitt, PT 04/11/19 1559 [CH] Анна Pruitt, PT 04/11/19 1150     Row Name 04/11/19 1555 04/11/19 1137          Stand-Sit Transfer    Stand-Sit [place] (Transfers)  verbal cues;nonverbal cues (demo/gesture);minimum assist (75% patient effort)  -  verbal cues;nonverbal cues (demo/gesture);minimum assist (75% patient effort);2 person assist  -     Assistive Device (Stand-Sit Transfers)  walker, front-wheeled  -CH  walker, front-wheeled  -CH     Recorded by [] Анна Pruitt, PT 04/11/19 1559 [] Анна Pruitt, PT 04/11/19 1150     Row Name 04/11/19 1555 04/11/19 1137          Gait/Stairs Assessment/Training    [place] Level (Gait)  verbal cues;nonverbal cues (demo/gesture);minimum assist (75% patient effort)  -  verbal cues;nonverbal cues (demo/gesture);moderate assist (50% patient effort);2 person assist  -     Assistive Device (Gait)  walker, front-wheeled  -CH  walker, front-wheeled  -CH     Distance in Feet (Gait)  3ft + 5ft bed to BSC to chair  -  3ft x2, bed <-> bsc  -     Deviations/Abnormal Patterns (Gait)  kam decreased;gait speed decreased;stride length decreased  -  antalgic;kam decreased;gait speed decreased;stride length decreased  -     Bilateral Gait Deviations  --  forward flexed posture;weight shift ability decreased  -     Comment (Gait/Stairs)  required verbal cuing for weight shift and L foot advancement  -  still having some difficulty advancing L LE due to pain with weight bearing on R LE  -CH     Recorded by [CH] Анна Pruitt, PT 04/11/19 1559 [] Анна Pruitt, PT 04/11/19 1150     Row Name 04/11/19 1137             Therapeutic Exercise    Comment (Therapeutic  Exercise)  20 reps R TKA protocol with assist  -CH      Recorded by [CH] Анна Pruitt, PT 04/11/19 1150      Row Name 04/11/19 1555 04/11/19 1137          Positioning and Restraints    Pre-Treatment Position  in bed  -CH  in bed  -CH     Post Treatment Position  chair  -CH  bed  -CH     In Bed  --  supine;call light within reach;encouraged to call for assist;with family/caregiver  -CH     In Chair  reclined;call light within reach;encouraged to call for assist;notified nsg  -CH  --     Recorded by [CH] Анна Pruitt, PT 04/11/19 1559 [CH] Анна Pruitt, PT 04/11/19 1150     Row Name 04/11/19 1555 04/11/19 1137          Pain Assessment    Additional Documentation  Pain Scale: Numbers Pre/Post-Treatment (Group)  -CH  Pain Scale: Numbers Pre/Post-Treatment (Group)  -CH     Recorded by [CH] Анна Pruitt, PT 04/11/19 1559 [CH] Анна Pruitt, PT 04/11/19 1150     Row Name 04/11/19 1555 04/11/19 1137          Pain Scale: Numbers Pre/Post-Treatment    Pain Scale: Numbers, Pretreatment  7/10  -CH  5/10  -CH     Pain Location - Side  Right  -CH  Right  -CH     Pain Location  knee  -CH  knee  -CH     Pain Intervention(s)  Repositioned  -CH  Repositioned;Cold applied  -CH     Recorded by [CH] Анна Pruitt, PT 04/11/19 1559 [CH] Анна Pruitt, PT 04/11/19 1150     Row Name                Wound 04/08/19 0742 Right knee incision    Wound - Properties Group Date first assessed: 04/08/19 [EG] Time first assessed: 0742 [EG] Side: Right [EG] Location: knee [EG] Type: incision [EG] Recorded by:  [EG] Ritu Hernandez RN 04/08/19 0742    Row Name 04/11/19 1555 04/11/19 1137          Plan of Care Review    Plan of Care Reviewed With  patient  -CH  patient;daughter  -CH     Recorded by [CH] Анна Pruitt, PT 04/11/19 1559 [CH] Анна Pruitt, PT 04/11/19 1150     Row Name 04/11/19 1555 04/11/19 1137          Outcome Summary/Treatment Plan (PT)    Anticipated Discharge Disposition (PT)   skilled nursing facility  -  skilled nursing facility  -     Recorded by [] Анна Pruitt, PT 04/11/19 1559 [] Анна Pruitt, PT 04/11/19 1150       User Key  (r) = Recorded By, (t) = Taken By, (c) = Cosigned By    Initials Name Effective Dates Discipline     Анна Pruitt, PT 04/03/18 -  PT    Ritu العلي RN 07/10/17 -  Nurse          Wound 04/08/19 0765 Right knee incision (Active)   Dressing Appearance dry;intact 4/11/2019  3:54 PM   Base dressing in place, unable to visualize 4/11/2019  3:54 PM           Physical Therapy Education     Title: PT OT SLP Therapies (Done)     Topic: Physical Therapy (Done)     Point: Mobility training (Done)     Learning Progress Summary           Patient Acceptance, E,TB,D, VU,NR by  at 4/11/2019  3:59 PM    Acceptance, E,TB,D, VU,NR by  at 4/11/2019 11:51 AM    Acceptance, E,TB,D, VU,NR by  at 4/10/2019  1:34 PM    Acceptance, E,D,TB, VU,NR by  at 4/9/2019  4:34 PM    Acceptance, E,D, VU,NR by MS at 4/8/2019  1:30 PM                   Point: Home exercise program (Done)     Learning Progress Summary           Patient Acceptance, E,TB,D, VU,NR by  at 4/11/2019  3:59 PM    Acceptance, E,TB,D, VU,NR by  at 4/11/2019 11:51 AM    Acceptance, E,TB,D, VU,NR by  at 4/10/2019  3:30 PM    Acceptance, E,D,TB, VU,NR by  at 4/9/2019  4:34 PM    Acceptance, E,D, VU,NR by MS at 4/8/2019  1:30 PM                   Point: Body mechanics (Done)     Learning Progress Summary           Patient Acceptance, E,TB,D, VU,NR by  at 4/11/2019  3:59 PM    Acceptance, E,TB,D, VU,NR by  at 4/11/2019 11:51 AM    Acceptance, E,TB,D, VU,NR by  at 4/10/2019  1:34 PM    Acceptance, E,D,TB, VU,NR by  at 4/9/2019  4:34 PM    Acceptance, E,D, VU,NR by MS at 4/8/2019  1:30 PM                   Point: Precautions (Done)     Learning Progress Summary           Patient Acceptance, E,TB,D, VU,NR by  at 4/11/2019  3:59 PM    Acceptance, E,TB,D, VU,NR by  at  4/11/2019 11:51 AM    Acceptance, E,TB,D, VU,NR by  at 4/10/2019  1:34 PM    Acceptance, E,D,TB, VU,NR by  at 4/9/2019  4:34 PM    Acceptance, E,D, VU,NR by MS at 4/8/2019  1:30 PM                               User Key     Initials Effective Dates Name Provider Type Discipline     04/03/18 -  Анна Pruitt, PT Physical Therapist PT    MS 04/03/18 -  Dillon Menjivar, PT Physical Therapist PT     08/19/18 -  Vaishali Gerber PTA Physical Therapy Assistant PT                PT Recommendation and Plan  Anticipated Discharge Disposition (PT): skilled nursing facility  Outcome Summary/Treatment Plan (PT)  Anticipated Discharge Disposition (PT): skilled nursing facility  Plan of Care Reviewed With: patient  Outcome Summary: Pt demonstrates some improved functional mobility and gait a she was able to take steps to and from BSC with less assistance. Pt continues to require a lot of assistance with bed mobility. PT will continue to follow to address strength, mobility, and gait.  Outcome Measures     Row Name 04/11/19 1500 04/11/19 1100 04/10/19 1300       How much help from another person do you currently need...    Turning from your back to your side while in flat bed without using bedrails?  2  -CH  2  -CH  3  -CH    Moving from lying on back to sitting on the side of a flat bed without bedrails?  2  -CH  2  -CH  3  -CH    Moving to and from a bed to a chair (including a wheelchair)?  3  -CH  2  -CH  3  -CH    Standing up from a chair using your arms (e.g., wheelchair, bedside chair)?  3  -CH  2  -CH  3  -CH    Climbing 3-5 steps with a railing?  1  -CH  1  -CH  2  -CH    To walk in hospital room?  2  -CH  2  -CH  3  -CH    AM-PAC 6 Clicks Score  13  -CH  11  -CH  17  -CH       Functional Assessment    Outcome Measure Options  AM-PAC 6 Clicks Basic Mobility (PT)  -CH  AM-PAC 6 Clicks Basic Mobility (PT)  -CH  AM-PAC 6 Clicks Basic Mobility (PT)  -CH    Row Name 04/09/19 1600             How much help from  another person do you currently need...    Turning from your back to your side while in flat bed without using bedrails?  3  -EH      Moving from lying on back to sitting on the side of a flat bed without bedrails?  3  -EH      Moving to and from a bed to a chair (including a wheelchair)?  3  -EH      Standing up from a chair using your arms (e.g., wheelchair, bedside chair)?  3  -EH      Climbing 3-5 steps with a railing?  2  -EH      To walk in hospital room?  3  -EH      AM-PAC 6 Clicks Score  17  -EH        User Key  (r) = Recorded By, (t) = Taken By, (c) = Cosigned By    Initials Name Provider Type    Анна Damon, PT Physical Therapist    EH Vaishali Gerber PTA Physical Therapy Assistant         Time Calculation:   PT Charges     Row Name 04/11/19 1600 04/11/19 1153          Time Calculation    Start Time  1528  -  1112  -CH     Stop Time  1548  -CH  1137  -CH     Time Calculation (min)  20 min  -CH  25 min  -     PT Received On  04/11/19  -CH  04/11/19  -     PT - Next Appointment  04/12/19  -  --        Time Calculation- PT    Total Timed Code Minutes- PT  20 minute(s)  -CH  25 minute(s)  -CH       User Key  (r) = Recorded By, (t) = Taken By, (c) = Cosigned By    Initials Name Provider Type    Анна Damon, PT Physical Therapist        Therapy Charges for Today     Code Description Service Date Service Provider Modifiers Qty    51490984244 HC PT THER PROC EA 15 MIN 4/10/2019 Анна Pruitt, PT GP 2    12987265642 HC PT THER SUPP EA 15 MIN 4/10/2019 Анна Pruitt, PT GP 2    79743364920 HC PT THER PROC EA 15 MIN 4/10/2019 Анна Pruitt, PT GP 1    58812211934 HC PT THER PROC EA 15 MIN 4/11/2019 Анна Pruitt, PT GP 2    81614454641 HC PT THER SUPP EA 15 MIN 4/11/2019 Анна Pruitt, PT GP 1    33557513294 HC PT THER PROC EA 15 MIN 4/11/2019 Анна Pruitt, PT GP 1          PT G-Codes  Outcome Measure Options: AM-PAC 6 Clicks Basic Mobility (PT)  AM-PAC 6  Clicks Score: 13    Анна Pruitt, PT  4/11/2019

## 2019-04-11 NOTE — PROGRESS NOTES
Discharge Planning Assessment  [facility]     Patient Name: Mariangel Nguyen  MRN: [id number]  Today's Date: 4/11/2019    Admit Date: 4/8/2019    Discharge Needs Assessment    No documentation.       Discharge Plan     Row Name 04/11/19 1251       Plan    Plan  SNF    Patient/Family in Agreement with Plan  yes    Plan Comments  Met with patient and daughter at bedside.  Patient is currently on 8L high flow oxygen. Patient and family requesting rehab options.  1. [facility], left message for Jamila.  Family to identify 2 other options and let us know.  Will continue to monitor for new or changing discharge plans.Met with patient and daughter at bedside.  Patient is currently on 8L high flow oxygen. Patient and family requesting rehab options.  1. [facility], left message for Jamila.  Family to identify 2 other options and let us know.  Will continue to monitor for new or changing discharge plans.        Destination      Service Provider Request Status Selected Services Address Phone Number Fax Number    [facility] Pending - Request Sent N/A [address] [place] [address] [phone]       Dasia Perez RN 4/11/2019 1229    4/11/2019 [facility], left message for Hu.  Dasia Perez RN                   Durable Medical Equipment      No service coordination in this encounter.      Dialysis/Infusion      No service coordination in this encounter.      Home Medical Care - Selection Complete      Service Provider Request Status Selected Services Address Phone Number Fax Number    [facility] Selected [facility] Health Services [address] [place] [address] [phone] --      Therapy      No service coordination in this encounter.      Community Resources      No service coordination in this encounter.          Demographic Summary    No documentation.       Functional Status    No documentation.       Psychosocial     No documentation.       Abuse/Neglect    No documentation.       Legal    No documentation.       Substance Abuse    No documentation.       Patient Forms    No documentation.           Dasia Perez RN

## 2019-04-11 NOTE — PLAN OF CARE
Problem: Patient Care Overview  Goal: Plan of Care Review  Outcome: Ongoing (interventions implemented as appropriate)   04/11/19 1151   Coping/Psychosocial   Plan of Care Reviewed With patient   OTHER   Outcome Summary Pt demonstrates some improved functional mobility and gait a she was able to take steps to and from BSC with less assistance. Pt continues to require a lot of assistance with bed mobility. PT will continue to follow to address strength, mobility, and gait.

## 2019-04-11 NOTE — PROGRESS NOTES
Procedure(s):  RIGHT TOTAL KNEE ARTHROPLASTY     LOS: 3 days     Subjective :   No Complaints.  Breathing on [place] O2 8L.      Objective :    Vital signs in last 24 hours:  Vitals:    04/10/19 1905 04/10/19 2005 04/10/19 2100 04/10/19 2105   BP: 109/56 112/64  101/55   Pulse: 65 64  59   Resp:       Temp:   98.2 °F (36.8 °C)    TempSrc:   Oral    SpO2: 94% 97%  97%   Weight:       Height:           PHYSICAL EXAM:  Patient is calm, in no acute distress, awake and oriented x 3.  Dressing is clean, dry and intact.  No signs of infection.  Swelling is appropriate in amount.  Ecchymosis is appropriate in amount.  Homans test is negative.  Patient is neurovascularly intact distally.    LABS:  Results from last 7 days   Lab Units 04/10/19  0505   HEMOGLOBIN g/dL 11.5*   HEMATOCRIT % 40.0     Results from last 7 days   Lab Units 04/10/19  0505   SODIUM mmol/L 139   POTASSIUM mmol/L 4.8   CHLORIDE mmol/L 96*   CO2 mmol/L 23.4   BUN mg/dL 15   CREATININE mg/dL 0.83  0.71   GLUCOSE mg/dL 153*   CALCIUM mg/dL 9.5             ASSESSMENT:  Status post Procedure(s):  RIGHT TOTAL KNEE ARTHROPLASTY      Plan:  Will continue ICU due to continued elevated O2 demands.  D/W intensvist about CT chest plans.  Progressing slowly with P.T.  Will need rehab placement.  Patient agreeable.  Continue Physical Therapy, increase mobility and range of motion as tolerated.  Continue SCDs, Continue Eliquis for DVT prophylaxis.  Dispo planning for rehab when medically stable.    Farzad Luong MD    Date: 4/11/2019  Time: 7:12 AM

## 2019-04-11 NOTE — PLAN OF CARE
Problem: Patient Care Overview  Goal: Plan of Care Review  Outcome: Ongoing (interventions implemented as appropriate)   04/11/19 0512   OTHER   Outcome Summary pt uses call light for assistNCE, leg straight, on pillows, ice packs in place prn , no co discomfort unless moving legs , drsg dry and intact w/o drainage       Problem: Fall Risk (Adult)  Goal: Absence of Fall  Outcome: Ongoing (interventions implemented as appropriate)      Problem: Knee Arthroplasty (Total, Partial) (Adult)  Goal: Signs and Symptoms of Listed Potential Problems Will be Absent, Minimized or Managed (Knee Arthroplasty)  Outcome: Ongoing (interventions implemented as appropriate)      Problem: Chronic Obstructive Pulmonary Disease (Adult)  Goal: Signs and Symptoms of Listed Potential Problems Will be Absent, Minimized or Managed (Chronic Obstructive Pulmonary Disease)  Outcome: Ongoing (interventions implemented as appropriate)      Problem: Skin Injury Risk (Adult)  Goal: Skin Health and Integrity  Outcome: Ongoing (interventions implemented as appropriate)

## 2019-04-11 NOTE — PROGRESS NOTES
[facility]     Mar/chart reviewed  F/u chronic respiratory failure  Off/on bipap  No new complaints.    Vital Sign Min/Max for last 24 hours  Temp  Min: 97.7 °F (36.5 °C)  Max: 98.2 °F (36.8 °C)   BP  Min: 100/52  Max: 134/84   Pulse  Min: 59  Max: 76   Resp  Min: 17  Max: 18   SpO2  Min: 92 %  Max: 99 %   Flow (L/min)  Min: 10  Max: 10   No Data Recorded     Nad, axox3,   perrl, eomi, no icterus,  mmm, no jvd, trachea midline, neck supple,  Chest decreased bilaterally, no crackles, no wheezes,   rrr,   soft, nt, nd +bs,  no c/c/ trace edema     Labs:  Pending    CXR: read limited by body habitus    A/P:  1. Pulmonary Hypertension - uncertain WHO Group.  Would continue her home revatio and bumex for now.  Suggest outpatient [facility]HC  2. Chronic hypoxemic respiratory failure -- continue oxygen, pulmonary toilet as able -- keep oxygen 88-92%  3. KAELA -- continue bipap at HS and prn; avoid hyperoxia and excessive narcotics.   4. Chronic diastolic chf  5. Obesity --      improve mobility as able. Check ct chest

## 2019-04-11 NOTE — PLAN OF CARE
Problem: Patient Care Overview  Goal: Plan of Care Review   04/11/19 1902   Coping/Psychosocial   Plan of Care Reviewed With patient;family   OTHER   Outcome Summary much more mobile today; limited discomfort; off bipap all day; still 8-10 liter per high flow; out of bed to chair for several hours; ct for pe negative   Plan of Care Review   Progress improving

## 2019-04-11 NOTE — THERAPY TREATMENT NOTE
Acute Care - Physical Therapy Treatment Note  [facility]     Patient Name: Mariangel Nguyen  : 1950  MRN: [id number]  Today's Date: 2019  Onset of Illness/Injury or Date of Surgery: 19  Date of Referral to PT: 19  Referring Physician: Farzad Drew    Admit Date: 2019    Visit Dx:    ICD-10-CM ICD-9-CM   1. Difficulty walking R26.2 719.7     Patient Active Problem List   Diagnosis   • Diabetes mellitus (CMS/HCC)   • Edema   • Hyperlipidemia   • Hypertension   • Rotator cuff tear arthropathy   • Venous stasis dermatitis   • Varicose veins   • Acute on chronic systolic CHF (congestive heart failure) (CMS/HCC)   • Acute pyelonephritis   • Acute respiratory failure (CMS/HCC)   • BRODY (acute kidney injury) (CMS/HCC)   • ATN (acute tubular necrosis) (CMS/HCC)   • Benign essential HTN   • COPD (chronic obstructive pulmonary disease) (CMS/HCC)   • Goiter   • Hx of lithotripsy   • Low vitamin D level   • Lymphedema   • Neuropathy   • Omental infarction (CMS/HCC)   • KAELA and COPD overlap syndrome (CMS/HCC)   • Pulmonary HTN (CMS/HCC)   • Recurrent nephrolithiasis   • Small bowel perforation (CMS/HCC)   • Acute deep vein thrombosis (DVT) of popliteal vein of left lower extremity (CMS/HCC)   • Microcytic anemia   • Thrombocytosis (CMS/HCC)   • Iron deficiency anemia   • Encounter for screening for malignant neoplasm of colon   • Anxiety and depression   • Osteoarthritis of right knee       Therapy Treatment    Rehabilitation Treatment Summary     Row Name 19 1137             Treatment Time/Intention    Discipline  physical therapist  -CH      Document Type  therapy note (daily note)  -CH      Subjective Information  complains of;pain  -CH      Mode of Treatment  physical therapy  -CH      Patient/Family Observations  pt supine in bed, no acute distress noted at rest, family present  -CH      Patient Effort  good  -CH      Existing Precautions/Restrictions  fall WBAT  -CH      Recorded  by [] Анна Pruitt, PT 04/11/19 1150      Row Name 04/11/19 1137             Cognitive Assessment/Intervention    Additional Documentation  Cognitive Assessment/Intervention (Group)  -CH      Recorded by [] Анна Pruitt, PT 04/11/19 1150      Row Name 04/11/19 1137             Cognitive Assessment/Intervention- PT/OT    Orientation Status (Cognition)  oriented x 3  -CH      Follows Commands (Cognition)  WFL  -      Personal Safety Interventions  fall prevention program maintained;gait belt;nonskid shoes/slippers when out of bed  -CH      Recorded by [] Анна Pruitt, PT 04/11/19 1150      Row Name 04/11/19 1137             Bed Mobility Assessment/Treatment    Supine-Sit Evans (Bed Mobility)  verbal cues;nonverbal cues (demo/gesture);maximum assist (25% patient effort)  -      Sit-Supine Evans (Bed Mobility)  verbal cues;nonverbal cues (demo/gesture);moderate assist (50% patient effort);2 person assist  -CH      Recorded by [] Анна Pruitt, PT 04/11/19 1150      Row Name 04/11/19 1137             Sit-Stand Transfer    Sit-Stand Evans (Transfers)  verbal cues;nonverbal cues (demo/gesture);moderate assist (50% patient effort)  -      Assistive Device (Sit-Stand Transfers)  walker, front-wheeled  -CH      Recorded by [] Анна Pruitt, PT 04/11/19 1150      Row Name 04/11/19 1137             Stand-Sit Transfer    Stand-Sit Evans (Transfers)  verbal cues;nonverbal cues (demo/gesture);minimum assist (75% patient effort);2 person assist  -      Assistive Device (Stand-Sit Transfers)  walker, front-wheeled  -CH      Recorded by [] Анна Pruitt, PT 04/11/19 1150      Row Name 04/11/19 1137             Gait/Stairs Assessment/Training    Evans Level (Gait)  verbal cues;nonverbal cues (demo/gesture);moderate assist (50% patient effort);2 person assist  -      Assistive Device (Gait)  walker, front-wheeled  -      Distance in Feet (Gait)   3ft x2, bed <-> bsc  -CH      Deviations/Abnormal Patterns (Gait)  antalgic;kam decreased;gait speed decreased;stride length decreased  -CH      Bilateral Gait Deviations  forward flexed posture;weight shift ability decreased  -CH      Comment (Gait/Stairs)  still having some difficulty advancing L LE due to pain with weight bearing on R LE  -CH      Recorded by [] нАна Pruitt, PT 04/11/19 1150      Row Name 04/11/19 1137             Therapeutic Exercise    Comment (Therapeutic Exercise)  20 reps R TKA protocol with assist  -CH      Recorded by [] Анна Pruitt, PT 04/11/19 1150      Row Name 04/11/19 1137             Positioning and Restraints    Pre-Treatment Position  in bed  -CH      Post Treatment Position  bed  -CH      In Bed  supine;call light within reach;encouraged to call for assist;with family/caregiver  -CH      Recorded by [] Анна Pruitt, PT 04/11/19 1150      Row Name 04/11/19 1137             Pain Assessment    Additional Documentation  Pain Scale: Numbers Pre/Post-Treatment (Group)  -CH      Recorded by [] Анна Pruitt, PT 04/11/19 1150      Row Name 04/11/19 1137             Pain Scale: Numbers Pre/Post-Treatment    Pain Scale: Numbers, Pretreatment  5/10  -CH      Pain Location - Side  Right  -CH      Pain Location  knee  -CH      Pain Intervention(s)  Repositioned;Cold applied  -CH      Recorded by [] Анна Pruitt, PT 04/11/19 1150      Row Name                Wound 04/08/19 0742 Right knee incision    Wound - Properties Group Date first assessed: 04/08/19 [EG] Time first assessed: 0742 [EG] Side: Right [EG] Location: knee [EG] Type: incision [EG] Recorded by:  [EG] Ritu Hernandez RN 04/08/19 0742    Row Name 04/11/19 1137             Plan of Care Review    Plan of Care Reviewed With  patient;daughter  -CH      Recorded by [] Анна Pruitt, PT 04/11/19 1150      Row Name 04/11/19 1137             Outcome Summary/Treatment Plan (PT)     Anticipated Discharge Disposition (PT)  skilled nursing facility  -      Recorded by [] Анна Pruitt, PT 04/11/19 1150        User Key  (r) = Recorded By, (t) = Taken By, (c) = Cosigned By    Initials Name Effective Dates Discipline     Анна Pruitt, PT 04/03/18 -  PT    Ritu العلي, RN 07/10/17 -  Nurse          Wound 04/08/19 0756 Right knee incision (Active)   Dressing Appearance dry;intact 4/11/2019  8:00 AM   Base dressing in place, unable to visualize 4/11/2019  8:00 AM           Physical Therapy Education     Title: PT OT SLP Therapies (Done)     Topic: Physical Therapy (Done)     Point: Mobility training (Done)     Learning Progress Summary           Patient Acceptance, E,TB,D, VU,NR by  at 4/11/2019 11:51 AM    Acceptance, E,TB,D, VU,NR by  at 4/10/2019  1:34 PM    Acceptance, E,D,TB, VU,NR by  at 4/9/2019  4:34 PM    Acceptance, E,D, VU,NR by MS at 4/8/2019  1:30 PM                   Point: Home exercise program (Done)     Learning Progress Summary           Patient Acceptance, E,TB,D, VU,NR by  at 4/11/2019 11:51 AM    Acceptance, E,TB,D, VU,NR by  at 4/10/2019  3:30 PM    Acceptance, E,D,TB, VU,NR by  at 4/9/2019  4:34 PM    Acceptance, E,D, VU,NR by MS at 4/8/2019  1:30 PM                   Point: Body mechanics (Done)     Learning Progress Summary           Patient Acceptance, E,TB,D, VU,NR by  at 4/11/2019 11:51 AM    Acceptance, E,TB,D, VU,NR by  at 4/10/2019  1:34 PM    Acceptance, E,D,TB, VU,NR by  at 4/9/2019  4:34 PM    Acceptance, E,D, VU,NR by MS at 4/8/2019  1:30 PM                   Point: Precautions (Done)     Learning Progress Summary           Patient Acceptance, E,TB,D, VU,NR by  at 4/11/2019 11:51 AM    Acceptance, E,TB,D, VU,NR by  at 4/10/2019  1:34 PM    Acceptance, RAUL MEDINA TB, VU,NR by  at 4/9/2019  4:34 PM    Acceptance, RAUL MEDINA VU, NR by MS at 4/8/2019  1:30 PM                               User Key     Initials Effective Dates Name  Provider Type Discipline     04/03/18 -  Анна Pruitt, PT Physical Therapist PT    MS 04/03/18 -  Dillon Menjivar, PT Physical Therapist PT     08/19/18 -  Vaishali Gerber PTA Physical Therapy Assistant PT                PT Recommendation and Plan  Anticipated Discharge Disposition (PT): skilled nursing facility  Outcome Summary/Treatment Plan (PT)  Anticipated Discharge Disposition (PT): skilled nursing facility  Plan of Care Reviewed With: patient  Outcome Summary: Pt demonstrates some improved functional mobility and gait a she was able to take steps to and from [facility] with less assistance. Pt continues to require a lot of assistance with bed mobility. PT will continue to follow to address strength, mobility, and gait.  Outcome Measures     Row Name 04/11/19 1100 04/10/19 1300 04/09/19 1600       How much help from another person do you currently need...    Turning from your back to your side while in flat bed without using bedrails?  2  -CH  3  -CH  3  -EH    Moving from lying on back to sitting on the side of a flat bed without bedrails?  2  -CH  3  -CH  3  -EH    Moving to and from a bed to a chair (including a wheelchair)?  2  -CH  3  -CH  3  -EH    Standing up from a chair using your arms (e.g., wheelchair, bedside chair)?  2  -CH  3  -CH  3  -EH    Climbing 3-5 steps with a railing?  1  -CH  2  -CH  2  -EH    To walk in hospital room?  2  -CH  3  -CH  3  -EH    AM-PAC 6 Clicks Score  11  -CH  17  -CH  17  -EH       Functional Assessment    Outcome Measure Options  AM-PAC 6 Clicks Basic Mobility (PT)  -CH  AM-PAC 6 Clicks Basic Mobility (PT)  -CH  --    Row Name 04/08/19 1300             How much help from another person do you currently need...    Turning from your back to your side while in flat bed without using bedrails?  3  -MS      Moving from lying on back to sitting on the side of a flat bed without bedrails?  3  -MS      Moving to and from a bed to a chair (including a wheelchair)?  3  -MS       Standing up from a chair using your arms (e.g., wheelchair, bedside chair)?  3  -MS      Climbing 3-5 steps with a railing?  2  -MS      To walk in hospital room?  3  -MS      AM-PAC 6 Clicks Score  17  -MS         Functional Assessment    Outcome Measure Options  AM-PAC 6 Clicks Basic Mobility (PT)  -MS        User Key  (r) = Recorded By, (t) = Taken By, (c) = Cosigned By    Initials Name Provider Type     Анна Pruitt, PT Physical Therapist    Dillon De Anda, PT Physical Therapist     Vaishali Gerber, PTA Physical Therapy Assistant         Time Calculation:   PT Charges     Row Name 04/11/19 1153             Time Calculation    Start Time  1112  -CH      Stop Time  1137  -      Time Calculation (min)  25 min  -      PT Received On  04/11/19  -         Time Calculation- PT    Total Timed Code Minutes- PT  25 minute(s)  -        User Key  (r) = Recorded By, (t) = Taken By, (c) = Cosigned By    Initials Name Provider Type     Анна Pruitt, PT Physical Therapist        Therapy Charges for Today     Code Description Service Date Service Provider Modifiers Qty    87657700217 HC PT THER PROC EA 15 MIN 4/10/2019 Анна Priutt, PT GP 2    47632592222 HC PT THER SUPP EA 15 MIN 4/10/2019 Анна Pruitt, PT GP 2    44302890874 HC PT THER PROC EA 15 MIN 4/10/2019 Анна Pruitt, PT GP 1    05278397604 HC PT THER PROC EA 15 MIN 4/11/2019 Анна Pruitt, PT GP 2    56980109476 HC PT THER SUPP EA 15 MIN 4/11/2019 Анна Pruitt, PT GP 1          PT G-Codes  Outcome Measure Options: AM-PAC 6 Clicks Basic Mobility (PT)  AM-PAC 6 Clicks Score: 11    Анна Pruitt PT  4/11/2019

## 2019-04-12 LAB
GLUCOSE BLDC GLUCOMTR-MCNC: 150 MG/DL (ref 70–130)
GLUCOSE BLDC GLUCOMTR-MCNC: 156 MG/DL (ref 70–130)
GLUCOSE BLDC GLUCOMTR-MCNC: 157 MG/DL (ref 70–130)
GLUCOSE BLDC GLUCOMTR-MCNC: 164 MG/DL (ref 70–130)
HCT VFR BLD AUTO: 37.5 % (ref 34–46.6)
HGB BLD-MCNC: 10.7 G/DL (ref 12–15.9)

## 2019-04-12 PROCEDURE — 97110 THERAPEUTIC EXERCISES: CPT

## 2019-04-12 PROCEDURE — 85018 HEMOGLOBIN: CPT | Performed by: ORTHOPAEDIC SURGERY

## 2019-04-12 PROCEDURE — 85014 HEMATOCRIT: CPT | Performed by: ORTHOPAEDIC SURGERY

## 2019-04-12 PROCEDURE — 94799 UNLISTED PULMONARY SVC/PX: CPT

## 2019-04-12 PROCEDURE — 94640 AIRWAY INHALATION TREATMENT: CPT

## 2019-04-12 PROCEDURE — 82962 GLUCOSE BLOOD TEST: CPT

## 2019-04-12 PROCEDURE — 63710000001 INSULIN LISPRO (HUMAN) PER 5 UNITS: Performed by: ORTHOPAEDIC SURGERY

## 2019-04-12 RX ORDER — IPRATROPIUM BROMIDE AND ALBUTEROL SULFATE 2.5; .5 MG/3ML; MG/3ML
3 SOLUTION RESPIRATORY (INHALATION) 3 TIMES DAILY
Status: DISCONTINUED | OUTPATIENT
Start: 2019-04-12 | End: 2019-04-15 | Stop reason: HOSPADM

## 2019-04-12 RX ADMIN — PRAVASTATIN SODIUM 40 MG: 20 TABLET ORAL at 21:02

## 2019-04-12 RX ADMIN — SODIUM CHLORIDE, PRESERVATIVE FREE 3 ML: 5 INJECTION INTRAVENOUS at 09:29

## 2019-04-12 RX ADMIN — LEVOTHYROXINE SODIUM 25 MCG: 25 TABLET ORAL at 08:56

## 2019-04-12 RX ADMIN — SILDENAFIL 20 MG: 20 TABLET, FILM COATED ORAL at 21:02

## 2019-04-12 RX ADMIN — INSULIN LISPRO 2 UNITS: 100 INJECTION, SOLUTION INTRAVENOUS; SUBCUTANEOUS at 13:45

## 2019-04-12 RX ADMIN — NAPROXEN 500 MG: 500 TABLET ORAL at 08:57

## 2019-04-12 RX ADMIN — IPRATROPIUM BROMIDE AND ALBUTEROL SULFATE 3 ML: 2.5; .5 SOLUTION RESPIRATORY (INHALATION) at 14:53

## 2019-04-12 RX ADMIN — ESCITALOPRAM OXALATE 15 MG: 5 TABLET, FILM COATED ORAL at 08:56

## 2019-04-12 RX ADMIN — SENNOSIDES AND DOCUSATE SODIUM 2 TABLET: 8.6; 5 TABLET ORAL at 21:02

## 2019-04-12 RX ADMIN — NYSTATIN 1 APPLICATION: 100000 POWDER TOPICAL at 21:03

## 2019-04-12 RX ADMIN — NAPROXEN 500 MG: 500 TABLET ORAL at 19:08

## 2019-04-12 RX ADMIN — INSULIN LISPRO 2 UNITS: 100 INJECTION, SOLUTION INTRAVENOUS; SUBCUTANEOUS at 09:25

## 2019-04-12 RX ADMIN — SILDENAFIL 20 MG: 20 TABLET, FILM COATED ORAL at 15:30

## 2019-04-12 RX ADMIN — BUMETANIDE 2 MG: 2 TABLET ORAL at 08:56

## 2019-04-12 RX ADMIN — IPRATROPIUM BROMIDE AND ALBUTEROL SULFATE 3 ML: 2.5; .5 SOLUTION RESPIRATORY (INHALATION) at 18:59

## 2019-04-12 RX ADMIN — INSULIN LISPRO 2 UNITS: 100 INJECTION, SOLUTION INTRAVENOUS; SUBCUTANEOUS at 19:08

## 2019-04-12 RX ADMIN — SENNOSIDES AND DOCUSATE SODIUM 2 TABLET: 8.6; 5 TABLET ORAL at 08:56

## 2019-04-12 RX ADMIN — BISOPROLOL FUMARATE 5 MG: 5 TABLET ORAL at 08:56

## 2019-04-12 RX ADMIN — INSULIN LISPRO 2 UNITS: 100 INJECTION, SOLUTION INTRAVENOUS; SUBCUTANEOUS at 20:55

## 2019-04-12 RX ADMIN — NYSTATIN 1 APPLICATION: 100000 POWDER TOPICAL at 09:26

## 2019-04-12 RX ADMIN — ACETAMINOPHEN 325 MG: 325 TABLET, FILM COATED ORAL at 15:28

## 2019-04-12 RX ADMIN — SODIUM CHLORIDE, PRESERVATIVE FREE 3 ML: 5 INJECTION INTRAVENOUS at 20:58

## 2019-04-12 RX ADMIN — SILDENAFIL 20 MG: 20 TABLET, FILM COATED ORAL at 08:56

## 2019-04-12 RX ADMIN — PANTOPRAZOLE SODIUM 40 MG: 40 TABLET, DELAYED RELEASE ORAL at 08:56

## 2019-04-12 RX ADMIN — APIXABAN 2.5 MG: 2.5 TABLET, FILM COATED ORAL at 21:02

## 2019-04-12 RX ADMIN — APIXABAN 2.5 MG: 2.5 TABLET, FILM COATED ORAL at 08:56

## 2019-04-12 RX ADMIN — IPRATROPIUM BROMIDE AND ALBUTEROL SULFATE 3 ML: 2.5; .5 SOLUTION RESPIRATORY (INHALATION) at 10:21

## 2019-04-12 NOTE — THERAPY TREATMENT NOTE
Acute Care - Physical Therapy Treatment Note  [facility]     Patient Name: Mariangel Nguyen  : 1950  MRN: [id number]  Today's Date: 2019  Onset of Illness/Injury or Date of Surgery: 19  Date of Referral to PT: 19  Referring Physician: Farzad Drew    Admit Date: 2019    Visit Dx:    ICD-10-CM ICD-9-CM   1. Difficulty walking R26.2 719.7     Patient Active Problem List   Diagnosis   • Diabetes mellitus (CMS/HCC)   • Edema   • Hyperlipidemia   • Hypertension   • Rotator cuff tear arthropathy   • Venous stasis dermatitis   • Varicose veins   • Acute on chronic systolic CHF (congestive heart failure) (CMS/HCC)   • Acute pyelonephritis   • Acute respiratory failure (CMS/HCC)   • BRODY (acute kidney injury) (CMS/HCC)   • ATN (acute tubular necrosis) (CMS/HCC)   • Benign essential HTN   • COPD (chronic obstructive pulmonary disease) (CMS/HCC)   • Goiter   • Hx of lithotripsy   • Low vitamin D level   • Lymphedema   • Neuropathy   • Omental infarction (CMS/HCC)   • KAELA and COPD overlap syndrome (CMS/HCC)   • Pulmonary HTN (CMS/HCC)   • Recurrent nephrolithiasis   • Small bowel perforation (CMS/HCC)   • Acute deep vein thrombosis (DVT) of popliteal vein of left lower extremity (CMS/HCC)   • Microcytic anemia   • Thrombocytosis (CMS/HCC)   • Iron deficiency anemia   • Encounter for screening for malignant neoplasm of colon   • Anxiety and depression   • Osteoarthritis of right knee       Therapy Treatment    Rehabilitation Treatment Summary     Row Name 19 1119             Treatment Time/Intention    Discipline  physical therapist  -EF      Document Type  therapy note (daily note)  -EF      Subjective Information  complains of;pain  -EF      Mode of Treatment  physical therapy  -EF      Patient/Family Observations  reclined in chair  -EF      Patient Effort  good  -EF      Existing Precautions/Restrictions  fall WBAT R LE  -EF      Recorded by [EF] Ritu Nicole, PT 19 1121       Row Name 04/12/19 1119             Vital Signs    O2 Delivery Pre Treatment  hi-flow  -EF      O2 Delivery Intra Treatment  hi-flow  -EF      O2 Delivery Post Treatment  hi-flow  -EF      Pre Patient Position  Sitting  -EF      Intra Patient Position  Standing  -EF      Post Patient Position  Sitting  -EF      Recorded by [EF] Ritu Nicole, PT 04/12/19 1124      Row Name 04/12/19 1119             Bed Mobility Assessment/Treatment    Comment (Bed Mobility)  not tested-up in chair  -EF      Recorded by [EF] Ritu Nicole, PT 04/12/19 1124      Row Name 04/12/19 1119             Sit-Stand Transfer    Sit-Stand [place]iken (Transfers)  minimum assist (75% patient effort);2 person assist;verbal cues  -EF      Assistive Device (Sit-Stand Transfers)  walker, front-wheeled  -EF      Recorded by [EF] Ritu Nicole, PT 04/12/19 1124      Row Name 04/12/19 1119             Stand-Sit Transfer    Stand-Sit [place] (Transfers)  minimum assist (75% patient effort);2 person assist;verbal cues  -EF      Assistive Device (Stand-Sit Transfers)  walker, front-wheeled  -EF      Recorded by [EF] Ritu Nicole, PT 04/12/19 1124      Row Name 04/12/19 1119             Gait/Stairs Assessment/Training    [place]iken Level (Gait)  minimum assist (75% patient effort);2 person assist;verbal cues  -EF      Assistive Device (Gait)  walker, front-wheeled  -EF      Distance in Feet (Gait)  20  -EF      Pattern (Gait)  step-to  -EF      Deviations/Abnormal Patterns (Gait)  right sided deviations;gait speed decreased;stride length decreased  -EF      Bilateral Gait Deviations  forward flexed posture;weight shift ability decreased  -EF      Right Sided Gait Deviations  weight shift ability decreased  -EF      Recorded by [EF] Ritu Nicole, PT 04/12/19 1124      Row Name 04/12/19 1119             Therapeutic Exercise    Comment (Therapeutic Exercise)  20-25 reps TKA protocol with assist  -EF      Recorded by [EF]  Ritu Nicole, PT 04/12/19 1124      Row Name 04/12/19 1119             Positioning and Restraints    Pre-Treatment Position  sitting in chair/recliner  -EF      Post Treatment Position  chair  -EF      In Chair  reclined;call light within reach;encouraged to call for assist;with family/caregiver  -EF      Recorded by [EF] Ritu Nicole, PT 04/12/19 1124      Row Name 04/12/19 1119             Pain Assessment    Additional Documentation  Pain Scale: Word Pre/Post-Treatment (Group)  -EF      Recorded by [EF] Ritu Nicole, PT 04/12/19 1124      Row Name 04/12/19 1119             Pain Scale: Numbers Pre/Post-Treatment    Pain Location - Side  Right  -EF      Pain Location  knee  -EF      Pain Intervention(s)  Cold applied;Repositioned;Ambulation/increased activity  -EF      Recorded by [EF] Ritu Nicole, PT 04/12/19 1124      Row Name 04/12/19 1119             Pain Scale: Word Pre/Post-Treatment    Pain: Word Scale, Pretreatment  4 - moderate pain  -EF      Pain: Word Scale, Post-Treatment  4 - moderate pain  -EF      Recorded by [EF] Ritu Nicole, PT 04/12/19 1124      Row Name                Wound 04/08/19 0742 Right knee incision    Wound - Properties Group Date first assessed: 04/08/19 [EG] Time first assessed: 0742 [EG] Side: Right [EG] Location: knee [EG] Type: incision [EG] Recorded by:  [EG] Ritu Hernandez RN 04/08/19 0742      User Key  (r) = Recorded By, (t) = Taken By, (c) = Cosigned By    Initials Name Effective Dates Discipline    EF Ritu Nicole, PT 06/08/18 -  PT    EG Ritu Hernandez RN 07/10/17 -  Nurse          Wound 04/08/19 0742 Right knee incision (Active)   Dressing Appearance dry;intact 4/12/2019  7:45 AM   Closure JENELLE 4/12/2019  7:45 AM   Base dressing in place, unable to visualize 4/12/2019  7:45 AM   Drainage Amount none 4/11/2019  8:00 PM           Physical Therapy Education     Title: PT OT SLP Therapies (Done)     Topic: Physical Therapy (Done)      Point: Mobility training (Done)     Learning Progress Summary           Patient Acceptance, E, VU,NR by  at 4/12/2019 11:24 AM    Acceptance, E,TB,D, VU,NR by  at 4/11/2019  3:59 PM    Acceptance, E,TB,D, VU,NR by  at 4/11/2019 11:51 AM    Acceptance, E,TB,D, VU,NR by  at 4/10/2019  1:34 PM    Acceptance, E,D,TB, VU,NR by  at 4/9/2019  4:34 PM    Acceptance, E,D, VU,NR by MS at 4/8/2019  1:30 PM   Family Acceptance, E, VU,NR by  at 4/12/2019 11:24 AM                   Point: Home exercise program (Done)     Learning Progress Summary           Patient Acceptance, E, VU,NR by  at 4/12/2019 11:24 AM    Acceptance, E,TB,D, VU,NR by  at 4/11/2019  3:59 PM    Acceptance, E,TB,D, VU,NR by  at 4/11/2019 11:51 AM    Acceptance, E,TB,D, VU,NR by  at 4/10/2019  3:30 PM    Acceptance, E,D,TB, VU,NR by  at 4/9/2019  4:34 PM    Acceptance, E,D, VU,NR by MS at 4/8/2019  1:30 PM   Family Acceptance, E, VU,NR by  at 4/12/2019 11:24 AM                   Point: Body mechanics (Done)     Learning Progress Summary           Patient Acceptance, E, VU,NR by  at 4/12/2019 11:24 AM    Acceptance, E,TB,D, VU,NR by  at 4/11/2019  3:59 PM    Acceptance, E,TB,D, VU,NR by  at 4/11/2019 11:51 AM    Acceptance, E,TB,D, VU,NR by  at 4/10/2019  1:34 PM    Acceptance, E,D,TB, VU,NR by  at 4/9/2019  4:34 PM    Acceptance, E,D, VU,NR by MS at 4/8/2019  1:30 PM   Family Acceptance, E, VU,NR by  at 4/12/2019 11:24 AM                   Point: Precautions (Done)     Learning Progress Summary           Patient Acceptance, E, VU,NR by  at 4/12/2019 11:24 AM    Acceptance, E,TB,D, VU,NR by CH at 4/11/2019  3:59 PM    Acceptance, E,TB,D, VU,NR by CH at 4/11/2019 11:51 AM    Acceptance, E,TB,D, VU,NR by CH at 4/10/2019  1:34 PM    Acceptance, E,D,TB, VU,NR by  at 4/9/2019  4:34 PM    Acceptance, E,D, VU,NR by MS at 4/8/2019  1:30 PM   Family Acceptance, E, VU,NR by EF at 4/12/2019 11:24 AM                                User Key     Initials Effective Dates Name Provider Type Discipline    EF 06/08/18 -  Ritu Nicole, PT Physical Therapist PT    CH 04/03/18 -  Анна Pruitt, PT Physical Therapist PT    MS 04/03/18 -  Dillon Menjivar, PT Physical Therapist PT     08/19/18 -  Vaishali Gerber PTA Physical Therapy Assistant PT                PT Recommendation and Plan     Plan of Care Reviewed With: patient  Progress: improving  Outcome Summary: Pt able to ambulate 20' with rwx & min-2, demonstrating improving ambulation & endurance.  Outcome Measures     Row Name 04/12/19 1100 04/11/19 1500 04/11/19 1100       How much help from another person do you currently need...    Turning from your back to your side while in flat bed without using bedrails?  2  -EF  2  -CH  2  -CH    Moving from lying on back to sitting on the side of a flat bed without bedrails?  2  -EF  2  -CH  2  -CH    Moving to and from a bed to a chair (including a wheelchair)?  3  -EF  3  -CH  2  -CH    Standing up from a chair using your arms (e.g., wheelchair, bedside chair)?  3  -EF  3  -CH  2  -CH    Climbing 3-5 steps with a railing?  1  -EF  1  -CH  1  -CH    To walk in hospital room?  2  -EF  2  -CH  2  -CH    AM-PAC 6 Clicks Score  13  -EF  13  -CH  11  -CH       Functional Assessment    Outcome Measure Options  AM-PAC 6 Clicks Basic Mobility (PT)  -EF  AM-PAC 6 Clicks Basic Mobility (PT)  -CH  AM-PAC 6 Clicks Basic Mobility (PT)  -CH    Row Name 04/10/19 1300 04/09/19 1600          How much help from another person do you currently need...    Turning from your back to your side while in flat bed without using bedrails?  3  -CH  3  -EH     Moving from lying on back to sitting on the side of a flat bed without bedrails?  3  -CH  3  -EH     Moving to and from a bed to a chair (including a wheelchair)?  3  -CH  3  -EH     Standing up from a chair using your arms (e.g., wheelchair, bedside chair)?  3  -CH  3  -EH     Climbing 3-5 steps with a railing?   2  -  2  -     To walk in hospital room?  3  -  3  -EH     AM-PAC 6 Clicks Score  17  -  17  -        Functional Assessment    Outcome Measure Options  AM-PAC 6 Clicks Basic Mobility (PT)  -  --       User Key  (r) = Recorded By, (t) = Taken By, (c) = Cosigned By    Initials Name Provider Type    Ritu Castillo, PT Physical Therapist    CH Анна Pruitt, PT Physical Therapist     Vaishali Gerber, NICOLE Physical Therapy Assistant         Time Calculation:   PT Charges     Row Name 04/12/19 1126             Time Calculation    Start Time  1056  -EF      Stop Time  1117  -EF      Time Calculation (min)  21 min  -EF      PT Received On  04/12/19  -EF         Time Calculation- PT    Total Timed Code Minutes- PT  21 minute(s)  -EF        User Key  (r) = Recorded By, (t) = Taken By, (c) = Cosigned By    Initials Name Provider Type    Ritu Castillo, PT Physical Therapist        Therapy Charges for Today     Code Description Service Date Service Provider Modifiers Qty    73954412569 HC PT THER PROC EA 15 MIN 4/12/2019 Ritu Nicole, PT GP 1    76519942935 HC PT THER SUPP EA 15 MIN 4/12/2019 Ritu Nicole, PT GP 1          PT G-Codes  Outcome Measure Options: AM-PAC 6 Clicks Basic Mobility (PT)  AM-PAC 6 Clicks Score: 13    Ritu Nicole, PT  4/12/2019

## 2019-04-12 NOTE — PROGRESS NOTES
[facility]      Mar/chart reviewed  F/u chronic respiratory failure  Off/on bipap  No new complaints    Vital Sign Min/Max for last 24 hours  Temp  Min: 97.6 °F (36.4 °C)  Max: 97.9 °F (36.6 °C)   BP  Min: 102/56  Max: 152/72   Pulse  Min: 55  Max: 75   No Data Recorded   SpO2  Min: 88 %  Max: 99 %   Flow (L/min)  Min: 8  Max: 10   No Data Recorded     Nad, axox3,   perrl, eomi, no icterus,  mmm, no jvd, trachea midline, neck supple,  Chest decreased bilaterally, no crackles, no wheezes,   rrr,   soft, nt, nd +bs,  no c/c/ trace edema     Labs:  hgb 10.7    Ct angio: atelectasis      A/P:  1. Pulmonary Hypertension - uncertain WHO Group.  Would continue her home revatio and bumex for now.  Suggest outpatient [facility]  2. Chronic hypoxemic respiratory failure -- continue oxygen, pulmonary toilet as able -- keep oxygen 88-92%  3. KAELA -- continue bipap at HS and prn; avoid hyperoxia and excessive narcotics.   4. Chronic diastolic chf  5. Obesity --   6. Atelectasis.    Ok to transfer out.  She may continue to require a good deal of oxygen will have to be aggressive about pulm toilet, add ez pap

## 2019-04-12 NOTE — PROGRESS NOTES
Continued Stay Note  [facility]     Patient Name: Mariangel Nguyen  MRN: [id number]  Today's Date: 4/12/2019    Admit Date: 4/8/2019    Discharge Plan     Row Name 04/12/19 1629       Plan    Plan  [facility]    Plan Comments  Spoke with Jamila/ [facility] and she will accept patient if O2 requirement continues to improve, reports she will be on call over the weekend, if patient appears ready for discharge please call. ALVINO Caban        Discharge Codes    No documentation.             Christa Smith RN

## 2019-04-12 NOTE — PLAN OF CARE
Problem: Patient Care Overview  Goal: Plan of Care Review  Outcome: Ongoing (interventions implemented as appropriate)   04/12/19 1638   Coping/Psychosocial   Plan of Care Reviewed With patient   OTHER   Outcome Summary Increased pain this afternoon during exercise & weight bearing.

## 2019-04-12 NOTE — THERAPY TREATMENT NOTE
Acute Care - Physical Therapy Treatment Note  [facility]     Patient Name: Mariangel Nguyen  : 1950  MRN: [id number]  Today's Date: 2019  Onset of Illness/Injury or Date of Surgery: 19  Date of Referral to PT: 19  Referring Physician: Farzad Drew    Admit Date: 2019    Visit Dx:    ICD-10-CM ICD-9-CM   1. Difficulty walking R26.2 719.7     Patient Active Problem List   Diagnosis   • Diabetes mellitus (CMS/HCC)   • Edema   • Hyperlipidemia   • Hypertension   • Rotator cuff tear arthropathy   • Venous stasis dermatitis   • Varicose veins   • Acute on chronic systolic CHF (congestive heart failure) (CMS/HCC)   • Acute pyelonephritis   • Acute respiratory failure (CMS/HCC)   • BRODY (acute kidney injury) (CMS/HCC)   • ATN (acute tubular necrosis) (CMS/HCC)   • Benign essential HTN   • COPD (chronic obstructive pulmonary disease) (CMS/HCC)   • Goiter   • Hx of lithotripsy   • Low vitamin D level   • Lymphedema   • Neuropathy   • Omental infarction (CMS/HCC)   • KAELA and COPD overlap syndrome (CMS/HCC)   • Pulmonary HTN (CMS/HCC)   • Recurrent nephrolithiasis   • Small bowel perforation (CMS/HCC)   • Acute deep vein thrombosis (DVT) of popliteal vein of left lower extremity (CMS/HCC)   • Microcytic anemia   • Thrombocytosis (CMS/HCC)   • Iron deficiency anemia   • Encounter for screening for malignant neoplasm of colon   • Anxiety and depression   • Osteoarthritis of right knee       Therapy Treatment    Rehabilitation Treatment Summary     Row Name 19 1628 19 1119          Treatment Time/Intention    Discipline  physical therapist  -EF  physical therapist  -EF     Document Type  therapy note (daily note)  -EF  therapy note (daily note)  -EF     Subjective Information  complains of;pain  -EF  complains of;pain  -EF     Mode of Treatment  physical therapy  -EF  physical therapy  -EF     Patient/Family Observations  reclined in chair, sleeping but woke easily  -EF  reclined in  chair  -EF     Patient Effort  good  -EF  good  -EF     Existing Precautions/Restrictions  fall WBAT R LE  -EF  fall WBAT R LE  -EF     Recorded by [EF] Ritu Nicole, PT 04/12/19 1631 [EF] Ritu Nicole, PT 04/12/19 1124     Row Name 04/12/19 1119             Vital Signs    O2 Delivery Pre Treatment  hi-flow  -EF      O2 Delivery Intra Treatment  hi-flow  -EF      O2 Delivery Post Treatment  hi-flow  -EF      Pre Patient Position  Sitting  -EF      Intra Patient Position  Standing  -EF      Post Patient Position  Sitting  -EF      Recorded by [EF] Ritu Nicole, PT 04/12/19 1124      Row Name 04/12/19 1628 04/12/19 1119          Bed Mobility Assessment/Treatment    Comment (Bed Mobility)  not tested-up in chair  -EF  not tested-up in chair  -EF     Recorded by [EF] Ritu Nicole, PT 04/12/19 1631 [EF] Ritu Nicole, PT 04/12/19 1124     Row Name 04/12/19 1628 04/12/19 1119          Sit-Stand Transfer    Sit-Stand Holmes (Transfers)  moderate assist (50% patient effort);verbal cues  -EF  minimum assist (75% patient effort);2 person assist;verbal cues  -EF     Assistive Device (Sit-Stand Transfers)  walker, front-wheeled  -EF  walker, front-wheeled  -EF     Recorded by [EF] Ritu Nicole, PT 04/12/19 1631 [EF] Ritu Nicole, PT 04/12/19 1124     Row Name 04/12/19 1628 04/12/19 1119          Stand-Sit Transfer    Stand-Sit Holmes (Transfers)  minimum assist (75% patient effort);verbal cues  -EF  minimum assist (75% patient effort);2 person assist;verbal cues  -EF     Assistive Device (Stand-Sit Transfers)  walker, front-wheeled  -EF  walker, front-wheeled  -EF     Recorded by [EF] Ritu Nicole, PT 04/12/19 1631 [EF] Ritu Nicole, PT 04/12/19 1124     Row Name 04/12/19 1628 04/12/19 1119          Gait/Stairs Assessment/Training    Holmes Level (Gait)  minimum assist (75% patient effort);moderate assist (50% patient effort)  -EF  minimum assist (75%  patient effort);2 person assist;verbal cues  -EF     Assistive Device (Gait)  walker, front-wheeled  -EF  walker, front-wheeled  -EF     Distance in Feet (Gait)  6  -EF  20  -EF     Pattern (Gait)  step-to  -EF  step-to  -EF     Deviations/Abnormal Patterns (Gait)  right sided deviations;gait speed decreased;stride length decreased  -EF  right sided deviations;gait speed decreased;stride length decreased  -EF     Bilateral Gait Deviations  forward flexed posture;weight shift ability decreased  -EF  forward flexed posture;weight shift ability decreased  -EF     Right Sided Gait Deviations  weight shift ability decreased  -EF  weight shift ability decreased  -EF     Recorded by [EF] Ritu Nicole, PT 04/12/19 1631 [EF] Ritu Nicole, PT 04/12/19 1124     Row Name 04/12/19 1628 04/12/19 1119          Therapeutic Exercise    Comment (Therapeutic Exercise)  20-25 reps TKA protocol with assist  -EF  20-25 reps TKA protocol with assist  -EF     Recorded by [EF] Ritu Nicole, PT 04/12/19 1631 [EF] Ritu Nicole, PT 04/12/19 1124     Row Name 04/12/19 1628 04/12/19 1119          Positioning and Restraints    Pre-Treatment Position  sitting in chair/recliner  -EF  sitting in chair/recliner  -EF     Post Treatment Position  chair  -EF  chair  -EF     In Chair  notified nsg;reclined;call light within reach;encouraged to call for assist  -EF  reclined;call light within reach;encouraged to call for assist;with family/caregiver  -EF     Recorded by [EF] Ritu Nicole, PT 04/12/19 1631 [EF] Riut Nicole, PT 04/12/19 1124     Row Name 04/12/19 1119             Pain Assessment    Additional Documentation  Pain Scale: Word Pre/Post-Treatment (Group)  -EF      Recorded by [EF] Ritu Nicole, PT 04/12/19 1124      Row Name 04/12/19 1628 04/12/19 1119          Pain Scale: Numbers Pre/Post-Treatment    Pain Location - Side  Right  -EF  Right  -EF     Pain Location  knee  -EF  knee  -EF     Pain  Intervention(s)  Repositioned;Ambulation/increased activity;Cold applied  -EF  Cold applied;Repositioned;Ambulation/increased activity  -EF     Recorded by [EF] Ritu Nicole, PT 04/12/19 1631 [EF] Ritu Nicole, PT 04/12/19 1124     Row Name 04/12/19 1628 04/12/19 1119          Pain Scale: Word Pre/Post-Treatment    Pain: Word Scale, Pretreatment  4 - moderate pain  -EF  4 - moderate pain  -EF     Pain: Word Scale, Post-Treatment  4 - moderate pain  -EF  4 - moderate pain  -EF     Pre/Post Treatment Pain Comment  increased during exercise & ambulation  -EF  --     Recorded by [EF] Ritu Nicole, PT 04/12/19 1631 [EF] Ritu Nicole, PT 04/12/19 1124     Row Name                Wound 04/08/19 0742 Right knee incision    Wound - Properties Group Date first assessed: 04/08/19 [EG] Time first assessed: 0742 [EG] Side: Right [EG] Location: knee [EG] Type: incision [EG] Recorded by:  [EG] Ritu Hernandez RN 04/08/19 0742      User Key  (r) = Recorded By, (t) = Taken By, (c) = Cosigned By    Initials Name Effective Dates Discipline    EF Ritu Nicole, PT 06/08/18 -  PT    EG Ritu Hernandez, RN 07/10/17 -  Nurse          Wound 04/08/19 0742 Right knee incision (Active)   Dressing Appearance dry;intact 4/12/2019  2:05 PM   Closure JENELLE 4/12/2019  2:05 PM   Base dressing in place, unable to visualize 4/12/2019  2:05 PM   Drainage Amount none 4/11/2019  8:00 PM           Physical Therapy Education     Title: PT OT SLP Therapies (Done)     Topic: Physical Therapy (Done)     Point: Mobility training (Done)     Learning Progress Summary           Patient Acceptance, E, VU,NR by  at 4/12/2019 11:24 AM    Acceptance, E,TB,D, VU,NR by  at 4/11/2019  3:59 PM    Acceptance, E,TB,D, VU,NR by  at 4/11/2019 11:51 AM    Acceptance, E,TB,D, VU,NR by  at 4/10/2019  1:34 PM    Acceptance, RAUL MEDINA TB, VU,NR by  at 4/9/2019  4:34 PM    Acceptance, RAUL MEDINA VU,NR by MS at 4/8/2019  1:30 PM   Family Acceptance,  E, VU,NR by  at 4/12/2019 11:24 AM                   Point: Home exercise program (Done)     Learning Progress Summary           Patient Acceptance, E, VU,NR by  at 4/12/2019 11:24 AM    Acceptance, E,TB,D, VU,NR by  at 4/11/2019  3:59 PM    Acceptance, E,TB,D, VU,NR by  at 4/11/2019 11:51 AM    Acceptance, E,TB,D, VU,NR by  at 4/10/2019  3:30 PM    Acceptance, E,D,TB, VU,NR by  at 4/9/2019  4:34 PM    Acceptance, E,D, VU,NR by MS at 4/8/2019  1:30 PM   Family Acceptance, E, VU,NR by  at 4/12/2019 11:24 AM                   Point: Body mechanics (Done)     Learning Progress Summary           Patient Acceptance, E, VU,NR by  at 4/12/2019 11:24 AM    Acceptance, E,TB,D, VU,NR by  at 4/11/2019  3:59 PM    Acceptance, E,TB,D, VU,NR by  at 4/11/2019 11:51 AM    Acceptance, E,TB,D, VU,NR by  at 4/10/2019  1:34 PM    Acceptance, E,D,TB, VU,NR by  at 4/9/2019  4:34 PM    Acceptance, E,D, VU,NR by MS at 4/8/2019  1:30 PM   Family Acceptance, E, VU,NR by  at 4/12/2019 11:24 AM                   Point: Precautions (Done)     Learning Progress Summary           Patient Acceptance, E, VU,NR by  at 4/12/2019 11:24 AM    Acceptance, E,TB,D, VU,NR by  at 4/11/2019  3:59 PM    Acceptance, E,TB,D, VU,NR by  at 4/11/2019 11:51 AM    Acceptance, E,TB,D, VU,NR by  at 4/10/2019  1:34 PM    Acceptance, E,D,TB, VU,NR by  at 4/9/2019  4:34 PM    Acceptance, E,D, VU,NR by MS at 4/8/2019  1:30 PM   Family Acceptance, E, VU,NR by  at 4/12/2019 11:24 AM                               User Key     Initials Effective Dates Name Provider Type Discipline    EF 06/08/18 -  Ritu Nicole, PT Physical Therapist PT    CH 04/03/18 -  Анна Pruitt, PT Physical Therapist PT    MS 04/03/18 -  Dillon Menjivar, PT Physical Therapist PT     08/19/18 -  Vaishali Gerber PTA Physical Therapy Assistant PT                PT Recommendation and Plan     Plan of Care Reviewed With: patient  Progress:  improving  Outcome Summary: Increased pain this afternoon during exercise & weight bearing.  Outcome Measures     Row Name 04/12/19 1600 04/12/19 1100 04/11/19 1500       How much help from another person do you currently need...    Turning from your back to your side while in flat bed without using bedrails?  2  -EF  2  -EF  2  -CH    Moving from lying on back to sitting on the side of a flat bed without bedrails?  2  -EF  2  -EF  2  -CH    Moving to and from a bed to a chair (including a wheelchair)?  3  -EF  3  -EF  3  -CH    Standing up from a chair using your arms (e.g., wheelchair, bedside chair)?  3  -EF  3  -EF  3  -CH    Climbing 3-5 steps with a railing?  1  -EF  1  -EF  1  -CH    To walk in hospital room?  2  -EF  2  -EF  2  -CH    AM-PAC 6 Clicks Score  13  -EF  13  -EF  13  -CH       Functional Assessment    Outcome Measure Options  AM-PAC 6 Clicks Basic Mobility (PT)  -EF  AM-PAC 6 Clicks Basic Mobility (PT)  -EF  AM-PAC 6 Clicks Basic Mobility (PT)  -CH    Row Name 04/11/19 1100 04/10/19 1300          How much help from another person do you currently need...    Turning from your back to your side while in flat bed without using bedrails?  2  -CH  3  -CH     Moving from lying on back to sitting on the side of a flat bed without bedrails?  2  -CH  3  -CH     Moving to and from a bed to a chair (including a wheelchair)?  2  -CH  3  -CH     Standing up from a chair using your arms (e.g., wheelchair, bedside chair)?  2  -CH  3  -CH     Climbing 3-5 steps with a railing?  1  -CH  2  -CH     To walk in hospital room?  2  -CH  3  -CH     AM-PAC 6 Clicks Score  11  -CH  17  -CH        Functional Assessment    Outcome Measure Options  AM-PAC 6 Clicks Basic Mobility (PT)  -CH  AM-PAC 6 Clicks Basic Mobility (PT)  -CH       User Key  (r) = Recorded By, (t) = Taken By, (c) = Cosigned By    Initials Name Provider Type    EF Ritu Nicole, PT Physical Therapist    CH Анна Pruitt, PT Physical Therapist          Time Calculation:   PT Charges     Row Name 04/12/19 1632 04/12/19 1126          Time Calculation    Start Time  1611  -EF  1056  -EF     Stop Time  1626  -EF  1117  -EF     Time Calculation (min)  15 min  -EF  21 min  -EF     PT Received On  04/12/19  -EF  04/12/19  -EF     PT - Next Appointment  04/13/19  -EF  --        Time Calculation- PT    Total Timed Code Minutes- PT  5 minute(s)  -EF  21 minute(s)  -EF       User Key  (r) = Recorded By, (t) = Taken By, (c) = Cosigned By    Initials Name Provider Type    EF Ritu Nicole, PT Physical Therapist        Therapy Charges for Today     Code Description Service Date Service Provider Modifiers Qty    93334049179 HC PT THER PROC EA 15 MIN 4/12/2019 Ritu Nicole, PT GP 1    34494068653 HC PT THER SUPP EA 15 MIN 4/12/2019 Ritu Nicole, PT GP 1    74464338256 HC PT THER PROC EA 15 MIN 4/12/2019 Ritu Nicole, PT GP 1          PT G-Codes  Outcome Measure Options: AM-PAC 6 Clicks Basic Mobility (PT)  AM-PAC 6 Clicks Score: 13    Ritu Nicole PT  4/12/2019

## 2019-04-12 NOTE — PROGRESS NOTES
Procedure(s):  RIGHT TOTAL KNEE ARTHROPLASTY     LOS: 4 days     Subjective :   No new complaints.    Objective :    Vital signs in last 24 hours:  Vitals:    04/12/19 0405 04/12/19 0505 04/12/19 0605 04/12/19 0641   BP: 132/73 132/67 124/73    Pulse: 69 58 66    Resp:       Temp:       TempSrc:       SpO2: 92% 96% 95% 96%   Weight:       Height:           PHYSICAL EXAM:  Patient is calm, in no acute distress, awake and oriented x 3.  Dressing is clean, dry and intact.  No signs of infection.  Swelling is appropriate in amount.  Ecchymosis is appropriate in amount.  Homans test is negative.  Patient is neurovascularly intact distally.    LABS:  Results from last 7 days   Lab Units 04/11/19  0726   WBC 10*3/mm3 9.63   HEMOGLOBIN g/dL 10.5*   HEMATOCRIT % 36.6   PLATELETS 10*3/mm3 332     Results from last 7 days   Lab Units 04/11/19  0726   SODIUM mmol/L 136   POTASSIUM mmol/L 4.1   CHLORIDE mmol/L 94*   CO2 mmol/L 33.4*   BUN mg/dL 21   CREATININE mg/dL 0.84   GLUCOSE mg/dL 137*   CALCIUM mg/dL 8.5*         CT chest negative for PE    ASSESSMENT:  Status post Procedure(s):  RIGHT TOTAL KNEE ARTHROPLASTY   Improved mobility with P.T.  Continued High supplemental oxygen demands    Plan:  Continue Physical Therapy, increase mobility and range of motion as tolerated.  Continue SCDs, Continue Eliquis for DVT prophylaxis.    Dispo planning for nursing home when medically stable.  Possible transfer to floor today, await Dr. Hardy evaluation.      Farzad Luong MD    Date: 4/12/2019  Time: 6:52 AM

## 2019-04-12 NOTE — PLAN OF CARE
Problem: Patient Care Overview  Goal: Plan of Care Review  Outcome: Ongoing (interventions implemented as appropriate)   04/12/19 0703   OTHER   Outcome Summary uses call light for assistance, no co discomfort in leg unless moved, ppp, drsg dry and intact, no bleeding, remains ion bipap at night, 8-10 l hiflo when awake,        Problem: Fall Risk (Adult)  Goal: Absence of Fall  Outcome: Ongoing (interventions implemented as appropriate)      Problem: Knee Arthroplasty (Total, Partial) (Adult)  Goal: Signs and Symptoms of Listed Potential Problems Will be Absent, Minimized or Managed (Knee Arthroplasty)  Outcome: Ongoing (interventions implemented as appropriate)      Problem: Chronic Obstructive Pulmonary Disease (Adult)  Goal: Signs and Symptoms of Listed Potential Problems Will be Absent, Minimized or Managed (Chronic Obstructive Pulmonary Disease)  Outcome: Ongoing (interventions implemented as appropriate)      Problem: Skin Injury Risk (Adult)  Goal: Skin Health and Integrity  Outcome: Ongoing (interventions implemented as appropriate)

## 2019-04-12 NOTE — PLAN OF CARE
Problem: Patient Care Overview  Goal: Plan of Care Review  Outcome: Ongoing (interventions implemented as appropriate)   04/12/19 1125   Coping/Psychosocial   Plan of Care Reviewed With patient   OTHER   Outcome Summary Pt able to ambulate 20' with rwx & min-2, demonstrating improving ambulation & endurance.   Plan of Care Review   Progress improving

## 2019-04-13 LAB
GLUCOSE BLDC GLUCOMTR-MCNC: 127 MG/DL (ref 70–130)
GLUCOSE BLDC GLUCOMTR-MCNC: 164 MG/DL (ref 70–130)
GLUCOSE BLDC GLUCOMTR-MCNC: 168 MG/DL (ref 70–130)
HCT VFR BLD AUTO: 35.4 % (ref 34–46.6)
HGB BLD-MCNC: 10.2 G/DL (ref 12–15.9)

## 2019-04-13 PROCEDURE — 94799 UNLISTED PULMONARY SVC/PX: CPT

## 2019-04-13 PROCEDURE — 85018 HEMOGLOBIN: CPT | Performed by: ORTHOPAEDIC SURGERY

## 2019-04-13 PROCEDURE — 82962 GLUCOSE BLOOD TEST: CPT

## 2019-04-13 PROCEDURE — 94660 CPAP INITIATION&MGMT: CPT

## 2019-04-13 PROCEDURE — 63710000001 INSULIN LISPRO (HUMAN) PER 5 UNITS: Performed by: ORTHOPAEDIC SURGERY

## 2019-04-13 PROCEDURE — 97110 THERAPEUTIC EXERCISES: CPT

## 2019-04-13 PROCEDURE — 85014 HEMATOCRIT: CPT | Performed by: ORTHOPAEDIC SURGERY

## 2019-04-13 RX ADMIN — BISOPROLOL FUMARATE 5 MG: 5 TABLET ORAL at 08:42

## 2019-04-13 RX ADMIN — INSULIN LISPRO 2 UNITS: 100 INJECTION, SOLUTION INTRAVENOUS; SUBCUTANEOUS at 12:29

## 2019-04-13 RX ADMIN — PRAVASTATIN SODIUM 40 MG: 20 TABLET ORAL at 21:36

## 2019-04-13 RX ADMIN — IPRATROPIUM BROMIDE AND ALBUTEROL SULFATE 3 ML: 2.5; .5 SOLUTION RESPIRATORY (INHALATION) at 09:09

## 2019-04-13 RX ADMIN — BUMETANIDE 2 MG: 2 TABLET ORAL at 08:43

## 2019-04-13 RX ADMIN — SILDENAFIL 20 MG: 20 TABLET, FILM COATED ORAL at 21:36

## 2019-04-13 RX ADMIN — ESCITALOPRAM OXALATE 15 MG: 5 TABLET, FILM COATED ORAL at 08:42

## 2019-04-13 RX ADMIN — APIXABAN 2.5 MG: 2.5 TABLET, FILM COATED ORAL at 08:42

## 2019-04-13 RX ADMIN — ACETAMINOPHEN 650 MG: 325 TABLET, FILM COATED ORAL at 06:56

## 2019-04-13 RX ADMIN — INSULIN LISPRO 2 UNITS: 100 INJECTION, SOLUTION INTRAVENOUS; SUBCUTANEOUS at 08:42

## 2019-04-13 RX ADMIN — SENNOSIDES AND DOCUSATE SODIUM 2 TABLET: 8.6; 5 TABLET ORAL at 08:42

## 2019-04-13 RX ADMIN — PANTOPRAZOLE SODIUM 40 MG: 40 TABLET, DELAYED RELEASE ORAL at 08:42

## 2019-04-13 RX ADMIN — METFORMIN HYDROCHLORIDE 1000 MG: 1000 TABLET ORAL at 17:19

## 2019-04-13 RX ADMIN — SODIUM CHLORIDE, PRESERVATIVE FREE 3 ML: 5 INJECTION INTRAVENOUS at 08:43

## 2019-04-13 RX ADMIN — SILDENAFIL 20 MG: 20 TABLET, FILM COATED ORAL at 08:43

## 2019-04-13 RX ADMIN — INSULIN LISPRO 2 UNITS: 100 INJECTION, SOLUTION INTRAVENOUS; SUBCUTANEOUS at 17:19

## 2019-04-13 RX ADMIN — SENNOSIDES AND DOCUSATE SODIUM 2 TABLET: 8.6; 5 TABLET ORAL at 21:36

## 2019-04-13 RX ADMIN — NAPROXEN 500 MG: 500 TABLET ORAL at 08:42

## 2019-04-13 RX ADMIN — NYSTATIN: 100000 POWDER TOPICAL at 21:48

## 2019-04-13 RX ADMIN — APIXABAN 2.5 MG: 2.5 TABLET, FILM COATED ORAL at 21:36

## 2019-04-13 RX ADMIN — NYSTATIN: 100000 POWDER TOPICAL at 12:29

## 2019-04-13 RX ADMIN — IPRATROPIUM BROMIDE AND ALBUTEROL SULFATE 3 ML: 2.5; .5 SOLUTION RESPIRATORY (INHALATION) at 15:49

## 2019-04-13 RX ADMIN — NAPROXEN 500 MG: 500 TABLET ORAL at 17:19

## 2019-04-13 RX ADMIN — LEVOTHYROXINE SODIUM 25 MCG: 25 TABLET ORAL at 08:42

## 2019-04-13 RX ADMIN — SODIUM CHLORIDE, PRESERVATIVE FREE 3 ML: 5 INJECTION INTRAVENOUS at 21:48

## 2019-04-13 RX ADMIN — SILDENAFIL 20 MG: 20 TABLET, FILM COATED ORAL at 17:19

## 2019-04-13 RX ADMIN — IPRATROPIUM BROMIDE AND ALBUTEROL SULFATE 3 ML: 2.5; .5 SOLUTION RESPIRATORY (INHALATION) at 19:51

## 2019-04-13 NOTE — PLAN OF CARE
Problem: Patient Care Overview  Goal: Plan of Care Review  Outcome: Ongoing (interventions implemented as appropriate)   04/13/19 1029   Coping/Psychosocial   Plan of Care Reviewed With patient   OTHER   Outcome Summary Pt walked to the door and then to the chair where she did several exercises with min-modA functional mobility.

## 2019-04-13 NOTE — PROGRESS NOTES
Procedure(s):  RIGHT TOTAL KNEE ARTHROPLASTY     LOS: 5 days     Subjective :   Complains of pain though controlled. Continues to improve.     Objective :    Vital signs in last 24 hours:  Vitals:    04/13/19 0329 04/13/19 0728 04/13/19 0909 04/13/19 0913   BP: 134/74 143/82     BP Location: Right arm Left arm     Patient Position: Lying Lying     Pulse: 64 71 60    Resp: 18 16 16 16   Temp: 97.3 °F (36.3 °C) 97.4 °F (36.3 °C)     TempSrc: Oral Oral     SpO2: 90% 95% 94%    Weight:       Height:           PHYSICAL EXAM:  Patient is calm, in no acute distress, awake and oriented x 3.  Dressing is clean, dry and intact.  No signs of infection.  Swelling is appropriate in amount.  Ecchymosis is appropriate in amount.  Homans test is negative.  Patient is neurovascularly intact distally.    LABS:  Results from last 7 days   Lab Units 04/13/19  0538  04/11/19  0726   WBC 10*3/mm3  --   --  9.63   HEMOGLOBIN g/dL 10.2*   < > 10.5*   HEMATOCRIT % 35.4   < > 36.6   PLATELETS 10*3/mm3  --   --  332    < > = values in this interval not displayed.     Results from last 7 days   Lab Units 04/11/19  0726   SODIUM mmol/L 136   POTASSIUM mmol/L 4.1   CHLORIDE mmol/L 94*   CO2 mmol/L 33.4*   BUN mg/dL 21   CREATININE mg/dL 0.84   GLUCOSE mg/dL 137*   CALCIUM mg/dL 8.5*             ASSESSMENT:  Status post Procedure(s):  RIGHT TOTAL KNEE ARTHROPLASTY      Plan:  Continue Physical Therapy, increase mobility and range of motion as tolerated.  Continue SCDs, Continue  Eliquis.  Dispo planning for nursing home when medically stable.     Dejah Denise PA-C    Date: 4/13/2019  Time: 9:38 AM

## 2019-04-13 NOTE — THERAPY TREATMENT NOTE
Acute Care - Physical Therapy Treatment Note  [facility]     Patient Name: Mariangel Nguyen  : 1950  MRN: [id number]  Today's Date: 2019  Onset of Illness/Injury or Date of Surgery: 19  Date of Referral to PT: 19  Referring Physician: Farzad Drew    Admit Date: 2019    Visit Dx:    ICD-10-CM ICD-9-CM   1. Difficulty walking R26.2 719.7     Patient Active Problem List   Diagnosis   • Diabetes mellitus (CMS/HCC)   • Edema   • Hyperlipidemia   • Hypertension   • Rotator cuff tear arthropathy   • Venous stasis dermatitis   • Varicose veins   • Acute on chronic systolic CHF (congestive heart failure) (CMS/HCC)   • Acute pyelonephritis   • Acute respiratory failure (CMS/HCC)   • BRODY (acute kidney injury) (CMS/HCC)   • ATN (acute tubular necrosis) (CMS/HCC)   • Benign essential HTN   • COPD (chronic obstructive pulmonary disease) (CMS/HCC)   • Goiter   • Hx of lithotripsy   • Low vitamin D level   • Lymphedema   • Neuropathy   • Omental infarction (CMS/HCC)   • KAELA and COPD overlap syndrome (CMS/HCC)   • Pulmonary HTN (CMS/HCC)   • Recurrent nephrolithiasis   • Small bowel perforation (CMS/HCC)   • Acute deep vein thrombosis (DVT) of popliteal vein of left lower extremity (CMS/HCC)   • Microcytic anemia   • Thrombocytosis (CMS/HCC)   • Iron deficiency anemia   • Encounter for screening for malignant neoplasm of colon   • Anxiety and depression   • Osteoarthritis of right knee       Therapy Treatment    Rehabilitation Treatment Summary     Row Name 19 0943             Treatment Time/Intention    Discipline  physical therapist  -CS      Document Type  therapy note (daily note)  -CS      Subjective Information  complains of;pain  -CS      Mode of Treatment  physical therapy  -CS      Therapy Frequency (PT Clinical Impression)  2 times/day  -CS      Patient Effort  good  -CS      Existing Precautions/Restrictions  fall  -CS      Recorded by [CS] Sixto Irvin, PT 19 2770       Row Name 04/13/19 0943             Vital Signs    O2 Delivery Pre Treatment  supplemental O2  -CS      O2 Delivery Intra Treatment  supplemental O2  -CS      O2 Delivery Post Treatment  supplemental O2  -CS      Recorded by [CS] Sixto Irvin, PT 04/13/19 0945      Row Name 04/13/19 0943             Cognitive Assessment/Intervention- PT/OT    Orientation Status (Cognition)  oriented x 3  -CS      Follows Commands (Cognition)  WFL  -CS      Personal Safety Interventions  fall prevention program maintained;gait belt;nonskid shoes/slippers when out of bed;supervised activity  -CS      Recorded by [CS] Sixto Irvin, PT 04/13/19 0945      Row Name 04/13/19 0943             Mobility Assessment/Intervention    Extremity Weight-bearing Status  right lower extremity  -CS      Right Lower Extremity (Weight-bearing Status)  weight-bearing as tolerated (WBAT)  -CS      Recorded by [CS] Sixto Irvin, PT 04/13/19 0945      Row Name 04/13/19 0943             Bed Mobility Assessment/Treatment    Bed Mobility Assessment/Treatment  supine-sit  -CS      Supine-Sit [place] (Bed Mobility)  minimum assist (75% patient effort)  -CS      Recorded by [CS] Sixto Irvin, PT 04/13/19 0945      Row Name 04/13/19 0943             Transfer Assessment/Treatment    Transfer Assessment/Treatment  sit-stand transfer;stand-sit transfer  -CS      Recorded by [CS] Sixto Irvin, PT 04/13/19 0945      Row Name 04/13/19 0943             Sit-Stand Transfer    Sit-Stand [place] (Transfers)  minimum assist (75% patient effort)  -CS      Assistive Device (Sit-Stand Transfers)  walker, front-wheeled  -CS      Recorded by [CS] Sixto Irvin, PT 04/13/19 0945      Row Name 04/13/19 0943             Stand-Sit Transfer    Stand-Sit [place] (Transfers)  minimum assist (75% patient effort);verbal cues  -CS      Assistive Device (Stand-Sit Transfers)  walker, front-wheeled  -CS      Recorded by [CS] Sixto Irvin, PT 04/13/19  0945      Row Name 04/13/19 0943             Gait/Stairs Assessment/Training    [place] Level (Gait)  minimum assist (75% patient effort)  -CS      Assistive Device (Gait)  walker, front-wheeled  -CS      Distance in Feet (Gait)  25  -CS      Pattern (Gait)  step-to  -CS      Deviations/Abnormal Patterns (Gait)  right sided deviations;gait speed decreased;stride length decreased  -CS      Bilateral Gait Deviations  forward flexed posture;weight shift ability decreased  -CS      Right Sided Gait Deviations  weight shift ability decreased  -CS      Recorded by [CS] Sixto Irvin, PT 04/13/19 0945      Row Name 04/13/19 0943             Therapeutic Exercise    Comment (Therapeutic Exercise)  TKA protocol x15  -CS      Recorded by [CS] Sixto Irvin, PT 04/13/19 1029      Row Name 04/13/19 0943             Positioning and Restraints    Pre-Treatment Position  in bed  -CS      Post Treatment Position  chair  -CS      In Chair  reclined;call light within reach;encouraged to call for assist;with nsg  -CS      Recorded by [CS] Sixto Irvin, PT 04/13/19 1029      Row Name 04/13/19 0943             Pain Scale: Numbers Pre/Post-Treatment    Pain Location  knee  -CS      Pain Intervention(s)  Repositioned;Ambulation/increased activity  -CS      Recorded by [CS] Sixto Irvin, PT 04/13/19 1029      Row Name 04/13/19 0943             Pain Scale: Word Pre/Post-Treatment    Pain: Word Scale, Pretreatment  4 - moderate pain  -CS      Pain: Word Scale, Post-Treatment  4 - moderate pain  -CS      Recorded by [CS] Sixto Irvin, PT 04/13/19 1029      Row Name                Wound 04/08/19 0742 Right knee incision    Wound - Properties Group Date first assessed: 04/08/19 [EG] Time first assessed: 0742 [EG] Side: Right [EG] Location: knee [EG] Type: incision [EG] Recorded by:  [EG] Ritu Hernandez RN 04/08/19 0742    Row Name 04/13/19 0943             Coping    Observed Emotional State  accepting;calm;cooperative   -CS      Verbalized Emotional State  acceptance  -CS      Recorded by [CS] Sixto Irvin, PT 04/13/19 1029      Row Name 04/13/19 0943             Plan of Care Review    Plan of Care Reviewed With  patient  -CS      Recorded by [CS] Sixto Irvin, PT 04/13/19 1029      Row Name 04/13/19 0943             Outcome Summary/Treatment Plan (PT)    Anticipated Discharge Disposition (PT)  skilled nursing facility  -CS      Recorded by [CS] Sixto Irvin, PT 04/13/19 1029        User Key  (r) = Recorded By, (t) = Taken By, (c) = Cosigned By    Initials Name Effective Dates Discipline    EG Ritu Hernandez, RN 07/10/17 -  Nurse    CS Sixto Irvin, PT 05/14/18 -  PT          Wound 04/08/19 0742 Right knee incision (Active)   Dressing Appearance dry;intact 4/12/2019 10:15 PM   Closure JENELLE 4/12/2019 10:15 PM   Base dressing in place, unable to visualize 4/12/2019 10:15 PM   Drainage Amount none 4/12/2019 10:15 PM   Dressing Care, Wound elastic bandage 4/12/2019  8:00 PM           Physical Therapy Education     Title: PT OT SLP Therapies (Done)     Topic: Physical Therapy (Done)     Point: Mobility training (Done)     Learning Progress Summary           Patient Acceptance, E,TB, VU by CS at 4/13/2019 10:29 AM    Acceptance, E, VU,NR by EF at 4/12/2019 11:24 AM    Acceptance, E,TB,D, VU,NR by  at 4/11/2019  3:59 PM    Acceptance, E,TB,D, VU,NR by  at 4/11/2019 11:51 AM    Acceptance, E,TB,D, VU,NR by  at 4/10/2019  1:34 PM    Acceptance, E,D,TB, VU,NR by  at 4/9/2019  4:34 PM    Acceptance, E,D, VU,NR by MS at 4/8/2019  1:30 PM   Family Acceptance, E, VU,NR by  at 4/12/2019 11:24 AM                   Point: Home exercise program (Done)     Learning Progress Summary           Patient Acceptance, E,TB, VU by CS at 4/13/2019 10:29 AM    Acceptance, E, VU,NR by EF at 4/12/2019 11:24 AM    Acceptance, LORENA MEDINA D, RUSSEL,NR by  at 4/11/2019  3:59 PM    Acceptance, LORENA MEDINA D, VU,NR by  at 4/11/2019 11:51 AM     Acceptance, E,TB,D, VU,NR by  at 4/10/2019  3:30 PM    Acceptance, E,D,TB, VU,NR by  at 4/9/2019  4:34 PM    Acceptance, E,D, VU,NR by MS at 4/8/2019  1:30 PM   Family Acceptance, E, VU,NR by  at 4/12/2019 11:24 AM                   Point: Body mechanics (Done)     Learning Progress Summary           Patient Acceptance, E,TB, VU by  at 4/13/2019 10:29 AM    Acceptance, E, VU,NR by  at 4/12/2019 11:24 AM    Acceptance, E,TB,D, VU,NR by  at 4/11/2019  3:59 PM    Acceptance, E,TB,D, VU,NR by  at 4/11/2019 11:51 AM    Acceptance, E,TB,D, VU,NR by  at 4/10/2019  1:34 PM    Acceptance, E,D,TB, VU,NR by  at 4/9/2019  4:34 PM    Acceptance, E,D, VU,NR by MS at 4/8/2019  1:30 PM   Family Acceptance, E, VU,NR by  at 4/12/2019 11:24 AM                   Point: Precautions (Done)     Learning Progress Summary           Patient Acceptance, E,TB, VU by  at 4/13/2019 10:29 AM    Acceptance, E, VU,NR by  at 4/12/2019 11:24 AM    Acceptance, E,TB,D, VU,NR by  at 4/11/2019  3:59 PM    Acceptance, E,TB,D, VU,NR by  at 4/11/2019 11:51 AM    Acceptance, E,TB,D, VU,NR by  at 4/10/2019  1:34 PM    Acceptance, E,D,TB, VU,NR by  at 4/9/2019  4:34 PM    Acceptance, E,D, VU,NR by MS at 4/8/2019  1:30 PM   Family Acceptance, E, VU,NR by  at 4/12/2019 11:24 AM                               User Key     Initials Effective Dates Name Provider Type Discipline     06/08/18 -  Ritu Nicole, PT Physical Therapist PT     04/03/18 -  Анна Pruitt, PT Physical Therapist PT    MS 04/03/18 -  Dillon Menjivar, PT Physical Therapist PT     08/19/18 -  Vaishali Gerber, NICOLE Physical Therapy Assistant PT     05/14/18 -  Sixto Irvin, PT Physical Therapist PT                PT Recommendation and Plan  Anticipated Discharge Disposition (PT): skilled nursing facility  Therapy Frequency (PT Clinical Impression): 2 times/day  Outcome Summary/Treatment Plan (PT)  Anticipated Discharge Disposition (PT):  skilled nursing facility  Plan of Care Reviewed With: patient  Outcome Summary: Pt walked to the door and then to the chair where she did several exercises with min-modA functional mobility.   Outcome Measures     Row Name 04/13/19 1000 04/12/19 1600 04/12/19 1100       How much help from another person do you currently need...    Turning from your back to your side while in flat bed without using bedrails?  3  -CS  2  -EF  2  -EF    Moving from lying on back to sitting on the side of a flat bed without bedrails?  2  -CS  2  -EF  2  -EF    Moving to and from a bed to a chair (including a wheelchair)?  3  -CS  3  -EF  3  -EF    Standing up from a chair using your arms (e.g., wheelchair, bedside chair)?  3  -CS  3  -EF  3  -EF    Climbing 3-5 steps with a railing?  2  -CS  1  -EF  1  -EF    To walk in hospital room?  2  -CS  2  -EF  2  -EF    AM-PAC 6 Clicks Score  15  -CS  13  -EF  13  -EF       Functional Assessment    Outcome Measure Options  AM-PAC 6 Clicks Basic Mobility (PT)  -CS  AM-PAC 6 Clicks Basic Mobility (PT)  -EF  AM-PAC 6 Clicks Basic Mobility (PT)  -EF    Row Name 04/11/19 1500 04/11/19 1100 04/10/19 1300       How much help from another person do you currently need...    Turning from your back to your side while in flat bed without using bedrails?  2  -CH  2  -CH  3  -CH    Moving from lying on back to sitting on the side of a flat bed without bedrails?  2  -CH  2  -CH  3  -CH    Moving to and from a bed to a chair (including a wheelchair)?  3  -CH  2  -CH  3  -CH    Standing up from a chair using your arms (e.g., wheelchair, bedside chair)?  3  -CH  2  -CH  3  -CH    Climbing 3-5 steps with a railing?  1  -CH  1  -CH  2  -CH    To walk in hospital room?  2  -CH  2  -CH  3  -CH    AM-PAC 6 Clicks Score  13  -CH  11  -CH  17  -CH       Functional Assessment    Outcome Measure Options  AM-PAC 6 Clicks Basic Mobility (PT)  -CH  AM-PAC 6 Clicks Basic Mobility (PT)  -CH  AM-PAC 6 Clicks Basic Mobility (PT)   -      User Key  (r) = Recorded By, (t) = Taken By, (c) = Cosigned By    Initials Name Provider Type     Ritu Nicole, PT Physical Therapist    CH Анна Pruitt, PT Physical Therapist    CS Sixto Irvin, PT Physical Therapist         Time Calculation:   PT Charges     Row Name 04/13/19 1030             Time Calculation    Start Time  0916  -CS      Stop Time  0943  -CS      Time Calculation (min)  27 min  -CS      PT Received On  04/13/19  -CS      PT - Next Appointment  04/13/19  -CS        User Key  (r) = Recorded By, (t) = Taken By, (c) = Cosigned By    Initials Name Provider Type    Sixto Mccullough, PT Physical Therapist        Therapy Charges for Today     Code Description Service Date Service Provider Modifiers Qty    06143364285 HC PT THER PROC EA 15 MIN 4/13/2019 Sixto Irvin, PT GP 2          PT G-Codes  Outcome Measure Options: AM-PAC 6 Clicks Basic Mobility (PT)  AM-PAC 6 Clicks Score: 15    Sixto Irvin PT  4/13/2019

## 2019-04-13 NOTE — PLAN OF CARE
Problem: Patient Care Overview  Goal: Plan of Care Review  Outcome: Ongoing (interventions implemented as appropriate)   04/12/19 1957   Coping/Psychosocial   Plan of Care Reviewed With patient   OTHER   Outcome Summary pt up x2 to bedside commode. up with PT as well. Continues to c/o pain - treated with PRN Tylenol. VSS. OF. Continuing to monitor.   Plan of Care Review   Progress no change     Goal: Individualization and Mutuality  Outcome: Ongoing (interventions implemented as appropriate)    Goal: Discharge Needs Assessment  Outcome: Ongoing (interventions implemented as appropriate)    Goal: Interprofessional Rounds/Family Conf  Outcome: Ongoing (interventions implemented as appropriate)      Problem: Fall Risk (Adult)  Goal: Absence of Fall  Outcome: Ongoing (interventions implemented as appropriate)      Problem: Knee Arthroplasty (Total, Partial) (Adult)  Goal: Signs and Symptoms of Listed Potential Problems Will be Absent, Minimized or Managed (Knee Arthroplasty)  Outcome: Ongoing (interventions implemented as appropriate)      Problem: Chronic Obstructive Pulmonary Disease (Adult)  Goal: Signs and Symptoms of Listed Potential Problems Will be Absent, Minimized or Managed (Chronic Obstructive Pulmonary Disease)  Outcome: Ongoing (interventions implemented as appropriate)      Problem: Skin Injury Risk (Adult)  Goal: Skin Health and Integrity  Outcome: Ongoing (interventions implemented as appropriate)

## 2019-04-13 NOTE — THERAPY TREATMENT NOTE
Acute Care - Physical Therapy Treatment Note  [facility]     Patient Name: Mariangel Nguyen  : 1950  MRN: [id number]  Today's Date: 2019  Onset of Illness/Injury or Date of Surgery: 19  Date of Referral to PT: 19  Referring Physician: Farzad Drew    Admit Date: 2019    Visit Dx:    ICD-10-CM ICD-9-CM   1. Difficulty walking R26.2 719.7     Patient Active Problem List   Diagnosis   • Diabetes mellitus (CMS/HCC)   • Edema   • Hyperlipidemia   • Hypertension   • Rotator cuff tear arthropathy   • Venous stasis dermatitis   • Varicose veins   • Acute on chronic systolic CHF (congestive heart failure) (CMS/HCC)   • Acute pyelonephritis   • Acute respiratory failure (CMS/HCC)   • BRODY (acute kidney injury) (CMS/HCC)   • ATN (acute tubular necrosis) (CMS/HCC)   • Benign essential HTN   • COPD (chronic obstructive pulmonary disease) (CMS/HCC)   • Goiter   • Hx of lithotripsy   • Low vitamin D level   • Lymphedema   • Neuropathy   • Omental infarction (CMS/HCC)   • KAELA and COPD overlap syndrome (CMS/HCC)   • Pulmonary HTN (CMS/HCC)   • Recurrent nephrolithiasis   • Small bowel perforation (CMS/HCC)   • Acute deep vein thrombosis (DVT) of popliteal vein of left lower extremity (CMS/HCC)   • Microcytic anemia   • Thrombocytosis (CMS/HCC)   • Iron deficiency anemia   • Encounter for screening for malignant neoplasm of colon   • Anxiety and depression   • Osteoarthritis of right knee       Therapy Treatment    Rehabilitation Treatment Summary     Row Name 19 1311 19 0943          Treatment Time/Intention    Discipline  physical therapist  -CS  physical therapist  -CS     Document Type  therapy note (daily note)  -CS  therapy note (daily note)  -CS     Subjective Information  complains of;pain  -CS  complains of;pain  -CS     Mode of Treatment  physical therapy  -CS  physical therapy  -CS     Patient/Family Observations  Pt in recliner no distress.   -CS  --     Therapy Frequency  (PT Clinical Impression)  2 times/day  -CS  2 times/day  -CS     Patient Effort  good  -CS  good  -CS     Existing Precautions/Restrictions  fall  -CS  fall  -CS     Recorded by [CS] Sixto Irvin, PT 04/13/19 1318 [CS] Sixto Irvin, PT 04/13/19 0945     Row Name 04/13/19 1311 04/13/19 0943          Vital Signs    O2 Delivery Pre Treatment  supplemental O2  -CS  supplemental O2  -CS     O2 Delivery Intra Treatment  supplemental O2  -CS  supplemental O2  -CS     O2 Delivery Post Treatment  supplemental O2  -CS  supplemental O2  -CS     Recorded by [CS] Sixto Irvin, PT 04/13/19 1323 [CS] Sixto Irvin, PT 04/13/19 0945     Row Name 04/13/19 1311 04/13/19 0943          Cognitive Assessment/Intervention- PT/OT    Orientation Status (Cognition)  oriented x 3  -CS  oriented x 3  -CS     Follows Commands (Cognition)  WFL  -CS  WFL  -CS     Personal Safety Interventions  fall prevention program maintained;gait belt;supervised activity;nonskid shoes/slippers when out of bed  -CS  fall prevention program maintained;gait belt;nonskid shoes/slippers when out of bed;supervised activity  -CS     Recorded by [CS] Sixto Irvin, PT 04/13/19 1323 [CS] Sixto Irvin, PT 04/13/19 0945     Row Name 04/13/19 1311 04/13/19 0943          Mobility Assessment/Intervention    Extremity Weight-bearing Status  right lower extremity  -CS  right lower extremity  -CS     Right Lower Extremity (Weight-bearing Status)  weight-bearing as tolerated (WBAT)  -CS  weight-bearing as tolerated (WBAT)  -CS     Recorded by [CS] Sixto Irvin, PT 04/13/19 1323 [CS] Sixto Irvin, PT 04/13/19 0945     Row Name 04/13/19 0943             Bed Mobility Assessment/Treatment    Bed Mobility Assessment/Treatment  supine-sit  -CS      Supine-Sit [place] (Bed Mobility)  minimum assist (75% patient effort)  -CS      Recorded by [CS] Sixto Irvin, PT 04/13/19 0945      Row Name 04/13/19 1311 04/13/19 0943          Transfer  Assessment/Treatment    Transfer Assessment/Treatment  sit-stand transfer;stand-sit transfer  -CS  sit-stand transfer;stand-sit transfer  -CS     Recorded by [CS] Sixto Irvin, PT 04/13/19 1323 [CS] Sixto Irvin, PT 04/13/19 0945     Row Name 04/13/19 1311 04/13/19 0943          Sit-Stand Transfer    Sit-Stand [place] (Transfers)  minimum assist (75% patient effort)  -CS  minimum assist (75% patient effort)  -CS     Assistive Device (Sit-Stand Transfers)  walker, front-wheeled  -CS  walker, front-wheeled  -CS     Recorded by [CS] Sixto Irvin, PT 04/13/19 1323 [CS] Sixto Irvin, PT 04/13/19 0945     Row Name 04/13/19 1311 04/13/19 0943          Stand-Sit Transfer    Stand-Sit [place] (Transfers)  minimum assist (75% patient effort);verbal cues  -CS  minimum assist (75% patient effort);verbal cues  -CS     Assistive Device (Stand-Sit Transfers)  walker, front-wheeled  -CS  walker, front-wheeled  -CS     Recorded by [CS] Sixto Irvin, PT 04/13/19 1323 [CS] Sixto Irvin, PT 04/13/19 0945     Row Name 04/13/19 1311 04/13/19 0943          Gait/Stairs Assessment/Training    [place] Level (Gait)  minimum assist (75% patient effort)  -CS  minimum assist (75% patient effort)  -CS     Assistive Device (Gait)  walker, front-wheeled  -CS  walker, front-wheeled  -CS     Distance in Feet (Gait)  50  -CS  25  -CS     Pattern (Gait)  step-to  -CS  step-to  -CS     Deviations/Abnormal Patterns (Gait)  right sided deviations;gait speed decreased;stride length decreased  -CS  right sided deviations;gait speed decreased;stride length decreased  -CS     Bilateral Gait Deviations  forward flexed posture;weight shift ability decreased  -CS  forward flexed posture;weight shift ability decreased  -CS     Right Sided Gait Deviations  weight shift ability decreased  -CS  weight shift ability decreased  -CS     Recorded by [CS] Sixto Irvin, PT 04/13/19 1323 [CS] Sixto Irvin, PT 04/13/19 0930      Row Name 04/13/19 1311 04/13/19 0943          Therapeutic Exercise    Comment (Therapeutic Exercise)  TKA protocol x10  -CS  TKA protocol x15  -CS     Recorded by [CS] Sixto Irvin, PT 04/13/19 1323 [CS] Sixto Irvin, PT 04/13/19 1029     Row Name 04/13/19 1311 04/13/19 0943          Positioning and Restraints    Pre-Treatment Position  sitting in chair/recliner  -CS  in bed  -CS     Post Treatment Position  chair  -CS  chair  -CS     In Chair  reclined;call light within reach;encouraged to call for assist  -CS  reclined;call light within reach;encouraged to call for assist;with nsg  -CS     Recorded by [CS] Sixto Irvin, PT 04/13/19 1323 [CS] Sixto Irvin, PT 04/13/19 1029     Row Name 04/13/19 1311 04/13/19 0943          Pain Scale: Numbers Pre/Post-Treatment    Pain Location  --  knee  -CS     Pain Intervention(s)  Repositioned;Ambulation/increased activity  -CS  Repositioned;Ambulation/increased activity  -CS     Recorded by [CS] Sixto Irvin, PT 04/13/19 1323 [CS] Sixto Irvin, PT 04/13/19 1029     Row Name 04/13/19 1311 04/13/19 0943          Pain Scale: Word Pre/Post-Treatment    Pain: Word Scale, Pretreatment  4 - moderate pain  -CS  4 - moderate pain  -CS     Pain: Word Scale, Post-Treatment  4 - moderate pain  -CS  4 - moderate pain  -CS     Recorded by [CS] Sixto Irvin, PT 04/13/19 1323 [CS] Sixto Irvin, PT 04/13/19 1029     Row Name                Wound 04/08/19 0742 Right knee incision    Wound - Properties Group Date first assessed: 04/08/19 [EG] Time first assessed: 0742 [EG] Side: Right [EG] Location: knee [EG] Type: incision [EG] Recorded by:  [EG] Ritu Hernandez RN 04/08/19 0742    Row Name 04/13/19 1311 04/13/19 0943          Coping    Observed Emotional State  accepting;calm;cooperative  -CS  accepting;calm;cooperative  -CS     Verbalized Emotional State  acceptance  -CS  acceptance  -CS     Recorded by [CS] Sixto Irvin, PT 04/13/19 1166 [ERICH] Albaro,  Sixto BEGUM, PT 04/13/19 1029     Row Name 04/13/19 1311 04/13/19 0943          Plan of Care Review    Plan of Care Reviewed With  patient  -CS  patient  -CS     Recorded by [CS] Sixto Irvin, PT 04/13/19 1323 [CS] AlbaroSixto, PT 04/13/19 1029     Row Name 04/13/19 1311 04/13/19 0943          Outcome Summary/Treatment Plan (PT)    Anticipated Discharge Disposition (PT)  skilled nursing facility  -CS  skilled nursing facility  -CS     Recorded by [CS] Sixto Irvin, PT 04/13/19 1323 [CS] AlbaroSixto, PT 04/13/19 1029       User Key  (r) = Recorded By, (t) = Taken By, (c) = Cosigned By    Initials Name Effective Dates Discipline    EG Ritu Hernandez, RN 07/10/17 -  Nurse    CS Albaro Sixto KEL, PT 05/14/18 -  PT          Wound 04/08/19 0742 Right knee incision (Active)   Dressing Appearance dry;intact 4/13/2019  8:45 AM   Closure JENELLE 4/13/2019  8:45 AM   Base dressing in place, unable to visualize 4/13/2019  8:45 AM   Drainage Amount none 4/13/2019  8:45 AM   Dressing Care, Wound elastic bandage 4/12/2019  8:00 PM           Physical Therapy Education     Title: PT OT SLP Therapies (Done)     Topic: Physical Therapy (Done)     Point: Mobility training (Done)     Learning Progress Summary           Patient Acceptance, E,TB, VU by CS at 4/13/2019 10:29 AM    Acceptance, E, VU,NR by EF at 4/12/2019 11:24 AM    Acceptance, E,TB,D, VU,NR by  at 4/11/2019  3:59 PM    Acceptance, E,TB,D, VU,NR by  at 4/11/2019 11:51 AM    Acceptance, E,TB,D, VU,NR by  at 4/10/2019  1:34 PM    Acceptance, E,D,TB, VU,NR by  at 4/9/2019  4:34 PM    Acceptance, E,D, VU,NR by MS at 4/8/2019  1:30 PM   Family Acceptance, E, VU,NR by  at 4/12/2019 11:24 AM                   Point: Home exercise program (Done)     Learning Progress Summary           Patient Acceptance, LORENA MEDINA VU by CS at 4/13/2019 10:29 AM    AcceptanceCAROL VU,NR by EF at 4/12/2019 11:24 AM    AcceptanceCAROL TB, D, VU,NR by  at 4/11/2019  3:59 PM     Acceptance, E,TB,D, VU,NR by  at 4/11/2019 11:51 AM    Acceptance, E,TB,D, VU,NR by  at 4/10/2019  3:30 PM    Acceptance, E,D,TB, VU,NR by  at 4/9/2019  4:34 PM    Acceptance, E,D, VU,NR by MS at 4/8/2019  1:30 PM   Family Acceptance, E, VU,NR by  at 4/12/2019 11:24 AM                   Point: Body mechanics (Done)     Learning Progress Summary           Patient Acceptance, E,TB, VU by  at 4/13/2019 10:29 AM    Acceptance, E, VU,NR by  at 4/12/2019 11:24 AM    Acceptance, E,TB,D, VU,NR by  at 4/11/2019  3:59 PM    Acceptance, E,TB,D, VU,NR by  at 4/11/2019 11:51 AM    Acceptance, E,TB,D, VU,NR by  at 4/10/2019  1:34 PM    Acceptance, E,D,TB, VU,NR by  at 4/9/2019  4:34 PM    Acceptance, E,D, VU,NR by MS at 4/8/2019  1:30 PM   Family Acceptance, E, VU,NR by  at 4/12/2019 11:24 AM                   Point: Precautions (Done)     Learning Progress Summary           Patient Acceptance, E,TB, VU by  at 4/13/2019 10:29 AM    Acceptance, E, VU,NR by  at 4/12/2019 11:24 AM    Acceptance, E,TB,D, VU,NR by  at 4/11/2019  3:59 PM    Acceptance, E,TB,D, VU,NR by  at 4/11/2019 11:51 AM    Acceptance, E,TB,D, VU,NR by  at 4/10/2019  1:34 PM    Acceptance, E,D,TB, VU,NR by  at 4/9/2019  4:34 PM    Acceptance, E,D, VU,NR by MS at 4/8/2019  1:30 PM   Family Acceptance, E, VU,NR by  at 4/12/2019 11:24 AM                               User Key     Initials Effective Dates Name Provider Type Discipline     06/08/18 -  Ritu Nicole, PT Physical Therapist PT    CH 04/03/18 -  Анна Pruitt, PT Physical Therapist PT    MS 04/03/18 -  Dillon Menjivar, PT Physical Therapist PT     08/19/18 -  Vaishali Gerber PTA Physical Therapy Assistant PT     05/14/18 -  Sixto Irvin, PT Physical Therapist PT                PT Recommendation and Plan  Anticipated Discharge Disposition (PT): skilled nursing facility  Therapy Frequency (PT Clinical Impression): 2 times/day  Outcome Summary/Treatment  Plan (PT)  Anticipated Discharge Disposition (PT): skilled nursing facility  Plan of Care Reviewed With: patient  Outcome Summary: Pt walked to the door and then to the chair where she did several exercises with min-modA functional mobility.   Outcome Measures     Row Name 04/13/19 1000 04/12/19 1600 04/12/19 1100       How much help from another person do you currently need...    Turning from your back to your side while in flat bed without using bedrails?  3  -CS  2  -EF  2  -EF    Moving from lying on back to sitting on the side of a flat bed without bedrails?  2  -CS  2  -EF  2  -EF    Moving to and from a bed to a chair (including a wheelchair)?  3  -CS  3  -EF  3  -EF    Standing up from a chair using your arms (e.g., wheelchair, bedside chair)?  3  -CS  3  -EF  3  -EF    Climbing 3-5 steps with a railing?  2  -CS  1  -EF  1  -EF    To walk in hospital room?  2  -CS  2  -EF  2  -EF    AM-PAC 6 Clicks Score  15  -CS  13  -EF  13  -EF       Functional Assessment    Outcome Measure Options  AM-PAC 6 Clicks Basic Mobility (PT)  -CS  AM-PAC 6 Clicks Basic Mobility (PT)  -EF  AM-PAC 6 Clicks Basic Mobility (PT)  -EF    Row Name 04/11/19 1500 04/11/19 1100          How much help from another person do you currently need...    Turning from your back to your side while in flat bed without using bedrails?  2  -CH  2  -CH     Moving from lying on back to sitting on the side of a flat bed without bedrails?  2  -CH  2  -CH     Moving to and from a bed to a chair (including a wheelchair)?  3  -CH  2  -CH     Standing up from a chair using your arms (e.g., wheelchair, bedside chair)?  3  -CH  2  -CH     Climbing 3-5 steps with a railing?  1  -CH  1  -CH     To walk in hospital room?  2  -CH  2  -CH     AM-PAC 6 Clicks Score  13  -CH  11  -CH        Functional Assessment    Outcome Measure Options  AM-PAC 6 Clicks Basic Mobility (PT)  -CH  AM-PAC 6 Clicks Basic Mobility (PT)  -CH       User Key  (r) = Recorded By, (t) = Taken  By, (c) = Cosigned By    Initials Name Provider Type    Ritu Castillo, PT Physical Therapist    CH Анна Pruitt, PT Physical Therapist    CS Sixto Irvin, PT Physical Therapist         Time Calculation:   PT Charges     Row Name 04/13/19 1323 04/13/19 1030          Time Calculation    Start Time  1308  -CS  0916  -CS     Stop Time  1324  -CS  0943  -CS     Time Calculation (min)  16 min  -CS  27 min  -CS     PT Received On  04/13/19  -CS  04/13/19  -CS     PT - Next Appointment  04/14/19  -CS  04/13/19  -CS       User Key  (r) = Recorded By, (t) = Taken By, (c) = Cosigned By    Initials Name Provider Type    Sixto Mccullough, PT Physical Therapist        Therapy Charges for Today     Code Description Service Date Service Provider Modifiers Qty    73354121639 HC PT THER PROC EA 15 MIN 4/13/2019 Sixto Irvin, PT GP 2    01811901016 HC PT THER PROC EA 15 MIN 4/13/2019 Sixto Irvin, PT GP 1          PT G-Codes  Outcome Measure Options: AM-PAC 6 Clicks Basic Mobility (PT)  AM-PAC 6 Clicks Score: 15    Sixto Irvin PT  4/13/2019

## 2019-04-13 NOTE — PROGRESS NOTES
LOS: 5 days   Patient Care Team:  Waqar Burton MD as PCP - General  Waqar Burton MD as PCP - Family Medicine  Al Farnaz Celis MD (Pulmonary Disease)  Simon Marx MD as Consulting Physician (Cardiology)  Farzad Luong MD as Referring Physician (Orthopedic Surgery)  Raheem Leone Jr., MD as Consulting Physician (Hematology and Oncology)    Subjective     Patient reports he is doing much better today she was able to get up a little bit with therapy and walk a short distance.  She does not feel short of breath.  At home she has 2 L net she uses mostly at night only occasionally during the day.    Review of Systems:          Objective     Vital Signs  Vital Sign Min/Max for last 24 hours  Temp  Min: 97.3 °F (36.3 °C)  Max: 98.4 °F (36.9 °C)   BP  Min: 132/76  Max: 143/82   Pulse  Min: 59  Max: 71   Resp  Min: 16  Max: 20   SpO2  Min: 90 %  Max: 98 %   Flow (L/min)  Min: 4  Max: 5   Weight  Min: 111 kg (245 lb 6 oz)  Max: 111 kg (245 lb 6 oz)        Ventilator/Non-Invasive Ventilation Settings (From admission, onward)    Start     Ordered    04/09/19 1026  NIPPV (CPAP or BIPAP)  Until Discontinued,   Status:  Canceled     Question Answer Comment   Type: AutoBIPAP    NIPPV Mask Interface: Per Patient Preference    Max IPAP 24    Max EPAP 8        04/09/19 1025    04/09/19 1021  NIPPV (CPAP or BIPAP)  As Needed-RT     Question Answer Comment   Indication: Sleep Apnea    Type: BIPAP    NIPPV Mask Interface: Per Patient Preference    Titrate for SPO2 Greater Than or Equal To    Titrate for SPO2 90%        04/09/19 1021                       Body mass index is 44.88 kg/m².  I/O last 3 completed shifts:  In: -   Out: 400 [Urine:400]  I/O this shift:  In: 360 [P.O.:360]  Out: 600 [Urine:600]        Physical Exam:  General Appearance: Morbidly obese white female resting comfortably in bed does not appear in any acute distress she is on 5 L nasal cannula O2 although it is only half in her  nose and her oxygen saturation is 97%  Eyes: Conjunctiva are clear and anicteric  ENT: Mucous membranes are moist no erythema exudates Mallampati type III airway  Neck: No adenopathy no jugular venous distention trachea midline neck is obese  Lungs: Clear nonlabored symmetric expansion no wheezes rales or rhonchi  Cardiac: Regular rate rhythm no murmur  Abdomen: Obese soft nontender no palpable organomegaly or masses  : Not examined  Musculoskeletal: Grossly normal except for the postoperative changes in the knee  Skin: No jaundice no rashes  Neuro: She is alert oriented cooperative  Extremities/P Vascular: Palpable radial dorsalis pedis pulses bilaterally  MSE: Seems to be in very good spirits       Labs:  Results from last 7 days   Lab Units 04/11/19  0726 04/10/19  0505 04/09/19  0458   GLUCOSE mg/dL 137* 153* 167*   SODIUM mmol/L 136 139 139   POTASSIUM mmol/L 4.1 4.8 4.3   CO2 mmol/L 33.4* 23.4 24.8   CHLORIDE mmol/L 94* 96* 95*   ANION GAP mmol/L 8.6 19.6 19.2   CREATININE mg/dL 0.84 0.83  0.71 0.63   BUN mg/dL 21 15 10   BUN / CREAT RATIO  25.0 18.1 15.9   CALCIUM mg/dL 8.5* 9.5 8.8   EGFR IF NONAFRICN AM mL/min/1.73 68 69  82 95     Estimated Creatinine Clearance: 77.5 mL/min (by C-G formula based on SCr of 0.84 mg/dL).      Results from last 7 days   Lab Units 04/13/19  0538 04/12/19  0606 04/11/19  0726 04/10/19  0505 04/09/19  0458 04/08/19  1047   WBC 10*3/mm3  --   --  9.63  --   --   --    RBC 10*6/mm3  --   --  4.14  --   --   --    HEMOGLOBIN g/dL 10.2* 10.7* 10.5* 11.5* 12.3 11.6*   HEMATOCRIT % 35.4 37.5 36.6 40.0 42.7 38.7   MCV fL  --   --  88.4  --   --   --    MCH pg  --   --  25.4*  --   --   --    MCHC g/dL  --   --  28.7*  --   --   --    RDW %  --   --  27.0*  --   --   --    RDW-SD fl  --   --  82.4*  --   --   --    MPV fL  --   --  9.4  --   --   --    PLATELETS 10*3/mm3  --   --  332  --   --   --    NEUTROPHIL % %  --   --  76.6*  --   --   --    LYMPHOCYTE % %  --   --  6.9*  --    --   --    MONOCYTES % %  --   --  10.3  --   --   --    EOSINOPHIL % %  --   --  5.2  --   --   --    BASOPHIL % %  --   --  0.5  --   --   --    IMM GRAN % %  --   --  0.5  --   --   --    NEUTROS ABS 10*3/mm3  --   --  7.38*  --   --   --    LYMPHS ABS 10*3/mm3  --   --  0.66*  --   --   --    MONOS ABS 10*3/mm3  --   --  0.99*  --   --   --    EOS ABS 10*3/mm3  --   --  0.50*  --   --   --    BASOS ABS 10*3/mm3  --   --  0.05  --   --   --    IMMATURE GRANS (ABS) 10*3/mm3  --   --  0.05  --   --   --    NRBC /100 WBC  --   --  0.1*  --   --   --      Results from last 7 days   Lab Units 04/09/19  1031   PH, ARTERIAL pH units 7.373   PO2 ART mm Hg 112.0*   PCO2, ARTERIAL mm Hg 57.8*   HCO3 ART mmol/L 33.7*                         Microbiology Results (last 10 days)     ** No results found for the last 240 hours. **                apixaban 2.5 mg Oral Q12H   bisoprolol 5 mg Oral Daily   bumetanide 2 mg Oral Daily   escitalopram 15 mg Oral Daily   insulin lispro 0-9 Units Subcutaneous 4x Daily With Meals & Nightly   ipratropium-albuterol 3 mL Nebulization TID   levothyroxine 25 mcg Oral Daily   metFORMIN 1,000 mg Oral BID With Meals   naproxen 500 mg Oral BID With Meals   nystatin  Topical Q12H   pantoprazole 40 mg Oral Daily   pravastatin 40 mg Oral Nightly   sennosides-docusate sodium 2 tablet Oral BID   sildenafil 20 mg Oral TID   sodium chloride 3 mL Intravenous Q12H          Diagnostics:  Xr Knee 1 Or 2 View Right    Result Date: 4/8/2019  Right knee radiograph  HISTORY:Postop right knee  TECHNIQUE: AP and lateral radiograph the right knee  COMPARISON:Right knee radiographs 03/28/2019  FINDINGS: Post surgical changes from recent right total knee arthroplasty are present. The hardware is intact. There is no new periprosthetic fracture.      Postoperative changes from recent right total knee arthroplasty without findings of immediate complication.  This report was finalized on 4/8/2019 10:08 AM by Dr. White  JUAN C Kern      Xr Knee 1 Or 2 View Right    Result Date: 3/29/2019  PA AND LATERAL CHEST, RIGHT KNEE  HISTORY:  Preop chest x-ray, knee pain.  CHEST: PA and lateral views of the chest were obtained and compared to 01/24/2019.  There is moderate cardiomegaly.  A calcified granuloma is noted at the left lung base. There is mild-to-moderate prominence of the pulmonary vasculature in the perihilar regions bilaterally with minimal cephalization. There is no evidence of focal infiltrate, consolidation or effusion.  RIGHT KNEE: Two views of the right knee demonstrates obliteration of joint space medially. There is mild lateral subluxation of the proximal tibia relative to the distal femur (by approximately 8 mm). There is sclerosis involving the articular surfaces of the knee medially and small marginal osteophytes appreciated. Mild patellofemoral degenerative disease is noted. There is no evidence of fracture.  This report was finalized on 3/29/2019 7:23 AM by Dr. Fernando Moreno M.D.      Ct Angiogram Chest With & Without Contrast    Result Date: 4/11/2019  CT ANGIOGRAM CHEST WITH/WITHOUT CONTRAST  Radiation dose reduction techniques were utilized, including automated exposure control and exposure modulation based on body size.  CLINICAL INFORMATION: Postop knee arthroplasty, shortness of breath, pulmonary hypertension.  CT angiogram of the chest with coronal, sagittal and 3-dimensional reconstruction.  FINDINGS: Main pulmonary artery diameter is 4 cm compatible with pulmonary arterial hypertension. There is cardiac enlargement, no pericardial abnormality. Atherosclerotic calcification of normal caliber aorta, no aneurysm nor dissection. The esophagus is normal in course and caliber.  Thin axial and CT angiographic reconstructed images failed to demonstrate pulmonary embolus.  There are linear areas demonstrated along the costophrenic sulci of both lower lobes, likely discoid atelectasis versus scar. No pleural  effusion demonstrated. Located at the right upper lobe apex are several small areas of groundglass infiltrates.  CONCLUSION: 1. No aortic aneurysm or dissection. 2. No pulmonary embolus. Diameter of the main pulmonary artery is 4 cm consistent with pulmonary arterial hypertension. 3. Low lung volumes with likely discoid atelectasis at both lung bases, scar is less likely. 4. Subtle areas of groundglass infiltrate right upper lobe.   This report was finalized on 4/11/2019 3:53 PM by Dr. Danielito Garcia M.D.      Xr Chest 1 View    Result Date: 4/10/2019  XR CHEST 1 VW-  HISTORY: Female who is 68 years-old,  hypoxemia  TECHNIQUE: Frontal view of the chest  COMPARISON: 03/28/2019  FINDINGS: Heart is enlarged. Mild prominence of vascular and interstitial markings. Left hemidiaphragm is partly obscured that may reflect atelectasis/infiltrate/effusion. No pneumothorax. No acute osseous process.      Partial obscuration of the left hemidiaphragm may reflect atelectasis/infiltrate/effusion, follow-up recommended. Cardiomegaly with mild prominence of vascular and interstitial markings.  This report was finalized on 4/10/2019 1:51 PM by Dr. Anjel Garcia M.D.      Xr Chest Pa & Lateral    Result Date: 3/29/2019  PA AND LATERAL CHEST, RIGHT KNEE  HISTORY:  Preop chest x-ray, knee pain.  CHEST: PA and lateral views of the chest were obtained and compared to 01/24/2019.  There is moderate cardiomegaly.  A calcified granuloma is noted at the left lung base. There is mild-to-moderate prominence of the pulmonary vasculature in the perihilar regions bilaterally with minimal cephalization. There is no evidence of focal infiltrate, consolidation or effusion.  RIGHT KNEE: Two views of the right knee demonstrates obliteration of joint space medially. There is mild lateral subluxation of the proximal tibia relative to the distal femur (by approximately 8 mm). There is sclerosis involving the articular surfaces of the knee medially  and small marginal osteophytes appreciated. Mild patellofemoral degenerative disease is noted. There is no evidence of fracture.  This report was finalized on 3/29/2019 7:23 AM by Dr. Fernando Moreno M.D.               Active Hospital Problems    Diagnosis  POA   • Osteoarthritis of right knee [M17.11]  Yes      Resolved Hospital Problems   No resolved problems to display.         Assessment/Plan     1. Pulmonary hypertension etiology unclear patient on revatio and diuretics at home she should continue these.   she should have a full workup for her pulmonary hypertension including a right heart catheterization after discharge, discussing with her does not sound like she is ever had a right heart catheterization and it appears she has had a diastolic CHF untreated sleep apnea both of which can cause pulmonary hypertension and could be worsened by the use of of revatio  2. Chronic hypoxemic respiratory failure wean O2 as tolerated  3. Obstructive sleep apnea history of sleep apnea noncompliant with Pap machine this certainly could be a cause of her pulmonary hypertension she absolutely should be reevaluated.  She is tolerating noninvasive type ventilation here she says at discharge she will follow-up with her usual pulmonologist about getting a new machine.  4. Chronic diastolic CHF  5. Morbid obesity  6. Atelectasis continue pulmonary hygiene   7.  status post right total knee arthroplasty    Plan for disposition: I think if we can get her weaned down to her home 2 L O2 she probably can go home from a pulmonary standpoint    Tyrone Stewart MD  04/13/19  3:39 PM    Time:

## 2019-04-14 LAB
ANION GAP SERPL CALCULATED.3IONS-SCNC: 14.8 MMOL/L
BUN BLD-MCNC: 12 MG/DL (ref 8–23)
BUN/CREAT SERPL: 18.5 (ref 7–25)
CALCIUM SPEC-SCNC: 9.4 MG/DL (ref 8.6–10.5)
CHLORIDE SERPL-SCNC: 91 MMOL/L (ref 98–107)
CO2 SERPL-SCNC: 31.2 MMOL/L (ref 22–29)
CREAT BLD-MCNC: 0.65 MG/DL (ref 0.57–1)
GFR SERPL CREATININE-BSD FRML MDRD: 91 ML/MIN/1.73
GLUCOSE BLD-MCNC: 174 MG/DL (ref 65–99)
GLUCOSE BLDC GLUCOMTR-MCNC: 122 MG/DL (ref 70–130)
GLUCOSE BLDC GLUCOMTR-MCNC: 171 MG/DL (ref 70–130)
GLUCOSE BLDC GLUCOMTR-MCNC: 178 MG/DL (ref 70–130)
GLUCOSE BLDC GLUCOMTR-MCNC: 180 MG/DL (ref 70–130)
HCT VFR BLD AUTO: 37.2 % (ref 34–46.6)
HGB BLD-MCNC: 10.5 G/DL (ref 12–15.9)
POTASSIUM BLD-SCNC: 3.2 MMOL/L (ref 3.5–5.2)
SODIUM BLD-SCNC: 137 MMOL/L (ref 136–145)

## 2019-04-14 PROCEDURE — 80048 BASIC METABOLIC PNL TOTAL CA: CPT | Performed by: ORTHOPAEDIC SURGERY

## 2019-04-14 PROCEDURE — 94799 UNLISTED PULMONARY SVC/PX: CPT

## 2019-04-14 PROCEDURE — 85018 HEMOGLOBIN: CPT | Performed by: ORTHOPAEDIC SURGERY

## 2019-04-14 PROCEDURE — 97110 THERAPEUTIC EXERCISES: CPT

## 2019-04-14 PROCEDURE — 93005 ELECTROCARDIOGRAM TRACING: CPT | Performed by: INTERNAL MEDICINE

## 2019-04-14 PROCEDURE — 85014 HEMATOCRIT: CPT | Performed by: ORTHOPAEDIC SURGERY

## 2019-04-14 PROCEDURE — 93010 ELECTROCARDIOGRAM REPORT: CPT | Performed by: INTERNAL MEDICINE

## 2019-04-14 PROCEDURE — 82962 GLUCOSE BLOOD TEST: CPT

## 2019-04-14 PROCEDURE — 63710000001 INSULIN LISPRO (HUMAN) PER 5 UNITS: Performed by: ORTHOPAEDIC SURGERY

## 2019-04-14 RX ORDER — BISOPROLOL FUMARATE 5 MG/1
10 TABLET, FILM COATED ORAL DAILY
Status: DISCONTINUED | OUTPATIENT
Start: 2019-04-15 | End: 2019-04-15

## 2019-04-14 RX ORDER — BISOPROLOL FUMARATE 5 MG/1
5 TABLET, FILM COATED ORAL ONCE
Status: COMPLETED | OUTPATIENT
Start: 2019-04-14 | End: 2019-04-14

## 2019-04-14 RX ORDER — DILTIAZEM HCL IN NACL,ISO-OSM 125 MG/125
10 PLASTIC BAG, INJECTION (ML) INTRAVENOUS
Status: DISCONTINUED | OUTPATIENT
Start: 2019-04-14 | End: 2019-04-14

## 2019-04-14 RX ORDER — POTASSIUM CHLORIDE 1.5 G/1.77G
40 POWDER, FOR SOLUTION ORAL AS NEEDED
Status: DISCONTINUED | OUTPATIENT
Start: 2019-04-14 | End: 2019-04-14 | Stop reason: CLARIF

## 2019-04-14 RX ORDER — POTASSIUM CHLORIDE 7.45 MG/ML
10 INJECTION INTRAVENOUS
Status: DISCONTINUED | OUTPATIENT
Start: 2019-04-14 | End: 2019-04-15 | Stop reason: HOSPADM

## 2019-04-14 RX ORDER — DILTIAZEM HYDROCHLORIDE 5 MG/ML
10 INJECTION INTRAVENOUS ONCE
Status: COMPLETED | OUTPATIENT
Start: 2019-04-14 | End: 2019-04-14

## 2019-04-14 RX ORDER — POTASSIUM CHLORIDE 750 MG/1
40 CAPSULE, EXTENDED RELEASE ORAL AS NEEDED
Status: DISCONTINUED | OUTPATIENT
Start: 2019-04-14 | End: 2019-04-15 | Stop reason: HOSPADM

## 2019-04-14 RX ADMIN — SILDENAFIL 20 MG: 20 TABLET, FILM COATED ORAL at 21:51

## 2019-04-14 RX ADMIN — PRAVASTATIN SODIUM 40 MG: 20 TABLET ORAL at 21:50

## 2019-04-14 RX ADMIN — METFORMIN HYDROCHLORIDE 1000 MG: 1000 TABLET ORAL at 17:46

## 2019-04-14 RX ADMIN — INSULIN LISPRO 2 UNITS: 100 INJECTION, SOLUTION INTRAVENOUS; SUBCUTANEOUS at 08:44

## 2019-04-14 RX ADMIN — ESCITALOPRAM OXALATE 15 MG: 5 TABLET, FILM COATED ORAL at 08:43

## 2019-04-14 RX ADMIN — POTASSIUM CHLORIDE 40 MEQ: 750 CAPSULE, EXTENDED RELEASE ORAL at 21:51

## 2019-04-14 RX ADMIN — DILTIAZEM HYDROCHLORIDE 10 MG: 5 INJECTION, SOLUTION INTRAVENOUS at 19:48

## 2019-04-14 RX ADMIN — BISOPROLOL FUMARATE 5 MG: 5 TABLET ORAL at 21:51

## 2019-04-14 RX ADMIN — APIXABAN 2.5 MG: 2.5 TABLET, FILM COATED ORAL at 21:51

## 2019-04-14 RX ADMIN — INSULIN LISPRO 2 UNITS: 100 INJECTION, SOLUTION INTRAVENOUS; SUBCUTANEOUS at 21:58

## 2019-04-14 RX ADMIN — SILDENAFIL 20 MG: 20 TABLET, FILM COATED ORAL at 08:43

## 2019-04-14 RX ADMIN — SODIUM CHLORIDE, PRESERVATIVE FREE 3 ML: 5 INJECTION INTRAVENOUS at 08:52

## 2019-04-14 RX ADMIN — METFORMIN HYDROCHLORIDE 1000 MG: 1000 TABLET ORAL at 08:52

## 2019-04-14 RX ADMIN — SODIUM CHLORIDE, PRESERVATIVE FREE 3 ML: 5 INJECTION INTRAVENOUS at 21:50

## 2019-04-14 RX ADMIN — IPRATROPIUM BROMIDE AND ALBUTEROL SULFATE 3 ML: 2.5; .5 SOLUTION RESPIRATORY (INHALATION) at 08:29

## 2019-04-14 RX ADMIN — BISOPROLOL FUMARATE 5 MG: 5 TABLET ORAL at 08:43

## 2019-04-14 RX ADMIN — INSULIN LISPRO 2 UNITS: 100 INJECTION, SOLUTION INTRAVENOUS; SUBCUTANEOUS at 12:22

## 2019-04-14 RX ADMIN — NAPROXEN 500 MG: 500 TABLET ORAL at 17:46

## 2019-04-14 RX ADMIN — DILTIAZEM HYDROCHLORIDE 10 MG/HR: 100 INJECTION, POWDER, LYOPHILIZED, FOR SOLUTION INTRAVENOUS at 19:47

## 2019-04-14 RX ADMIN — NYSTATIN: 100000 POWDER TOPICAL at 21:52

## 2019-04-14 RX ADMIN — NAPROXEN 500 MG: 500 TABLET ORAL at 08:44

## 2019-04-14 RX ADMIN — LEVOTHYROXINE SODIUM 25 MCG: 25 TABLET ORAL at 08:43

## 2019-04-14 RX ADMIN — SILDENAFIL 20 MG: 20 TABLET, FILM COATED ORAL at 16:49

## 2019-04-14 RX ADMIN — IPRATROPIUM BROMIDE AND ALBUTEROL SULFATE 3 ML: 2.5; .5 SOLUTION RESPIRATORY (INHALATION) at 14:35

## 2019-04-14 RX ADMIN — PANTOPRAZOLE SODIUM 40 MG: 40 TABLET, DELAYED RELEASE ORAL at 08:43

## 2019-04-14 RX ADMIN — IPRATROPIUM BROMIDE AND ALBUTEROL SULFATE 3 ML: 2.5; .5 SOLUTION RESPIRATORY (INHALATION) at 19:25

## 2019-04-14 RX ADMIN — APIXABAN 2.5 MG: 2.5 TABLET, FILM COATED ORAL at 08:43

## 2019-04-14 RX ADMIN — BUMETANIDE 2 MG: 2 TABLET ORAL at 08:44

## 2019-04-14 RX ADMIN — SENNOSIDES AND DOCUSATE SODIUM 2 TABLET: 8.6; 5 TABLET ORAL at 08:44

## 2019-04-14 RX ADMIN — NYSTATIN: 100000 POWDER TOPICAL at 12:23

## 2019-04-14 NOTE — THERAPY TREATMENT NOTE
Acute Care - Physical Therapy Treatment Note  [facility]     Patient Name: Mariangel Nguyen  : 1950  MRN: [id number]  Today's Date: 2019  Onset of Illness/Injury or Date of Surgery: 19  Date of Referral to PT: 19  Referring Physician: Farzad Drew    Admit Date: 2019    Visit Dx:    ICD-10-CM ICD-9-CM   1. Difficulty walking R26.2 719.7     Patient Active Problem List   Diagnosis   • Diabetes mellitus (CMS/HCC)   • Edema   • Hyperlipidemia   • Hypertension   • Rotator cuff tear arthropathy   • Venous stasis dermatitis   • Varicose veins   • Acute on chronic systolic CHF (congestive heart failure) (CMS/HCC)   • Acute pyelonephritis   • Acute respiratory failure (CMS/HCC)   • BRODY (acute kidney injury) (CMS/HCC)   • ATN (acute tubular necrosis) (CMS/HCC)   • Benign essential HTN   • COPD (chronic obstructive pulmonary disease) (CMS/HCC)   • Goiter   • Hx of lithotripsy   • Low vitamin D level   • Lymphedema   • Neuropathy   • Omental infarction (CMS/HCC)   • KAELA and COPD overlap syndrome (CMS/HCC)   • Pulmonary HTN (CMS/HCC)   • Recurrent nephrolithiasis   • Small bowel perforation (CMS/HCC)   • Acute deep vein thrombosis (DVT) of popliteal vein of left lower extremity (CMS/HCC)   • Microcytic anemia   • Thrombocytosis (CMS/HCC)   • Iron deficiency anemia   • Encounter for screening for malignant neoplasm of colon   • Anxiety and depression   • Osteoarthritis of right knee       Therapy Treatment    Rehabilitation Treatment Summary     Row Name 19 1340 19 0904          Treatment Time/Intention    Discipline  physical therapist  -CS  physical therapist  -CS     Document Type  therapy note (daily note)  -CS  therapy note (daily note)  -CS     Subjective Information  complains of;pain  -CS  complains of;pain  -CS     Mode of Treatment  physical therapy  -CS  physical therapy  -CS     Patient/Family Observations  Pt up in recliner no acute distress  -CS  Pt up in recliner  no distress  -CS     Therapy Frequency (PT Clinical Impression)  2 times/day  -CS  2 times/day  -CS     Patient Effort  good  -CS  good  -CS     Existing Precautions/Restrictions  fall  -CS  fall  -CS     Recorded by [CS] Sixto Irvin, PT 04/14/19 1343 [CS] Sixto Irvin, PT 04/14/19 0906     Row Name 04/14/19 1340 04/14/19 0904          Vital Signs    O2 Delivery Pre Treatment  supplemental O2  -CS  supplemental O2  -CS     O2 Delivery Intra Treatment  supplemental O2  -CS  supplemental O2  -CS     O2 Delivery Post Treatment  supplemental O2  -CS  supplemental O2  -CS     Recorded by [CS] Sixto Irvin, PT 04/14/19 1343 [CS] Sixto rIvin, PT 04/14/19 0906     Row Name 04/14/19 0904             Cognitive Assessment/Intervention    Additional Documentation  Cognitive Assessment/Intervention (Group)  -CS      Recorded by [CS] Sixto Irvin, PT 04/14/19 0906      Row Name 04/14/19 1340 04/14/19 0904          Cognitive Assessment/Intervention- PT/OT    Orientation Status (Cognition)  oriented x 3  -CS  oriented x 3  -CS     Follows Commands (Cognition)  WFL  -CS  WFL  -CS     Personal Safety Interventions  fall prevention program maintained;gait belt;nonskid shoes/slippers when out of bed;supervised activity  -CS  fall prevention program maintained;gait belt;nonskid shoes/slippers when out of bed;supervised activity  -CS     Recorded by [CS] Sixto Irvin, PT 04/14/19 1343 [CS] Sixto Irvin, PT 04/14/19 0906     Row Name 04/14/19 1340 04/14/19 0904          Mobility Assessment/Intervention    Extremity Weight-bearing Status  right lower extremity  -CS  right lower extremity  -CS     Right Lower Extremity (Weight-bearing Status)  weight-bearing as tolerated (WBAT)  -CS  weight-bearing as tolerated (WBAT)  -CS     Recorded by [CS] Sixto Irvin, PT 04/14/19 1343 [CS] Sixto Irvin, PT 04/14/19 0906     Row Name 04/14/19 1340 04/14/19 0904          Transfer Assessment/Treatment    Transfer  Assessment/Treatment  sit-stand transfer;stand-sit transfer  -CS  sit-stand transfer;stand-sit transfer  -CS     Recorded by [CS] Sixto Irvin, PT 04/14/19 1343 [CS] Sixto Irvin, PT 04/14/19 0906     Row Name 04/14/19 1340 04/14/19 0904          Sit-Stand Transfer    Sit-Stand [place] (Transfers)  minimum assist (75% patient effort)  -CS  minimum assist (75% patient effort)  -CS     Assistive Device (Sit-Stand Transfers)  walker, front-wheeled  -CS  walker, front-wheeled  -CS     Recorded by [CS] Sixto Irvin, PT 04/14/19 1343 [CS] Sixto Irvin, PT 04/14/19 0906     Row Name 04/14/19 1340 04/14/19 0904          Stand-Sit Transfer    Stand-Sit [place] (Transfers)  minimum assist (75% patient effort);verbal cues  -CS  minimum assist (75% patient effort);verbal cues  -CS     Assistive Device (Stand-Sit Transfers)  walker, front-wheeled  -CS  walker, front-wheeled  -CS     Recorded by [CS] Sixto Irvin, PT 04/14/19 1343 [CS] Sixto Irvin, PT 04/14/19 0906     Row Name 04/14/19 1340 04/14/19 0904          Gait/Stairs Assessment/Training    [place] Level (Gait)  minimum assist (75% patient effort)  -CS  minimum assist (75% patient effort)  -CS     Assistive Device (Gait)  walker, front-wheeled  -CS  walker, front-wheeled  -CS     Distance in Feet (Gait)  100  -CS  80  -CS     Pattern (Gait)  step-to  -CS  step-to  -CS     Deviations/Abnormal Patterns (Gait)  right sided deviations;gait speed decreased;stride length decreased  -CS  right sided deviations;gait speed decreased;stride length decreased  -CS     Bilateral Gait Deviations  forward flexed posture;weight shift ability decreased  -CS  forward flexed posture;weight shift ability decreased  -CS     Right Sided Gait Deviations  weight shift ability decreased  -CS  weight shift ability decreased  -CS     Recorded by [CS] Sixto Irvin, PT 04/14/19 1343 [CS] Sixto Irvin, PT 04/14/19 0906     Row Name 04/14/19 1340 04/14/19  0904          Therapeutic Exercise    Comment (Therapeutic Exercise)  TKA protocol x15  -CS  TKA protocol x15  -CS     Recorded by [CS] Sixto Irvin, PT 04/14/19 1343 [CS] Sixto Irvin, PT 04/14/19 0906     Row Name 04/14/19 1340 04/14/19 0904          Positioning and Restraints    Pre-Treatment Position  sitting in chair/recliner  -CS  sitting in chair/recliner  -CS     Post Treatment Position  chair  -CS  chair  -CS     In Chair  reclined;call light within reach;encouraged to call for assist  -CS  reclined;call light within reach;encouraged to call for assist  -CS     Recorded by [CS] Sixto Irvin, PT 04/14/19 1343 [CS] Sixto Irvin, PT 04/14/19 0906     Row Name 04/14/19 1340 04/14/19 0904          Pain Scale: Numbers Pre/Post-Treatment    Pain Location  knee  -CS  knee  -CS     Pain Intervention(s)  Repositioned;Ambulation/increased activity  -CS  Repositioned;Ambulation/increased activity  -CS     Recorded by [CS] Sixto Ivrin, PT 04/14/19 1343 [CS] Sixto Irvin, PT 04/14/19 0906     Row Name 04/14/19 1340 04/14/19 0904          Pain Scale: Word Pre/Post-Treatment    Pain: Word Scale, Pretreatment  4 - moderate pain  -CS  4 - moderate pain  -CS     Pain: Word Scale, Post-Treatment  4 - moderate pain  -CS  4 - moderate pain  -CS     Recorded by [CS] Sixto Irvin, PT 04/14/19 1343 [CS] Sixto Irvin, PT 04/14/19 0906     Row Name                Wound 04/08/19 0742 Right knee incision    Wound - Properties Group Date first assessed: 04/08/19 [EG] Time first assessed: 0742 [EG] Side: Right [EG] Location: knee [EG] Type: incision [EG] Recorded by:  [EG] Ritu Hernandez RN 04/08/19 0742    Row Name 04/14/19 1340 04/14/19 0904          Coping    Observed Emotional State  accepting;calm;cooperative  -CS  accepting;calm;cooperative  -CS     Verbalized Emotional State  acceptance  -CS  acceptance  -CS     Recorded by [CS] Sixto Irvin, PT 04/14/19 1343 [CS] Sixto Irvin, PT  04/14/19 0906     Row Name 04/14/19 1340 04/14/19 0904          Plan of Care Review    Plan of Care Reviewed With  patient  -CS  patient  -CS     Recorded by [CS] Sixto Irvin, PT 04/14/19 1343 [CS] Sixto Irvin, PT 04/14/19 0906     Row Name 04/14/19 1340 04/14/19 0904          Outcome Summary/Treatment Plan (PT)    Anticipated Discharge Disposition (PT)  skilled nursing facility  -CS  skilled nursing facility  -CS     Recorded by [CS] Sixto Irvin, PT 04/14/19 1343 [CS] Sixto Irvin, PT 04/14/19 0906       User Key  (r) = Recorded By, (t) = Taken By, (c) = Cosigned By    Initials Name Effective Dates Discipline    EG Ritu Hernandez, RN 07/10/17 -  Nurse    CS Sixto Irvin, PT 05/14/18 -  PT          Wound 04/08/19 0742 Right knee incision (Active)   Dressing Appearance dry;intact 4/14/2019 12:15 AM   Closure JENELLE 4/14/2019 12:15 AM   Base dressing in place, unable to visualize 4/14/2019 12:15 AM   Drainage Amount none 4/14/2019 12:15 AM           Physical Therapy Education     Title: PT OT SLP Therapies (Done)     Topic: Physical Therapy (Done)     Point: Mobility training (Done)     Learning Progress Summary           Patient Acceptance, E,TB, VU by CS at 4/14/2019  9:07 AM    Acceptance, E,TB, VU by CS at 4/13/2019 10:29 AM    Acceptance, E, VU,NR by  at 4/12/2019 11:24 AM    Acceptance, E,TB,D, VU,NR by  at 4/11/2019  3:59 PM    Acceptance, E,TB,D, VU,NR by  at 4/11/2019 11:51 AM    Acceptance, E,TB,D, VU,NR by  at 4/10/2019  1:34 PM    Acceptance, E,D,TB, VU,NR by  at 4/9/2019  4:34 PM    Acceptance, E,D, VU,NR by MS at 4/8/2019  1:30 PM   Family Acceptance, E, VU,NR by  at 4/12/2019 11:24 AM                   Point: Home exercise program (Done)     Learning Progress Summary           Patient Acceptance, LORENA MEDINA, VU by CS at 4/14/2019  9:07 AM    Acceptance, LORENA MEDINA, VU by CS at 4/13/2019 10:29 AM    Acceptance, CAROL VU,NR by  at 4/12/2019 11:24 AM    Acceptance, ELORENA,D, VU,NR by   at 4/11/2019  3:59 PM    Acceptance, E,TB,D, VU,NR by  at 4/11/2019 11:51 AM    Acceptance, E,TB,D, VU,NR by  at 4/10/2019  3:30 PM    Acceptance, E,D,TB, VU,NR by  at 4/9/2019  4:34 PM    Acceptance, E,D, VU,NR by MS at 4/8/2019  1:30 PM   Family Acceptance, E, VU,NR by  at 4/12/2019 11:24 AM                   Point: Body mechanics (Done)     Learning Progress Summary           Patient Acceptance, E,TB, VU by CS at 4/14/2019  9:07 AM    Acceptance, E,TB, VU by CS at 4/13/2019 10:29 AM    Acceptance, E, VU,NR by EF at 4/12/2019 11:24 AM    Acceptance, E,TB,D, VU,NR by  at 4/11/2019  3:59 PM    Acceptance, E,TB,D, VU,NR by  at 4/11/2019 11:51 AM    Acceptance, E,TB,D, VU,NR by  at 4/10/2019  1:34 PM    Acceptance, E,D,TB, VU,NR by  at 4/9/2019  4:34 PM    Acceptance, E,D, VU,NR by MS at 4/8/2019  1:30 PM   Family Acceptance, E, VU,NR by EF at 4/12/2019 11:24 AM                   Point: Precautions (Done)     Learning Progress Summary           Patient Acceptance, E,TB, VU by CS at 4/14/2019  9:07 AM    Acceptance, E,TB, VU by CS at 4/13/2019 10:29 AM    Acceptance, E, VU,NR by EF at 4/12/2019 11:24 AM    Acceptance, E,TB,D, VU,NR by  at 4/11/2019  3:59 PM    Acceptance, E,TB,D, VU,NR by  at 4/11/2019 11:51 AM    Acceptance, E,TB,D, VU,NR by  at 4/10/2019  1:34 PM    Acceptance, E,D,TB, VU,NR by  at 4/9/2019  4:34 PM    Acceptance, E,D, VU,NR by MS at 4/8/2019  1:30 PM   Family Acceptance, E, VU,NR by EF at 4/12/2019 11:24 AM                               User Key     Initials Effective Dates Name Provider Type Discipline    EF 06/08/18 -  Ritu Nicole, PT Physical Therapist PT    CH 04/03/18 -  Анна Pruitt, PT Physical Therapist PT    MS 04/03/18 -  Dillon Menjivar, PT Physical Therapist PT     08/19/18 -  Vaishali Gerber, NICOLE Physical Therapy Assistant PT     05/14/18 -  Sixto Irvin, PT Physical Therapist PT                PT Recommendation and Plan  Anticipated  Discharge Disposition (PT): skilled nursing facility  Therapy Frequency (PT Clinical Impression): 2 times/day  Outcome Summary/Treatment Plan (PT)  Anticipated Discharge Disposition (PT): skilled nursing facility  Plan of Care Reviewed With: patient  Outcome Summary: Pt walked further today, 80'Laureen and did TKA exercises after. Mild complaints of pain.   Outcome Measures     Row Name 04/14/19 0900 04/13/19 1000 04/12/19 1600       How much help from another person do you currently need...    Turning from your back to your side while in flat bed without using bedrails?  3  -CS  3  -CS  2  -EF    Moving from lying on back to sitting on the side of a flat bed without bedrails?  3  -CS  2  -CS  2  -EF    Moving to and from a bed to a chair (including a wheelchair)?  3  -CS  3  -CS  3  -EF    Standing up from a chair using your arms (e.g., wheelchair, bedside chair)?  3  -CS  3  -CS  3  -EF    Climbing 3-5 steps with a railing?  2  -CS  2  -CS  1  -EF    To walk in hospital room?  3  -CS  2  -CS  2  -EF    AM-PAC 6 Clicks Score  17  -CS  15  -CS  13  -EF       Functional Assessment    Outcome Measure Options  AM-PAC 6 Clicks Basic Mobility (PT)  -CS  AM-PAC 6 Clicks Basic Mobility (PT)  -CS  AM-PAC 6 Clicks Basic Mobility (PT)  -EF    Row Name 04/12/19 1100 04/11/19 1500          How much help from another person do you currently need...    Turning from your back to your side while in flat bed without using bedrails?  2  -EF  2  -CH     Moving from lying on back to sitting on the side of a flat bed without bedrails?  2  -EF  2  -CH     Moving to and from a bed to a chair (including a wheelchair)?  3  -EF  3  -CH     Standing up from a chair using your arms (e.g., wheelchair, bedside chair)?  3  -EF  3  -CH     Climbing 3-5 steps with a railing?  1  -EF  1  -CH     To walk in hospital room?  2  -EF  2  -CH     AM-PAC 6 Clicks Score  13  -EF  13  -CH        Functional Assessment    Outcome Measure Options  AM-PAC 6 Clicks  Basic Mobility (PT)  -EF  AM-PAC 6 Clicks Basic Mobility (PT)  -       User Key  (r) = Recorded By, (t) = Taken By, (c) = Cosigned By    Initials Name Provider Type    EF Ritu Nicole, PT Physical Therapist    CH Анна Pruitt, PT Physical Therapist    CS Sixto Irvin, PT Physical Therapist         Time Calculation:   PT Charges     Row Name 04/14/19 1343 04/14/19 0909          Time Calculation    Start Time  1329  -CS  0846  -CS     Stop Time  1344  -CS  0910  -CS     Time Calculation (min)  15 min  -CS  24 min  -CS     PT Received On  04/14/19  -  --     PT - Next Appointment  04/15/19  -  --       User Key  (r) = Recorded By, (t) = Taken By, (c) = Cosigned By    Initials Name Provider Type    Sixto Mccullough, PT Physical Therapist        Therapy Charges for Today     Code Description Service Date Service Provider Modifiers Qty    53228033768 HC PT THER PROC EA 15 MIN 4/13/2019 Sixto Irvin, PT GP 2    98888622160 HC PT THER PROC EA 15 MIN 4/13/2019 Sixto Irvin, PT GP 1    29250229506 HC PT THER PROC EA 15 MIN 4/14/2019 Sixto Irvin, PT GP 2    45339790464 HC PT THER PROC EA 15 MIN 4/14/2019 Sixto Irvin, PT GP 1          PT G-Codes  Outcome Measure Options: AM-PAC 6 Clicks Basic Mobility (PT)  AM-PAC 6 Clicks Score: 17    Sixto Irvin PT  4/14/2019

## 2019-04-14 NOTE — THERAPY TREATMENT NOTE
Acute Care - Physical Therapy Treatment Note  [facility]     Patient Name: Mariangel Nguyen  : 1950  MRN: [id number]  Today's Date: 2019  Onset of Illness/Injury or Date of Surgery: 19  Date of Referral to PT: 19  Referring Physician: Farzad rDew    Admit Date: 2019    Visit Dx:    ICD-10-CM ICD-9-CM   1. Difficulty walking R26.2 719.7     Patient Active Problem List   Diagnosis   • Diabetes mellitus (CMS/HCC)   • Edema   • Hyperlipidemia   • Hypertension   • Rotator cuff tear arthropathy   • Venous stasis dermatitis   • Varicose veins   • Acute on chronic systolic CHF (congestive heart failure) (CMS/HCC)   • Acute pyelonephritis   • Acute respiratory failure (CMS/HCC)   • BRODY (acute kidney injury) (CMS/HCC)   • ATN (acute tubular necrosis) (CMS/HCC)   • Benign essential HTN   • COPD (chronic obstructive pulmonary disease) (CMS/HCC)   • Goiter   • Hx of lithotripsy   • Low vitamin D level   • Lymphedema   • Neuropathy   • Omental infarction (CMS/HCC)   • KAELA and COPD overlap syndrome (CMS/HCC)   • Pulmonary HTN (CMS/HCC)   • Recurrent nephrolithiasis   • Small bowel perforation (CMS/HCC)   • Acute deep vein thrombosis (DVT) of popliteal vein of left lower extremity (CMS/HCC)   • Microcytic anemia   • Thrombocytosis (CMS/HCC)   • Iron deficiency anemia   • Encounter for screening for malignant neoplasm of colon   • Anxiety and depression   • Osteoarthritis of right knee       Therapy Treatment    Rehabilitation Treatment Summary     Row Name 19 0904             Treatment Time/Intention    Discipline  physical therapist  -CS      Document Type  therapy note (daily note)  -CS      Subjective Information  complains of;pain  -CS      Mode of Treatment  physical therapy  -CS      Patient/Family Observations  Pt up in recliner no distress  -CS      Therapy Frequency (PT Clinical Impression)  2 times/day  -CS      Patient Effort  good  -CS      Existing Precautions/Restrictions   fall  -CS      Recorded by [CS] Sixto Irvin, PT 04/14/19 0906      Row Name 04/14/19 0904             Vital Signs    O2 Delivery Pre Treatment  supplemental O2  -CS      O2 Delivery Intra Treatment  supplemental O2  -CS      O2 Delivery Post Treatment  supplemental O2  -CS      Recorded by [CS] Sixto Irvin, PT 04/14/19 0906      Row Name 04/14/19 0904             Cognitive Assessment/Intervention    Additional Documentation  Cognitive Assessment/Intervention (Group)  -CS      Recorded by [CS] Sixto Irvin, PT 04/14/19 0906      Row Name 04/14/19 0904             Cognitive Assessment/Intervention- PT/OT    Orientation Status (Cognition)  oriented x 3  -CS      Follows Commands (Cognition)  WFL  -CS      Personal Safety Interventions  fall prevention program maintained;gait belt;nonskid shoes/slippers when out of bed;supervised activity  -CS      Recorded by [CS] Sixto Irvin, PT 04/14/19 0906      Row Name 04/14/19 0904             Mobility Assessment/Intervention    Extremity Weight-bearing Status  right lower extremity  -CS      Right Lower Extremity (Weight-bearing Status)  weight-bearing as tolerated (WBAT)  -CS      Recorded by [CS] Sixto Irvin, PT 04/14/19 0906      Row Name 04/14/19 0904             Transfer Assessment/Treatment    Transfer Assessment/Treatment  sit-stand transfer;stand-sit transfer  -CS      Recorded by [CS] Sixto Irvin, PT 04/14/19 0906      Row Name 04/14/19 0904             Sit-Stand Transfer    Sit-Stand [place] (Transfers)  minimum assist (75% patient effort)  -CS      Assistive Device (Sit-Stand Transfers)  walker, front-wheeled  -CS      Recorded by [CS] Sixto Irvin, PT 04/14/19 0906      Row Name 04/14/19 0904             Stand-Sit Transfer    Stand-Sit [place] (Transfers)  minimum assist (75% patient effort);verbal cues  -CS      Assistive Device (Stand-Sit Transfers)  walker, front-wheeled  -CS      Recorded by [CS] Sixto Irvin, PT  04/14/19 0906      Row Name 04/14/19 0904             Gait/Stairs Assessment/Training    [place] Level (Gait)  minimum assist (75% patient effort)  -CS      Assistive Device (Gait)  walker, front-wheeled  -CS      Distance in Feet (Gait)  80  -CS      Pattern (Gait)  step-to  -CS      Deviations/Abnormal Patterns (Gait)  right sided deviations;gait speed decreased;stride length decreased  -CS      Bilateral Gait Deviations  forward flexed posture;weight shift ability decreased  -CS      Right Sided Gait Deviations  weight shift ability decreased  -CS      Recorded by [CS] Sixto Irvin, PT 04/14/19 0906      Row Name 04/14/19 0904             Therapeutic Exercise    Comment (Therapeutic Exercise)  TKA protocol x15  -CS      Recorded by [CS] Sixto Irvin, PT 04/14/19 0906      Row Name 04/14/19 0904             Positioning and Restraints    Pre-Treatment Position  sitting in chair/recliner  -CS      Post Treatment Position  chair  -CS      In Chair  reclined;call light within reach;encouraged to call for assist  -CS      Recorded by [CS] Sixto Irvin, PT 04/14/19 0906      Row Name 04/14/19 0904             Pain Scale: Numbers Pre/Post-Treatment    Pain Location  knee  -CS      Pain Intervention(s)  Repositioned;Ambulation/increased activity  -CS      Recorded by [CS] Sixto Irvin, PT 04/14/19 0906      Row Name 04/14/19 0904             Pain Scale: Word Pre/Post-Treatment    Pain: Word Scale, Pretreatment  4 - moderate pain  -CS      Pain: Word Scale, Post-Treatment  4 - moderate pain  -CS      Recorded by [CS] Sixto Irvin, PT 04/14/19 0906      Row Name                Wound 04/08/19 0742 Right knee incision    Wound - Properties Group Date first assessed: 04/08/19 [EG] Time first assessed: 0742 [EG] Side: Right [EG] Location: knee [EG] Type: incision [EG] Recorded by:  [EG] Ritu Hernandez RN 04/08/19 0742    Row Name 04/14/19 0904             Coping    Observed Emotional State   accepting;calm;cooperative  -CS      Verbalized Emotional State  acceptance  -CS      Recorded by [CS] Sixto Irvin, PT 04/14/19 0906      Row Name 04/14/19 0904             Plan of Care Review    Plan of Care Reviewed With  patient  -CS      Recorded by [CS] Sixto Irvin, PT 04/14/19 0906      Row Name 04/14/19 0904             Outcome Summary/Treatment Plan (PT)    Anticipated Discharge Disposition (PT)  skilled nursing facility  -CS      Recorded by [CS] Sixto Irvin, PT 04/14/19 0906        User Key  (r) = Recorded By, (t) = Taken By, (c) = Cosigned By    Initials Name Effective Dates Discipline    EG Ritu Hernandez RN 07/10/17 -  Nurse    CS Sixto Irvin, PT 05/14/18 -  PT          Wound 04/08/19 0742 Right knee incision (Active)   Dressing Appearance dry;intact 4/14/2019 12:15 AM   Closure JENELLE 4/14/2019 12:15 AM   Base dressing in place, unable to visualize 4/14/2019 12:15 AM   Drainage Amount none 4/14/2019 12:15 AM           Physical Therapy Education     Title: PT OT SLP Therapies (Done)     Topic: Physical Therapy (Done)     Point: Mobility training (Done)     Learning Progress Summary           Patient Acceptance, E,TB, VU by CS at 4/14/2019  9:07 AM    Acceptance, E,TB, VU by CS at 4/13/2019 10:29 AM    Acceptance, E, VU,NR by EF at 4/12/2019 11:24 AM    Acceptance, E,TB,D, VU,NR by  at 4/11/2019  3:59 PM    Acceptance, E,TB,D, VU,NR by  at 4/11/2019 11:51 AM    Acceptance, E,TB,D, VU,NR by  at 4/10/2019  1:34 PM    Acceptance, E,D,TB, VU,NR by  at 4/9/2019  4:34 PM    Acceptance, E,D, VU,NR by MS at 4/8/2019  1:30 PM   Family Acceptance, E, VU,NR by EF at 4/12/2019 11:24 AM                   Point: Home exercise program (Done)     Learning Progress Summary           Patient Acceptance, E,TB, VU by CS at 4/14/2019  9:07 AM    Acceptance, E,TB, VU by CS at 4/13/2019 10:29 AM    AcceptanceCAROL VU,NR by EF at 4/12/2019 11:24 AM    Acceptance, CAROL,LORENA,RAUL, RUSSEL,NR by  at 4/11/2019   3:59 PM    Acceptance, E,TB,D, VU,NR by  at 4/11/2019 11:51 AM    Acceptance, E,TB,D, VU,NR by  at 4/10/2019  3:30 PM    Acceptance, E,D,TB, VU,NR by  at 4/9/2019  4:34 PM    Acceptance, E,D, VU,NR by MS at 4/8/2019  1:30 PM   Family Acceptance, E, VU,NR by EF at 4/12/2019 11:24 AM                   Point: Body mechanics (Done)     Learning Progress Summary           Patient Acceptance, E,TB, VU by CS at 4/14/2019  9:07 AM    Acceptance, E,TB, VU by CS at 4/13/2019 10:29 AM    Acceptance, E, VU,NR by EF at 4/12/2019 11:24 AM    Acceptance, E,TB,D, VU,NR by  at 4/11/2019  3:59 PM    Acceptance, E,TB,D, VU,NR by  at 4/11/2019 11:51 AM    Acceptance, E,TB,D, VU,NR by  at 4/10/2019  1:34 PM    Acceptance, E,D,TB, VU,NR by  at 4/9/2019  4:34 PM    Acceptance, E,D, VU,NR by MS at 4/8/2019  1:30 PM   Family Acceptance, E, VU,NR by EF at 4/12/2019 11:24 AM                   Point: Precautions (Done)     Learning Progress Summary           Patient Acceptance, E,TB, VU by CS at 4/14/2019  9:07 AM    Acceptance, E,TB, VU by  at 4/13/2019 10:29 AM    Acceptance, E, VU,NR by EF at 4/12/2019 11:24 AM    Acceptance, E,TB,D, VU,NR by  at 4/11/2019  3:59 PM    Acceptance, E,TB,D, VU,NR by  at 4/11/2019 11:51 AM    Acceptance, E,TB,D, VU,NR by  at 4/10/2019  1:34 PM    Acceptance, E,D,TB, VU,NR by  at 4/9/2019  4:34 PM    Acceptance, E,D, VU,NR by MS at 4/8/2019  1:30 PM   Family Acceptance, E, VU,NR by EF at 4/12/2019 11:24 AM                               User Key     Initials Effective Dates Name Provider Type Discipline    EF 06/08/18 -  Ritu Nicole, PT Physical Therapist PT    CH 04/03/18 -  Анна Pruitt, PT Physical Therapist PT    MS 04/03/18 -  Dillon Menjivar, PT Physical Therapist PT    EH 08/19/18 -  Vaishali Gerber, NICOLE Physical Therapy Assistant PT    CS 05/14/18 -  Sixto Irvin, PT Physical Therapist PT                PT Recommendation and Plan  Anticipated Discharge Disposition  (PT): skilled nursing facility  Therapy Frequency (PT Clinical Impression): 2 times/day  Outcome Summary/Treatment Plan (PT)  Anticipated Discharge Disposition (PT): skilled nursing facility  Plan of Care Reviewed With: patient  Outcome Summary: Pt walked further today, 80'[place] and did TKA exercises after. Mild complaints of pain.   Outcome Measures     Row Name 04/14/19 0900 04/13/19 1000 04/12/19 1600       How much help from another person do you currently need...    Turning from your back to your side while in flat bed without using bedrails?  3  -CS  3  -CS  2  -EF    Moving from lying on back to sitting on the side of a flat bed without bedrails?  3  -CS  2  -CS  2  -EF    Moving to and from a bed to a chair (including a wheelchair)?  3  -CS  3  -CS  3  -EF    Standing up from a chair using your arms (e.g., wheelchair, bedside chair)?  3  -CS  3  -CS  3  -EF    Climbing 3-5 steps with a railing?  2  -CS  2  -CS  1  -EF    To walk in hospital room?  3  -CS  2  -CS  2  -EF    AM-PAC 6 Clicks Score  17  -CS  15  -CS  13  -EF       Functional Assessment    Outcome Measure Options  AM-PAC 6 Clicks Basic Mobility (PT)  -CS  AM-PAC 6 Clicks Basic Mobility (PT)  -CS  AM-PAC 6 Clicks Basic Mobility (PT)  -EF    Row Name 04/12/19 1100 04/11/19 1500 04/11/19 1100       How much help from another person do you currently need...    Turning from your back to your side while in flat bed without using bedrails?  2  -EF  2  -CH  2  -CH    Moving from lying on back to sitting on the side of a flat bed without bedrails?  2  -EF  2  -CH  2  -CH    Moving to and from a bed to a chair (including a wheelchair)?  3  -EF  3  -CH  2  -CH    Standing up from a chair using your arms (e.g., wheelchair, bedside chair)?  3  -EF  3  -CH  2  -CH    Climbing 3-5 steps with a railing?  1  -EF  1  -CH  1  -CH    To walk in hospital room?  2  -EF  2  -CH  2  -CH    AM-PAC 6 Clicks Score  13  -EF  13  -CH  11  -CH       Functional Assessment     Outcome Measure Options  AM-PAC 6 Clicks Basic Mobility (PT)  -EF  AM-PAC 6 Clicks Basic Mobility (PT)  -CH  AM-PAC 6 Clicks Basic Mobility (PT)  -CH      User Key  (r) = Recorded By, (t) = Taken By, (c) = Cosigned By    Initials Name Provider Type    EF Ritu Nicole, PT Physical Therapist    CH Анна Pruitt, PT Physical Therapist    CS Sixto Irvin, PT Physical Therapist         Time Calculation:   PT Charges     Row Name 04/14/19 0909             Time Calculation    Start Time  0846  -CS      Stop Time  0910  -CS      Time Calculation (min)  24 min  -CS        User Key  (r) = Recorded By, (t) = Taken By, (c) = Cosigned By    Initials Name Provider Type    Sixto Mccullough, PT Physical Therapist        Therapy Charges for Today     Code Description Service Date Service Provider Modifiers Qty    75391412354 HC PT THER PROC EA 15 MIN 4/13/2019 Sixto Irvin, PT GP 2    06781518979 HC PT THER PROC EA 15 MIN 4/13/2019 Sixto Irvin, PT GP 1    92710722226 HC PT THER PROC EA 15 MIN 4/14/2019 Sixto Irvin, PT GP 2          PT G-Codes  Outcome Measure Options: AM-PAC 6 Clicks Basic Mobility (PT)  AM-PAC 6 Clicks Score: 17    Sixto Irvin, PT  4/14/2019

## 2019-04-14 NOTE — PLAN OF CARE
Problem: Chronic Obstructive Pulmonary Disease (Adult)  Intervention: Reduce/Relieve Breathlessness   04/14/19 0501   Cognitive Interventions   Sensory Stimulation Regulation lighting decreased;music/television provided for relaxation;quiet environment promoted   Coping/Psychosocial Interventions   Environmental Support calm environment promoted;distractions minimized;environmental consistency promoted;personal routine supported;rest periods encouraged

## 2019-04-14 NOTE — PROGRESS NOTES
LOS: 6 days   Patient Care Team:  Waqar Burton MD as PCP - General  Waqar Burton MD as PCP - Family Medicine  Al Farnaz Celis MD (Pulmonary Disease)  Simon Marx MD as Consulting Physician (Cardiology)  Farzad Luong MD as Referring Physician (Orthopedic Surgery)  Raheem Leone Jr., MD as Consulting Physician (Hematology and Oncology)    Subjective     Patient reports she is feeling much better today does not feel short of breath.  Says she wore the noninvasive ventilator for 7 hours without any problems last night  Review of Systems:          Objective     Vital Signs  Vital Sign Min/Max for last 24 hours  Temp  Min: 97.2 °F (36.2 °C)  Max: 98 °F (36.7 °C)   BP  Min: 128/70  Max: 161/89   Pulse  Min: 54  Max: 76   Resp  Min: 14  Max: 16   SpO2  Min: 89 %  Max: 100 %   Flow (L/min)  Min: 2  Max: 5   Weight  Min: 111 kg (245 lb 3.2 oz)  Max: 111 kg (245 lb 3.2 oz)        Ventilator/Non-Invasive Ventilation Settings (From admission, onward)    Start     Ordered    04/09/19 1026  NIPPV (CPAP or BIPAP)  Until Discontinued,   Status:  Canceled     Question Answer Comment   Type: AutoBIPAP    NIPPV Mask Interface: Per Patient Preference    Max IPAP 24    Max EPAP 8        04/09/19 1025    04/09/19 1021  NIPPV (CPAP or BIPAP)  As Needed-RT     Question Answer Comment   Indication: Sleep Apnea    Type: BIPAP    NIPPV Mask Interface: Per Patient Preference    Titrate for SPO2 Greater Than or Equal To    Titrate for SPO2 90%        04/09/19 1021                       Body mass index is 44.85 kg/m².  I/O last 3 completed shifts:  In: 720 [P.O.:720]  Out: 900 [Urine:900]  I/O this shift:  In: 720 [P.O.:720]  Out: -         Physical Exam:  General Appearance: Morbidly obese white female resting comfortably in bed does not appear in any acute distress she is on 2 L nasal cannula O2  oxygen saturation is 96%  Eyes: Conjunctiva are clear and anicteric  ENT: Mucous membranes are moist no  erythema exudates Mallampati type III airway  Neck: No adenopathy no jugular venous distention trachea midline neck is obese  Lungs: Clear nonlabored symmetric expansion no wheezes rales or rhonchi  Cardiac: Regular rate rhythm no murmur  Abdomen: Obese soft nontender no palpable organomegaly or masses  : Not examined  Musculoskeletal: Grossly normal except for the postoperative changes in the knee  Skin: No jaundice no rashes  Neuro: She is alert oriented cooperative  Extremities/P Vascular: Palpable radial dorsalis pedis pulses bilaterally  MSE: Seems to be in very good spirits       Labs:  Results from last 7 days   Lab Units 04/11/19  0726 04/10/19  0505 04/09/19  0458   GLUCOSE mg/dL 137* 153* 167*   SODIUM mmol/L 136 139 139   POTASSIUM mmol/L 4.1 4.8 4.3   CO2 mmol/L 33.4* 23.4 24.8   CHLORIDE mmol/L 94* 96* 95*   ANION GAP mmol/L 8.6 19.6 19.2   CREATININE mg/dL 0.84 0.83  0.71 0.63   BUN mg/dL 21 15 10   BUN / CREAT RATIO  25.0 18.1 15.9   CALCIUM mg/dL 8.5* 9.5 8.8   EGFR IF NONAFRICN AM mL/min/1.73 68 69  82 95     Estimated Creatinine Clearance: 77.5 mL/min (by C-G formula based on SCr of 0.84 mg/dL).      Results from last 7 days   Lab Units 04/14/19  0712 04/13/19  0538 04/12/19  0606 04/11/19  0726 04/10/19  0505 04/09/19  0458 04/08/19  1047   WBC 10*3/mm3  --   --   --  9.63  --   --   --    RBC 10*6/mm3  --   --   --  4.14  --   --   --    HEMOGLOBIN g/dL 10.5* 10.2* 10.7* 10.5* 11.5* 12.3 11.6*   HEMATOCRIT % 37.2 35.4 37.5 36.6 40.0 42.7 38.7   MCV fL  --   --   --  88.4  --   --   --    MCH pg  --   --   --  25.4*  --   --   --    MCHC g/dL  --   --   --  28.7*  --   --   --    RDW %  --   --   --  27.0*  --   --   --    RDW-SD fl  --   --   --  82.4*  --   --   --    MPV fL  --   --   --  9.4  --   --   --    PLATELETS 10*3/mm3  --   --   --  332  --   --   --    NEUTROPHIL % %  --   --   --  76.6*  --   --   --    LYMPHOCYTE % %  --   --   --  6.9*  --   --   --    MONOCYTES % %  --   --    --  10.3  --   --   --    EOSINOPHIL % %  --   --   --  5.2  --   --   --    BASOPHIL % %  --   --   --  0.5  --   --   --    IMM GRAN % %  --   --   --  0.5  --   --   --    NEUTROS ABS 10*3/mm3  --   --   --  7.38*  --   --   --    LYMPHS ABS 10*3/mm3  --   --   --  0.66*  --   --   --    MONOS ABS 10*3/mm3  --   --   --  0.99*  --   --   --    EOS ABS 10*3/mm3  --   --   --  0.50*  --   --   --    BASOS ABS 10*3/mm3  --   --   --  0.05  --   --   --    IMMATURE GRANS (ABS) 10*3/mm3  --   --   --  0.05  --   --   --    NRBC /100 WBC  --   --   --  0.1*  --   --   --      Results from last 7 days   Lab Units 04/09/19  1031   PH, ARTERIAL pH units 7.373   PO2 ART mm Hg 112.0*   PCO2, ARTERIAL mm Hg 57.8*   HCO3 ART mmol/L 33.7*                         Microbiology Results (last 10 days)     ** No results found for the last 240 hours. **                apixaban 2.5 mg Oral Q12H   bisoprolol 5 mg Oral Daily   bumetanide 2 mg Oral Daily   escitalopram 15 mg Oral Daily   insulin lispro 0-9 Units Subcutaneous 4x Daily With Meals & Nightly   ipratropium-albuterol 3 mL Nebulization TID   levothyroxine 25 mcg Oral Daily   metFORMIN 1,000 mg Oral BID With Meals   naproxen 500 mg Oral BID With Meals   nystatin  Topical Q12H   pantoprazole 40 mg Oral Daily   pravastatin 40 mg Oral Nightly   sennosides-docusate sodium 2 tablet Oral BID   sildenafil 20 mg Oral TID   sodium chloride 3 mL Intravenous Q12H          Diagnostics:  Xr Knee 1 Or 2 View Right    Result Date: 4/8/2019  Right knee radiograph  HISTORY:Postop right knee  TECHNIQUE: AP and lateral radiograph the right knee  COMPARISON:Right knee radiographs 03/28/2019  FINDINGS: Post surgical changes from recent right total knee arthroplasty are present. The hardware is intact. There is no new periprosthetic fracture.      Postoperative changes from recent right total knee arthroplasty without findings of immediate complication.  This report was finalized on 4/8/2019 10:08  AM by Dr. Christopher Kern M.D.      Xr Knee 1 Or 2 View Right    Result Date: 3/29/2019  PA AND LATERAL CHEST, RIGHT KNEE  HISTORY:  Preop chest x-ray, knee pain.  CHEST: PA and lateral views of the chest were obtained and compared to 01/24/2019.  There is moderate cardiomegaly.  A calcified granuloma is noted at the left lung base. There is mild-to-moderate prominence of the pulmonary vasculature in the perihilar regions bilaterally with minimal cephalization. There is no evidence of focal infiltrate, consolidation or effusion.  RIGHT KNEE: Two views of the right knee demonstrates obliteration of joint space medially. There is mild lateral subluxation of the proximal tibia relative to the distal femur (by approximately 8 mm). There is sclerosis involving the articular surfaces of the knee medially and small marginal osteophytes appreciated. Mild patellofemoral degenerative disease is noted. There is no evidence of fracture.  This report was finalized on 3/29/2019 7:23 AM by Dr. Fernando Moreno M.D.      Ct Angiogram Chest With & Without Contrast    Result Date: 4/11/2019  CT ANGIOGRAM CHEST WITH/WITHOUT CONTRAST  Radiation dose reduction techniques were utilized, including automated exposure control and exposure modulation based on body size.  CLINICAL INFORMATION: Postop knee arthroplasty, shortness of breath, pulmonary hypertension.  CT angiogram of the chest with coronal, sagittal and 3-dimensional reconstruction.  FINDINGS: Main pulmonary artery diameter is 4 cm compatible with pulmonary arterial hypertension. There is cardiac enlargement, no pericardial abnormality. Atherosclerotic calcification of normal caliber aorta, no aneurysm nor dissection. The esophagus is normal in course and caliber.  Thin axial and CT angiographic reconstructed images failed to demonstrate pulmonary embolus.  There are linear areas demonstrated along the costophrenic sulci of both lower lobes, likely discoid atelectasis versus scar.  No pleural effusion demonstrated. Located at the right upper lobe apex are several small areas of groundglass infiltrates.  CONCLUSION: 1. No aortic aneurysm or dissection. 2. No pulmonary embolus. Diameter of the main pulmonary artery is 4 cm consistent with pulmonary arterial hypertension. 3. Low lung volumes with likely discoid atelectasis at both lung bases, scar is less likely. 4. Subtle areas of groundglass infiltrate right upper lobe.   This report was finalized on 4/11/2019 3:53 PM by Dr. Danielito Garcia M.D.      Xr Chest 1 View    Result Date: 4/10/2019  XR CHEST 1 VW-  HISTORY: Female who is 68 years-old,  hypoxemia  TECHNIQUE: Frontal view of the chest  COMPARISON: 03/28/2019  FINDINGS: Heart is enlarged. Mild prominence of vascular and interstitial markings. Left hemidiaphragm is partly obscured that may reflect atelectasis/infiltrate/effusion. No pneumothorax. No acute osseous process.      Partial obscuration of the left hemidiaphragm may reflect atelectasis/infiltrate/effusion, follow-up recommended. Cardiomegaly with mild prominence of vascular and interstitial markings.  This report was finalized on 4/10/2019 1:51 PM by Dr. Anjel Garcia M.D.      Xr Chest Pa & Lateral    Result Date: 3/29/2019  PA AND LATERAL CHEST, RIGHT KNEE  HISTORY:  Preop chest x-ray, knee pain.  CHEST: PA and lateral views of the chest were obtained and compared to 01/24/2019.  There is moderate cardiomegaly.  A calcified granuloma is noted at the left lung base. There is mild-to-moderate prominence of the pulmonary vasculature in the perihilar regions bilaterally with minimal cephalization. There is no evidence of focal infiltrate, consolidation or effusion.  RIGHT KNEE: Two views of the right knee demonstrates obliteration of joint space medially. There is mild lateral subluxation of the proximal tibia relative to the distal femur (by approximately 8 mm). There is sclerosis involving the articular surfaces of the knee  medially and small marginal osteophytes appreciated. Mild patellofemoral degenerative disease is noted. There is no evidence of fracture.  This report was finalized on 3/29/2019 7:23 AM by Dr. Fernando Moreno M.D.               Active Hospital Problems    Diagnosis  POA   • Osteoarthritis of right knee [M17.11]  Yes      Resolved Hospital Problems   No resolved problems to display.         Assessment/Plan     1. Pulmonary hypertension etiology unclear patient on revatio and diuretics at home she should continue these.   she should have a full workup for her pulmonary hypertension including a right heart catheterization after discharge, discussing with her does not sound like she is ever had a right heart catheterization and it appears she has had a diastolic CHF untreated sleep apnea both of which can cause pulmonary hypertension and could be worsened by the use of of revatio  2. Chronic hypoxemic respiratory failure wean O2 as tolerated  3. Obstructive sleep apnea history of sleep apnea noncompliant with Pap machine this certainly could be a cause of her pulmonary hypertension she absolutely should be reevaluated.  She is tolerating noninvasive type ventilation here she says at discharge she will follow-up with her usual pulmonologist about getting a new machine.  4. Chronic diastolic CHF  5. Morbid obesity  6. Atelectasis continue pulmonary hygiene   7.  status post right total knee arthroplasty    Plan for disposition: I think if we can get her weaned down to her home 2 L O2 she probably can go home from a pulmonary standpoint    Tyrone Stewart MD  04/14/19  3:29 PM    Time:            LOS: 6 days   Patient Care Team:  Waqar Burton MD as PCP - General  Waqar Burton MD as PCP - Family Medicine  Farnaz Sanchez MD (Pulmonary Disease)  Simon Marx MD as Consulting Physician (Cardiology)  Farzad Luong MD as Referring Physician (Orthopedic Surgery)  Raheem Leone Jr., MD as  Consulting Physician (Hematology and Oncology)    Subjective     Patient reports he is doing much better today she was able to get up a little bit with therapy and walk a short distance.  She does not feel short of breath.  At home she has 2 L net she uses mostly at night only occasionally during the day.    Review of Systems:          Objective     Vital Signs  Vital Sign Min/Max for last 24 hours  Temp  Min: 97.2 °F (36.2 °C)  Max: 98 °F (36.7 °C)   BP  Min: 128/70  Max: 161/89   Pulse  Min: 54  Max: 76   Resp  Min: 14  Max: 16   SpO2  Min: 89 %  Max: 100 %   Flow (L/min)  Min: 2  Max: 5   Weight  Min: 111 kg (245 lb 3.2 oz)  Max: 111 kg (245 lb 3.2 oz)        Ventilator/Non-Invasive Ventilation Settings (From admission, onward)    Start     Ordered    04/09/19 1026  NIPPV (CPAP or BIPAP)  Until Discontinued,   Status:  Canceled     Question Answer Comment   Type: AutoBIPAP    NIPPV Mask Interface: Per Patient Preference    Max IPAP 24    Max EPAP 8        04/09/19 1025    04/09/19 1021  NIPPV (CPAP or BIPAP)  As Needed-RT     Question Answer Comment   Indication: Sleep Apnea    Type: BIPAP    NIPPV Mask Interface: Per Patient Preference    Titrate for SPO2 Greater Than or Equal To    Titrate for SPO2 90%        04/09/19 1021                       Body mass index is 44.85 kg/m².  I/O last 3 completed shifts:  In: 720 [P.O.:720]  Out: 900 [Urine:900]  I/O this shift:  In: 720 [P.O.:720]  Out: -         Physical Exam:  General Appearance: Morbidly obese white female resting comfortably in bed does not appear in any acute distress she is on 5 L nasal cannula O2 although it is only half in her nose and her oxygen saturation is 97%  Eyes: Conjunctiva are clear and anicteric  ENT: Mucous membranes are moist no erythema exudates Mallampati type III airway  Neck: No adenopathy no jugular venous distention trachea midline neck is obese  Lungs: Clear nonlabored symmetric expansion no wheezes rales or rhonchi  Cardiac:  Regular rate rhythm no murmur  Abdomen: Obese soft nontender no palpable organomegaly or masses  : Not examined  Musculoskeletal: Grossly normal except for the postoperative changes in the knee  Skin: No jaundice no rashes  Neuro: She is alert oriented cooperative  Extremities/P Vascular: Palpable radial dorsalis pedis pulses bilaterally  MSE: Seems to be in very good spirits       Labs:  Results from last 7 days   Lab Units 04/11/19  0726 04/10/19  0505 04/09/19  0458   GLUCOSE mg/dL 137* 153* 167*   SODIUM mmol/L 136 139 139   POTASSIUM mmol/L 4.1 4.8 4.3   CO2 mmol/L 33.4* 23.4 24.8   CHLORIDE mmol/L 94* 96* 95*   ANION GAP mmol/L 8.6 19.6 19.2   CREATININE mg/dL 0.84 0.83  0.71 0.63   BUN mg/dL 21 15 10   BUN / CREAT RATIO  25.0 18.1 15.9   CALCIUM mg/dL 8.5* 9.5 8.8   EGFR IF NONAFRICN AM mL/min/1.73 68 69  82 95     Estimated Creatinine Clearance: 77.5 mL/min (by C-G formula based on SCr of 0.84 mg/dL).      Results from last 7 days   Lab Units 04/14/19  0712 04/13/19  0538 04/12/19  0606 04/11/19  0726 04/10/19  0505 04/09/19  0458 04/08/19  1047   WBC 10*3/mm3  --   --   --  9.63  --   --   --    RBC 10*6/mm3  --   --   --  4.14  --   --   --    HEMOGLOBIN g/dL 10.5* 10.2* 10.7* 10.5* 11.5* 12.3 11.6*   HEMATOCRIT % 37.2 35.4 37.5 36.6 40.0 42.7 38.7   MCV fL  --   --   --  88.4  --   --   --    MCH pg  --   --   --  25.4*  --   --   --    MCHC g/dL  --   --   --  28.7*  --   --   --    RDW %  --   --   --  27.0*  --   --   --    RDW-SD fl  --   --   --  82.4*  --   --   --    MPV fL  --   --   --  9.4  --   --   --    PLATELETS 10*3/mm3  --   --   --  332  --   --   --    NEUTROPHIL % %  --   --   --  76.6*  --   --   --    LYMPHOCYTE % %  --   --   --  6.9*  --   --   --    MONOCYTES % %  --   --   --  10.3  --   --   --    EOSINOPHIL % %  --   --   --  5.2  --   --   --    BASOPHIL % %  --   --   --  0.5  --   --   --    IMM GRAN % %  --   --   --  0.5  --   --   --    NEUTROS ABS 10*3/mm3  --   --    --  7.38*  --   --   --    LYMPHS ABS 10*3/mm3  --   --   --  0.66*  --   --   --    MONOS ABS 10*3/mm3  --   --   --  0.99*  --   --   --    EOS ABS 10*3/mm3  --   --   --  0.50*  --   --   --    BASOS ABS 10*3/mm3  --   --   --  0.05  --   --   --    IMMATURE GRANS (ABS) 10*3/mm3  --   --   --  0.05  --   --   --    NRBC /100 WBC  --   --   --  0.1*  --   --   --      Results from last 7 days   Lab Units 04/09/19  1031   PH, ARTERIAL pH units 7.373   PO2 ART mm Hg 112.0*   PCO2, ARTERIAL mm Hg 57.8*   HCO3 ART mmol/L 33.7*                         Microbiology Results (last 10 days)     ** No results found for the last 240 hours. **                apixaban 2.5 mg Oral Q12H   bisoprolol 5 mg Oral Daily   bumetanide 2 mg Oral Daily   escitalopram 15 mg Oral Daily   insulin lispro 0-9 Units Subcutaneous 4x Daily With Meals & Nightly   ipratropium-albuterol 3 mL Nebulization TID   levothyroxine 25 mcg Oral Daily   metFORMIN 1,000 mg Oral BID With Meals   naproxen 500 mg Oral BID With Meals   nystatin  Topical Q12H   pantoprazole 40 mg Oral Daily   pravastatin 40 mg Oral Nightly   sennosides-docusate sodium 2 tablet Oral BID   sildenafil 20 mg Oral TID   sodium chloride 3 mL Intravenous Q12H          Diagnostics:  Xr Knee 1 Or 2 View Right    Result Date: 4/8/2019  Right knee radiograph  HISTORY:Postop right knee  TECHNIQUE: AP and lateral radiograph the right knee  COMPARISON:Right knee radiographs 03/28/2019  FINDINGS: Post surgical changes from recent right total knee arthroplasty are present. The hardware is intact. There is no new periprosthetic fracture.      Postoperative changes from recent right total knee arthroplasty without findings of immediate complication.  This report was finalized on 4/8/2019 10:08 AM by Dr. Christopher Kern M.D.      Xr Knee 1 Or 2 View Right    Result Date: 3/29/2019  PA AND LATERAL CHEST, RIGHT KNEE  HISTORY:  Preop chest x-ray, knee pain.  CHEST: PA and lateral views of the chest were  obtained and compared to 01/24/2019.  There is moderate cardiomegaly.  A calcified granuloma is noted at the left lung base. There is mild-to-moderate prominence of the pulmonary vasculature in the perihilar regions bilaterally with minimal cephalization. There is no evidence of focal infiltrate, consolidation or effusion.  RIGHT KNEE: Two views of the right knee demonstrates obliteration of joint space medially. There is mild lateral subluxation of the proximal tibia relative to the distal femur (by approximately 8 mm). There is sclerosis involving the articular surfaces of the knee medially and small marginal osteophytes appreciated. Mild patellofemoral degenerative disease is noted. There is no evidence of fracture.  This report was finalized on 3/29/2019 7:23 AM by Dr. Fernando Moreno M.D.      Ct Angiogram Chest With & Without Contrast    Result Date: 4/11/2019  CT ANGIOGRAM CHEST WITH/WITHOUT CONTRAST  Radiation dose reduction techniques were utilized, including automated exposure control and exposure modulation based on body size.  CLINICAL INFORMATION: Postop knee arthroplasty, shortness of breath, pulmonary hypertension.  CT angiogram of the chest with coronal, sagittal and 3-dimensional reconstruction.  FINDINGS: Main pulmonary artery diameter is 4 cm compatible with pulmonary arterial hypertension. There is cardiac enlargement, no pericardial abnormality. Atherosclerotic calcification of normal caliber aorta, no aneurysm nor dissection. The esophagus is normal in course and caliber.  Thin axial and CT angiographic reconstructed images failed to demonstrate pulmonary embolus.  There are linear areas demonstrated along the costophrenic sulci of both lower lobes, likely discoid atelectasis versus scar. No pleural effusion demonstrated. Located at the right upper lobe apex are several small areas of groundglass infiltrates.  CONCLUSION: 1. No aortic aneurysm or dissection. 2. No pulmonary embolus. Diameter of  the main pulmonary artery is 4 cm consistent with pulmonary arterial hypertension. 3. Low lung volumes with likely discoid atelectasis at both lung bases, scar is less likely. 4. Subtle areas of groundglass infiltrate right upper lobe.   This report was finalized on 4/11/2019 3:53 PM by Dr. Danielito Garcia M.D.      Xr Chest 1 View    Result Date: 4/10/2019  XR CHEST 1 VW-  HISTORY: Female who is 68 years-old,  hypoxemia  TECHNIQUE: Frontal view of the chest  COMPARISON: 03/28/2019  FINDINGS: Heart is enlarged. Mild prominence of vascular and interstitial markings. Left hemidiaphragm is partly obscured that may reflect atelectasis/infiltrate/effusion. No pneumothorax. No acute osseous process.      Partial obscuration of the left hemidiaphragm may reflect atelectasis/infiltrate/effusion, follow-up recommended. Cardiomegaly with mild prominence of vascular and interstitial markings.  This report was finalized on 4/10/2019 1:51 PM by Dr. Anjel Garcia M.D.      Xr Chest Pa & Lateral    Result Date: 3/29/2019  PA AND LATERAL CHEST, RIGHT KNEE  HISTORY:  Preop chest x-ray, knee pain.  CHEST: PA and lateral views of the chest were obtained and compared to 01/24/2019.  There is moderate cardiomegaly.  A calcified granuloma is noted at the left lung base. There is mild-to-moderate prominence of the pulmonary vasculature in the perihilar regions bilaterally with minimal cephalization. There is no evidence of focal infiltrate, consolidation or effusion.  RIGHT KNEE: Two views of the right knee demonstrates obliteration of joint space medially. There is mild lateral subluxation of the proximal tibia relative to the distal femur (by approximately 8 mm). There is sclerosis involving the articular surfaces of the knee medially and small marginal osteophytes appreciated. Mild patellofemoral degenerative disease is noted. There is no evidence of fracture.  This report was finalized on 3/29/2019 7:23 AM by Dr. Fernando Moreno,  M.D.               Active Hospital Problems    Diagnosis  POA   • Osteoarthritis of right knee [M17.11]  Yes      Resolved Hospital Problems   No resolved problems to display.         Assessment/Plan     8. Pulmonary hypertension etiology unclear patient on revatio and diuretics at home she should continue these.   she should have a full workup for her pulmonary hypertension including a right heart catheterization after discharge, discussing with her does not sound like she is ever had a right heart catheterization and it appears she has had a diastolic CHF untreated sleep apnea both of which can cause pulmonary hypertension and could be worsened by the use of of revatio.  Patient will follow up Dr Morse her usual pulmonologist at [facility]  9. Chronic hypoxemic respiratory failure wean O2 as tolerated  10. Obstructive sleep apnea history of sleep apnea noncompliant with Pap machine this certainly could be a cause of her pulmonary hypertension she absolutely should be reevaluated.  She is tolerating noninvasive type ventilation here she says at discharge she will follow-up with her usual pulmonologist about getting a new machine.  At subacute rehab will try and get her an auto titrating machine until she can get a repeat sleep study  11. Chronic diastolic CHF  12. Morbid obesity  13. Atelectasis continue pulmonary hygiene clinically much improved  14.  status post right total knee arthroplasty    Plan for disposition: Patient has her home O2 she can be discharged to subacute rehab or home at any time from a pulmonary standpoint  CCP please set patient up at whatever subacute rehab she goes with an auto titrating BiPAP inspiratory maximum of 25 and minimum of 15 expiratory pressure minimum of 5 maximum of 12  Tyrone Stewart MD  04/14/19  3:30 PM    Time:

## 2019-04-14 NOTE — PROGRESS NOTES
Procedure(s):  RIGHT TOTAL KNEE ARTHROPLASTY     LOS: 6 days     Subjective :   No complaints.  Slept well with CPAP, Walked in hallway yesterday.  Happy with progress.    Objective :    Vital signs in last 24 hours:  Vitals:    04/13/19 1954 04/13/19 2330 04/14/19 0500 04/14/19 0715   BP:  155/88  161/89   BP Location:  Left arm  Left arm   Patient Position:  Lying  Sitting   Pulse: 60 54  68   Resp: 16 16  14   Temp:  97.9 °F (36.6 °C)  98 °F (36.7 °C)   TempSrc:  Oral  Oral   SpO2: 100% 97%  92%   Weight:   111 kg (245 lb 3.2 oz)    Height:           PHYSICAL EXAM:  Patient is calm, in no acute distress, awake and oriented x 3.  Dressing is clean, dry and intact.  No signs of infection.  Swelling is appropriate in amount.  Ecchymosis is appropriate in amount.  Homans test is negative.  Patient is neurovascularly intact distally.    LABS:  Results from last 7 days   Lab Units 04/13/19  0538  04/11/19  0726   WBC 10*3/mm3  --   --  9.63   HEMOGLOBIN g/dL 10.2*   < > 10.5*   HEMATOCRIT % 35.4   < > 36.6   PLATELETS 10*3/mm3  --   --  332    < > = values in this interval not displayed.     Results from last 7 days   Lab Units 04/11/19  0726   SODIUM mmol/L 136   POTASSIUM mmol/L 4.1   CHLORIDE mmol/L 94*   CO2 mmol/L 33.4*   BUN mg/dL 21   CREATININE mg/dL 0.84   GLUCOSE mg/dL 137*   CALCIUM mg/dL 8.5*             ASSESSMENT:  Status post Procedure(s):  RIGHT TOTAL KNEE ARTHROPLASTY     Clinically much improved medically.    Plan:  Continue Physical Therapy, increase mobility and range of motion as tolerated.  Continue SCDs, Continue Eliquis.  Dispo planning for rehab tomorrow, insurance permitting.    Farzad Luong MD    Date: 4/14/2019  Time: 7:59 AM

## 2019-04-15 ENCOUNTER — APPOINTMENT (OUTPATIENT)
Dept: CARDIOLOGY | Facility: HOSPITAL | Age: 69
End: 2019-04-15

## 2019-04-15 VITALS
RESPIRATION RATE: 18 BRPM | WEIGHT: 245.2 LBS | SYSTOLIC BLOOD PRESSURE: 139 MMHG | OXYGEN SATURATION: 93 % | DIASTOLIC BLOOD PRESSURE: 97 MMHG | BODY MASS INDEX: 45.12 KG/M2 | TEMPERATURE: 97.9 F | HEART RATE: 57 BPM | HEIGHT: 62 IN

## 2019-04-15 PROBLEM — Z96.651 HISTORY OF TOTAL RIGHT KNEE REPLACEMENT: Status: ACTIVE | Noted: 2019-04-08

## 2019-04-15 LAB
AORTIC DIMENSIONLESS INDEX: 0.7 (DI)
BH CV ECHO MEAS - AO MAX PG (FULL): 5.9 MMHG
BH CV ECHO MEAS - AO MAX PG: 11.3 MMHG
BH CV ECHO MEAS - AO MEAN PG (FULL): 3 MMHG
BH CV ECHO MEAS - AO MEAN PG: 5 MMHG
BH CV ECHO MEAS - AO ROOT AREA (BSA CORRECTED): 1.4
BH CV ECHO MEAS - AO ROOT AREA: 6.6 CM^2
BH CV ECHO MEAS - AO ROOT DIAM: 2.9 CM
BH CV ECHO MEAS - AO V2 MAX: 168 CM/SEC
BH CV ECHO MEAS - AO V2 MEAN: 101 CM/SEC
BH CV ECHO MEAS - AO V2 VTI: 34.4 CM
BH CV ECHO MEAS - ASC AORTA: 3.5 CM
BH CV ECHO MEAS - AVA(I,A): 2.9 CM^2
BH CV ECHO MEAS - AVA(I,D): 2.9 CM^2
BH CV ECHO MEAS - AVA(V,A): 2.9 CM^2
BH CV ECHO MEAS - AVA(V,D): 2.9 CM^2
BH CV ECHO MEAS - BSA(HAYCOCK): 2.3 M^2
BH CV ECHO MEAS - BSA: 2.1 M^2
BH CV ECHO MEAS - BZI_BMI: 44.8 KILOGRAMS/M^2
BH CV ECHO MEAS - BZI_METRIC_HEIGHT: 157.5 CM
BH CV ECHO MEAS - BZI_METRIC_WEIGHT: 111.1 KG
BH CV ECHO MEAS - EDV(CUBED): 125 ML
BH CV ECHO MEAS - EDV(MOD-SP2): 85 ML
BH CV ECHO MEAS - EDV(MOD-SP4): 104 ML
BH CV ECHO MEAS - EDV(TEICH): 118.2 ML
BH CV ECHO MEAS - EF(CUBED): 65.7 %
BH CV ECHO MEAS - EF(MOD-BP): 60 %
BH CV ECHO MEAS - EF(MOD-SP2): 57.6 %
BH CV ECHO MEAS - EF(MOD-SP4): 60.6 %
BH CV ECHO MEAS - EF(TEICH): 57 %
BH CV ECHO MEAS - ESV(CUBED): 42.9 ML
BH CV ECHO MEAS - ESV(MOD-SP2): 36 ML
BH CV ECHO MEAS - ESV(MOD-SP4): 41 ML
BH CV ECHO MEAS - ESV(TEICH): 50.9 ML
BH CV ECHO MEAS - FS: 30 %
BH CV ECHO MEAS - IVS/LVPW: 1
BH CV ECHO MEAS - IVSD: 1.3 CM
BH CV ECHO MEAS - LAT PEAK E' VEL: 10 CM/SEC
BH CV ECHO MEAS - LV DIASTOLIC VOL/BSA (35-75): 49.9 ML/M^2
BH CV ECHO MEAS - LV MASS(C)D: 261.8 GRAMS
BH CV ECHO MEAS - LV MASS(C)DI: 125.7 GRAMS/M^2
BH CV ECHO MEAS - LV MAX PG: 5.4 MMHG
BH CV ECHO MEAS - LV MEAN PG: 2 MMHG
BH CV ECHO MEAS - LV SYSTOLIC VOL/BSA (12-30): 19.7 ML/M^2
BH CV ECHO MEAS - LV V1 MAX: 116 CM/SEC
BH CV ECHO MEAS - LV V1 MEAN: 70.4 CM/SEC
BH CV ECHO MEAS - LV V1 VTI: 23.9 CM
BH CV ECHO MEAS - LVIDD: 5 CM
BH CV ECHO MEAS - LVIDS: 3.5 CM
BH CV ECHO MEAS - LVLD AP2: 7.8 CM
BH CV ECHO MEAS - LVLD AP4: 7.9 CM
BH CV ECHO MEAS - LVLS AP2: 6.3 CM
BH CV ECHO MEAS - LVLS AP4: 6.4 CM
BH CV ECHO MEAS - LVOT AREA (M): 4.2 CM^2
BH CV ECHO MEAS - LVOT AREA: 4.2 CM^2
BH CV ECHO MEAS - LVOT DIAM: 2.3 CM
BH CV ECHO MEAS - LVPWD: 1.3 CM
BH CV ECHO MEAS - MED PEAK E' VEL: 6 CM/SEC
BH CV ECHO MEAS - MV A DUR: 0.14 SEC
BH CV ECHO MEAS - MV A MAX VEL: 84.4 CM/SEC
BH CV ECHO MEAS - MV DEC SLOPE: 234 CM/SEC^2
BH CV ECHO MEAS - MV DEC TIME: 193 SEC
BH CV ECHO MEAS - MV E MAX VEL: 104 CM/SEC
BH CV ECHO MEAS - MV E/A: 1.2
BH CV ECHO MEAS - MV MAX PG: 4.1 MMHG
BH CV ECHO MEAS - MV MEAN PG: 2 MMHG
BH CV ECHO MEAS - MV P1/2T MAX VEL: 98.3 CM/SEC
BH CV ECHO MEAS - MV P1/2T: 123 MSEC
BH CV ECHO MEAS - MV V2 MAX: 101 CM/SEC
BH CV ECHO MEAS - MV V2 MEAN: 58.4 CM/SEC
BH CV ECHO MEAS - MV V2 VTI: 38.1 CM
BH CV ECHO MEAS - MVA P1/2T LCG: 2.2 CM^2
BH CV ECHO MEAS - MVA(P1/2T): 1.8 CM^2
BH CV ECHO MEAS - MVA(VTI): 2.6 CM^2
BH CV ECHO MEAS - PA ACC TIME: 0.07 SEC
BH CV ECHO MEAS - PA MAX PG (FULL): 2.4 MMHG
BH CV ECHO MEAS - PA MAX PG: 4.2 MMHG
BH CV ECHO MEAS - PA PR(ACCEL): 47.5 MMHG
BH CV ECHO MEAS - PA V2 MAX: 102 CM/SEC
BH CV ECHO MEAS - RAP SYSTOLE: 3 MMHG
BH CV ECHO MEAS - RV MAX PG: 1.8 MMHG
BH CV ECHO MEAS - RV MEAN PG: 1 MMHG
BH CV ECHO MEAS - RV V1 MAX: 66.5 CM/SEC
BH CV ECHO MEAS - RV V1 MEAN: 44.8 CM/SEC
BH CV ECHO MEAS - RV V1 VTI: 15 CM
BH CV ECHO MEAS - RVSP: 26 MMHG
BH CV ECHO MEAS - SI(AO): 109 ML/M^2
BH CV ECHO MEAS - SI(CUBED): 39.4 ML/M^2
BH CV ECHO MEAS - SI(LVOT): 47.7 ML/M^2
BH CV ECHO MEAS - SI(MOD-SP2): 23.5 ML/M^2
BH CV ECHO MEAS - SI(MOD-SP4): 30.2 ML/M^2
BH CV ECHO MEAS - SI(TEICH): 32.3 ML/M^2
BH CV ECHO MEAS - SV(AO): 227.2 ML
BH CV ECHO MEAS - SV(CUBED): 82.1 ML
BH CV ECHO MEAS - SV(LVOT): 99.3 ML
BH CV ECHO MEAS - SV(MOD-SP2): 49 ML
BH CV ECHO MEAS - SV(MOD-SP4): 63 ML
BH CV ECHO MEAS - SV(TEICH): 67.4 ML
BH CV ECHO MEAS - TAPSE (>1.6): 2.8 CM2
BH CV ECHO MEAS - TR MAX VEL: 244 CM/SEC
BH CV ECHO MEASUREMENTS AVERAGE E/E' RATIO: 13
BH CV VAS BP RIGHT ARM: NORMAL MMHG
BH CV XLRA - RV BASE: 3.3 CM
BH CV XLRA - TDI S': 10.8 CM/SEC
GLUCOSE BLDC GLUCOMTR-MCNC: 125 MG/DL (ref 70–130)
GLUCOSE BLDC GLUCOMTR-MCNC: 143 MG/DL (ref 70–130)
GLUCOSE BLDC GLUCOMTR-MCNC: 165 MG/DL (ref 70–130)
HCT VFR BLD AUTO: 42.1 % (ref 34–46.6)
HGB BLD-MCNC: 11.5 G/DL (ref 12–15.9)
LEFT ATRIUM VOLUME INDEX: 32 ML/M2
LV EF 2D ECHO EST: 60 %
MAXIMAL PREDICTED HEART RATE: 154 BPM
POTASSIUM BLD-SCNC: 4 MMOL/L (ref 3.5–5.2)
STRESS TARGET HR: 131 BPM
TSH SERPL DL<=0.05 MIU/L-ACNC: 9.18 MIU/ML (ref 0.27–4.2)

## 2019-04-15 PROCEDURE — 93306 TTE W/DOPPLER COMPLETE: CPT | Performed by: INTERNAL MEDICINE

## 2019-04-15 PROCEDURE — 0296T HC EXT ECG > 48HR TO 21 DAY RCRD W/CONECT INTL RCRD: CPT

## 2019-04-15 PROCEDURE — 93306 TTE W/DOPPLER COMPLETE: CPT

## 2019-04-15 PROCEDURE — 85014 HEMATOCRIT: CPT | Performed by: ORTHOPAEDIC SURGERY

## 2019-04-15 PROCEDURE — 82962 GLUCOSE BLOOD TEST: CPT

## 2019-04-15 PROCEDURE — 94799 UNLISTED PULMONARY SVC/PX: CPT

## 2019-04-15 PROCEDURE — 99222 1ST HOSP IP/OBS MODERATE 55: CPT | Performed by: INTERNAL MEDICINE

## 2019-04-15 PROCEDURE — 84443 ASSAY THYROID STIM HORMONE: CPT | Performed by: INTERNAL MEDICINE

## 2019-04-15 PROCEDURE — 84132 ASSAY OF SERUM POTASSIUM: CPT | Performed by: ORTHOPAEDIC SURGERY

## 2019-04-15 PROCEDURE — 85018 HEMOGLOBIN: CPT | Performed by: ORTHOPAEDIC SURGERY

## 2019-04-15 PROCEDURE — 63710000001 INSULIN LISPRO (HUMAN) PER 5 UNITS: Performed by: ORTHOPAEDIC SURGERY

## 2019-04-15 PROCEDURE — 97110 THERAPEUTIC EXERCISES: CPT

## 2019-04-15 RX ORDER — IPRATROPIUM BROMIDE AND ALBUTEROL SULFATE 2.5; .5 MG/3ML; MG/3ML
3 SOLUTION RESPIRATORY (INHALATION) 3 TIMES DAILY
Qty: 360 ML | Refills: 0 | Status: SHIPPED | OUTPATIENT
Start: 2019-04-15 | End: 2021-11-04

## 2019-04-15 RX ORDER — ONDANSETRON 4 MG/1
4 TABLET, FILM COATED ORAL EVERY 6 HOURS PRN
Qty: 60 TABLET | Refills: 0 | Status: SHIPPED | OUTPATIENT
Start: 2019-04-15 | End: 2020-10-09

## 2019-04-15 RX ORDER — SENNA AND DOCUSATE SODIUM 50; 8.6 MG/1; MG/1
2 TABLET, FILM COATED ORAL 2 TIMES DAILY
Qty: 60 TABLET | Refills: 0 | Status: SHIPPED | OUTPATIENT
Start: 2019-04-15 | End: 2020-10-09

## 2019-04-15 RX ORDER — BISOPROLOL FUMARATE 5 MG/1
2.5 TABLET ORAL EVERY EVENING
Start: 2019-04-16 | End: 2019-10-09 | Stop reason: SDUPTHER

## 2019-04-15 RX ORDER — NAPROXEN 500 MG/1
500 TABLET ORAL 2 TIMES DAILY WITH MEALS
Qty: 60 TABLET | Refills: 0 | Status: SHIPPED | OUTPATIENT
Start: 2019-04-15 | End: 2020-10-09

## 2019-04-15 RX ORDER — NICOTINE POLACRILEX 4 MG
15 LOZENGE BUCCAL
Qty: 30 EACH | Refills: 0 | Status: SHIPPED | OUTPATIENT
Start: 2019-04-15 | End: 2020-10-09

## 2019-04-15 RX ORDER — BISOPROLOL FUMARATE 5 MG/1
5 TABLET, FILM COATED ORAL EVERY MORNING
Status: DISCONTINUED | OUTPATIENT
Start: 2019-04-16 | End: 2019-04-15 | Stop reason: HOSPADM

## 2019-04-15 RX ORDER — BISOPROLOL FUMARATE 5 MG/1
2.5 TABLET, FILM COATED ORAL EVERY EVENING
Status: DISCONTINUED | OUTPATIENT
Start: 2019-04-15 | End: 2019-04-15 | Stop reason: HOSPADM

## 2019-04-15 RX ORDER — ACETAMINOPHEN 325 MG/1
650 TABLET ORAL EVERY 4 HOURS PRN
Qty: 120 TABLET | Refills: 0 | Status: SHIPPED | OUTPATIENT
Start: 2019-04-15 | End: 2022-09-02

## 2019-04-15 RX ORDER — NYSTATIN 100000 [USP'U]/G
POWDER TOPICAL EVERY 12 HOURS SCHEDULED
Qty: 1 EACH | Refills: 0 | Status: SHIPPED | OUTPATIENT
Start: 2019-04-15 | End: 2020-10-09

## 2019-04-15 RX ADMIN — NAPROXEN 500 MG: 500 TABLET ORAL at 17:21

## 2019-04-15 RX ADMIN — ESCITALOPRAM OXALATE 15 MG: 5 TABLET, FILM COATED ORAL at 08:03

## 2019-04-15 RX ADMIN — BUMETANIDE 2 MG: 2 TABLET ORAL at 08:02

## 2019-04-15 RX ADMIN — NAPROXEN 500 MG: 500 TABLET ORAL at 07:49

## 2019-04-15 RX ADMIN — APIXABAN 2.5 MG: 2.5 TABLET, FILM COATED ORAL at 08:03

## 2019-04-15 RX ADMIN — BISOPROLOL FUMARATE 5 MG: 5 TABLET ORAL at 17:21

## 2019-04-15 RX ADMIN — SENNOSIDES AND DOCUSATE SODIUM 2 TABLET: 8.6; 5 TABLET ORAL at 08:05

## 2019-04-15 RX ADMIN — METFORMIN HYDROCHLORIDE 1000 MG: 1000 TABLET ORAL at 17:22

## 2019-04-15 RX ADMIN — INSULIN LISPRO 2 UNITS: 100 INJECTION, SOLUTION INTRAVENOUS; SUBCUTANEOUS at 17:22

## 2019-04-15 RX ADMIN — BISOPROLOL FUMARATE 5 MG: 5 TABLET ORAL at 09:44

## 2019-04-15 RX ADMIN — LEVOTHYROXINE SODIUM 25 MCG: 25 TABLET ORAL at 07:49

## 2019-04-15 RX ADMIN — NYSTATIN: 100000 POWDER TOPICAL at 08:06

## 2019-04-15 RX ADMIN — METFORMIN HYDROCHLORIDE 1000 MG: 1000 TABLET ORAL at 07:49

## 2019-04-15 RX ADMIN — POTASSIUM CHLORIDE 40 MEQ: 750 CAPSULE, EXTENDED RELEASE ORAL at 02:16

## 2019-04-15 RX ADMIN — IPRATROPIUM BROMIDE AND ALBUTEROL SULFATE 3 ML: 2.5; .5 SOLUTION RESPIRATORY (INHALATION) at 07:41

## 2019-04-15 RX ADMIN — SILDENAFIL 20 MG: 20 TABLET, FILM COATED ORAL at 08:03

## 2019-04-15 RX ADMIN — SODIUM CHLORIDE, PRESERVATIVE FREE 3 ML: 5 INJECTION INTRAVENOUS at 08:35

## 2019-04-15 RX ADMIN — SILDENAFIL 20 MG: 20 TABLET, FILM COATED ORAL at 16:27

## 2019-04-15 RX ADMIN — PANTOPRAZOLE SODIUM 40 MG: 40 TABLET, DELAYED RELEASE ORAL at 08:03

## 2019-04-15 RX ADMIN — IPRATROPIUM BROMIDE AND ALBUTEROL SULFATE 3 ML: 2.5; .5 SOLUTION RESPIRATORY (INHALATION) at 16:08

## 2019-04-15 NOTE — PLAN OF CARE
Problem: Patient Care Overview  Goal: Plan of Care Review  Outcome: Ongoing (interventions implemented as appropriate)   04/15/19 0123   Coping/Psychosocial   Plan of Care Reviewed With patient   OTHER   Outcome Summary Pt tolerated treatment with moderate c/o pain of right knee. Pt able to perform TKR protocol exercises with increased reps with minimal c/o difficulty. Pt ambulated 100 feet with rwx, CGA. Pt still requires Meaghan for transfers. Pt will continue to benefit from skilled PT to improve strength/transfers and overall mobility.    Plan of Care Review   Progress improving

## 2019-04-15 NOTE — PROGRESS NOTES
Continued Stay Note  [facility]     Patient Name: Mariangel Nguyen  MRN: [id number]  Today's Date: 4/15/2019    Admit Date: 4/8/2019    Discharge Plan     Row Name 04/15/19 1758       Plan    Plan  [place], [facility] Rm#[id number]    Plan Comments  Pt and her daughter borrowed and O2 tank for transport to [facility], form signed and instructions given for return of O2 tank. Rosa DE OLIVEIRA updatd. JChasteenRN/CCP                               Discharge Codes    No documentation.       Expected Discharge Date and Time     Expected Discharge Date Expected Discharge Time    Apr 15, 2019             Gracie Mccabe RN

## 2019-04-15 NOTE — DISCHARGE SUMMARY
Discharge Summary    Date of Admission: 4/8/2019  5:22 AM  Date of Discharge:  4/15/2019    Discharge Diagnosis:   Osteoarthritis of right knee, unspecified osteoarthritis type [M17.11]  Total knee replacement status, right [Z96.651]      PMHX:   Past Medical History:   Diagnosis Date   • Arthritis     OSTEOARTHRITIS KNEES   • CHF (congestive heart failure) (CMS/[facility])    • Colon polyp    • COPD (chronic obstructive pulmonary disease) (CMS/HCC)    • Diabetes mellitus (CMS/HCC)    • Disease of thyroid gland    • H/O Abnormal Pap smear of cervix    • History of DVT (deep vein thrombosis)    • History of kidney stones    • History of pulmonary embolus (PE)    • History of sepsis    • History of small bowel obstruction    • History of snoring    • Hyperlipidemia    • Hypertension    • Knee pain, bilateral    • Neuropathy    • On home oxygen therapy     2L NC   • Pulmonary hypertension (CMS/[facility])    • Sleep apnea        Discharge Disposition  Rehab Facility or Unit (DC - External)    Procedures Performed  Procedure(s):  RIGHT TOTAL KNEE ARTHROPLASTY       Indication for Admission  Patient is a 68 y.o. female admitted after undergoing the above surgical procedure. They were admitted for post-operative pain control, medical management and physical therapy.  They progressed with physical therapy.  Pulmonology was consulted for hypoxia. Patient's oxygen was monitored and regulated back to her home Oxygen level.  They were deemed stable for discharge.      Consults:   Consults     Date and Time Order Name Status Description    4/14/2019 1725 Inpatient Cardiology Consult      4/9/2019 0734 Inpatient Infectious Diseases Consult Completed     4/8/2019 0854 Inpatient Internal Medicine Consult Completed     4/8/2019 0854 Inpatient Pulmonology Consult Completed           Discharge Instructions:  Patient is weight bearing as tolerated on the operative leg.  Patient is to progress range of motion and ambulation as tolerated.  Use  walker as needed for stability and gait.  May progress to cane as tolerated.  The dressing should be left on knee until post-operative day 7, and then removed.  Dressing is waterproof. Patient may shower.  Use ace wrap as needed for swelling.  Patient will follow-up in the office in 2 weeks.  Call the office at [phone] for any questions or concerns.      Discharge Medications     Discharge Medications      New Medications      Instructions Start Date   acetaminophen 325 MG tablet  Commonly known as:  TYLENOL   650 mg, Oral, Every 4 Hours PRN      dextrose 40 % gel  Commonly known as:  GLUTOSE   15 g, Oral, Every 15 Minutes PRN      insulin lispro 100 UNIT/ML injection  Commonly known as:  humaLOG   0-9 Units, Subcutaneous, 4 Times Daily With Meals & Nightly      ipratropium-albuterol 0.5-2.5 mg/3 ml nebulizer  Commonly known as:  DUO-NEB   3 mL, Nebulization, 3 Times Daily      naproxen 500 MG tablet  Commonly known as:  NAPROSYN   500 mg, Oral, 2 Times Daily With Meals      nystatin 417395 UNIT/GM powder  Commonly known as:  MYCOSTATIN   Topical, Every 12 Hours Scheduled      ondansetron 4 MG tablet  Commonly known as:  ZOFRAN   4 mg, Oral, Every 6 Hours PRN      sennosides-docusate sodium 8.6-50 MG tablet  Commonly known as:  SENOKOT-S   2 tablets, Oral, 2 Times Daily         Continue These Medications      Instructions Start Date   albuterol (2.5 MG/3ML) 0.083% nebulizer solution  Commonly known as:  PROVENTIL   2.5 mg, Nebulization, Every 4 Hours PRN      bisoprolol 5 MG tablet  Commonly known as:  ZEBeta   5 mg, Oral, Daily      bumetanide 2 MG tablet  Commonly known as:  BUMEX   2 mg, Oral, Daily      ELIQUIS PO   5 mg, Oral, 2 Times Daily, Pt unsure of dose.      escitalopram 10 MG tablet  Commonly known as:  LEXAPRO   15 mg, Oral, Daily      levothyroxine 25 MCG tablet  Commonly known as:  SYNTHROID, LEVOTHROID   25 mcg, Oral, Daily      metFORMIN 1000 MG tablet  Commonly known as:  GLUCOPHAGE   1,000  mg, Oral, 2 Times Daily With Meals      ONE TOUCH ULTRA TEST test strip  Generic drug:  glucose blood   No dose, route, or frequency recorded.      ONETOUCH DELICA LANCETS 33G misc   No dose, route, or frequency recorded.      pantoprazole 40 MG EC tablet  Commonly known as:  PROTONIX   40 mg, Oral, Daily      potassium chloride 10 MEQ CR capsule  Commonly known as:  MICRO-K   TAKE 2 CAPSULES BY MOUTH ONCE DAILY      pravastatin 40 MG tablet  Commonly known as:  PRAVACHOL   40 mg, Oral, Nightly      sildenafil 20 MG tablet  Commonly known as:  REVATIO   20 mg, Oral, 3 Times Daily      vitamin D 42809 units capsule capsule  Commonly known as:  ERGOCALCIFEROL   50,000 Units, Oral, Weekly             Discharge Diet:   Diet Instructions     Advance Diet As Tolerated            Activity at Discharge:   Activity Instructions     Activity as Tolerated      Discharge Activity Restrictions      No driving until cleared by physician          Follow-up Appointments  Future Appointments   Date Time Provider Department Center   5/13/2019  1:30 PM Rebel Mcdermott MD [facility] ID [facility] None   5/15/2019  8:30 AM LAB CHAIR 6 [facility]  [facility]   5/22/2019 10:00 AM VITALS ONLY [facility]  [facility]   5/22/2019 10:20 AM Raheem Leone Jr., MD [facility]  [facility]     Additional Instructions for the Follow-ups that You Need to Schedule     Referral to Home Health   As directed      Face to Face Visit Date:  4/15/2019    Follow-up Provider for Plan of Care?:  I will be treating the patient on an ongoing basis.  Please send me the Plan of Care for signature.    Follow-up Provider:  JAYME TELLEZ [[id number]]    Reason/Clinical Findings:  s/p TKA    Describe mobility limitations that make leaving home difficult:  taxing effort    Nursing/Therapeutic Services Requested:  Physical Therapy    PT orders:  Total joint pathway    Frequency:  1 Week 1               Test Results Pending at Discharge       Dejah Deinse  ALEXANDER  04/15/19,  9:54 AM

## 2019-04-15 NOTE — PLAN OF CARE
Problem: Chronic Obstructive Pulmonary Disease (Adult)  Intervention: Reduce/Relieve Breathlessness   04/15/19 0434   Cognitive Interventions   Sensory Stimulation Regulation auditory stimulation minimized;auditory stimulation provided;lighting decreased;quiet environment promoted   Coping/Psychosocial Interventions   Environmental Support calm environment promoted;caregiver consistency promoted     Intervention: Optimize Oxygenation/Ventilation/Perfusion   04/15/19 0434   Respiratory Interventions   Airway/Ventilation Management airway patency maintained;calming measures promoted;pulmonary hygiene promoted   Breathing Techniques/Airway Clearance deep/controlled cough encouraged   Positioning   Head of Bed (HOB) HOB elevated     Intervention: Support/Optimize Psychosocial Response to Chronic Pulmonary Disease   04/15/19 0434   Coping/Psychosocial Interventions   Supportive Measures active listening utilized;decision-making supported   Psychosocial Support   Family/Support System Care caregiver stress acknowledged;support provided   Interventions   Trust Relationship/Rapport care explained;choices provided         Problem: Skin Injury Risk (Adult)  Goal: Skin Health and Integrity  Outcome: Ongoing (interventions implemented as appropriate)   04/15/19 0434   Skin Injury Risk (Adult)   Skin Health and Integrity making progress toward outcome

## 2019-04-15 NOTE — PROGRESS NOTES
Procedure(s):  RIGHT TOTAL KNEE ARTHROPLASTY     LOS: 7 days     Subjective :   No new complaints    Objective :    Vital signs in last 24 hours:  Vitals:    04/15/19 0500 04/15/19 0511 04/15/19 0741 04/15/19 0802   BP:    155/77   BP Location:       Patient Position:       Pulse: 51 (!) 49 63 59   Resp:   20    Temp:       TempSrc:       SpO2:   93%    Weight:       Height:           PHYSICAL EXAM:  Patient is calm, in no acute distress, awake and oriented x 3.  Dressing is clean, dry and intact.  No signs of infection.  Swelling is appropriate in amount.  Ecchymosis is appropriate in amount.  Homans test is negative.  Patient is neurovascularly intact distally.    LABS:  Results from last 7 days   Lab Units 04/14/19  0712  04/11/19  0726   WBC 10*3/mm3  --   --  9.63   HEMOGLOBIN g/dL 10.5*   < > 10.5*   HEMATOCRIT % 37.2   < > 36.6   PLATELETS 10*3/mm3  --   --  332    < > = values in this interval not displayed.     Results from last 7 days   Lab Units 04/14/19  1902   SODIUM mmol/L 137   POTASSIUM mmol/L 3.2*   CHLORIDE mmol/L 91*   CO2 mmol/L 31.2*   BUN mg/dL 12   CREATININE mg/dL 0.65   GLUCOSE mg/dL 174*   CALCIUM mg/dL 9.4             ASSESSMENT:  Status post Procedure(s):  RIGHT TOTAL KNEE ARTHROPLASTY      Plan:  Continue Physical Therapy, increase mobility and range of motion as tolerated.  Continue SCDs, Continue Eliquis     Dispo planning for rehab today.    Dejah Denise PA-C    Date: 4/15/2019  Time: 8:16 AM

## 2019-04-15 NOTE — CONSULTS
[facility]  Consult Note                                                                              4/15/2019  Farzad Luong MD    Patient Identification:  Mariangel Nguyen:   68 y.o.  female  1950     Date of Admission:4/8/2019    CC: Osteoarthritis of right knee    Reason for Consult:  EKG changes/ SVT    History of Present Illness:    Ms Nguyen is a 68 year old patient with a history of CHF, COPD, diabetes, pulmonary embolism (on Eliquis), hyperlipidemia, SVT (takes Zebeta), pulmonary hypertension, and KAELA (non-compliant). She is normally followed by [facility] and was last seen in their office for pre-operative evaluation in January 2019.    At the time of the office visit she was denying any chest pain, palpitations, or syncope. She was tolerating her anticoagulation without any bleeding. She had cardiac catheterization in October 2017 which showed normal coronaries. She was cleared for surgery.     She was admitted on 04/08/2019 and underwent right total knee replacement by Dr Luong.  Post-operatively she became oversedated and had low oxygen saturations and elevated CO2   She was placed on BIPAP, she was given narcan, and her pain meds were decreased. Pulmonary was also consulted and she was transferred to the ICU. CT of chest was negative for pulmonary embolism.  She was transferred out of the ICU over the weekend.          Yesterday afternoon she had episode of SVT with 's. Her B/P was stable at 121/89. She was given Cardizem bolus and her home bisoprolol 5 mg.  Her bisoprolol was increased to 10 mg for today.  The episode lasted a couple hours before she converted back to SR. Her K at the time was 3.2 which was replaced.  She denied any chest pain or SOA with this. She did not feel the palpitations. This morning she remains NSR with HR 50-65.     K this morning is 4.0. Echocardiogram performed today showed normal systolic function with mild left ventricular  hypertrophy, grade 2 diastolic dysfunction, trivial valve regurgitation with normal right-sided pressures.  She feels well and is anxious to leave.  She denies any chest pain.  She had mild brief fluttering.  Of note is that she has sleep apnea and has just started using her CPAP.    Previous Cardiac Testing   Cardiac Catheterization 10/2017  RESULTS:    Hemodynamics: Ao: 140/65 mmHg; LV: 140/EDP 34 mmHg    Left Main coronary artery: The left main coronary artery is a   large-caliber vessel without disease.    Left anterior descending coronary artery: The LAD is a large-caliber   vessel that terminates at the apex without disease.  The first diagonal   result very large-caliber long branching vessel without disease.  The   diagonal-2 is a large-caliber branching vessel without disease.    Ramus intermedius: The ramus intermedius is a large-caliber vessel,   without disease.    Left circumflex: Left circumflex is a large-caliber vessel without   disease.  The 2 obtuse marginals are small in caliber without disease.    Next    Right coronary artery: The RCA is a large-caliber dominant vessel without   disease.  The posterior descending artery is a moderate vessel without   disease.  The distal RCA gives off 3 moderate caliber posterior lateral   arteries without disease.    Left ventriculogram: Left ventriculogram in the GARCIA projection shows   normal left atrial chamber size.  There is severe hypokinesis of the very   tip of the apex consistent with hypertensive heart disease.  No   intracavitary filling defect is seen.  No mitral regurgitation.  Normal   caliber aortic root.    Summary:  1.  Normal coronary arteries.  2.  Normal overall left ventricle systolic function with wall motion   abnormality as described above.    ECHO 10/25/2017  Summary   There is mild hypokinesis of the posterior wall of the left ventricle; The   base and inferior wall is severely hypokinetic. The remainder left ventricle   is lower limits  normal. Estimated left ejection fraction is 40%, mildly   reduced overall.   There is grade 1B diastolic dysfunction of the left ventricle.   The left atrium is mild to moderately enlarged.   The right ventricle is moderately enlarged with mildly reduced systolic   function consistent with moderate cor pulmonale.   The right atrium is mildly enlarged.   There is mild tricuspid regurgitation.    Past Medical History:  Past Medical History:   Diagnosis Date   • Arthritis     OSTEOARTHRITIS KNEES   • CHF (congestive heart failure) (CMS/HCC)    • Colon polyp    • COPD (chronic obstructive pulmonary disease) (CMS/HCC)    • Diabetes mellitus (CMS/HCC)    • Disease of thyroid gland    • H/O Abnormal Pap smear of cervix    • History of DVT (deep vein thrombosis)    • History of kidney stones    • History of pulmonary embolus (PE)    • History of sepsis    • History of small bowel obstruction    • History of snoring    • Hyperlipidemia    • Hypertension    • Knee pain, bilateral    • Neuropathy    • On home oxygen therapy     2L NC   • Pulmonary hypertension (CMS/HCC)    • Sleep apnea        Past Surgical History:  Past Surgical History:   Procedure Laterality Date   • ABDOMINAL SURGERY  11/2017    Delayed closure of abdominal wound-wound vac placement, Dr. Nestor Howe   • BLADDER SURGERY      BLADDER LIFT, 2011   • CARDIAC CATHETERIZATION  2017    Normal coronary arteries, normal overall LVSF with wall motion abnormality   • COLON SURGERY      RECONSTRUCTION, 2001   • COLONOSCOPY N/A 3/4/2019    Procedure: COLONOSCOPY polypectomy;  Surgeon: Nai Luong MD;  Location: [facility] OR;  Service: Gastroenterology   • CYSTOSCOPY URETEROSCOPY LASER LITHOTRIPSY Right 10/23/2017    Procedure: CYSTO RT URETEROSCOPY LASER  STONE BASKETING RIGHT STENT EXCHANGE;  Surgeon: Quinn Cason MD;  Location: [facility] OR;  Service:    • ENDOSCOPY N/A 3/4/2019    Procedure: ESOPHAGOGASTRODUODENOSCOPY WITH BIOPSIES;  Surgeon:  Nia Luong MD;  Location: [facility] OR;  Service: Gastroenterology   • EXPLORATORY LAPAROTOMY     • HYSTERECTOMY      2001   • ROTATOR CUFF REPAIR Right 2016   • STEROID INJECTION KNEE      Had left knee injection 10/09/2018; right knee injection 10/02/2017   • TOTAL KNEE ARTHROPLASTY Right 4/8/2019    Procedure: RIGHT TOTAL KNEE ARTHROPLASTY;  Surgeon: Farzad Luong MD;  Location: [facility] MAIN OR;  Service: Orthopedics       Allergies:  No Known Allergies    Home Meds:  Medications Prior to Admission   Medication Sig Dispense Refill Last Dose   • albuterol (PROVENTIL) (2.5 MG/3ML) 0.083% nebulizer solution Take 2.5 mg by nebulization Every 4 (Four) Hours As Needed for Wheezing.   4/1/2019 at Unknown time   • Apixaban (ELIQUIS PO) Take 5 mg by mouth 2 (Two) Times a Day. Pt unsure of dose.    3/18/2019   • bisoprolol (ZEBeta) 5 MG tablet Take 1 tablet by mouth Daily. 30 tablet 5 4/8/2019 at 0400   • bumetanide (BUMEX) 2 MG tablet Take 1 tablet by mouth Daily. 30 tablet 5 4/7/2019 at 1600   • escitalopram (LEXAPRO) 10 MG tablet Take 1.5 tablets by mouth Daily. 45 tablet 5 4/7/2019 at 0800   • levothyroxine (SYNTHROID, LEVOTHROID) 25 MCG tablet Take 25 mcg by mouth Daily.   4/7/2019 at 0800   • metFORMIN (GLUCOPHAGE) 1000 MG tablet Take 1,000 mg by mouth 2 (Two) Times a Day With Meals.   4/7/2019 at 2000   • pantoprazole (PROTONIX) 40 MG EC tablet Take 40 mg by mouth Daily.   4/8/2019 at 0400   • pravastatin (PRAVACHOL) 40 MG tablet Take 40 mg by mouth Every Night.   4/7/2019 at 2000   • sildenafil (REVATIO) 20 MG tablet Take 20 mg by mouth 3 (Three) Times a Day.   4/8/2019 at 0400   • vitamin D (ERGOCALCIFEROL) 10629 units capsule capsule Take 1 capsule by mouth 1 (One) Time Per Week. 13 capsule 1 4/4/2019   • ONE TOUCH ULTRA TEST test strip    Not Taking   • ONETOUCH DELICA LANCETS 33G misc    Not Taking   • potassium chloride (MICRO-K) 10 MEQ CR capsule TAKE 2 CAPSULES BY MOUTH ONCE DAILY 60 capsule 2 Taking        Current Meds  Scheduled Meds:  apixaban 2.5 mg Oral Q12H   bisoprolol 10 mg Oral Daily   bumetanide 2 mg Oral Daily   escitalopram 15 mg Oral Daily   insulin lispro 0-9 Units Subcutaneous 4x Daily With Meals & Nightly   ipratropium-albuterol 3 mL Nebulization TID   levothyroxine 25 mcg Oral Daily   metFORMIN 1,000 mg Oral BID With Meals   naproxen 500 mg Oral BID With Meals   nystatin  Topical Q12H   pantoprazole 40 mg Oral Daily   pravastatin 40 mg Oral Nightly   sennosides-docusate sodium 2 tablet Oral BID   sildenafil 20 mg Oral TID   sodium chloride 3 mL Intravenous Q12H     Continuous Infusions:   PRN Meds:.•  acetaminophen  •  albuterol  •  bisacodyl  •  dextrose  •  dextrose  •  glucagon (human recombinant)  •  magnesium hydroxide  •  ondansetron **OR** ondansetron ODT **OR** ondansetron  •  potassium chloride **OR** [DISCONTINUED] potassium chloride **OR** potassium chloride  •  sodium chloride    Social History:   Social History     Socioeconomic History   • Marital status: Single     Spouse name: Not on file   • Number of children: Not on file   • Years of education: High school   • Highest education level: Not on file   Occupational History     Employer: RETIRED   Tobacco Use   • Smoking status: Never Smoker   • Smokeless tobacco: Never Used   Substance and Sexual Activity   • Alcohol use: Yes     Comment: 1-2X YEAR   • Drug use: No   • Sexual activity: Defer       Family History:  Family History   Problem Relation Age of Onset   • Cancer Other    • Stroke Other    • Other Other         LUPUS ANTICOAGULANT   • Rheum arthritis Other    • Heart disease Other    • Heart attack Other    • Lupus Mother    • Stomach cancer Father    • Malig Hyperthermia Neg Hx    • Colon cancer Neg Hx    • Colon polyps Neg Hx        REVIEW OF SYSTEMS:   CONSTITUTIONAL: No weight loss, fever, chills, weakness or fatigue.   HEENT: Eyes: No visual loss, blurred vision, double vision or yellow sclerae. Ears, Nose, Throat: No  "hearing loss, sneezing, congestion, runny nose or sore throat.   SKIN: No rash or itching.     RESPIRATORY: No shortness of breath, hemoptysis, cough or sputum.   GASTROINTESTINAL: No anorexia, nausea, vomiting or diarrhea. No abdominal pain, bright red blood per rectum or melena.  GENITOURINARY: No burning on urination, hematuria or increased frequency.  NEUROLOGICAL: No headache, dizziness, syncope, paralysis, ataxia, numbness or tingling in the extremities. No change in bowel or bladder control.   MUSCULOSKELETAL: No muscle, back pain  HEMATOLOGIC: No anemia, bleeding or bruising.   LYMPHATICS: No enlarged nodes. No history of splenectomy.   PSYCHIATRIC: No history of depression, anxiety, hallucinations.   ENDOCRINOLOGIC: No reports of sweating, cold or heat intolerance. No polyuria or polydipsia.     Physical Exam    /83   Pulse (!) 49   Temp 98.8 °F (37.1 °C) (Oral)   Resp 21   Ht 157.5 cm (62\")   Wt 111 kg (245 lb 3.2 oz)   SpO2 94%   BMI 44.85 kg/m²     General Appearance Well developed, cooperative and well nourished and no acute distress   Head Normocephalic, without abnormality, atraumatic   Ears Ears appear intact with no abnormalities noted   Throat No oral lesions, no thrush, oral mucosa moist   Neck No adenopathy, supple, trachea midline, no thyromegaly, no carotid bruit, no JVD   Back No skin lesions, erythema or scars, no tenderness to percussion or palpation,range of motion is normal   Lungs Clear to auscultation,respirations regular, even and unlabored   Heart Regular rhythm and normal rate, normal S1 and S2, no murmur, no gallop, no rub, no click   Chest wall No abnormalities observed   Abdomen Normal bowel sounds, no masses, no hepatomegaly,    Extremities Moves all extremities well, no edema, no cyanosis, no redness   Pulses Pulses palpable and equal bilaterally. Normal radial, carotid, femoral, dorsalis pedis and posterior tibial pulses bilaterally. Normal abdominal aorta   Skin No " bleeding, bruising or rash   Psychiatric Alert and oriented x 3, normal mood and affect    Results from last 7 days   Lab Units 04/14/19  1902   SODIUM mmol/L 137   POTASSIUM mmol/L 3.2*   CHLORIDE mmol/L 91*   CO2 mmol/L 31.2*   BUN mg/dL 12   CREATININE mg/dL 0.65   CALCIUM mg/dL 9.4   GLUCOSE mg/dL 174*         )  Results from last 7 days   Lab Units 04/14/19  0712 04/13/19  0538 04/12/19  0606 04/11/19  0726   WBC 10*3/mm3  --   --   --  9.63   HEMOGLOBIN g/dL 10.5* 10.2* 10.7* 10.5*   HEMATOCRIT % 37.2 35.4 37.5 36.6   PLATELETS 10*3/mm3  --   --   --  332     CT of chest   CONCLUSION:  1. No aortic aneurysm or dissection.  2. No pulmonary embolus. Diameter of the main pulmonary artery is 4 cm  consistent with pulmonary arterial hypertension.  3. Low lung volumes with likely discoid atelectasis at both lung bases,  scar is less likely.  4. Subtle areas of groundglass infiltrate right upper lobe.      CXR  IMPRESSION:  Partial obscuration of the left hemidiaphragm may reflect  atelectasis/infiltrate/effusion, follow-up recommended. Cardiomegaly  with mild prominence of vascular and interstitial markings.    Telemetry            EKG        I personally viewed and interpreted the patient's EKG/Telemetry data    Assessment and Plan  1.  Paroxysmal supraventricular tachycardia.  Back to normal rhythm sinus at this time.  Would increase Bystolic from 5 mg in a.m. to 5 mg in a.m. with an additional 2.5 mg every afternoon.  She can follow-up with Dr. Marx her primary cardiologist in the next 3-4 weeks.  We will also need to have her address hypothyroidism with her PCP.  2.  Elevated TSH, as above  3.  Status post right total knee arthroplasty  4.  History of pulmonary emboli, on Eliquis therapy.  CT angiogram this admission did not show pulmonary emboli  5.  Obstructive sleep apnea.  She just started using her BiPAP.  Strongly recommend she continue with this  6.  Hypertension with hypertensive heart disease and  diastolic dysfunction.  Strongly recommend she continue with treatment of sleep apnea with adequate blood pressure control.    Celia Hodges  4/15/2019  6:52 AM    60min spent in reviewing records, discussion and examination of the patient and discussion with other members of the patient's medical team.     Dictated utilizing Dragon dictation

## 2019-04-15 NOTE — THERAPY TREATMENT NOTE
Acute Care - Physical Therapy Treatment Note  [facility]     Patient Name: Mariangel Nguyen  : 1950  MRN: [id number]  Today's Date: 4/15/2019  Onset of Illness/Injury or Date of Surgery: 19  Date of Referral to PT: 19  Referring Physician: Farzad Drew    Admit Date: 2019    Visit Dx:    ICD-10-CM ICD-9-CM   1. Difficulty walking R26.2 719.7     Patient Active Problem List   Diagnosis   • Diabetes mellitus (CMS/HCC)   • Edema   • Hyperlipidemia   • Hypertension   • Rotator cuff tear arthropathy   • Venous stasis dermatitis   • Varicose veins   • Acute on chronic systolic CHF (congestive heart failure) (CMS/HCC)   • Acute pyelonephritis   • Acute respiratory failure (CMS/HCC)   • BRODY (acute kidney injury) (CMS/HCC)   • ATN (acute tubular necrosis) (CMS/HCC)   • Benign essential HTN   • COPD (chronic obstructive pulmonary disease) (CMS/HCC)   • Goiter   • Hx of lithotripsy   • Low vitamin D level   • Lymphedema   • Neuropathy   • Omental infarction (CMS/HCC)   • KAELA and COPD overlap syndrome (CMS/HCC)   • Pulmonary HTN (CMS/HCC)   • Recurrent nephrolithiasis   • Small bowel perforation (CMS/HCC)   • Acute deep vein thrombosis (DVT) of popliteal vein of left lower extremity (CMS/HCC)   • Microcytic anemia   • Thrombocytosis (CMS/HCC)   • Iron deficiency anemia   • Encounter for screening for malignant neoplasm of colon   • Anxiety and depression   • Osteoarthritis of right knee       Therapy Treatment    Rehabilitation Treatment Summary     Row Name 04/15/19 0928             Treatment Time/Intention    Discipline  physical therapy assistant  -      Document Type  therapy note (daily note)  -      Subjective Information  complains of;pain  -      Mode of Treatment  physical therapy  -      Patient/Family Observations  pt reclined in chair  -      Care Plan Review  patient/other agree to care plan  -      Therapy Frequency (PT Clinical Impression)  2 times/day  -       Patient Effort  good  -EH      Existing Precautions/Restrictions  fall  -EH      Recorded by [] Vaishali Gerber PTA 04/15/19 0931      Row Name 04/15/19 0928             Cognitive Assessment/Intervention- PT/OT    Orientation Status (Cognition)  oriented x 3  -EH      Follows Commands (Cognition)  WNL  -EH      Personal Safety Interventions  fall prevention program maintained;gait belt;nonskid shoes/slippers when out of bed  -EH      Recorded by [] Vaishali Gerber PTA 04/15/19 0931      Row Name 04/15/19 0928             Sit-Stand Transfer    Sit-Stand [place] (Transfers)  minimum assist (75% patient effort)  -      Assistive Device (Sit-Stand Transfers)  walker, front-wheeled  -EH      Recorded by [] Vaishali Gerber PTA 04/15/19 0931      Row Name 04/15/19 0928             Stand-Sit Transfer    Stand-Sit [place] (Transfers)  minimum assist (75% patient effort)  -      Assistive Device (Stand-Sit Transfers)  walker, front-wheeled  -EH      Recorded by [] Vaishali Gerber PTA 04/15/19 0931      Row Name 04/15/19 0928             Toilet Transfer    Type (Toilet Transfer)  sit-stand;stand-sit  -      [place] Level (Toilet Transfer)  minimum assist (75% patient effort)  -      Assistive Device (Toilet Transfer)  commode, bedside without drop arms  -EH      Recorded by [] Vaishali Gerber PTA 04/15/19 0931      Row Name 04/15/19 0928             Gait/Stairs Assessment/Training    [place] Level (Gait)  contact guard  -      Assistive Device (Gait)  walker, front-wheeled  -      Distance in Feet (Gait)  100  -EH      Pattern (Gait)  step-to  -EH      Deviations/Abnormal Patterns (Gait)  right sided deviations;gait speed decreased;stride length decreased  -      Bilateral Gait Deviations  forward flexed posture;weight shift ability decreased  -EH      Recorded by [] Vaishali Gerber PTA 04/15/19 0931      Row Name 04/15/19 0928             Therapeutic Exercise    Comment (Therapeutic  Exercise)  R. TKA protocol X20 reps  -EH      Recorded by [] Vaishali Gerber PTA 04/15/19 0931      Row Name 04/15/19 0928             Positioning and Restraints    Pre-Treatment Position  sitting in chair/recliner  -EH      Post Treatment Position  chair  -EH      In Chair  reclined;call light within reach;encouraged to call for assist;exit alarm on;with family/caregiver  -      Recorded by [] Vaishali Gerber PTA 04/15/19 0931      Row Name 04/15/19 0928             Pain Scale: Numbers Pre/Post-Treatment    Pain Location - Side  Right  -EH      Pain Location  knee  -EH      Pain Intervention(s)  Repositioned;Ambulation/increased activity  -      Recorded by [] Vaishali Gerber PTA 04/15/19 0931      Row Name 04/15/19 0928             Pain Scale: Word Pre/Post-Treatment    Pain: Word Scale, Pretreatment  4 - moderate pain  -EH      Recorded by [] Vaishali Gerber PTA 04/15/19 0931      Row Name                Wound 04/08/19 0742 Right knee incision    Wound - Properties Group Date first assessed: 04/08/19 [EG] Time first assessed: 0742 [EG] Side: Right [EG] Location: knee [EG] Type: incision [EG] Recorded by:  [EG] Ritu Hernandez RN 04/08/19 0742      User Key  (r) = Recorded By, (t) = Taken By, (c) = Cosigned By    Initials Name Effective Dates Discipline     Vaishali Gerber PTA 08/19/18 -  PT    EG Ritu Hernandez RN 07/10/17 -  Nurse          Wound 04/08/19 0742 Right knee incision (Active)   Dressing Appearance dry;intact 4/15/2019  7:51 AM   Closure JENELLE 4/15/2019  7:51 AM   Base dressing in place, unable to visualize 4/15/2019  7:51 AM   Drainage Amount none 4/15/2019  7:51 AM           Physical Therapy Education     Title: PT OT SLP Therapies (Done)     Topic: Physical Therapy (Done)     Point: Mobility training (Done)     Learning Progress Summary           Patient Acceptance, E,TB,D, VU,DU by  at 4/15/2019  9:28 AM    Acceptance, E,TB, VU by  at 4/14/2019  9:07 AM    Acceptance, E,TB, VU by   at 4/13/2019 10:29 AM    Acceptance, E, VU,NR by  at 4/12/2019 11:24 AM    Acceptance, E,TB,D, VU,NR by  at 4/11/2019  3:59 PM    Acceptance, E,TB,D, VU,NR by  at 4/11/2019 11:51 AM    Acceptance, E,TB,D, VU,NR by  at 4/10/2019  1:34 PM    Acceptance, E,D,TB, VU,NR by  at 4/9/2019  4:34 PM    Acceptance, E,D, VU,NR by MS at 4/8/2019  1:30 PM   Family Acceptance, E, VU,NR by  at 4/12/2019 11:24 AM                   Point: Home exercise program (Done)     Learning Progress Summary           Patient Acceptance, E,TB,D, VU,DU by  at 4/15/2019  9:28 AM    Acceptance, E,TB, VU by  at 4/14/2019  9:07 AM    Acceptance, E,TB, VU by  at 4/13/2019 10:29 AM    Acceptance, E, VU,NR by  at 4/12/2019 11:24 AM    Acceptance, E,TB,D, VU,NR by  at 4/11/2019  3:59 PM    Acceptance, E,TB,D, VU,NR by  at 4/11/2019 11:51 AM    Acceptance, E,TB,D, VU,NR by  at 4/10/2019  3:30 PM    Acceptance, E,D,TB, VU,NR by  at 4/9/2019  4:34 PM    Acceptance, E,D, VU,NR by MS at 4/8/2019  1:30 PM   Family Acceptance, E, VU,NR by  at 4/12/2019 11:24 AM                   Point: Body mechanics (Done)     Learning Progress Summary           Patient Acceptance, E,TB,D, VU,DU by  at 4/15/2019  9:28 AM    Acceptance, E,TB, VU by  at 4/14/2019  9:07 AM    Acceptance, E,TB, VU by  at 4/13/2019 10:29 AM    Acceptance, E, VU,NR by  at 4/12/2019 11:24 AM    Acceptance, E,TB,D, VU,NR by  at 4/11/2019  3:59 PM    Acceptance, E,TB,D, VU,NR by CH at 4/11/2019 11:51 AM    Acceptance, E,TB,D, VU,NR by CH at 4/10/2019  1:34 PM    Acceptance, E,D,TB, VU,NR by EH at 4/9/2019  4:34 PM    Acceptance, E,D, VU,NR by MS at 4/8/2019  1:30 PM   Family Acceptance, E, VU,NR by  at 4/12/2019 11:24 AM                   Point: Precautions (Done)     Learning Progress Summary           Patient Acceptance, E,TB,D, VU,DU by  at 4/15/2019  9:28 AM    Acceptance, E,TB, VU by CS at 4/14/2019  9:07 AM    Acceptance, E,TB, VU by CS at 4/13/2019  10:29 AM    Acceptance, E, VU,NR by  at 4/12/2019 11:24 AM    Acceptance, E,TB,D, VU,NR by  at 4/11/2019  3:59 PM    Acceptance, E,TB,D, VU,NR by  at 4/11/2019 11:51 AM    Acceptance, E,TB,D, VU,NR by  at 4/10/2019  1:34 PM    Acceptance, E,D,TB, VU,NR by  at 4/9/2019  4:34 PM    Acceptance, E,D, VU,NR by MS at 4/8/2019  1:30 PM   Family Acceptance, E, VU,NR by  at 4/12/2019 11:24 AM                               User Key     Initials Effective Dates Name Provider Type Discipline     06/08/18 -  Ritu Nicole, PT Physical Therapist PT     04/03/18 -  Анна Pruitt, PT Physical Therapist PT    MS 04/03/18 -  Dillon Menjivar, PT Physical Therapist PT     08/19/18 -  Vaishali Gerber PTA Physical Therapy Assistant PT     05/14/18 -  Sixto Irvin, PT Physical Therapist PT                PT Recommendation and Plan  Therapy Frequency (PT Clinical Impression): 2 times/day  Plan of Care Reviewed With: patient  Progress: improving  Outcome Summary: Pt very drowsy and in and out of sleep this afternoon working with PT.  Pt able to perform some TKR protocol exercises with assistance needed.   Outcome Measures     Row Name 04/15/19 0900 04/14/19 0900 04/13/19 1000       How much help from another person do you currently need...    Turning from your back to your side while in flat bed without using bedrails?  3  -  3  -CS  3  -CS    Moving from lying on back to sitting on the side of a flat bed without bedrails?  3  -  3  -CS  2  -CS    Moving to and from a bed to a chair (including a wheelchair)?  3  -  3  -CS  3  -CS    Standing up from a chair using your arms (e.g., wheelchair, bedside chair)?  3  -  3  -CS  3  -CS    Climbing 3-5 steps with a railing?  2  -  2  -CS  2  -CS    To walk in hospital room?  3  -  3  -CS  2  -CS    AM-PAC 6 Clicks Score  17  -EH  17  -CS  15  -CS       Functional Assessment    Outcome Measure Options  --  AM-PAC 6 Clicks Basic Mobility (PT)  -CS   AM-PAC 6 Clicks Basic Mobility (PT)  -    Row Name 04/12/19 1600 04/12/19 1100          How much help from another person do you currently need...    Turning from your back to your side while in flat bed without using bedrails?  2  -EF  2  -EF     Moving from lying on back to sitting on the side of a flat bed without bedrails?  2  -EF  2  -EF     Moving to and from a bed to a chair (including a wheelchair)?  3  -EF  3  -EF     Standing up from a chair using your arms (e.g., wheelchair, bedside chair)?  3  -EF  3  -EF     Climbing 3-5 steps with a railing?  1  -EF  1  -EF     To walk in hospital room?  2  -EF  2  -EF     AM-PAC 6 Clicks Score  13  -EF  13  -EF        Functional Assessment    Outcome Measure Options  AM-PAC 6 Clicks Basic Mobility (PT)  -EF  AM-PAC 6 Clicks Basic Mobility (PT)  -EF       User Key  (r) = Recorded By, (t) = Taken By, (c) = Cosigned By    Initials Name Provider Type    EF Ritu Nicole, PT Physical Therapist     Vaishali Gerber PTA Physical Therapy Assistant     Sixto Irvin, PT Physical Therapist         Time Calculation:   PT Charges     Row Name 04/15/19 0927             Time Calculation    Start Time  0855  -      Stop Time  0922  -      Time Calculation (min)  27 min  -      PT Received On  04/15/19  -      PT - Next Appointment  04/15/19  -         Time Calculation- PT    Total Timed Code Minutes- PT  27 minute(s)  -        User Key  (r) = Recorded By, (t) = Taken By, (c) = Cosigned By    Initials Name Provider Type     Vaishali Gerber PTA Physical Therapy Assistant        Therapy Charges for Today     Code Description Service Date Service Provider Modifiers Qty    82850019705 HC PT THER PROC EA 15 MIN 4/15/2019 Vaishali Gerber PTA GP 2          PT G-Codes  Outcome Measure Options: AM-PAC 6 Clicks Basic Mobility (PT)  AM-[facility] 6 Clicks Score: 17    Vaishali Gerber PTA  4/15/2019

## 2019-04-15 NOTE — PROGRESS NOTES
Continued Stay Note  [facility]     Patient Name: Mariangel Nguyen  MRN: [id number]  Today's Date: 4/15/2019    Admit Date: 4/8/2019    Discharge Plan     Row Name 04/15/19 1045       Plan    Plan  [facility]     Plan Comments  Pt has dc orders. Called and spoke with Jamila/[facility], pt is accepted and has a bed available today, [facility] Rm#[id number]. Pts room will be ready after 2:30pm. Rosa DE OLIVEIRA updated. Pt to have an Echo. CCP to follow. JChasteenRN/CCP         Discharge Codes    No documentation.       Expected Discharge Date and Time     Expected Discharge Date Expected Discharge Time    Apr 15, 2019             Gracie Mccabe RN

## 2019-04-15 NOTE — PROGRESS NOTES
Continued Stay Note  [facility]     Patient Name: Mariangel Nguyen  MRN: [id number]  Today's Date: 4/15/2019    Admit Date: 4/8/2019    Discharge Plan     Row Name 04/15/19 1755       Plan    Plan  Order Clarification     Plan Comments  Order Clarification: Auto titrating BiPAP, inspiratory maximum of 25 and minimum of 15, expiratory pressure minimum of 5 maximum of 12. Per Dr Stewart. ADRIANAhastkiannaRN/CCP         Discharge Codes    No documentation.       Expected Discharge Date and Time     Expected Discharge Date Expected Discharge Time    Apr 15, 2019             Gracie Mccabe RN

## 2019-04-15 NOTE — PLAN OF CARE
Problem: Patient Care Overview  Goal: Plan of Care Review  Outcome: Ongoing (interventions implemented as appropriate)   04/15/19 7881   Coping/Psychosocial   Plan of Care Reviewed With patient   OTHER   Outcome Summary Patient resting well,not in any distress. Vital signs as charted.  IV cardizem bolus given and  Cardizem drip started   per MAR last night. Patient immediately  converted back to NSR heart rate in 60-70s  - ARNABIGAIL Mota called/ informed with new orders received .  Cardizem drip stopped. Pain meds refused the whole night., Patient only want  ice pack. K  po given per protocol-see MAR. Right knee dressing dry and intact. Fall precautions enforced. Monitored.       Problem: Fall Risk (Adult)  Goal: Absence of Fall  Outcome: Ongoing (interventions implemented as appropriate)      Problem: Chronic Obstructive Pulmonary Disease (Adult)  Goal: Signs and Symptoms of Listed Potential Problems Will be Absent, Minimized or Managed (Chronic Obstructive Pulmonary Disease)  Outcome: Ongoing (interventions implemented as appropriate)      Problem: Skin Injury Risk (Adult)  Goal: Skin Health and Integrity  Outcome: Ongoing (interventions implemented as appropriate)

## 2019-04-16 NOTE — PROGRESS NOTES
Continued Stay Note  [facility]     Patient Name: Mariangel Nguyen  MRN: [id number]  Today's Date: 4/16/2019    Admit Date: 4/8/2019    Discharge Plan     Row Name 04/16/19 1457       Plan    Plan Comments  Spoke with Whitney, their [employer] company cannot support a BIPAP with that high of a max and min for pressure support. Called and spoke with Dr Stewart. Order Clarification: Bipap auto titrating with a max pressure of 20 and a min pressure of 13. Whitney updated. JChasteenRN/CCP         Discharge Codes    No documentation.       Expected Discharge Date and Time     Expected Discharge Date Expected Discharge Time    Apr 15, 2019             Gracie Mccabe RN

## 2019-04-16 NOTE — PROGRESS NOTES
Case Management Discharge Note    Final Note: Pt dc'd to [facility]     Destination - Selection Complete      Service Provider Request Status Selected Services Address Phone Number Fax Number    [facility] [address] [facility] [address] [phone]       Dasia Perez RN 4/11/2019 1229    4/11/2019 Family first choice, left message for Hu.  Dasia Perez RN                   Durable Medical Equipment      No service has been selected for the patient.      Dialysis/Infusion      No service has been selected for the patient.      Home Medical Care - Selection Complete      Service Provider Request Status Selected Services Address Phone Number Fax Number    [facility] [address] [address] [phone] --      Therapy      No service has been selected for the patient.      Community Resources      No service has been selected for the patient.        Transportation Services  Private: Car    Final Discharge Disposition Code: 03 - skilled nursing facility (SNF)

## 2019-05-09 ENCOUNTER — TELEPHONE (OUTPATIENT)
Dept: CARDIOLOGY | Facility: CLINIC | Age: 69
End: 2019-05-09

## 2019-05-09 ENCOUNTER — OFFICE VISIT (OUTPATIENT)
Dept: FAMILY MEDICINE CLINIC | Facility: CLINIC | Age: 69
End: 2019-05-09

## 2019-05-09 VITALS
WEIGHT: 240 LBS | SYSTOLIC BLOOD PRESSURE: 138 MMHG | BODY MASS INDEX: 44.16 KG/M2 | DIASTOLIC BLOOD PRESSURE: 80 MMHG | OXYGEN SATURATION: 97 % | HEART RATE: 88 BPM | TEMPERATURE: 97.7 F | HEIGHT: 62 IN

## 2019-05-09 DIAGNOSIS — E03.9 ACQUIRED HYPOTHYROIDISM: Primary | ICD-10-CM

## 2019-05-09 PROCEDURE — 99213 OFFICE O/P EST LOW 20 MIN: CPT | Performed by: INTERNAL MEDICINE

## 2019-05-09 PROCEDURE — 0298T HOLTER MONITOR - 72 HOUR UP TO 21 DAY: CPT | Performed by: INTERNAL MEDICINE

## 2019-05-09 RX ORDER — HYDROCODONE BITARTRATE AND ACETAMINOPHEN 5; 325 MG/1; MG/1
1 TABLET ORAL EVERY 6 HOURS PRN
COMMUNITY
End: 2020-10-09

## 2019-05-09 NOTE — PROGRESS NOTES
Subjective Chief complaint is follow-up for elevated TSH  Mariangel Nguyen is a 68 y.o. female.     History of Present Illness   Mariangel is here today for follow-up.  She does have a prior history of some small thyroid nodules.  She is followed by Dr. Snell.  She was started on 25 mcg of levothyroxine in January.  She had some follow-up testing at her endocrinologist office and it looked like the TSH had normalized.  However at her recent orthopedic surgery her TSH was checked and it was 9.2.  She has had thyroid antibodies checked and they have been normal.  During the course of the hospitalization it appears that her bisoprolol was decreased from 5 mg to 2.5 mg.  Current outpatient and discharge medications have been reconciled for the patient.  Reviewed by: Waqar Burton MD    The following portions of the patient's history were reviewed and updated as appropriate: allergies, current medications, past family history, past medical history, past social history, past surgical history and problem list.    Review of Systems   Respiratory: Negative for chest tightness and shortness of breath.    Cardiovascular: Positive for leg swelling. Negative for chest pain.       Objective   Physical Exam   Constitutional: She appears well-developed and well-nourished.   Cardiovascular: Normal rate, regular rhythm and normal heart sounds.   Pulmonary/Chest: Effort normal and breath sounds normal. No stridor. No respiratory distress. She has no wheezes. She has no rales.   Abdominal: Soft.   Nursing note and vitals reviewed.        Assessment/Plan   Mariangel was seen today for follow-up.    Diagnoses and all orders for this visit:    Acquired hypothyroidism  -     T3, Free  -     TSH+Free T4      Mariangel is here today for follow-up.  She does see an endocrinologist.  I am going to recheck her TSH level along with a free T3 and free T4.  We may need to increase her thyroid medicine slightly.

## 2019-05-10 LAB
T3FREE SERPL-MCNC: 2.9 PG/ML (ref 2–4.4)
T4 FREE SERPL-MCNC: 1.27 NG/DL (ref 0.93–1.7)
TSH SERPL DL<=0.005 MIU/L-ACNC: 3.73 MIU/ML (ref 0.27–4.2)

## 2019-05-15 ENCOUNTER — LAB (OUTPATIENT)
Dept: LAB | Facility: HOSPITAL | Age: 69
End: 2019-05-15

## 2019-05-15 DIAGNOSIS — D50.8 OTHER IRON DEFICIENCY ANEMIA: ICD-10-CM

## 2019-05-15 LAB
BASOPHILS # BLD AUTO: 0.08 10*3/MM3 (ref 0–0.2)
BASOPHILS NFR BLD AUTO: 0.8 % (ref 0–1.5)
DEPRECATED RDW RBC AUTO: 54.4 FL (ref 37–54)
EOSINOPHIL # BLD AUTO: 0.53 10*3/MM3 (ref 0–0.4)
EOSINOPHIL NFR BLD AUTO: 5.4 % (ref 0.3–6.2)
ERYTHROCYTE [DISTWIDTH] IN BLOOD BY AUTOMATED COUNT: 18.1 % (ref 12.3–15.4)
FERRITIN SERPL-MCNC: 128.2 NG/ML (ref 13–150)
HCT VFR BLD AUTO: 41.7 % (ref 34–46.6)
HGB BLD-MCNC: 12.6 G/DL (ref 12–15.9)
IMM GRANULOCYTES # BLD AUTO: 0.07 10*3/MM3 (ref 0–0.05)
IMM GRANULOCYTES NFR BLD AUTO: 0.7 % (ref 0–0.5)
IRON 24H UR-MRATE: 28 MCG/DL (ref 37–145)
IRON SATN MFR SERPL: 7 % (ref 14–48)
LYMPHOCYTES # BLD AUTO: 1.24 10*3/MM3 (ref 0.7–3.1)
LYMPHOCYTES NFR BLD AUTO: 12.5 % (ref 19.6–45.3)
MCH RBC QN AUTO: 26.6 PG (ref 26.6–33)
MCHC RBC AUTO-ENTMCNC: 30.2 G/DL (ref 31.5–35.7)
MCV RBC AUTO: 88 FL (ref 79–97)
MONOCYTES # BLD AUTO: 0.83 10*3/MM3 (ref 0.1–0.9)
MONOCYTES NFR BLD AUTO: 8.4 % (ref 5–12)
NEUTROPHILS # BLD AUTO: 7.15 10*3/MM3 (ref 1.7–7)
NEUTROPHILS NFR BLD AUTO: 72.2 % (ref 42.7–76)
NRBC BLD AUTO-RTO: 0 /100 WBC (ref 0–0.2)
PLATELET # BLD AUTO: 354 10*3/MM3 (ref 140–450)
PMV BLD AUTO: 9 FL (ref 6–12)
RBC # BLD AUTO: 4.74 10*6/MM3 (ref 3.77–5.28)
TIBC SERPL-MCNC: 395 MCG/DL (ref 249–505)
TRANSFERRIN SERPL-MCNC: 282 MG/DL (ref 200–360)
WBC NRBC COR # BLD: 9.9 10*3/MM3 (ref 3.4–10.8)

## 2019-05-15 PROCEDURE — 36415 COLL VENOUS BLD VENIPUNCTURE: CPT | Performed by: INTERNAL MEDICINE

## 2019-05-15 PROCEDURE — 82728 ASSAY OF FERRITIN: CPT | Performed by: INTERNAL MEDICINE

## 2019-05-15 PROCEDURE — 84466 ASSAY OF TRANSFERRIN: CPT | Performed by: INTERNAL MEDICINE

## 2019-05-15 PROCEDURE — 85025 COMPLETE CBC W/AUTO DIFF WBC: CPT | Performed by: INTERNAL MEDICINE

## 2019-05-15 PROCEDURE — 83540 ASSAY OF IRON: CPT | Performed by: INTERNAL MEDICINE

## 2019-05-21 ENCOUNTER — APPOINTMENT (OUTPATIENT)
Dept: LAB | Facility: HOSPITAL | Age: 69
End: 2019-05-21

## 2019-05-21 ENCOUNTER — OFFICE VISIT (OUTPATIENT)
Dept: INFECTIOUS DISEASES | Facility: CLINIC | Age: 69
End: 2019-05-21

## 2019-05-21 VITALS
BODY MASS INDEX: 43.89 KG/M2 | HEIGHT: 62 IN | DIASTOLIC BLOOD PRESSURE: 83 MMHG | SYSTOLIC BLOOD PRESSURE: 144 MMHG | TEMPERATURE: 97.5 F | HEART RATE: 64 BPM

## 2019-05-21 DIAGNOSIS — Z77.21 EXPOSURE TO BLOODBORNE PATHOGEN: Primary | ICD-10-CM

## 2019-05-21 DIAGNOSIS — Z11.59 ENCOUNTER FOR SCREENING FOR OTHER VIRAL DISEASES: ICD-10-CM

## 2019-05-21 LAB
HBV CORE IGM SERPL QL IA: NORMAL
HBV SURFACE AB SER RIA-ACNC: NORMAL
HBV SURFACE AG SERPL QL IA: NORMAL
HCV AB SER DONR QL: NORMAL
HIV1+2 AB SER QL: NORMAL

## 2019-05-21 PROCEDURE — G0432 EIA HIV-1/HIV-2 SCREEN: HCPCS | Performed by: INTERNAL MEDICINE

## 2019-05-21 PROCEDURE — 86704 HEP B CORE ANTIBODY TOTAL: CPT | Performed by: INTERNAL MEDICINE

## 2019-05-21 PROCEDURE — 99213 OFFICE O/P EST LOW 20 MIN: CPT | Performed by: INTERNAL MEDICINE

## 2019-05-21 PROCEDURE — 86706 HEP B SURFACE ANTIBODY: CPT | Performed by: INTERNAL MEDICINE

## 2019-05-21 PROCEDURE — 36415 COLL VENOUS BLD VENIPUNCTURE: CPT | Performed by: INTERNAL MEDICINE

## 2019-05-21 PROCEDURE — 80074 ACUTE HEPATITIS PANEL: CPT | Performed by: INTERNAL MEDICINE

## 2019-05-21 NOTE — PROGRESS NOTES
CC: f/u possible bloodborne pathogen exposure    HPI: Mariangel Nguyen is a 68 y.o. female here for  f/u possible bloodborne pathogen exposure. Her knee is healing well. She saw Dr Luong last week. No new symptoms. No fevers, chills, sweats, jaundice, weight loss, dysuria, RUQ pain. She has been very anxious about these repeat tests.     Review of Systems: no n/v/d    Past Medical History:   Diagnosis Date   • Arthritis     OSTEOARTHRITIS KNEES   • CHF (congestive heart failure) (CMS/HCC)    • Colon polyp    • COPD (chronic obstructive pulmonary disease) (CMS/HCC)    • Diabetes mellitus (CMS/HCC)    • Disease of thyroid gland    • H/O Abnormal Pap smear of cervix    • History of DVT (deep vein thrombosis)    • History of kidney stones    • History of pulmonary embolus (PE)    • History of sepsis    • History of small bowel obstruction    • History of snoring    • Hyperlipidemia    • Hypertension    • Knee pain, bilateral    • Neuropathy    • On home oxygen therapy     2L NC   • Pulmonary hypertension (CMS/HCC)    • Sleep apnea      Past Surgical History:   Procedure Laterality Date   • ABDOMINAL SURGERY  11/2017    Delayed closure of abdominal wound-wound vac placement, Dr. Nestor Howe   • BLADDER SURGERY      BLADDER LIFT, 2011   • CARDIAC CATHETERIZATION  2017    Normal coronary arteries, normal overall LVSF with wall motion abnormality   • COLON SURGERY      RECONSTRUCTION, 2001   • COLONOSCOPY N/A 3/4/2019    Procedure: COLONOSCOPY polypectomy;  Surgeon: Nai Luong MD;  Location: [facility];  Service: Gastroenterology   • CYSTOSCOPY URETEROSCOPY LASER LITHOTRIPSY Right 10/23/2017    Procedure: CYSTO RT URETEROSCOPY LASER  STONE BASKETING RIGHT STENT EXCHANGE;  Surgeon: Quinn Cason MD;  Location: [facility] OR;  Service:    • ENDOSCOPY N/A 3/4/2019    Procedure: ESOPHAGOGASTRODUODENOSCOPY WITH BIOPSIES;  Surgeon: Nai Luong MD;  Location: [facility] OR;  Service: Gastroenterology   •  EXPLORATORY LAPAROTOMY     • HYSTERECTOMY      2001   • ROTATOR CUFF REPAIR Right 2016   • STEROID INJECTION KNEE      Had left knee injection 10/09/2018; right knee injection 10/02/2017   • TOTAL KNEE ARTHROPLASTY Right 4/8/2019    Procedure: RIGHT TOTAL KNEE ARTHROPLASTY;  Surgeon: Farzad Luong MD;  Location: [facility];  Service: Orthopedics     Social History:  Retired  Lives in [place], [place]     Family History:  Daughter is living and heatlhy     Allergies:  No Antibiotic Allergies    Medications:   Current Outpatient Medications:   •  acetaminophen (TYLENOL) 325 MG tablet, Take 2 tablets by mouth Every 4 (Four) Hours As Needed for Moderate Pain  or Fever (greater than 101 F)., Disp: 120 tablet, Rfl: 0  •  albuterol (PROVENTIL) (2.5 MG/3ML) 0.083% nebulizer solution, Take 2.5 mg by nebulization Every 4 (Four) Hours As Needed for Wheezing., Disp: , Rfl:   •  Apixaban (ELIQUIS PO), Take 5 mg by mouth 2 (Two) Times a Day. Pt unsure of dose. , Disp: , Rfl:   •  bisoprolol (ZEBeta) 2.5 MG half tablet, Take 1 half tablet by mouth Every Evening., Disp: , Rfl:   •  bumetanide (BUMEX) 2 MG tablet, Take 1 tablet by mouth Daily., Disp: 30 tablet, Rfl: 5  •  dextrose (GLUTOSE) 40 % gel, Take 15 g by mouth Every 15 (Fifteen) Minutes As Needed for Low Blood Sugar (Blood sugar less than 70)., Disp: 30 each, Rfl: 0  •  escitalopram (LEXAPRO) 10 MG tablet, Take 1.5 tablets by mouth Daily., Disp: 45 tablet, Rfl: 5  •  HYDROcodone-acetaminophen (NORCO) 5-325 MG per tablet, Take 1 tablet by mouth Every 6 (Six) Hours As Needed., Disp: , Rfl:   •  insulin lispro (humaLOG) 100 UNIT/ML injection, Inject 0-9 Units under the skin into the appropriate area as directed 4 (Four) Times a Day With Meals & at Bedtime., Disp: 1 each, Rfl: 12  •  ipratropium-albuterol (DUO-NEB) 0.5-2.5 mg/3 ml nebulizer, Take 3 mL by nebulization 3 (Three) Times a Day., Disp: 360 mL, Rfl: 0  •  levothyroxine (SYNTHROID, LEVOTHROID) 25 MCG tablet,  "Take 25 mcg by mouth Daily., Disp: , Rfl:   •  metFORMIN (GLUCOPHAGE) 1000 MG tablet, Take 1,000 mg by mouth 2 (Two) Times a Day With Meals., Disp: , Rfl:   •  naproxen (NAPROSYN) 500 MG tablet, Take 1 tablet by mouth 2 (Two) Times a Day With Meals., Disp: 60 tablet, Rfl: 0  •  nystatin (MYCOSTATIN) 487929 UNIT/GM powder, Apply  topically to the appropriate area as directed Every 12 (Twelve) Hours., Disp: 1 each, Rfl: 0  •  ondansetron (ZOFRAN) 4 MG tablet, Take 1 tablet by mouth Every 6 (Six) Hours As Needed for Nausea or Vomiting., Disp: 60 tablet, Rfl: 0  •  ONE TOUCH ULTRA TEST test strip, , Disp: , Rfl:   •  ONETOUCH DELICA LANCETS 33G misc, , Disp: , Rfl:   •  pantoprazole (PROTONIX) 40 MG EC tablet, Take 40 mg by mouth Daily., Disp: , Rfl:   •  potassium chloride (MICRO-K) 10 MEQ CR capsule, TAKE 2 CAPSULES BY MOUTH ONCE DAILY, Disp: 60 capsule, Rfl: 2  •  pravastatin (PRAVACHOL) 40 MG tablet, Take 40 mg by mouth Every Night., Disp: , Rfl:   •  sennosides-docusate sodium (SENOKOT-S) 8.6-50 MG tablet, Take 2 tablets by mouth 2 (Two) Times a Day., Disp: 60 tablet, Rfl: 0  •  sildenafil (REVATIO) 20 MG tablet, Take 20 mg by mouth 3 (Three) Times a Day., Disp: , Rfl:   •  vitamin D (ERGOCALCIFEROL) 38072 units capsule capsule, Take 1 capsule by mouth 1 (One) Time Per Week., Disp: 13 capsule, Rfl: 1      OBJECTIVE:  /83   Pulse 64   Temp 97.5 °F (36.4 °C)   Ht 157.5 cm (62.01\")   BMI 43.89 kg/m²     General: awake, alert, NAD, in wheelchair  Head: Normocephalic  Eyes: no scleral icterus  ENT: MMM, no thrush  Neck: Supple  Cardiovascular: NR, RR, no murmurs, rubs, or gallops; no LE edema  Respiratory: normal work of breathing; no wheeze  GI: Abdomen is soft, non-tender, non-distended  Musculoskeletal: R knee incision is healed  Skin: No rashes, lesions  Neurological: Alert and oriented x 3, motor strength 5/5 in upper extremities  Psychiatric: calm and pleasant    DIAGNOSTICS:  CBC reviewed today  Lab " Results   Component Value Date    WBC 9.90 05/15/2019    HGB 12.6 05/15/2019    HCT 41.7 05/15/2019     05/15/2019     Lab Results   Component Value Date    GLUCOSE 174 (H) 04/14/2019    BUN 12 04/14/2019    CREATININE 0.65 04/14/2019    EGFRIFNONA 91 04/14/2019    EGFRIFAFRI 79 01/31/2018    BCR 18.5 04/14/2019    CO2 31.2 (H) 04/14/2019    CALCIUM 9.4 04/14/2019    PROTENTOTREF 7.1 01/31/2018    ALBUMIN 4.20 02/06/2019    LABIL2 1.4 01/31/2018    AST 14 02/06/2019    ALT 11 02/06/2019     Hep B sAb neg  Hep B sAg neg  Hep C Ab negative  HIV Ab negative    Microbiology:  None    Radiology:  None    ASSESSMENT/PLAN:  1. Possible exposure to infection due to autoclave malfunction  -initial Hep B, Hep C, and HIV testing in hospital was negative. Repeat today now that she is 6 weeks out.     2. S/P R knee arthroplasty  -keep follow-up with Dr Luong      I spent greater than 15 minutes of face-to-face time with patient and >50% counseling re: risks of transmission (low), treatment options if needed, and offering reassurance.

## 2019-05-22 ENCOUNTER — OFFICE VISIT (OUTPATIENT)
Dept: ONCOLOGY | Facility: CLINIC | Age: 69
End: 2019-05-22

## 2019-05-22 ENCOUNTER — APPOINTMENT (OUTPATIENT)
Dept: LAB | Facility: HOSPITAL | Age: 69
End: 2019-05-22

## 2019-05-22 VITALS
HEIGHT: 62 IN | HEART RATE: 52 BPM | RESPIRATION RATE: 18 BRPM | SYSTOLIC BLOOD PRESSURE: 154 MMHG | OXYGEN SATURATION: 97 % | TEMPERATURE: 97.6 F | DIASTOLIC BLOOD PRESSURE: 86 MMHG | BODY MASS INDEX: 43.89 KG/M2

## 2019-05-22 DIAGNOSIS — D50.8 OTHER IRON DEFICIENCY ANEMIA: ICD-10-CM

## 2019-05-22 DIAGNOSIS — D75.839 THROMBOCYTOSIS: ICD-10-CM

## 2019-05-22 DIAGNOSIS — I82.432 ACUTE DEEP VEIN THROMBOSIS (DVT) OF POPLITEAL VEIN OF LEFT LOWER EXTREMITY (HCC): Primary | ICD-10-CM

## 2019-05-22 LAB — HBV CORE AB SER DONR QL IA: NEGATIVE

## 2019-05-22 PROCEDURE — 99214 OFFICE O/P EST MOD 30 MIN: CPT | Performed by: INTERNAL MEDICINE

## 2019-05-22 PROCEDURE — G0463 HOSPITAL OUTPT CLINIC VISIT: HCPCS | Performed by: INTERNAL MEDICINE

## 2019-05-22 NOTE — PROGRESS NOTES
Subjective .     REASONS FOR FOLLOWUP:    1. Iron deficiency anemia:   · Long-standing chronic anemia with worsening in 2017 along with declining MCV.    · Associated thrombocytosis and pica symptoms  · Iron deficient 2/6/19 with ferritin less than 5, iron saturation 3%, hemoglobin 8.4, MCV 63.5.  · Received Injectafer 750mg IV 2/12 and 2/19/19  · Stool cards negative for occult blood x3 on 2/27/19  · EGD/colonoscopy 3/4/19 with hiatal hernia, gastritis, benign colonic polyps, no bleeding source identified.  2. Left lower extremity DVT (popliteal/calf) with suspected pulmonary embolism:  · Identified 12/1/17 during hospitalization for small bowel perforation requiring abdominal surgery with subsequent sepsis (provoked).  · Left lower extremity popliteal/calf DVT 12/1/17  · V/Q scan 12/2/17 with intermediate probability for pulmonary embolism  · Continuation of anticoagulation with Eliquis 5 mg twice daily  · Hypercoagulable evaluation 12/6/19 negative  · Due to provoked nature of thrombosis, anticoagulation discontinued 12/27/19  · Anticoagulation Eliquis 5 mg twice daily following right total knee arthroplasty 4/8/19 until fully ambulatory.    HISTORY OF PRESENT ILLNESS:  The patient is a 68 y.o. year old female who is here for follow-up with the above-mentioned history.    History of Present Illness the patient returns today in follow-up with labs to review following right total knee arthroplasty 4/8/2019.  She has been anticoagulated postoperatively with Eliquis 5 mg twice daily.  She has tolerated this well with no significant bleeding issues.  She has however had some difficulties after the surgery.  Apparently the surgical instruments were not autoclaved long enough and there was concern regarding exposure to infectious diseases.  She was seen by ID and underwent recent testing which was negative for hepatitis B, C and HIV 1/2.  She tolerated surgery relatively well.  She had a stay in rehab x4 weeks.  She  is now participating in outpatient physical therapy.  She is doing well with this.  She is due back to see orthopedics in approximately 3 weeks and at that time they will likely plan to discontinue her anticoagulation.  She denies any bright blood per rectum nor melena.  She is in a wheelchair today.    Past Medical History:   Diagnosis Date   • Arthritis     OSTEOARTHRITIS KNEES   • CHF (congestive heart failure) (CMS/HCC)    • Colon polyp    • COPD (chronic obstructive pulmonary disease) (CMS/HCC)    • Diabetes mellitus (CMS/HCC)    • Disease of thyroid gland    • H/O Abnormal Pap smear of cervix    • History of DVT (deep vein thrombosis)    • History of kidney stones    • History of pulmonary embolus (PE)    • History of sepsis    • History of small bowel obstruction    • History of snoring    • Hyperlipidemia    • Hypertension    • Knee pain, bilateral    • Neuropathy    • On home oxygen therapy     2L NC   • Pulmonary hypertension (CMS/HCC)    • Sleep apnea      Past Surgical History:   Procedure Laterality Date   • ABDOMINAL SURGERY  11/2017    Delayed closure of abdominal wound-wound vac placement, Dr. Nestor Howe   • BLADDER SURGERY      BLADDER LIFT, 2011   • CARDIAC CATHETERIZATION  2017    Normal coronary arteries, normal overall LVSF with wall motion abnormality   • COLON SURGERY      RECONSTRUCTION, 2001   • COLONOSCOPY N/A 3/4/2019    Procedure: COLONOSCOPY polypectomy;  Surgeon: Nai Luong MD;  Location: [facility] OR;  Service: Gastroenterology   • CYSTOSCOPY URETEROSCOPY LASER LITHOTRIPSY Right 10/23/2017    Procedure: CYSTO RT URETEROSCOPY LASER  STONE BASKETING RIGHT STENT EXCHANGE;  Surgeon: Quinn Cason MD;  Location: [facility] OR;  Service:    • ENDOSCOPY N/A 3/4/2019    Procedure: ESOPHAGOGASTRODUODENOSCOPY WITH BIOPSIES;  Surgeon: Nai Luong MD;  Location: [facility] OR;  Service: Gastroenterology   • EXPLORATORY LAPAROTOMY     • HYSTERECTOMY      2001   • ROTATOR CUFF  REPAIR Right 2016   • STEROID INJECTION KNEE      Had left knee injection 10/09/2018; right knee injection 10/02/2017   • TOTAL KNEE ARTHROPLASTY Right 4/8/2019    Procedure: RIGHT TOTAL KNEE ARTHROPLASTY;  Surgeon: Farzad Luong MD;  Location: [facility] OR;  Service: Orthopedics         Current Outpatient Medications on File Prior to Visit   Medication Sig Dispense Refill   • acetaminophen (TYLENOL) 325 MG tablet Take 2 tablets by mouth Every 4 (Four) Hours As Needed for Moderate Pain  or Fever (greater than 101 F). 120 tablet 0   • albuterol (PROVENTIL) (2.5 MG/3ML) 0.083% nebulizer solution Take 2.5 mg by nebulization Every 4 (Four) Hours As Needed for Wheezing.     • Apixaban (ELIQUIS PO) Take 5 mg by mouth 2 (Two) Times a Day. Pt unsure of dose.      • bisoprolol (ZEBeta) 2.5 MG half tablet Take 1 half tablet by mouth Every Evening.     • bumetanide (BUMEX) 2 MG tablet Take 1 tablet by mouth Daily. 30 tablet 5   • escitalopram (LEXAPRO) 10 MG tablet Take 1.5 tablets by mouth Daily. 45 tablet 5   • HYDROcodone-acetaminophen (NORCO) 5-325 MG per tablet Take 1 tablet by mouth Every 6 (Six) Hours As Needed.     • ipratropium-albuterol (DUO-NEB) 0.5-2.5 mg/3 ml nebulizer Take 3 mL by nebulization 3 (Three) Times a Day. 360 mL 0   • levothyroxine (SYNTHROID, LEVOTHROID) 25 MCG tablet Take 25 mcg by mouth Daily.     • metFORMIN (GLUCOPHAGE) 1000 MG tablet Take 1,000 mg by mouth 2 (Two) Times a Day With Meals.     • naproxen (NAPROSYN) 500 MG tablet Take 1 tablet by mouth 2 (Two) Times a Day With Meals. 60 tablet 0   • pantoprazole (PROTONIX) 40 MG EC tablet Take 40 mg by mouth Daily.     • potassium chloride (MICRO-K) 10 MEQ CR capsule TAKE 2 CAPSULES BY MOUTH ONCE DAILY 60 capsule 2   • pravastatin (PRAVACHOL) 40 MG tablet Take 40 mg by mouth Every Night.     • sildenafil (REVATIO) 20 MG tablet Take 20 mg by mouth 3 (Three) Times a Day.     • vitamin D (ERGOCALCIFEROL) 18571 units capsule capsule Take 1 capsule by  mouth 1 (One) Time Per Week. 13 capsule 1   • dextrose (GLUTOSE) 40 % gel Take 15 g by mouth Every 15 (Fifteen) Minutes As Needed for Low Blood Sugar (Blood sugar less than 70). 30 each 0   • insulin lispro (humaLOG) 100 UNIT/ML injection Inject 0-9 Units under the skin into the appropriate area as directed 4 (Four) Times a Day With Meals & at Bedtime. 1 each 12   • nystatin (MYCOSTATIN) 454136 UNIT/GM powder Apply  topically to the appropriate area as directed Every 12 (Twelve) Hours. 1 each 0   • ondansetron (ZOFRAN) 4 MG tablet Take 1 tablet by mouth Every 6 (Six) Hours As Needed for Nausea or Vomiting. 60 tablet 0   • ONE TOUCH ULTRA TEST test strip      • ONETOUCH DELICA LANCETS 33G misc      • sennosides-docusate sodium (SENOKOT-S) 8.6-50 MG tablet Take 2 tablets by mouth 2 (Two) Times a Day. 60 tablet 0     No current facility-administered medications on file prior to visit.        ALLERGIES:   No Known Allergies    Social History     Socioeconomic History   • Marital status: Single     Spouse name: Not on file   • Number of children: Not on file   • Years of education: High school   • Highest education level: Not on file   Occupational History     Employer: RETIRED   Tobacco Use   • Smoking status: Never Smoker   • Smokeless tobacco: Never Used   Substance and Sexual Activity   • Alcohol use: Yes     Comment: 1-2X YEAR   • Drug use: No   • Sexual activity: Defer     Family History   Problem Relation Age of Onset   • Cancer Other    • Stroke Other    • Other Other         LUPUS ANTICOAGULANT   • Rheum arthritis Other    • Heart disease Other    • Heart attack Other    • Lupus Mother    • Stomach cancer Father    • Malig Hyperthermia Neg Hx    • Colon cancer Neg Hx    • Colon polyps Neg Hx               Review of Systems   Constitutional: Negative for activity change, appetite change, fatigue, fever and unexpected weight change.   HENT: Negative for congestion, mouth sores, nosebleeds, sore throat and voice  change.    Respiratory: Negative for cough, shortness of breath and wheezing.    Cardiovascular: Negative for chest pain, palpitations and leg swelling.   Gastrointestinal: Negative for abdominal distention, abdominal pain, blood in stool, constipation, diarrhea, nausea and vomiting.   Endocrine: Negative for cold intolerance and heat intolerance.   Genitourinary: Negative for difficulty urinating, dysuria, frequency and hematuria.   Musculoskeletal: Positive for arthralgias. Negative for back pain, joint swelling and myalgias.   Skin: Negative for rash.   Neurological: Negative for dizziness, syncope, weakness, light-headedness, numbness and headaches.   Hematological: Negative for adenopathy. Does not bruise/bleed easily.   Psychiatric/Behavioral: Negative for confusion and sleep disturbance. The patient is nervous/anxious.          Objective      Vitals:    05/22/19 1023   BP: 154/86   Pulse: 52   Resp: 18   Temp: 97.6 °F (36.4 °C)   SpO2: 97%      Current Status 5/22/2019   ECOG score 1   Pain 0/10    Physical Exam   Constitutional: She is oriented to person, place, and time. She appears well-developed and well-nourished.   Patient is seated in a wheelchair in the office today.   HENT:   Mouth/Throat: Oropharynx is clear and moist.   Eyes: Conjunctivae are normal.   Neck: No thyromegaly present.   Cardiovascular: Normal rate and regular rhythm. Exam reveals no gallop and no friction rub.   No murmur heard.  Pulmonary/Chest: Breath sounds normal. No respiratory distress.   Abdominal: Soft. Bowel sounds are normal. She exhibits no distension. There is no tenderness.   Musculoskeletal: She exhibits no edema.   Trace bilateral lower extremity edema, right slightly greater than left.  Healed surgical incision right knee.   Lymphadenopathy:        Head (right side): No submandibular adenopathy present.     She has no cervical adenopathy.     She has no axillary adenopathy.        Right: No inguinal and no  supraclavicular adenopathy present.        Left: No inguinal and no supraclavicular adenopathy present.   Neurological: She is alert and oriented to person, place, and time. She displays normal reflexes. No cranial nerve deficit. She exhibits normal muscle tone.   Skin: Skin is warm and dry. No rash noted.   Psychiatric: She has a normal mood and affect. Her behavior is normal.       RECENT LABS:  Hematology WBC   Date Value Ref Range Status   05/15/2019 9.90 3.40 - 10.80 10*3/mm3 Final   04/10/2018 7.3 4.5 - 11.0 10*3/uL Final     RBC   Date Value Ref Range Status   05/15/2019 4.74 3.77 - 5.28 10*6/mm3 Final   04/10/2018 4.69 4.0 - 6.0 10*6/uL Final     Hemoglobin   Date Value Ref Range Status   05/15/2019 12.6 12.0 - 15.9 g/dL Final   04/10/2018 10.5 (L) 12.0 - 16.0 g/dL Final     Hematocrit   Date Value Ref Range Status   05/15/2019 41.7 34.0 - 46.6 % Final   04/10/2018 36.2 33.0 - 51.0 % Final     Platelets   Date Value Ref Range Status   05/15/2019 354 140 - 450 10*3/mm3 Final   04/10/2018 498 (H) 150 - 450 10*3/uL Final        Lab Results   Component Value Date    GLUCOSE 174 (H) 04/14/2019    BUN 12 04/14/2019    CREATININE 0.65 04/14/2019    EGFRIFNONA 91 04/14/2019    EGFRIFAFRI 79 01/31/2018    BCR 18.5 04/14/2019    K 4.0 04/15/2019    CO2 31.2 (H) 04/14/2019    CALCIUM 9.4 04/14/2019    PROTENTOTREF 7.1 01/31/2018    ALBUMIN 4.20 02/06/2019    LABIL2 1.4 01/31/2018    AST 14 02/06/2019    ALT 11 02/06/2019     I did review records from hospitalization including discharge summary, operative note.  Reviewed progress note from infectious disease.  Reviewed laboratory studies.    Additional labs 5/15/2018: Iron 28, ferritin 128.2, iron saturation 7%, TIBC 395.      Assessment/Plan     1. Iron deficiency anemia:  · The patient has had a long-standing degree of anemia.  Anemia appears to have worsened following her hospitalization for bowel obstruction and sepsis in late 2017.  Previous hemoglobin in the 7-8  range during hospitalization in late 2017, subsequent increase into the 9-10 range in early 2018 with gradual decline into the 8-9 range.  · In 2018, the patient developed declining MCV which has gradually decreased to 63.5 2/6/19    · Patient had concurrent thrombocytosis with platelet count in the high 400-500,000 range, subsequently increased to 600,000 range. Related to underlying iron deficiency.  · Patient developed associated PICA symptom of ice craving.  · There was no visible evidence of GI blood loss however patient remains on chronic anticoagulation with Eliquis.  She had never undergone EGD or colonoscopy.  Stool cards negative for occult blood on 2/27/19.    · Anemia evaluation 2/6/19 at initial visit with hemoglobin 8.6, MCV 63.5, platelet count 694,000, iron 18, ferritin less than 5, iron saturation 3%, TIBC 584, B12 380, folate greater than 20, creatinine 0.74, ESR 30, REBECCA negative, haptoglobin 324, .  · In order to facilitate rapid correction of her iron deficiency for anticipated knee surgery, administered IV iron in the form of Injectafer 750mg on 2/12 and 2/19/19.  · EGD and colonoscopy 3/4/19 with no evidence of bleeding source, evidence of hiatal hernia, gastritis, benign colonic polyps.  · Pica symptoms resolved  · Labs on 2/27/2019 with ferritin 617, iron saturation 16%, hemoglobin 10.4  · Patient returns today with labs from 5/15/2019 showing hemoglobin of 12.6, MCV 88 with iron studies showing iron 28, ferritin 128.2, iron saturation 7%, TIBC 395.  We did discuss that we will need to monitor her iron status routinely and anticipate that she may require further IV iron treatments in the future.  I will see her back in 3 months with repeat labs to review performed a week before.  2. Thrombocytosis:  · As above, the patient had a platelet count in the high 400-500,000 range since late 2017.  This progressively increased to 809,000 on 2/12/19.  · Secondary to iron deficiency  anemia  · Platelet count improved following IV iron administration  · Platelet count 354,000 today  3. Left lower extremity DVT (popliteal/calf) with suspected pulmonary embolism:  · Patient was admitted in November/December 2017 with small bowel perforation requiring abdominal surgery and subsequent sepsis  · Lower extremity Doppler 12/1/17 with popliteal and calf DVT on the left  · V/Q scan 12/2/17 with intermediate probability for pulmonary embolism  · Patient was anticoagulated and has continued on treatment with Eliquis 5mg bid.  She is now over one year out from her thrombosis that was provoked.  · Patient reports that her daughter developed DVT and pulmonary embolism in her 30s and apparently has an IVC filter in place.  She is a poor historian, accuracy is unknown.  Unclear whether her daughter has been evaluated with hypercoagulable labs.  · Given the family history, we did proceed with hypercoagulable evaluation 2/6/19: Negative factor V Leiden mutation, negative prothrombin gene mutation, protein C activity 192%, protein S activity 127%, Antithrombin III greater than 140, lupus anticoagulant negative, anticardiolipin antibody panel negative, anti-beta-2 GP 1 antibody panel negative.  · Hypercoagulable evaluation was negative.  Given the provoked nature of her previous thrombosis, and having been anticoagulated for over one year, on 2/27/19, recommended that she discontinue anticoagulation.  We did discuss signs and symptoms of recurrent DVT and pulmonary embolism and the need to seek immediate medical attention if these occur.  · Right total knee arthroplasty 4/8/2019.  Patient has been continuing on Eliquis 5 mg twice daily which was initiated postoperatively.  She had a 4-week stay in rehab however is now back at home forming outpatient physical therapy.  She continues to improve.  Her functional status has improved significantly.  Therefore when she returns to see orthopedics in 3 weeks I would be  comfortable at that time discontinuing her Eliquis if they agree as well.  She will then remain off of anticoagulation.     Plan:  1. Continue Eliquis 5 mg twice daily.  When the patient follows up with orthopedics in 3 weeks, she is fully ambulatory she may be able to discontinue Eliquis.  2. MD visit in 3 months.  1 week prior we will draw labs with CBC, iron panel, ferritin.

## 2019-05-30 RX ORDER — PANTOPRAZOLE SODIUM 40 MG/1
TABLET, DELAYED RELEASE ORAL
Qty: 30 TABLET | Refills: 5 | Status: SHIPPED | OUTPATIENT
Start: 2019-05-30 | End: 2019-12-16 | Stop reason: SDUPTHER

## 2019-06-07 ENCOUNTER — OFFICE VISIT (OUTPATIENT)
Dept: FAMILY MEDICINE CLINIC | Facility: CLINIC | Age: 69
End: 2019-06-07

## 2019-06-07 VITALS
DIASTOLIC BLOOD PRESSURE: 80 MMHG | TEMPERATURE: 97.6 F | BODY MASS INDEX: 43.79 KG/M2 | SYSTOLIC BLOOD PRESSURE: 124 MMHG | WEIGHT: 238 LBS | OXYGEN SATURATION: 76 % | HEIGHT: 62 IN | HEART RATE: 77 BPM

## 2019-06-07 DIAGNOSIS — L91.8 SKIN TAG: Primary | ICD-10-CM

## 2019-06-07 PROCEDURE — 99213 OFFICE O/P EST LOW 20 MIN: CPT | Performed by: INTERNAL MEDICINE

## 2019-06-07 NOTE — PROGRESS NOTES
Subjective Chief complaint is a skin lesion  Mariangel Nguyen is a 68 y.o. female.     History of Present Illness   This is here today for complaints of a skin lesion.  She noticed this a few days ago.  She is concerned that it could be a skin cancer.  It is located in the left periclavicular area.  Past medical history is remarkable for congestive heart failure and DVT.  She is on Eliquis.  The following portions of the patient's history were reviewed and updated as appropriate: allergies, current medications, past family history, past medical history, past social history, past surgical history and problem list.    Review of Systems   Skin: Positive for skin lesions. Negative for color change.       Objective   Physical Exam   Skin:   There is an irritated fimbrillated skin tag on the left periclavicular area         Assessment/Plan   Mariangel was seen today for suspicious skin lesion.    Diagnoses and all orders for this visit:    Skin tag    Mariangel is here today for a skin lesion.  This appears to be a skin tag and is not likely malignant.

## 2019-06-20 ENCOUNTER — APPOINTMENT (OUTPATIENT)
Dept: CARDIOLOGY | Facility: HOSPITAL | Age: 69
End: 2019-06-20

## 2019-06-20 ENCOUNTER — APPOINTMENT (OUTPATIENT)
Dept: GENERAL RADIOLOGY | Facility: HOSPITAL | Age: 69
End: 2019-06-20

## 2019-06-20 ENCOUNTER — HOSPITAL ENCOUNTER (EMERGENCY)
Facility: HOSPITAL | Age: 69
Discharge: HOME | End: 2019-06-20
Attending: EMERGENCY MEDICINE | Admitting: EMERGENCY MEDICINE

## 2019-06-20 VITALS
DIASTOLIC BLOOD PRESSURE: 83 MMHG | HEART RATE: 70 BPM | RESPIRATION RATE: 16 BRPM | OXYGEN SATURATION: 94 % | TEMPERATURE: 98.5 F | SYSTOLIC BLOOD PRESSURE: 156 MMHG

## 2019-06-20 DIAGNOSIS — Z96.651 HISTORY OF TOTAL RIGHT KNEE REPLACEMENT: ICD-10-CM

## 2019-06-20 DIAGNOSIS — M25.461 PREPATELLAR EFFUSION OF RIGHT KNEE: Primary | ICD-10-CM

## 2019-06-20 PROCEDURE — 73562 X-RAY EXAM OF KNEE 3: CPT

## 2019-06-20 PROCEDURE — 93971 EXTREMITY STUDY: CPT

## 2019-06-20 PROCEDURE — 99284 EMERGENCY DEPT VISIT MOD MDM: CPT

## 2019-06-20 RX ORDER — TRAMADOL HYDROCHLORIDE 50 MG/1
50 TABLET ORAL EVERY 6 HOURS PRN
Qty: 12 TABLET | Refills: 0 | Status: SHIPPED | OUTPATIENT
Start: 2019-06-20 | End: 2020-10-09

## 2019-06-20 RX ORDER — TRAMADOL HYDROCHLORIDE 50 MG/1
50 TABLET ORAL ONCE
Status: COMPLETED | OUTPATIENT
Start: 2019-06-20 | End: 2019-06-20

## 2019-06-20 RX ADMIN — TRAMADOL HYDROCHLORIDE 50 MG: 50 TABLET, FILM COATED ORAL at 22:06

## 2019-06-21 LAB
BH CV LOW VAS RIGHT SAPHENOFEMORAL JUNCTION SPONT: 1
BH CV LOWER VASCULAR LEFT COMMON FEMORAL AUGMENT: NORMAL
BH CV LOWER VASCULAR LEFT COMMON FEMORAL COMPETENT: NORMAL
BH CV LOWER VASCULAR LEFT COMMON FEMORAL COMPRESS: NORMAL
BH CV LOWER VASCULAR LEFT COMMON FEMORAL PHASIC: NORMAL
BH CV LOWER VASCULAR LEFT COMMON FEMORAL SPONT: NORMAL
BH CV LOWER VASCULAR RIGHT COMMON FEMORAL AUGMENT: NORMAL
BH CV LOWER VASCULAR RIGHT COMMON FEMORAL COMPETENT: NORMAL
BH CV LOWER VASCULAR RIGHT COMMON FEMORAL COMPRESS: NORMAL
BH CV LOWER VASCULAR RIGHT COMMON FEMORAL PHASIC: NORMAL
BH CV LOWER VASCULAR RIGHT COMMON FEMORAL SPONT: NORMAL
BH CV LOWER VASCULAR RIGHT DISTAL FEMORAL COMPRESS: NORMAL
BH CV LOWER VASCULAR RIGHT GASTRONEMIUS COMPRESS: NORMAL
BH CV LOWER VASCULAR RIGHT GREATER SAPH AK COMPRESS: NORMAL
BH CV LOWER VASCULAR RIGHT GREATER SAPH BK COMPRESS: NORMAL
BH CV LOWER VASCULAR RIGHT MID FEMORAL AUGMENT: NORMAL
BH CV LOWER VASCULAR RIGHT MID FEMORAL COMPETENT: NORMAL
BH CV LOWER VASCULAR RIGHT MID FEMORAL COMPRESS: NORMAL
BH CV LOWER VASCULAR RIGHT MID FEMORAL PHASIC: NORMAL
BH CV LOWER VASCULAR RIGHT MID FEMORAL SPONT: NORMAL
BH CV LOWER VASCULAR RIGHT PERONEAL COMPRESS: NORMAL
BH CV LOWER VASCULAR RIGHT POPLITEAL AUGMENT: NORMAL
BH CV LOWER VASCULAR RIGHT POPLITEAL COMPETENT: NORMAL
BH CV LOWER VASCULAR RIGHT POPLITEAL COMPRESS: NORMAL
BH CV LOWER VASCULAR RIGHT POPLITEAL PHASIC: NORMAL
BH CV LOWER VASCULAR RIGHT POPLITEAL SPONT: NORMAL
BH CV LOWER VASCULAR RIGHT POSTERIOR TIBIAL COMPRESS: NORMAL
BH CV LOWER VASCULAR RIGHT PROXIMAL FEMORAL COMPRESS: NORMAL
BH CV LOWER VASCULAR RIGHT SAPHENOFEMORAL JUNCTION AUGMENT: NORMAL
BH CV LOWER VASCULAR RIGHT SAPHENOFEMORAL JUNCTION COMPETENT: NORMAL
BH CV LOWER VASCULAR RIGHT SAPHENOFEMORAL JUNCTION COMPRESS: NORMAL
BH CV LOWER VASCULAR RIGHT SAPHENOFEMORAL JUNCTION PHASIC: NORMAL
BH CV LOWER VASCULAR RIGHT SAPHENOFEMORAL JUNCTION SPONT: NORMAL
BH CV LOWER VASCULAR RIGHT VARICOSITY BK COMPRESS: NORMAL

## 2019-07-05 RX ORDER — POTASSIUM CHLORIDE 750 MG/1
CAPSULE, EXTENDED RELEASE ORAL
Qty: 60 CAPSULE | Refills: 2 | Status: SHIPPED | OUTPATIENT
Start: 2019-07-05 | End: 2019-10-11 | Stop reason: SDUPTHER

## 2019-07-31 ENCOUNTER — TELEPHONE (OUTPATIENT)
Dept: ONCOLOGY | Facility: CLINIC | Age: 69
End: 2019-07-31

## 2019-07-31 ENCOUNTER — APPOINTMENT (OUTPATIENT)
Dept: LAB | Facility: HOSPITAL | Age: 69
End: 2019-07-31

## 2019-07-31 NOTE — TELEPHONE ENCOUNTER
Spoke with Edita, daughter and let her know to have labs done at [facility]. Appointment time confirmed. She v/u.     ----- Message from Raheem Leone Jr., MD sent at 7/31/2019  3:18 PM EDT -----  Why does she need to go to [facility]? We can draw routine labs here at visit. We need to see how functional she is before deciding whether to continue eliquis which we will do at the visit.  ----- Message -----  From: Vonda Damian RN  Sent: 7/31/2019   3:01 PM  To: Raheem Leone Jr., MD    I apologize-she is continuing on the Eliquis-graduated from PT today.  Could I get an order for her to go to [facility] x 1 visit due to ride issues on 8/1? Should she continue her Eliquis until she sees you on 8/6?  Vonda Choi  ----- Message -----  From: Raheem Leone Jr., MD  Sent: 7/31/2019   2:41 PM  To: Vonda Damian RN    I was not aware she was on coumadin- she was on eliquis which I thought was going to be discontinued by ortho 3 weeks after last visit here. Need to find out more info  ----- Message -----  From: Vonda Damian RN  Sent: 7/31/2019   2:14 PM  To: Raheem Leone Jr., MD    Hi Dr. Leone,    Patient was inquiring whether she can have her INR levels drawn at [facility] in [place] where she lives and have results faxed to us. Is that ok?    Thanks!

## 2019-07-31 NOTE — TELEPHONE ENCOUNTER
Spoke with patient's daughter on phone re: having INR levels drawn at [facility] in [place]. Told her I would have to talk with Dr. Leone first and I would let her know.      ----- Message from Janine Bernard sent at 7/31/2019  2:06 PM EDT -----  Contact: [phone]  Pt's daughter is calling about her having her labs done closer to home at [facility].  Edita

## 2019-08-01 ENCOUNTER — LAB (OUTPATIENT)
Dept: LAB | Facility: HOSPITAL | Age: 69
End: 2019-08-01

## 2019-08-01 DIAGNOSIS — D75.839 THROMBOCYTOSIS: ICD-10-CM

## 2019-08-01 DIAGNOSIS — D50.8 OTHER IRON DEFICIENCY ANEMIA: ICD-10-CM

## 2019-08-01 DIAGNOSIS — I82.432 ACUTE DEEP VEIN THROMBOSIS (DVT) OF POPLITEAL VEIN OF LEFT LOWER EXTREMITY (HCC): ICD-10-CM

## 2019-08-01 LAB
BASOPHILS # BLD AUTO: 0.05 10*3/MM3 (ref 0–0.2)
BASOPHILS NFR BLD AUTO: 0.6 % (ref 0–1.5)
DEPRECATED RDW RBC AUTO: 52.3 FL (ref 37–54)
EOSINOPHIL # BLD AUTO: 0.36 10*3/MM3 (ref 0–0.4)
EOSINOPHIL NFR BLD AUTO: 4.4 % (ref 0.3–6.2)
ERYTHROCYTE [DISTWIDTH] IN BLOOD BY AUTOMATED COUNT: 16 % (ref 12.3–15.4)
FERRITIN SERPL-MCNC: 151.9 NG/ML (ref 13–150)
HCT VFR BLD AUTO: 40.5 % (ref 34–46.6)
HGB BLD-MCNC: 12 G/DL (ref 12–15.9)
IMM GRANULOCYTES # BLD AUTO: 0.06 10*3/MM3 (ref 0–0.05)
IMM GRANULOCYTES NFR BLD AUTO: 0.7 % (ref 0–0.5)
IRON 24H UR-MRATE: 49 MCG/DL (ref 37–145)
IRON SATN MFR SERPL: 13 % (ref 14–48)
LYMPHOCYTES # BLD AUTO: 1.43 10*3/MM3 (ref 0.7–3.1)
LYMPHOCYTES NFR BLD AUTO: 17.4 % (ref 19.6–45.3)
MCH RBC QN AUTO: 26.5 PG (ref 26.6–33)
MCHC RBC AUTO-ENTMCNC: 29.6 G/DL (ref 31.5–35.7)
MCV RBC AUTO: 89.6 FL (ref 79–97)
MONOCYTES # BLD AUTO: 0.73 10*3/MM3 (ref 0.1–0.9)
MONOCYTES NFR BLD AUTO: 8.9 % (ref 5–12)
NEUTROPHILS # BLD AUTO: 5.6 10*3/MM3 (ref 1.7–7)
NEUTROPHILS NFR BLD AUTO: 68 % (ref 42.7–76)
NRBC BLD AUTO-RTO: 0 /100 WBC (ref 0–0.2)
PLATELET # BLD AUTO: 363 10*3/MM3 (ref 140–450)
PMV BLD AUTO: 9.2 FL (ref 6–12)
RBC # BLD AUTO: 4.52 10*6/MM3 (ref 3.77–5.28)
TIBC SERPL-MCNC: 377 MCG/DL (ref 249–505)
TRANSFERRIN SERPL-MCNC: 269 MG/DL (ref 200–360)
WBC NRBC COR # BLD: 8.23 10*3/MM3 (ref 3.4–10.8)

## 2019-08-01 PROCEDURE — 83540 ASSAY OF IRON: CPT | Performed by: INTERNAL MEDICINE

## 2019-08-01 PROCEDURE — 84466 ASSAY OF TRANSFERRIN: CPT | Performed by: INTERNAL MEDICINE

## 2019-08-01 PROCEDURE — 82728 ASSAY OF FERRITIN: CPT | Performed by: INTERNAL MEDICINE

## 2019-08-01 PROCEDURE — 85025 COMPLETE CBC W/AUTO DIFF WBC: CPT | Performed by: INTERNAL MEDICINE

## 2019-08-01 PROCEDURE — 36415 COLL VENOUS BLD VENIPUNCTURE: CPT | Performed by: INTERNAL MEDICINE

## 2019-08-06 ENCOUNTER — OFFICE VISIT (OUTPATIENT)
Dept: ONCOLOGY | Facility: CLINIC | Age: 69
End: 2019-08-06

## 2019-08-06 ENCOUNTER — APPOINTMENT (OUTPATIENT)
Dept: LAB | Facility: HOSPITAL | Age: 69
End: 2019-08-06

## 2019-08-06 VITALS
HEIGHT: 62 IN | SYSTOLIC BLOOD PRESSURE: 165 MMHG | BODY MASS INDEX: 44.18 KG/M2 | OXYGEN SATURATION: 97 % | WEIGHT: 240.1 LBS | TEMPERATURE: 98.1 F | HEART RATE: 54 BPM | RESPIRATION RATE: 16 BRPM | DIASTOLIC BLOOD PRESSURE: 83 MMHG

## 2019-08-06 DIAGNOSIS — D50.8 OTHER IRON DEFICIENCY ANEMIA: ICD-10-CM

## 2019-08-06 DIAGNOSIS — I82.432 ACUTE DEEP VEIN THROMBOSIS (DVT) OF POPLITEAL VEIN OF LEFT LOWER EXTREMITY (HCC): Primary | ICD-10-CM

## 2019-08-06 DIAGNOSIS — Z96.651 HISTORY OF TOTAL RIGHT KNEE REPLACEMENT: ICD-10-CM

## 2019-08-06 PROCEDURE — 99214 OFFICE O/P EST MOD 30 MIN: CPT | Performed by: INTERNAL MEDICINE

## 2019-08-06 PROCEDURE — G0463 HOSPITAL OUTPT CLINIC VISIT: HCPCS | Performed by: INTERNAL MEDICINE

## 2019-08-06 NOTE — PROGRESS NOTES
Subjective .     REASONS FOR FOLLOWUP:    1. Iron deficiency anemia:   · Long-standing chronic anemia with worsening in 2017 along with declining MCV.    · Associated thrombocytosis and pica symptoms  · Iron deficient 2/6/19 with ferritin less than 5, iron saturation 3%, hemoglobin 8.4, MCV 63.5.  · Received Injectafer 750mg IV 2/12 and 2/19/19  · Stool cards negative for occult blood x3 on 2/27/19  · EGD/colonoscopy 3/4/19 with hiatal hernia, gastritis, benign colonic polyps, no bleeding source identified.  2. Left lower extremity DVT (popliteal/calf) with suspected pulmonary embolism:  · Identified 12/1/17 during hospitalization for small bowel perforation requiring abdominal surgery with subsequent sepsis (provoked).  · Left lower extremity popliteal/calf DVT 12/1/17  · V/Q scan 12/2/17 with intermediate probability for pulmonary embolism  · Continuation of anticoagulation with Eliquis 5 mg twice daily  · Hypercoagulable evaluation 12/6/19 negative  · Due to provoked nature of thrombosis, anticoagulation discontinued 12/27/19  · Anticoagulation Eliquis 5 mg twice daily following right total knee arthroplasty 4/8/19 until fully ambulatory.  Patient with prolonged recovery from surgery, became fully ambulatory and Eliquis discontinued 8/6/2019.    HISTORY OF PRESENT ILLNESS:  The patient is a 68 y.o. year old female who is here for follow-up with the above-mentioned history.    History of Present Illness patient returns today in follow-up with labs to review.  In the interval, she did develop some pain and swelling in the right leg and underwent a Doppler on 6/20/2019 showing some superficial valvular incompetence but no evidence of DVT.  She has continued on Eliquis 5 mg twice daily.  She has had a slow recovery from her knee replacement.  Only in the past few weeks has her pain improved significantly and was she able to begin walking at home without the assistance of a walker.  She is still using a walker when  ambulating outside of the house and is in a wheelchair today as it was a long distance from the parking lot.  She has been using CPAP at home for sleep apnea and reports that this is going well.  She has no other significant complaints today.    Past Medical History:   Diagnosis Date   • Arthritis     OSTEOARTHRITIS KNEES   • CHF (congestive heart failure) (CMS/HCC)    • Colon polyp    • COPD (chronic obstructive pulmonary disease) (CMS/HCC)    • Diabetes mellitus (CMS/HCC)    • Disease of thyroid gland    • H/O Abnormal Pap smear of cervix    • History of DVT (deep vein thrombosis)    • History of kidney stones    • History of pulmonary embolus (PE)    • History of sepsis    • History of small bowel obstruction    • History of snoring    • Hyperlipidemia    • Hypertension    • Knee pain, bilateral    • Neuropathy    • On home oxygen therapy     2L [place]   • Pulmonary hypertension (CMS/HCC)    • Sleep apnea      Past Surgical History:   Procedure Laterality Date   • ABDOMINAL SURGERY  11/2017    Delayed closure of abdominal wound-wound vac placement, Dr. Nestor Howe   • BLADDER SURGERY      BLADDER LIFT, 2011   • CARDIAC CATHETERIZATION  2017    Normal coronary arteries, normal overall LVSF with wall motion abnormality   • COLON SURGERY      RECONSTRUCTION, 2001   • COLONOSCOPY N/A 3/4/2019    Procedure: COLONOSCOPY polypectomy;  Surgeon: Nai Luong MD;  Location: [facility] OR;  Service: Gastroenterology   • CYSTOSCOPY URETEROSCOPY LASER LITHOTRIPSY Right 10/23/2017    Procedure: CYSTO RT URETEROSCOPY LASER  STONE BASKETING RIGHT STENT EXCHANGE;  Surgeon: Quinn Cason MD;  Location: [facility] OR;  Service:    • ENDOSCOPY N/A 3/4/2019    Procedure: ESOPHAGOGASTRODUODENOSCOPY WITH BIOPSIES;  Surgeon: Nai Luong MD;  Location: [facility] OR;  Service: Gastroenterology   • EXPLORATORY LAPAROTOMY     • HYSTERECTOMY      2001   • ROTATOR CUFF REPAIR Right 2016   • STEROID INJECTION KNEE      Had left knee  injection 10/09/2018; right knee injection 10/02/2017   • TOTAL KNEE ARTHROPLASTY Right 4/8/2019    Procedure: RIGHT TOTAL KNEE ARTHROPLASTY;  Surgeon: Farzad Luong MD;  Location: [facility];  Service: Orthopedics         Current Outpatient Medications on File Prior to Visit   Medication Sig Dispense Refill   • acetaminophen (TYLENOL) 325 MG tablet Take 2 tablets by mouth Every 4 (Four) Hours As Needed for Moderate Pain  or Fever (greater than 101 F). 120 tablet 0   • albuterol (PROVENTIL) (2.5 MG/3ML) 0.083% nebulizer solution Take 2.5 mg by nebulization Every 4 (Four) Hours As Needed for Wheezing.     • Apixaban (ELIQUIS PO) Take 5 mg by mouth 2 (Two) Times a Day. Pt unsure of dose.      • bisoprolol (ZEBeta) 2.5 MG half tablet Take 1 half tablet by mouth Every Evening.     • bumetanide (BUMEX) 2 MG tablet Take 1 tablet by mouth Daily. 30 tablet 5   • dextrose (GLUTOSE) 40 % gel Take 15 g by mouth Every 15 (Fifteen) Minutes As Needed for Low Blood Sugar (Blood sugar less than 70). 30 each 0   • escitalopram (LEXAPRO) 10 MG tablet Take 1.5 tablets by mouth Daily. 45 tablet 5   • HYDROcodone-acetaminophen (NORCO) 5-325 MG per tablet Take 1 tablet by mouth Every 6 (Six) Hours As Needed.     • insulin lispro (humaLOG) 100 UNIT/ML injection Inject 0-9 Units under the skin into the appropriate area as directed 4 (Four) Times a Day With Meals & at Bedtime. 1 each 12   • ipratropium-albuterol (DUO-NEB) 0.5-2.5 mg/3 ml nebulizer Take 3 mL by nebulization 3 (Three) Times a Day. 360 mL 0   • levothyroxine (SYNTHROID, LEVOTHROID) 25 MCG tablet Take 25 mcg by mouth Daily.     • metFORMIN (GLUCOPHAGE) 1000 MG tablet Take 1,000 mg by mouth 2 (Two) Times a Day With Meals.     • naproxen (NAPROSYN) 500 MG tablet Take 1 tablet by mouth 2 (Two) Times a Day With Meals. 60 tablet 0   • nystatin (MYCOSTATIN) 551886 UNIT/GM powder Apply  topically to the appropriate area as directed Every 12 (Twelve) Hours. 1 each 0   •  ondansetron (ZOFRAN) 4 MG tablet Take 1 tablet by mouth Every 6 (Six) Hours As Needed for Nausea or Vomiting. 60 tablet 0   • ONE TOUCH ULTRA TEST test strip      • ONETOUCH DELICA LANCETS 33G misc      • pantoprazole (PROTONIX) 40 MG EC tablet TAKE 1 TABLET BY MOUTH ONCE DAILY 30 tablet 5   • potassium chloride (MICRO-K) 10 MEQ CR capsule TAKE 2 CAPSULES BY MOUTH ONCE DAILY 60 capsule 2   • pravastatin (PRAVACHOL) 40 MG tablet Take 40 mg by mouth Every Night.     • sennosides-docusate sodium (SENOKOT-S) 8.6-50 MG tablet Take 2 tablets by mouth 2 (Two) Times a Day. 60 tablet 0   • sildenafil (REVATIO) 20 MG tablet Take 20 mg by mouth 3 (Three) Times a Day.     • traMADol (ULTRAM) 50 MG tablet Take 1 tablet by mouth Every 6 (Six) Hours As Needed for Moderate Pain . 12 tablet 0   • vitamin D (ERGOCALCIFEROL) 39973 units capsule capsule Take 1 capsule by mouth 1 (One) Time Per Week. 13 capsule 1     No current facility-administered medications on file prior to visit.        ALLERGIES:   No Known Allergies    Social History     Socioeconomic History   • Marital status: Single     Spouse name: Not on file   • Number of children: Not on file   • Years of education: High school   • Highest education level: Not on file   Occupational History     Employer: RETIRED   Tobacco Use   • Smoking status: Never Smoker   • Smokeless tobacco: Never Used   Substance and Sexual Activity   • Alcohol use: Yes     Comment: 1-2X YEAR   • Drug use: No   • Sexual activity: Defer     Family History   Problem Relation Age of Onset   • Cancer Other    • Stroke Other    • Other Other         LUPUS ANTICOAGULANT   • Rheum arthritis Other    • Heart disease Other    • Heart attack Other    • Lupus Mother    • Stomach cancer Father    • Malig Hyperthermia Neg Hx    • Colon cancer Neg Hx    • Colon polyps Neg Hx               Review of Systems   Constitutional: Positive for activity change. Negative for appetite change, fatigue, fever and unexpected  weight change.   HENT: Negative for congestion, mouth sores, nosebleeds, sore throat and voice change.    Respiratory: Negative for cough, shortness of breath and wheezing.    Cardiovascular: Negative for chest pain, palpitations and leg swelling.   Gastrointestinal: Negative for abdominal distention, abdominal pain, blood in stool, constipation, diarrhea, nausea and vomiting.   Endocrine: Negative for cold intolerance and heat intolerance.   Genitourinary: Negative for difficulty urinating, dysuria, frequency and hematuria.   Musculoskeletal: Positive for arthralgias. Negative for back pain, joint swelling and myalgias.   Skin: Negative for rash.   Neurological: Positive for weakness. Negative for dizziness, syncope, light-headedness, numbness and headaches.   Hematological: Negative for adenopathy. Does not bruise/bleed easily.   Psychiatric/Behavioral: Negative for confusion and sleep disturbance. The patient is nervous/anxious.          Objective      Vitals:    08/06/19 0758   BP: 165/83   Pulse: 54   Resp: 16   Temp: 98.1 °F (36.7 °C)   SpO2: 97%      Current Status 8/6/2019   ECOG score 2   Pain 0/10    Physical Exam   Constitutional: She is oriented to person, place, and time. She appears well-developed and well-nourished.   Patient is seated in a wheelchair in the office today.   HENT:   Mouth/Throat: Oropharynx is clear and moist.   Eyes: Conjunctivae are normal.   Neck: No thyromegaly present.   Cardiovascular: Normal rate and regular rhythm. Exam reveals no gallop and no friction rub.   No murmur heard.  Pulmonary/Chest: No respiratory distress. She has wheezes.   A few scattered bilateral wheezes   Abdominal: Soft. Bowel sounds are normal. She exhibits no distension. There is no tenderness.   Musculoskeletal: She exhibits no edema.   Trace bilateral lower extremity edema, right slightly greater than left.  Healed surgical incision right knee.   Lymphadenopathy:        Head (right side): No submandibular  adenopathy present.     She has no cervical adenopathy.     She has no axillary adenopathy.        Right: No inguinal and no supraclavicular adenopathy present.        Left: No inguinal and no supraclavicular adenopathy present.   Neurological: She is alert and oriented to person, place, and time. She displays normal reflexes. No cranial nerve deficit. She exhibits normal muscle tone.   Skin: Skin is warm and dry. No rash noted.   Psychiatric: She has a normal mood and affect. Her behavior is normal.       RECENT LABS:  Hematology WBC   Date Value Ref Range Status   08/01/2019 8.23 3.40 - 10.80 10*3/mm3 Final   06/18/2019 10.0 4.5 - 11.0 10*3/uL Final     RBC   Date Value Ref Range Status   08/01/2019 4.52 3.77 - 5.28 10*6/mm3 Final   06/18/2019 5.09 4.0 - 6.0 10*6/uL Final   04/10/2018 4.69 4.0 - 6.0 10*6/uL Final     Hemoglobin   Date Value Ref Range Status   08/01/2019 12.0 12.0 - 15.9 g/dL Final   06/18/2019 13.5 12.0 - 16.0 g/dL Final     Hematocrit   Date Value Ref Range Status   08/01/2019 40.5 34.0 - 46.6 % Final   06/18/2019 43.1 33.0 - 51.0 % Final     Platelets   Date Value Ref Range Status   08/01/2019 363 140 - 450 10*3/mm3 Final   06/18/2019 463 (H) 150 - 450 10*3/uL Final        Lab Results   Component Value Date    GLUCOSE 174 (H) 04/14/2019    BUN 19 06/18/2019    CREATININE 0.9 06/18/2019    EGFRIFNONA 91 04/14/2019    EGFRIFAFRI 79 01/31/2018    BCR 22.0 (H) 06/18/2019    K 4.5 06/18/2019    CO2 28 06/18/2019    CALCIUM 10.0 06/18/2019    PROTENTOTREF 7.1 01/31/2018    ALBUMIN 4.2 06/18/2019    LABIL2 1.1 06/18/2019    AST 18 06/18/2019    ALT 10 (L) 06/18/2019     Labs 8/1/2019: Iron 49, ferritin 151.9, iron saturation 13%, TIBC 377    Reviewed results from bilateral lower extremity Doppler 6/20/2019 showing superficial venous valvular incompetence on the right, no DVT    Assessment/Plan     1. Iron deficiency anemia:  · The patient has had a long-standing degree of anemia.  Anemia appears to  have worsened following her hospitalization for bowel obstruction and sepsis in late 2017.  Previous hemoglobin in the 7-8 range during hospitalization in late 2017, subsequent increase into the 9-10 range in early 2018 with gradual decline into the 8-9 range.  · In 2018, the patient developed declining MCV which has gradually decreased to 63.5 2/6/19    · Patient had concurrent thrombocytosis with platelet count in the high 400-500,000 range, subsequently increased to 600,000 range. Related to underlying iron deficiency.  · Patient developed associated PICA symptom of ice craving.  · There was no visible evidence of GI blood loss however patient remains on chronic anticoagulation with Eliquis.  She had never undergone EGD or colonoscopy.  Stool cards negative for occult blood on 2/27/19.    · Anemia evaluation 2/6/19 at initial visit with hemoglobin 8.6, MCV 63.5, platelet count 694,000, iron 18, ferritin less than 5, iron saturation 3%, TIBC 584, B12 380, folate greater than 20, creatinine 0.74, ESR 30, REBECCA negative, haptoglobin 324, .  · In order to facilitate rapid correction of her iron deficiency for anticipated knee surgery, administered IV iron in the form of Injectafer 750mg on 2/12 and 2/19/19.  · EGD and colonoscopy 3/4/19 with no evidence of bleeding source, evidence of hiatal hernia, gastritis, benign colonic polyps.  · Pica symptoms resolved  · Labs on 2/27/2019 with ferritin 617, iron saturation 16%, hemoglobin 10.4  · Patient returns today with labs from 8/1/2019 showing a hemoglobin of 12, iron 49, ferritin 151.9, iron saturation 13%, TIBC 377.  Iron studies are currently adequate.  We discussed continued monitoring of her iron status as it is unclear whether she may develop recurrent iron deficiency in the future.  I have asked her to return in 4 months with repeat labs to review.  2. Thrombocytosis:  · As above, the patient had a platelet count in the high 400-500,000 range since late 2017.   This progressively increased to 809,000 on 2/12/19.  · Secondary to iron deficiency anemia  · Platelet count improved following IV iron administration  · Platelet count normal today at 363,000  3. Left lower extremity DVT (popliteal/calf) with suspected pulmonary embolism:  · Patient was admitted in November/December 2017 with small bowel perforation requiring abdominal surgery and subsequent sepsis  · Lower extremity Doppler 12/1/17 with popliteal and calf DVT on the left  · V/Q scan 12/2/17 with intermediate probability for pulmonary embolism  · Patient was anticoagulated and has continued on treatment with Eliquis 5mg bid.  She is now over one year out from her thrombosis that was provoked.  · Patient reports that her daughter developed DVT and pulmonary embolism in her 30s and apparently has an IVC filter in place.  She is a poor historian, accuracy is unknown.  Unclear whether her daughter has been evaluated with hypercoagulable labs.  · Given the family history, we did proceed with hypercoagulable evaluation 2/6/19: Negative factor V Leiden mutation, negative prothrombin gene mutation, protein C activity 192%, protein S activity 127%, Antithrombin III greater than 140, lupus anticoagulant negative, anticardiolipin antibody panel negative, anti-beta-2 GP 1 antibody panel negative.  · Hypercoagulable evaluation was negative.  Given the provoked nature of her previous thrombosis, and having been anticoagulated for over one year, on 2/27/19, recommended that she discontinue anticoagulation.  We did discuss signs and symptoms of recurrent DVT and pulmonary embolism and the need to seek immediate medical attention if these occur.  · Right total knee arthroplasty 4/8/2019.  Patient has been continuing on Eliquis 5 mg twice daily which was initiated postoperatively.  It was anticipated that she would only require brief treatment with Eliquis however her recovery from her knee replacement has been somewhat prolonged.   She has been working with physical therapy but has now been discharged from physical therapy.  She did develop some pain and swelling in the right knee and underwent lower extremity Doppler on 6/20/2019 which was negative for DVT, showed some valvular incompetence.  Only in the past few weeks has her knee pain improved.  She has started walking at home without the assistance of a walker but does use a walker outside of the home.  Today she is in a wheelchair however she uses that only for long distances.  At this point, she is mobile enough that I believe we can safely discontinue Eliquis.  She is aware of signs and symptoms of recurrent thrombosis and the need to seek immediate medical attention if the symptoms arise.     Plan:  1. Discontinue Eliquis  2. MD visit in 4 months.  1 week prior we will draw labs with CBC, iron panel, ferritin.

## 2019-09-04 RX ORDER — ESCITALOPRAM OXALATE 10 MG/1
TABLET ORAL
Qty: 30 TABLET | Refills: 5 | Status: SHIPPED | OUTPATIENT
Start: 2019-09-04 | End: 2020-03-31

## 2019-10-08 RX ORDER — POTASSIUM CHLORIDE 750 MG/1
CAPSULE, EXTENDED RELEASE ORAL
Qty: 60 CAPSULE | Refills: 2 | OUTPATIENT
Start: 2019-10-08

## 2019-10-08 RX ORDER — BISOPROLOL FUMARATE 5 MG/1
TABLET, FILM COATED ORAL
Qty: 30 TABLET | Refills: 5 | OUTPATIENT
Start: 2019-10-08

## 2019-10-09 RX ORDER — BISOPROLOL FUMARATE 5 MG/1
2.5 TABLET, FILM COATED ORAL DAILY
Qty: 45 TABLET | Refills: 1 | Status: SHIPPED | OUTPATIENT
Start: 2019-10-09 | End: 2020-04-20

## 2019-10-11 RX ORDER — POTASSIUM CHLORIDE 750 MG/1
20 CAPSULE, EXTENDED RELEASE ORAL DAILY
Qty: 60 CAPSULE | Refills: 2 | Status: SHIPPED | OUTPATIENT
Start: 2019-10-11 | End: 2020-01-20 | Stop reason: SDUPTHER

## 2019-10-11 RX ORDER — POTASSIUM CHLORIDE 750 MG/1
CAPSULE, EXTENDED RELEASE ORAL
Qty: 60 CAPSULE | Refills: 2 | Status: CANCELLED | OUTPATIENT
Start: 2019-10-11

## 2019-11-06 ENCOUNTER — TELEPHONE (OUTPATIENT)
Dept: FAMILY MEDICINE CLINIC | Facility: CLINIC | Age: 69
End: 2019-11-06

## 2019-11-06 NOTE — TELEPHONE ENCOUNTER
----- Message from Ritu Weems sent at 11/6/2019  3:28 PM EST -----  PT HAS A COUGH AND IS LOSING HER VOICE, WOULD LIKE TO KNOW IF YOU COULD CALL SOMETHING IN FOR HER TO HER PHARMACY.    [id number] - [place], [place] - [address]  AT [facility] [address] - [phone] PH - [phone] FX    [phone]    Patient has a cough and some laryngitis.  This is been going on for couple of days.  She has not really tried taking anything for.  She is not having fever or chills and is not short of breath.  I did advise Mucinex DM.

## 2019-11-19 ENCOUNTER — APPOINTMENT (OUTPATIENT)
Dept: LAB | Facility: HOSPITAL | Age: 69
End: 2019-11-19

## 2019-11-26 ENCOUNTER — OFFICE VISIT (OUTPATIENT)
Dept: LAB | Facility: HOSPITAL | Age: 69
End: 2019-11-26

## 2019-11-26 ENCOUNTER — OFFICE VISIT (OUTPATIENT)
Dept: ONCOLOGY | Facility: CLINIC | Age: 69
End: 2019-11-26

## 2019-11-26 VITALS
RESPIRATION RATE: 18 BRPM | HEART RATE: 108 BPM | WEIGHT: 243.7 LBS | BODY MASS INDEX: 44.85 KG/M2 | TEMPERATURE: 97.5 F | DIASTOLIC BLOOD PRESSURE: 97 MMHG | HEIGHT: 62 IN | SYSTOLIC BLOOD PRESSURE: 167 MMHG | OXYGEN SATURATION: 95 %

## 2019-11-26 DIAGNOSIS — Z96.651 HISTORY OF TOTAL RIGHT KNEE REPLACEMENT: ICD-10-CM

## 2019-11-26 DIAGNOSIS — I82.432 ACUTE DEEP VEIN THROMBOSIS (DVT) OF POPLITEAL VEIN OF LEFT LOWER EXTREMITY (HCC): ICD-10-CM

## 2019-11-26 DIAGNOSIS — D50.8 OTHER IRON DEFICIENCY ANEMIA: ICD-10-CM

## 2019-11-26 DIAGNOSIS — D50.8 OTHER IRON DEFICIENCY ANEMIA: Primary | ICD-10-CM

## 2019-11-26 LAB
BASOPHILS # BLD AUTO: 0.07 10*3/MM3 (ref 0–0.2)
BASOPHILS NFR BLD AUTO: 0.8 % (ref 0–1.5)
DEPRECATED RDW RBC AUTO: 47.7 FL (ref 37–54)
EOSINOPHIL # BLD AUTO: 0.34 10*3/MM3 (ref 0–0.4)
EOSINOPHIL NFR BLD AUTO: 3.9 % (ref 0.3–6.2)
ERYTHROCYTE [DISTWIDTH] IN BLOOD BY AUTOMATED COUNT: 14.9 % (ref 12.3–15.4)
FERRITIN SERPL-MCNC: 62.3 NG/ML (ref 13–150)
HCT VFR BLD AUTO: 42.5 % (ref 34–46.6)
HGB BLD-MCNC: 12.8 G/DL (ref 12–15.9)
IMM GRANULOCYTES # BLD AUTO: 0.06 10*3/MM3 (ref 0–0.05)
IMM GRANULOCYTES NFR BLD AUTO: 0.7 % (ref 0–0.5)
IRON 24H UR-MRATE: 29 MCG/DL (ref 37–145)
IRON SATN MFR SERPL: 7 % (ref 14–48)
LYMPHOCYTES # BLD AUTO: 1.24 10*3/MM3 (ref 0.7–3.1)
LYMPHOCYTES NFR BLD AUTO: 14.2 % (ref 19.6–45.3)
MCH RBC QN AUTO: 26.4 PG (ref 26.6–33)
MCHC RBC AUTO-ENTMCNC: 30.1 G/DL (ref 31.5–35.7)
MCV RBC AUTO: 87.6 FL (ref 79–97)
MONOCYTES # BLD AUTO: 0.65 10*3/MM3 (ref 0.1–0.9)
MONOCYTES NFR BLD AUTO: 7.4 % (ref 5–12)
NEUTROPHILS # BLD AUTO: 6.37 10*3/MM3 (ref 1.7–7)
NEUTROPHILS NFR BLD AUTO: 73 % (ref 42.7–76)
NRBC BLD AUTO-RTO: 0 /100 WBC (ref 0–0.2)
PLATELET # BLD AUTO: 368 10*3/MM3 (ref 140–450)
PMV BLD AUTO: 8.9 FL (ref 6–12)
RBC # BLD AUTO: 4.85 10*6/MM3 (ref 3.77–5.28)
TIBC SERPL-MCNC: 391 MCG/DL (ref 249–505)
TRANSFERRIN SERPL-MCNC: 279 MG/DL (ref 200–360)
WBC NRBC COR # BLD: 8.73 10*3/MM3 (ref 3.4–10.8)

## 2019-11-26 PROCEDURE — 85025 COMPLETE CBC W/AUTO DIFF WBC: CPT

## 2019-11-26 PROCEDURE — 99213 OFFICE O/P EST LOW 20 MIN: CPT | Performed by: INTERNAL MEDICINE

## 2019-11-26 PROCEDURE — 84466 ASSAY OF TRANSFERRIN: CPT

## 2019-11-26 PROCEDURE — 82728 ASSAY OF FERRITIN: CPT

## 2019-11-26 PROCEDURE — 83540 ASSAY OF IRON: CPT

## 2019-11-26 PROCEDURE — 36415 COLL VENOUS BLD VENIPUNCTURE: CPT

## 2019-11-26 NOTE — PROGRESS NOTES
Subjective .     REASONS FOR FOLLOWUP:    1. Iron deficiency anemia:   · Long-standing chronic anemia with worsening in 2017 along with declining MCV.    · Associated thrombocytosis and pica symptoms  · Iron deficient 2/6/19 with ferritin less than 5, iron saturation 3%, hemoglobin 8.4, MCV 63.5.  · Received Injectafer 750mg IV 2/12 and 2/19/19  · Stool cards negative for occult blood x3 on 2/27/19  · EGD/colonoscopy 3/4/19 with hiatal hernia, gastritis, benign colonic polyps, no bleeding source identified.  2. Left lower extremity DVT (popliteal/calf) with suspected pulmonary embolism:  · Identified 12/1/17 during hospitalization for small bowel perforation requiring abdominal surgery with subsequent sepsis (provoked).  · Left lower extremity popliteal/calf DVT 12/1/17  · V/Q scan 12/2/17 with intermediate probability for pulmonary embolism  · Continuation of anticoagulation with Eliquis 5 mg twice daily  · Hypercoagulable evaluation 12/6/19 negative  · Due to provoked nature of thrombosis, anticoagulation discontinued 12/27/19  · Anticoagulation Eliquis 5 mg twice daily following right total knee arthroplasty 4/8/19 until fully ambulatory.  Patient with prolonged recovery from surgery, became fully ambulatory and Eliquis discontinued 8/6/2019.    HISTORY OF PRESENT ILLNESS:  The patient is a 69 y.o. year old female who is here for follow-up with the above-mentioned history.    History of Present Illness patient returns today in follow-up with labs to review.  In the interval, she has continued to do reasonably well.  She does note continued osteoarthritic pain.  She has had a mild nonproductive cough and some sinus congestion.  She notes stable chronic trace bilateral lower extremity edema which is unchanged.  She has not experienced any obvious evidence of GI blood loss clinically.    Past Medical History:   Diagnosis Date   • Arthritis     OSTEOARTHRITIS KNEES   • CHF (congestive heart failure) (CMS/[facility])    •  Colon polyp    • COPD (chronic obstructive pulmonary disease) (CMS/HCC)    • Diabetes mellitus (CMS/HCC)    • Disease of thyroid gland    • H/O Abnormal Pap smear of cervix    • History of DVT (deep vein thrombosis)    • History of kidney stones    • History of pulmonary embolus (PE)    • History of sepsis    • History of small bowel obstruction    • History of snoring    • Hyperlipidemia    • Hypertension    • Knee pain, bilateral    • Neuropathy    • On home oxygen therapy     2L NC   • Pulmonary hypertension (CMS/HCC)    • Sleep apnea      Past Surgical History:   Procedure Laterality Date   • ABDOMINAL SURGERY  11/2017    Delayed closure of abdominal wound-wound vac placement, Dr. Nestor Howe   • BLADDER SURGERY      BLADDER LIFT, 2011   • CARDIAC CATHETERIZATION  2017    Normal coronary arteries, normal overall LVSF with wall motion abnormality   • COLON SURGERY      RECONSTRUCTION, 2001   • COLONOSCOPY N/A 3/4/2019    Procedure: COLONOSCOPY polypectomy;  Surgeon: Nai Luong MD;  Location: [facility];  Service: Gastroenterology   • CYSTOSCOPY URETEROSCOPY LASER LITHOTRIPSY Right 10/23/2017    Procedure: CYSTO RT URETEROSCOPY LASER  STONE BASKETING RIGHT STENT EXCHANGE;  Surgeon: Quinn Cason MD;  Location: [facility];  Service:    • ENDOSCOPY N/A 3/4/2019    Procedure: ESOPHAGOGASTRODUODENOSCOPY WITH BIOPSIES;  Surgeon: Nai Luong MD;  Location: [facility] OR;  Service: Gastroenterology   • EXPLORATORY LAPAROTOMY     • HYSTERECTOMY      2001   • ROTATOR CUFF REPAIR Right 2016   • STEROID INJECTION KNEE      Had left knee injection 10/09/2018; right knee injection 10/02/2017   • TOTAL KNEE ARTHROPLASTY Right 4/8/2019    Procedure: RIGHT TOTAL KNEE ARTHROPLASTY;  Surgeon: Farzad Luong MD;  Location: [facility] OR;  Service: Orthopedics         Current Outpatient Medications on File Prior to Visit   Medication Sig Dispense Refill   • acetaminophen (TYLENOL) 325 MG tablet Take 2 tablets by  mouth Every 4 (Four) Hours As Needed for Moderate Pain  or Fever (greater than 101 F). 120 tablet 0   • albuterol (PROVENTIL) (2.5 MG/3ML) 0.083% nebulizer solution Take 2.5 mg by nebulization Every 4 (Four) Hours As Needed for Wheezing.     • Apixaban (ELIQUIS PO) Take 5 mg by mouth 2 (Two) Times a Day. Pt unsure of dose.      • bisoprolol (ZEBeta) 5 MG tablet Take 0.5 tablets by mouth Daily. 45 tablet 1   • bumetanide (BUMEX) 2 MG tablet Take 1 tablet by mouth Daily. 30 tablet 5   • dextrose (GLUTOSE) 40 % gel Take 15 g by mouth Every 15 (Fifteen) Minutes As Needed for Low Blood Sugar (Blood sugar less than 70). 30 each 0   • escitalopram (LEXAPRO) 10 MG tablet TAKE 1 TABLET BY MOUTH ONCE DAILY 30 tablet 5   • HYDROcodone-acetaminophen (NORCO) 5-325 MG per tablet Take 1 tablet by mouth Every 6 (Six) Hours As Needed.     • insulin lispro (humaLOG) 100 UNIT/ML injection Inject 0-9 Units under the skin into the appropriate area as directed 4 (Four) Times a Day With Meals & at Bedtime. 1 each 12   • ipratropium-albuterol (DUO-NEB) 0.5-2.5 mg/3 ml nebulizer Take 3 mL by nebulization 3 (Three) Times a Day. 360 mL 0   • levothyroxine (SYNTHROID, LEVOTHROID) 25 MCG tablet Take 25 mcg by mouth Daily.     • metFORMIN (GLUCOPHAGE) 1000 MG tablet Take 1,000 mg by mouth 2 (Two) Times a Day With Meals.     • naproxen (NAPROSYN) 500 MG tablet Take 1 tablet by mouth 2 (Two) Times a Day With Meals. 60 tablet 0   • nystatin (MYCOSTATIN) 281083 UNIT/GM powder Apply  topically to the appropriate area as directed Every 12 (Twelve) Hours. 1 each 0   • ondansetron (ZOFRAN) 4 MG tablet Take 1 tablet by mouth Every 6 (Six) Hours As Needed for Nausea or Vomiting. 60 tablet 0   • ONE TOUCH ULTRA TEST test strip      • ONETOUCH DELICA LANCETS 33G misc      • pantoprazole (PROTONIX) 40 MG EC tablet TAKE 1 TABLET BY MOUTH ONCE DAILY 30 tablet 5   • potassium chloride (MICRO-K) 10 MEQ CR capsule Take 2 capsules by mouth Daily. 60 capsule 2   •  pravastatin (PRAVACHOL) 40 MG tablet Take 40 mg by mouth Every Night.     • sennosides-docusate sodium (SENOKOT-S) 8.6-50 MG tablet Take 2 tablets by mouth 2 (Two) Times a Day. 60 tablet 0   • sildenafil (REVATIO) 20 MG tablet Take 20 mg by mouth 3 (Three) Times a Day.     • traMADol (ULTRAM) 50 MG tablet Take 1 tablet by mouth Every 6 (Six) Hours As Needed for Moderate Pain . 12 tablet 0   • vitamin D (ERGOCALCIFEROL) 98660 units capsule capsule Take 1 capsule by mouth 1 (One) Time Per Week. 13 capsule 1     No current facility-administered medications on file prior to visit.        ALLERGIES:   No Known Allergies    Social History     Socioeconomic History   • Marital status: Single     Spouse name: Not on file   • Number of children: Not on file   • Years of education: High school   • Highest education level: Not on file   Occupational History     Employer: RETIRED   Tobacco Use   • Smoking status: Never Smoker   • Smokeless tobacco: Never Used   Substance and Sexual Activity   • Alcohol use: Yes     Comment: 1-2X YEAR   • Drug use: No   • Sexual activity: Defer     Family History   Problem Relation Age of Onset   • Cancer Other    • Stroke Other    • Other Other         LUPUS ANTICOAGULANT   • Rheum arthritis Other    • Heart disease Other    • Heart attack Other    • Lupus Mother    • Stomach cancer Father    • Malig Hyperthermia Neg Hx    • Colon cancer Neg Hx    • Colon polyps Neg Hx               Review of Systems   Constitutional: Negative for activity change, appetite change, fatigue, fever and unexpected weight change.   HENT: Positive for congestion. Negative for mouth sores, nosebleeds, sore throat and voice change.    Respiratory: Positive for cough. Negative for shortness of breath and wheezing.    Cardiovascular: Negative for chest pain, palpitations and leg swelling.   Gastrointestinal: Negative for abdominal distention, abdominal pain, blood in stool, constipation, diarrhea, nausea and vomiting.    Endocrine: Negative for cold intolerance and heat intolerance.   Genitourinary: Negative for difficulty urinating, dysuria, frequency and hematuria.   Musculoskeletal: Positive for arthralgias. Negative for back pain, joint swelling and myalgias.   Skin: Negative for rash.   Neurological: Negative for dizziness, syncope, weakness, light-headedness, numbness and headaches.   Hematological: Negative for adenopathy. Does not bruise/bleed easily.   Psychiatric/Behavioral: Negative for confusion and sleep disturbance. The patient is not nervous/anxious.          Objective      Vitals:    11/26/19 0946   BP: 167/97   Pulse: 108   Resp: 18   Temp: 97.5 °F (36.4 °C)   SpO2: 95%      Current Status 11/26/2019   ECOG score 1   Pain 0/10    Physical Exam   Constitutional: She is oriented to person, place, and time. She appears well-developed and well-nourished.   Patient is seated in a wheelchair in the office today.   HENT:   Mouth/Throat: Oropharynx is clear and moist.   Eyes: Conjunctivae are normal.   Neck: No thyromegaly present.   Cardiovascular: Normal rate and regular rhythm. Exam reveals no gallop and no friction rub.   No murmur heard.  Pulmonary/Chest: Effort normal and breath sounds normal. No respiratory distress. She has no wheezes.   A few scattered bilateral wheezes   Abdominal: Soft. Bowel sounds are normal. She exhibits no distension. There is no tenderness.   Musculoskeletal: She exhibits edema.   Trace bilateral lower extremity edema, right slightly greater than left.     Lymphadenopathy:        Head (right side): No submandibular adenopathy present.     She has no cervical adenopathy.     She has no axillary adenopathy.        Right: No inguinal and no supraclavicular adenopathy present.        Left: No inguinal and no supraclavicular adenopathy present.   Neurological: She is alert and oriented to person, place, and time. She displays normal reflexes. No cranial nerve deficit. She exhibits normal muscle  tone.   Skin: Skin is warm and dry. No rash noted.   Psychiatric: She has a normal mood and affect. Her behavior is normal.       RECENT LABS:  Hematology WBC   Date Value Ref Range Status   11/26/2019 8.73 3.40 - 10.80 10*3/mm3 Final   06/18/2019 10.0 4.5 - 11.0 10*3/uL Final     RBC   Date Value Ref Range Status   11/26/2019 4.85 3.77 - 5.28 10*6/mm3 Final   06/18/2019 5.09 4.0 - 6.0 10*6/uL Final   04/10/2018 4.69 4.0 - 6.0 10*6/uL Final     Hemoglobin   Date Value Ref Range Status   11/26/2019 12.8 12.0 - 15.9 g/dL Final   06/18/2019 13.5 12.0 - 16.0 g/dL Final     Hematocrit   Date Value Ref Range Status   11/26/2019 42.5 34.0 - 46.6 % Final   06/18/2019 43.1 33.0 - 51.0 % Final     Platelets   Date Value Ref Range Status   11/26/2019 368 140 - 450 10*3/mm3 Final   06/18/2019 463 (H) 150 - 450 10*3/uL Final        Lab Results   Component Value Date    GLUCOSE 174 (H) 04/14/2019    BUN 19 06/18/2019    CREATININE 0.9 06/18/2019    EGFRIFNONA 91 04/14/2019    EGFRIFAFRI 79 01/31/2018    BCR 22.0 (H) 06/18/2019    K 4.5 06/18/2019    CO2 28 06/18/2019    CALCIUM 10.0 06/18/2019    PROTENTOTREF 7.1 01/31/2018    ALBUMIN 4.2 06/18/2019    LABIL2 1.1 06/18/2019    AST 18 06/18/2019    ALT 10 (L) 06/18/2019     Additional labs from today as outlined below    Assessment/Plan     1. Iron deficiency anemia:  · The patient has had a long-standing degree of anemia.  Anemia appears to have worsened following her hospitalization for bowel obstruction and sepsis in late 2017.  Previous hemoglobin in the 7-8 range during hospitalization in late 2017, subsequent increase into the 9-10 range in early 2018 with gradual decline into the 8-9 range.  · In 2018, the patient developed declining MCV which has gradually decreased to 63.5 2/6/19    · Patient had concurrent thrombocytosis with platelet count in the high 400-500,000 range, subsequently increased to 600,000 range. Related to underlying iron deficiency.  · Patient developed  associated PICA symptom of ice craving.  · There was no visible evidence of GI blood loss however patient remains on chronic anticoagulation with Eliquis.  She had never undergone EGD or colonoscopy.  Stool cards negative for occult blood on 2/27/19.    · Anemia evaluation 2/6/19 at initial visit with hemoglobin 8.6, MCV 63.5, platelet count 694,000, iron 18, ferritin less than 5, iron saturation 3%, TIBC 584, B12 380, folate greater than 20, creatinine 0.74, ESR 30, REBECCA negative, haptoglobin 324, .  · In order to facilitate rapid correction of her iron deficiency for anticipated knee surgery, administered IV iron in the form of Injectafer 750mg on 2/12 and 2/19/19.  · EGD and colonoscopy 3/4/19 with no evidence of bleeding source, evidence of hiatal hernia, gastritis, benign colonic polyps.  · Pica symptoms resolved  · Labs on 2/27/2019 with ferritin 617, iron saturation 16%, hemoglobin 10.4  · Today, hemoglobin is stable at 12.8.  Iron studies however show iron 29, ferritin that has declined from 151.9 down to 62.3, iron saturation down to 7% with TIBC 391.  We did discuss the decline in her ferritin level.  She has not experienced any evidence of GI blood loss clinically.  Most likely she has underlying malabsorption and likely will require additional IV iron in the future.  She will likely need IV iron in the near future.  I have asked her to return in 3 months with repeat labs to review at that time.  2. Thrombocytosis:  · As above, the patient had a platelet count in the high 400-500,000 range since late 2017.  This progressively increased to 809,000 on 2/12/19.  · Secondary to iron deficiency anemia  · Platelet count improved following IV iron administration  · Platelet count normal today at 368,000  3. Left lower extremity DVT (popliteal/calf) with suspected pulmonary embolism:  · Patient was admitted in November/December 2017 with small bowel perforation requiring abdominal surgery and subsequent  sepsis  · Lower extremity Doppler 12/1/17 with popliteal and calf DVT on the left  · V/Q scan 12/2/17 with intermediate probability for pulmonary embolism  · Patient was anticoagulated and has continued on treatment with Eliquis 5mg bid.  She is now over one year out from her thrombosis that was provoked.  · Patient reports that her daughter developed DVT and pulmonary embolism in her 30s and apparently has an IVC filter in place.  She is a poor historian, accuracy is unknown.  Unclear whether her daughter has been evaluated with hypercoagulable labs.  · Given the family history, we did proceed with hypercoagulable evaluation 2/6/19: Negative factor V Leiden mutation, negative prothrombin gene mutation, protein C activity 192%, protein S activity 127%, Antithrombin III greater than 140, lupus anticoagulant negative, anticardiolipin antibody panel negative, anti-beta-2 GP 1 antibody panel negative.  · Hypercoagulable evaluation was negative.  Given the provoked nature of her previous thrombosis, and having been anticoagulated for over one year, on 2/27/19, recommended that she discontinue anticoagulation.  We did discuss signs and symptoms of recurrent DVT and pulmonary embolism and the need to seek immediate medical attention if these occur.  · Right total knee arthroplasty 4/8/2019.  Patient received Eliquis 5 mg twice daily postoperatively.  It was anticipated that she would only require brief treatment with Eliquis however her recovery from her knee replacement was prolonged. She did develop some pain and swelling in the right knee and underwent lower extremity Doppler on 6/20/2019 which was negative for DVT, showed some valvular incompetence.  With eventual improvement in mobility, Eliquis discontinued 8/6/2019.  Plan:    1. MD visit in 3 months.  1 week prior we will draw labs with CBC, iron panel, ferritin.

## 2019-11-27 ENCOUNTER — OFFICE VISIT (OUTPATIENT)
Dept: FAMILY MEDICINE CLINIC | Facility: CLINIC | Age: 69
End: 2019-11-27

## 2019-11-27 VITALS
OXYGEN SATURATION: 96 % | SYSTOLIC BLOOD PRESSURE: 124 MMHG | TEMPERATURE: 97.5 F | BODY MASS INDEX: 44.9 KG/M2 | DIASTOLIC BLOOD PRESSURE: 70 MMHG | WEIGHT: 244 LBS | HEART RATE: 78 BPM | HEIGHT: 62 IN

## 2019-11-27 DIAGNOSIS — I10 ESSENTIAL HYPERTENSION: ICD-10-CM

## 2019-11-27 DIAGNOSIS — E03.9 ACQUIRED HYPOTHYROIDISM: ICD-10-CM

## 2019-11-27 DIAGNOSIS — E78.2 MIXED HYPERLIPIDEMIA: ICD-10-CM

## 2019-11-27 DIAGNOSIS — E11.9 TYPE 2 DIABETES MELLITUS WITHOUT COMPLICATION, WITHOUT LONG-TERM CURRENT USE OF INSULIN (HCC): ICD-10-CM

## 2019-11-27 DIAGNOSIS — G47.33 OSA AND COPD OVERLAP SYNDROME (HCC): ICD-10-CM

## 2019-11-27 DIAGNOSIS — J06.9 URI WITH COUGH AND CONGESTION: Primary | ICD-10-CM

## 2019-11-27 DIAGNOSIS — J44.9 OSA AND COPD OVERLAP SYNDROME (HCC): ICD-10-CM

## 2019-11-27 PROBLEM — K55.069 OMENTAL INFARCTION (HCC): Status: RESOLVED | Noted: 2018-01-31 | Resolved: 2019-11-27

## 2019-11-27 PROBLEM — D75.839 THROMBOCYTOSIS: Status: RESOLVED | Noted: 2019-02-06 | Resolved: 2019-11-27

## 2019-11-27 PROBLEM — I50.23 ACUTE ON CHRONIC SYSTOLIC CHF (CONGESTIVE HEART FAILURE) (HCC): Status: RESOLVED | Noted: 2017-10-27 | Resolved: 2019-11-27

## 2019-11-27 PROBLEM — K63.1 SMALL BOWEL PERFORATION (HCC): Status: RESOLVED | Noted: 2018-01-31 | Resolved: 2019-11-27

## 2019-11-27 PROBLEM — N17.0 ATN (ACUTE TUBULAR NECROSIS) (HCC): Status: RESOLVED | Noted: 2017-12-07 | Resolved: 2019-11-27

## 2019-11-27 PROBLEM — N17.9 AKI (ACUTE KIDNEY INJURY): Status: RESOLVED | Noted: 2017-12-05 | Resolved: 2019-11-27

## 2019-11-27 PROCEDURE — 99214 OFFICE O/P EST MOD 30 MIN: CPT | Performed by: INTERNAL MEDICINE

## 2019-11-27 RX ORDER — DEXTROMETHORPHAN HYDROBROMIDE AND PROMETHAZINE HYDROCHLORIDE 15; 6.25 MG/5ML; MG/5ML
5 SOLUTION ORAL 4 TIMES DAILY PRN
Qty: 118 ML | Refills: 1 | Status: SHIPPED | OUTPATIENT
Start: 2019-11-27 | End: 2020-10-09

## 2019-11-27 NOTE — PROGRESS NOTES
Subjective Chief complaint is a cough  Mariangel Nguyen is a 69 y.o. female.     History of Present Illness   Mariangel is here today for complaints of a cough.  Is been present for 2 weeks.  She has not had any associated fever or chills.  She really does not feel any more short of breath than usual.  She is complaining of some excessive postnasal and pharyngeal phlegm.  She has been using some Mucinex DM and it is not really seeming to help.  She also has some diabetes.  She is on metformin.  Her A1c is due to be checked.  She has hypertension in addition to chronic congestive heart failure.  We are therefore going to need to avoid medicines with pseudoephedrine in it.  Past history is remarkable for hypothyroidism.  She also has a prior history of obstructive sleep apnea and chronic obstructive pulmonary disease.  The following portions of the patient's history were reviewed and updated as appropriate: allergies, current medications, past family history, past medical history, past social history, past surgical history and problem list.    Review of Systems   Constitutional: Negative for chills and fever.   HENT: Negative for sore throat, trouble swallowing and voice change.    Respiratory: Positive for cough. Negative for shortness of breath.        Objective   Physical Exam   Constitutional: She appears well-developed and well-nourished.   HENT:   Tympanic membranes are normal.  There is very minimal nasal congestion.  Oropharynx shows some posterior pharyngeal drainage.   Cardiovascular: Normal rate, regular rhythm and normal heart sounds.   Pulmonary/Chest: Effort normal and breath sounds normal. She has no wheezes. She has no rales.   Nursing note and vitals reviewed.        Assessment/Plan   Mariangel was seen today for cough.    Diagnoses and all orders for this visit:    URI with cough and congestion    Type 2 diabetes mellitus without complication, without long-term current use of insulin (CMS/[facility])  -      Comprehensive Metabolic Panel  -     Hemoglobin A1c    KAELA and COPD overlap syndrome (CMS/HCC)    Essential hypertension    Mixed hyperlipidemia  -     Lipid Panel    Acquired hypothyroidism  -     TSH+Free T4    Other orders  -     promethazine-dextromethorphan (PROMETHAZINE-DM) 6.25-15 MG/5ML solution; Take 5 mL by mouth 4 (Four) Times a Day As Needed for Cough.      Mariangel is here today for cough.  I believe this is just going to be an upper respiratory infection that is likely viral.  I am going to prescribe some Promethazine DM.  We will check on the status of her diabetes cholesterol and thyroid today.

## 2019-11-28 LAB
ALBUMIN SERPL-MCNC: 4.5 G/DL (ref 3.5–5.2)
ALBUMIN/GLOB SERPL: 1.5 G/DL
ALP SERPL-CCNC: 90 U/L (ref 39–117)
ALT SERPL-CCNC: 13 U/L (ref 1–33)
AST SERPL-CCNC: 11 U/L (ref 1–32)
BILIRUB SERPL-MCNC: 0.4 MG/DL (ref 0.2–1.2)
BUN SERPL-MCNC: 13 MG/DL (ref 8–23)
BUN/CREAT SERPL: 15.9 (ref 7–25)
CALCIUM SERPL-MCNC: 9.5 MG/DL (ref 8.6–10.5)
CHLORIDE SERPL-SCNC: 98 MMOL/L (ref 98–107)
CHOLEST SERPL-MCNC: 180 MG/DL (ref 0–200)
CO2 SERPL-SCNC: 28.6 MMOL/L (ref 22–29)
CREAT SERPL-MCNC: 0.82 MG/DL (ref 0.57–1)
GLOBULIN SER CALC-MCNC: 3.1 GM/DL
GLUCOSE SERPL-MCNC: 141 MG/DL (ref 65–99)
HBA1C MFR BLD: 7.5 % (ref 4.8–5.6)
HDLC SERPL-MCNC: 59 MG/DL (ref 40–60)
LDLC SERPL CALC-MCNC: 81 MG/DL (ref 0–100)
POTASSIUM SERPL-SCNC: 4.4 MMOL/L (ref 3.5–5.2)
PROT SERPL-MCNC: 7.6 G/DL (ref 6–8.5)
SODIUM SERPL-SCNC: 143 MMOL/L (ref 136–145)
T4 FREE SERPL-MCNC: 1.33 NG/DL (ref 0.93–1.7)
TRIGL SERPL-MCNC: 200 MG/DL (ref 0–150)
TSH SERPL DL<=0.005 MIU/L-ACNC: 3.44 UIU/ML (ref 0.27–4.2)
VLDLC SERPL CALC-MCNC: 40 MG/DL

## 2019-12-04 ENCOUNTER — TELEPHONE (OUTPATIENT)
Dept: FAMILY MEDICINE CLINIC | Facility: CLINIC | Age: 69
End: 2019-12-04

## 2019-12-04 NOTE — TELEPHONE ENCOUNTER
PT CALLING BACK REGARDING LABS, SAYS IF SHE IS UNAVAILABLE ITS OKAY TO TALK TO HER DAUGHTER CHELLE.     [phone]  Results were given to the patient.  She has been lax on her diet.  When she watches things closely her A1c was down to 6.0.  I advised her to get back on her diet and we will recheck this in

## 2019-12-16 RX ORDER — PANTOPRAZOLE SODIUM 40 MG/1
TABLET, DELAYED RELEASE ORAL
Qty: 30 TABLET | Refills: 0 | Status: SHIPPED | OUTPATIENT
Start: 2019-12-16 | End: 2020-01-20 | Stop reason: SDUPTHER

## 2020-01-20 RX ORDER — PANTOPRAZOLE SODIUM 40 MG/1
40 TABLET, DELAYED RELEASE ORAL DAILY
Qty: 90 TABLET | OUTPATIENT
Start: 2020-01-20

## 2020-01-20 RX ORDER — PANTOPRAZOLE SODIUM 40 MG/1
40 TABLET, DELAYED RELEASE ORAL DAILY
Qty: 30 TABLET | Refills: 0 | Status: SHIPPED | OUTPATIENT
Start: 2020-01-20 | End: 2020-02-24

## 2020-01-20 RX ORDER — POTASSIUM CHLORIDE 750 MG/1
20 CAPSULE, EXTENDED RELEASE ORAL DAILY
Qty: 60 CAPSULE | Refills: 2 | Status: SHIPPED | OUTPATIENT
Start: 2020-01-20 | End: 2020-01-22 | Stop reason: SDUPTHER

## 2020-01-22 ENCOUNTER — TELEPHONE (OUTPATIENT)
Dept: FAMILY MEDICINE CLINIC | Facility: CLINIC | Age: 70
End: 2020-01-22

## 2020-01-22 RX ORDER — POTASSIUM CHLORIDE 750 MG/1
20 CAPSULE, EXTENDED RELEASE ORAL DAILY
Qty: 60 CAPSULE | Refills: 2 | Status: SHIPPED | OUTPATIENT
Start: 2020-01-22 | End: 2020-06-05

## 2020-02-24 RX ORDER — PANTOPRAZOLE SODIUM 40 MG/1
40 TABLET, DELAYED RELEASE ORAL DAILY
Qty: 30 TABLET | Refills: 0 | Status: SHIPPED | OUTPATIENT
Start: 2020-02-24 | End: 2020-03-31

## 2020-02-25 ENCOUNTER — LAB (OUTPATIENT)
Dept: LAB | Facility: HOSPITAL | Age: 70
End: 2020-02-25

## 2020-02-25 DIAGNOSIS — D50.8 OTHER IRON DEFICIENCY ANEMIA: ICD-10-CM

## 2020-02-25 LAB
BASOPHILS # BLD AUTO: 0.06 10*3/MM3 (ref 0–0.2)
BASOPHILS NFR BLD AUTO: 0.7 % (ref 0–1.5)
DEPRECATED RDW RBC AUTO: 46.3 FL (ref 37–54)
EOSINOPHIL # BLD AUTO: 0.31 10*3/MM3 (ref 0–0.4)
EOSINOPHIL NFR BLD AUTO: 3.5 % (ref 0.3–6.2)
ERYTHROCYTE [DISTWIDTH] IN BLOOD BY AUTOMATED COUNT: 14.6 % (ref 12.3–15.4)
FERRITIN SERPL-MCNC: 63.2 NG/ML (ref 13–150)
HCT VFR BLD AUTO: 45.1 % (ref 34–46.6)
HGB BLD-MCNC: 13.3 G/DL (ref 12–15.9)
IMM GRANULOCYTES # BLD AUTO: 0.06 10*3/MM3 (ref 0–0.05)
IMM GRANULOCYTES NFR BLD AUTO: 0.7 % (ref 0–0.5)
IRON 24H UR-MRATE: 33 MCG/DL (ref 37–145)
IRON SATN MFR SERPL: 8 % (ref 14–48)
LYMPHOCYTES # BLD AUTO: 1.35 10*3/MM3 (ref 0.7–3.1)
LYMPHOCYTES NFR BLD AUTO: 15.2 % (ref 19.6–45.3)
MCH RBC QN AUTO: 25.8 PG (ref 26.6–33)
MCHC RBC AUTO-ENTMCNC: 29.5 G/DL (ref 31.5–35.7)
MCV RBC AUTO: 87.6 FL (ref 79–97)
MONOCYTES # BLD AUTO: 0.78 10*3/MM3 (ref 0.1–0.9)
MONOCYTES NFR BLD AUTO: 8.8 % (ref 5–12)
NEUTROPHILS # BLD AUTO: 6.31 10*3/MM3 (ref 1.7–7)
NEUTROPHILS NFR BLD AUTO: 71.1 % (ref 42.7–76)
NRBC BLD AUTO-RTO: 0 /100 WBC (ref 0–0.2)
PLATELET # BLD AUTO: 396 10*3/MM3 (ref 140–450)
PMV BLD AUTO: 9.3 FL (ref 6–12)
RBC # BLD AUTO: 5.15 10*6/MM3 (ref 3.77–5.28)
TIBC SERPL-MCNC: 412 MCG/DL (ref 249–505)
TRANSFERRIN SERPL-MCNC: 294 MG/DL (ref 200–360)
WBC NRBC COR # BLD: 8.87 10*3/MM3 (ref 3.4–10.8)

## 2020-02-25 PROCEDURE — 85025 COMPLETE CBC W/AUTO DIFF WBC: CPT

## 2020-02-25 PROCEDURE — 82728 ASSAY OF FERRITIN: CPT

## 2020-02-25 PROCEDURE — 84466 ASSAY OF TRANSFERRIN: CPT

## 2020-02-25 PROCEDURE — 36415 COLL VENOUS BLD VENIPUNCTURE: CPT

## 2020-02-25 PROCEDURE — 83540 ASSAY OF IRON: CPT

## 2020-03-03 ENCOUNTER — APPOINTMENT (OUTPATIENT)
Dept: LAB | Facility: HOSPITAL | Age: 70
End: 2020-03-03

## 2020-03-03 ENCOUNTER — OFFICE VISIT (OUTPATIENT)
Dept: ONCOLOGY | Facility: CLINIC | Age: 70
End: 2020-03-03

## 2020-03-03 VITALS
HEIGHT: 62 IN | SYSTOLIC BLOOD PRESSURE: 166 MMHG | HEART RATE: 76 BPM | OXYGEN SATURATION: 90 % | DIASTOLIC BLOOD PRESSURE: 88 MMHG | BODY MASS INDEX: 43.79 KG/M2 | WEIGHT: 238 LBS | TEMPERATURE: 97.7 F | RESPIRATION RATE: 18 BRPM

## 2020-03-03 DIAGNOSIS — D50.8 OTHER IRON DEFICIENCY ANEMIA: Primary | ICD-10-CM

## 2020-03-03 PROCEDURE — 99213 OFFICE O/P EST LOW 20 MIN: CPT | Performed by: INTERNAL MEDICINE

## 2020-03-03 RX ORDER — SEMAGLUTIDE 1.34 MG/ML
INJECTION, SOLUTION SUBCUTANEOUS
COMMUNITY
Start: 2020-02-21 | End: 2020-10-09

## 2020-03-03 NOTE — PROGRESS NOTES
Subjective .     REASONS FOR FOLLOWUP:    1. Iron deficiency anemia:   · Long-standing chronic anemia with worsening in 2017 along with declining MCV.    · Associated thrombocytosis and pica symptoms  · Iron deficient 2/6/19 with ferritin less than 5, iron saturation 3%, hemoglobin 8.4, MCV 63.5.  · Received Injectafer 750mg IV 2/12 and 2/19/19  · Stool cards negative for occult blood x3 on 2/27/19  · EGD/colonoscopy 3/4/19 with hiatal hernia, gastritis, benign colonic polyps, no bleeding source identified.  2. Left lower extremity DVT (popliteal/calf) with suspected pulmonary embolism:  · Identified 12/1/17 during hospitalization for small bowel perforation requiring abdominal surgery with subsequent sepsis (provoked).  · Left lower extremity popliteal/calf DVT 12/1/17  · V/Q scan 12/2/17 with intermediate probability for pulmonary embolism  · Continuation of anticoagulation with Eliquis 5 mg twice daily  · Hypercoagulable evaluation 12/6/19 negative  · Due to provoked nature of thrombosis, anticoagulation discontinued 12/27/19  · Anticoagulation Eliquis 5 mg twice daily following right total knee arthroplasty 4/8/19 until fully ambulatory.  Patient with prolonged recovery from surgery, became fully ambulatory and Eliquis discontinued 8/6/2019.    HISTORY OF PRESENT ILLNESS:  The patient is a 69 y.o. year old female who is here for follow-up with the above-mentioned history.    History of Present Illness patient returns today in follow-up with labs to review.  In the interval, she has not experienced any significant issues.  She denies any evidence of blood in her stool.  She has not experienced return of any pica symptoms, specifically ice craving.  She has continued to improve in terms of her mobility and is ambulating in the office today without the assistance of a wheelchair or walker.    Past Medical History:   Diagnosis Date   • Acute on chronic systolic CHF (congestive heart failure) (CMS/[facility]) 10/27/2017    • Acute respiratory failure (CMS/[facility]) 2/24/2016   • BRODY (acute kidney injury) (CMS/[facility]) 12/5/2017   • Arthritis     OSTEOARTHRITIS KNEES   • ATN (acute tubular necrosis) (CMS/[facility]) 12/7/2017   • CHF (congestive heart failure) (CMS/[facility])    • Colon polyp    • COPD (chronic obstructive pulmonary disease) (CMS/[facility])    • Diabetes mellitus (CMS/[facility])    • Disease of thyroid gland    • H/O Abnormal Pap smear of cervix    • History of DVT (deep vein thrombosis)    • History of kidney stones    • History of pulmonary embolus (PE)    • History of sepsis    • History of small bowel obstruction    • History of snoring    • Hyperlipidemia    • Hypertension    • Knee pain, bilateral    • Neuropathy    • 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 Nestor Howe   • BLADDER SURGERY      BLADDER LIFT, 2011   • CARDIAC CATHETERIZATION  2017    Normal coronary arteries, normal overall LVSF with wall motion abnormality   • COLON SURGERY      RECONSTRUCTION, 2001   • COLONOSCOPY N/A 3/4/2019    Procedure: COLONOSCOPY polypectomy;  Surgeon: Nai Luong MD;  Location: [facility] OR;  Service: Gastroenterology   • CYSTOSCOPY URETEROSCOPY LASER LITHOTRIPSY Right 10/23/2017    Procedure: CYSTO RT URETEROSCOPY LASER  STONE BASKETING RIGHT STENT EXCHANGE;  Surgeon: Quinn Cason MD;  Location: [facility] OR;  Service:    • ENDOSCOPY N/A 3/4/2019    Procedure: ESOPHAGOGASTRODUODENOSCOPY WITH BIOPSIES;  Surgeon: Nai Luong MD;  Location: [facility] OR;  Service: Gastroenterology   • EXPLORATORY LAPAROTOMY     • HYSTERECTOMY      2001   • ROTATOR CUFF REPAIR Right 2016   • STEROID INJECTION KNEE      Had left knee  injection 10/09/2018; right knee injection 10/02/2017   • TOTAL KNEE ARTHROPLASTY Right 4/8/2019    Procedure: RIGHT TOTAL KNEE ARTHROPLASTY;  Surgeon: Farzad Luong MD;  Location: [facility];  Service: Orthopedics         Current Outpatient Medications on File Prior to Visit   Medication Sig Dispense Refill   • acetaminophen (TYLENOL) 325 MG tablet Take 2 tablets by mouth Every 4 (Four) Hours As Needed for Moderate Pain  or Fever (greater than 101 F). 120 tablet 0   • albuterol (PROVENTIL) (2.5 MG/3ML) 0.083% nebulizer solution Take 2.5 mg by nebulization Every 4 (Four) Hours As Needed for Wheezing.     • Apixaban (ELIQUIS PO) Take 5 mg by mouth 2 (Two) Times a Day. Pt unsure of dose.      • bisoprolol (ZEBeta) 5 MG tablet Take 0.5 tablets by mouth Daily. 45 tablet 1   • bumetanide (BUMEX) 2 MG tablet Take 1 tablet by mouth Daily. 30 tablet 5   • dapagliflozin (FARXIGA) 5 MG tablet tablet Take 5 mg by mouth Daily.     • dextrose (GLUTOSE) 40 % gel Take 15 g by mouth Every 15 (Fifteen) Minutes As Needed for Low Blood Sugar (Blood sugar less than 70). 30 each 0   • escitalopram (LEXAPRO) 10 MG tablet TAKE 1 TABLET BY MOUTH ONCE DAILY 30 tablet 5   • HYDROcodone-acetaminophen (NORCO) 5-325 MG per tablet Take 1 tablet by mouth Every 6 (Six) Hours As Needed.     • insulin lispro (humaLOG) 100 UNIT/ML injection Inject 0-9 Units under the skin into the appropriate area as directed 4 (Four) Times a Day With Meals & at Bedtime. 1 each 12   • ipratropium-albuterol (DUO-NEB) 0.5-2.5 mg/3 ml nebulizer Take 3 mL by nebulization 3 (Three) Times a Day. 360 mL 0   • levothyroxine (SYNTHROID, LEVOTHROID) 25 MCG tablet Take 25 mcg by mouth Daily.     • metFORMIN (GLUCOPHAGE) 1000 MG tablet Take 1,000 mg by mouth 2 (Two) Times a Day With Meals.     • naproxen (NAPROSYN) 500 MG tablet Take 1 tablet by mouth 2 (Two) Times a Day With Meals. 60 tablet 0   • nystatin (MYCOSTATIN) 925222 UNIT/GM powder Apply  topically to the  appropriate area as directed Every 12 (Twelve) Hours. 1 each 0   • ondansetron (ZOFRAN) 4 MG tablet Take 1 tablet by mouth Every 6 (Six) Hours As Needed for Nausea or Vomiting. 60 tablet 0   • ONE TOUCH ULTRA TEST test strip      • ONETOUCH DELICA LANCETS 33G misc      • OZEMPIC, 0.25 OR 0.5 MG/DOSE, 2 MG/1.5ML solution pen-injector INJECT 0.25 MG INTO THE SKIN ONCE WEEKLY FOR 4 WEEKS THEN INCREASE TO 0.5 MG WEEKLY     • pantoprazole (PROTONIX) 40 MG EC tablet TAKE 1 TABLET BY MOUTH DAILY 30 tablet 0   • potassium chloride (MICRO-K) 10 MEQ CR capsule Take 2 capsules by mouth Daily. 60 capsule 2   • pravastatin (PRAVACHOL) 40 MG tablet Take 40 mg by mouth Every Night.     • promethazine-dextromethorphan (PROMETHAZINE-DM) 6.25-15 MG/5ML solution Take 5 mL by mouth 4 (Four) Times a Day As Needed for Cough. 118 mL 1   • sennosides-docusate sodium (SENOKOT-S) 8.6-50 MG tablet Take 2 tablets by mouth 2 (Two) Times a Day. 60 tablet 0   • sildenafil (REVATIO) 20 MG tablet Take 20 mg by mouth 3 (Three) Times a Day.     • traMADol (ULTRAM) 50 MG tablet Take 1 tablet by mouth Every 6 (Six) Hours As Needed for Moderate Pain . 12 tablet 0   • vitamin D (ERGOCALCIFEROL) 63268 units capsule capsule Take 1 capsule by mouth 1 (One) Time Per Week. 13 capsule 1     No current facility-administered medications on file prior to visit.        ALLERGIES:   No Known Allergies    Social History     Socioeconomic History   • Marital status: Single     Spouse name: Not on file   • Number of children: Not on file   • Years of education: High school   • Highest education level: Not on file   Occupational History     Employer: RETIRED   Tobacco Use   • Smoking status: Never Smoker   • Smokeless tobacco: Never Used   Substance and Sexual Activity   • Alcohol use: Yes     Comment: 1-2X YEAR   • Drug use: No   • Sexual activity: Defer     Family History   Problem Relation Age of Onset   • Cancer Other    • Stroke Other    • Other Other          LUPUS ANTICOAGULANT   • Rheum arthritis Other    • Heart disease Other    • Heart attack Other    • Lupus Mother    • Stomach cancer Father    • Malig Hyperthermia Neg Hx    • Colon cancer Neg Hx    • Colon polyps Neg Hx               Review of Systems   Constitutional: Negative for activity change, appetite change, fatigue, fever and unexpected weight change.   HENT: Negative for congestion, mouth sores, nosebleeds, sore throat and voice change.    Respiratory: Negative for cough, chest tightness, shortness of breath and wheezing.    Cardiovascular: Negative for chest pain, palpitations and leg swelling.   Gastrointestinal: Negative for abdominal distention, abdominal pain, blood in stool, constipation, diarrhea, nausea and vomiting.   Endocrine: Negative for cold intolerance and heat intolerance.   Genitourinary: Negative for difficulty urinating, dysuria, frequency and hematuria.   Musculoskeletal: Positive for arthralgias. Negative for back pain, joint swelling and myalgias.   Skin: Negative for rash.   Neurological: Negative for dizziness, syncope, weakness, light-headedness, numbness and headaches.   Hematological: Negative for adenopathy. Does not bruise/bleed easily.   Psychiatric/Behavioral: Negative for confusion and sleep disturbance. The patient is not nervous/anxious.          Objective      Vitals:    03/03/20 1140   BP: 166/88   Pulse: 76   Resp: 18   Temp: 97.7 °F (36.5 °C)   SpO2: 90%      Current Status 3/3/2020   ECOG score 0   Pain 0/10    Physical Exam   Constitutional: She is oriented to person, place, and time. She appears well-developed and well-nourished.   HENT:   Mouth/Throat: Oropharynx is clear and moist.   Eyes: Conjunctivae are normal.   Neck: No thyromegaly present.   Cardiovascular: Normal rate and regular rhythm. Exam reveals no gallop and no friction rub.   No murmur heard.  Pulmonary/Chest: Effort normal and breath sounds normal. No respiratory distress. She has no wheezes.    Abdominal: Soft. Bowel sounds are normal. She exhibits no distension. There is no tenderness.   Musculoskeletal: She exhibits edema.   Trace bilateral lower extremity edema, right slightly greater than left.     Lymphadenopathy:        Head (right side): No submandibular adenopathy present.     She has no cervical adenopathy.     She has no axillary adenopathy.        Right: No inguinal and no supraclavicular adenopathy present.        Left: No inguinal and no supraclavicular adenopathy present.   Neurological: She is alert and oriented to person, place, and time. She displays normal reflexes. No cranial nerve deficit. She exhibits normal muscle tone.   Skin: Skin is warm and dry. No rash noted.   Psychiatric: She has a normal mood and affect. Her behavior is normal.       RECENT LABS:  Hematology WBC   Date Value Ref Range Status   02/25/2020 8.87 3.40 - 10.80 10*3/mm3 Final   01/23/2020 8.93 4.5 - 11.0 10*3/uL Final     RBC   Date Value Ref Range Status   02/25/2020 5.15 3.77 - 5.28 10*6/mm3 Final   01/23/2020 5.06 4.0 - 5.2 10*6/uL Final     Hemoglobin   Date Value Ref Range Status   02/25/2020 13.3 12.0 - 15.9 g/dL Final   01/23/2020 13.5 12.0 - 16.0 g/dL Final     Hematocrit   Date Value Ref Range Status   02/25/2020 45.1 34.0 - 46.6 % Final   01/23/2020 45.8 36.0 - 46.0 % Final     Platelets   Date Value Ref Range Status   02/25/2020 396 140 - 450 10*3/mm3 Final   01/23/2020 466 (H) 140 - 440 10*3/uL Final        Lab Results   Component Value Date    GLUCOSE 174 (H) 04/14/2019    BUN 19 01/23/2020    CREATININE 0.8 01/23/2020    EGFRIFNONA 70 11/27/2019    EGFRIFAFRI 84 11/27/2019    BCR 24.0 (H) 01/23/2020    K 4.5 01/23/2020    CO2 28 01/23/2020    CALCIUM 10.2 01/23/2020    PROTENTOTREF 7.6 11/27/2019    ALBUMIN 4.2 01/23/2020    LABIL2 1.2 01/23/2020    AST 23 01/23/2020    ALT 34 01/23/2020     Additional labs from today as outlined below    Assessment/Plan     1. Iron deficiency anemia:  · The patient  has had a long-standing degree of anemia.  Anemia appears to have worsened following her hospitalization for bowel obstruction and sepsis in late 2017.  Previous hemoglobin in the 7-8 range during hospitalization in late 2017, subsequent increase into the 9-10 range in early 2018 with gradual decline into the 8-9 range.  · In 2018, the patient developed declining MCV which has gradually decreased to 63.5 2/6/19    · Patient had concurrent thrombocytosis with platelet count in the high 400-500,000 range, subsequently increased to 600,000 range. Related to underlying iron deficiency.  · Patient developed associated PICA symptom of ice craving.  · There was no visible evidence of GI blood loss however patient remains on chronic anticoagulation with Eliquis.  She had never undergone EGD or colonoscopy.  Stool cards negative for occult blood on 2/27/19.    · Anemia evaluation 2/6/19 at initial visit with hemoglobin 8.6, MCV 63.5, platelet count 694,000, iron 18, ferritin less than 5, iron saturation 3%, TIBC 584, B12 380, folate greater than 20, creatinine 0.74, ESR 30, REBECCA negative, haptoglobin 324, .  · In order to facilitate rapid correction of her iron deficiency for anticipated knee surgery, administered IV iron in the form of Injectafer 750mg on 2/12 and 2/19/19.  · EGD and colonoscopy 3/4/19 with no evidence of bleeding source, evidence of hiatal hernia, gastritis, benign colonic polyps.  · Pica symptoms resolved  · Labs on 2/27/2019 with ferritin 617, iron saturation 16%, hemoglobin 10.4  · Labs on 11/26/2019 with stable hemoglobin at 12.8.  Iron studies however showed iron 29, ferritin that declined from 151.9 down to 62.3, iron saturation down to 7% with TIBC 391.    · Patient returns today in follow-up with repeat labs to review.  We anticipated that her ferritin level would continue to decline however labs from 2/25/2020 showing hemoglobin that is actually improved further up to 13.3.  Iron was 33,  ferritin stable at 63.2, iron saturation 8%, TIBC 412.  She has no current pica symptoms.  With stable ferritin, I have recommended that we continue to monitor her iron status for now and hold off on any further IV iron until her ferritin declines more significantly into the 20-30 range.  She will return in 3 months for labs with CBC, iron panel, ferritin and RN review and I will see her in 6 months for follow-up repeating labs 1 week prior.  She will notify us if her pica symptoms return or if fatigue worsens.  2. Thrombocytosis:  · As above, the patient had a platelet count in the high 400-500,000 range since late 2017.  This progressively increased to 809,000 on 2/12/19.  · Secondary to iron deficiency anemia  · Platelet count improved following IV iron administration  · Platelet count currently 396,000  3. Left lower extremity DVT (popliteal/calf) with suspected pulmonary embolism:  · Patient was admitted in November/December 2017 with small bowel perforation requiring abdominal surgery and subsequent sepsis  · Lower extremity Doppler 12/1/17 with popliteal and calf DVT on the left  · V/Q scan 12/2/17 with intermediate probability for pulmonary embolism  · Patient was anticoagulated and has continued on treatment with Eliquis 5mg bid.  She is now over one year out from her thrombosis that was provoked.  · Patient reports that her daughter developed DVT and pulmonary embolism in her 30s and apparently has an IVC filter in place.  She is a poor historian, accuracy is unknown.  Unclear whether her daughter has been evaluated with hypercoagulable labs.  · Given the family history, we did proceed with hypercoagulable evaluation 2/6/19: Negative factor V Leiden mutation, negative prothrombin gene mutation, protein C activity 192%, protein S activity 127%, Antithrombin III greater than 140, lupus anticoagulant negative, anticardiolipin antibody panel negative, anti-beta-2 GP 1 antibody panel  negative.  · Hypercoagulable evaluation was negative.  Given the provoked nature of her previous thrombosis, and having been anticoagulated for over one year, on 2/27/19, recommended that she discontinue anticoagulation.  We did discuss signs and symptoms of recurrent DVT and pulmonary embolism and the need to seek immediate medical attention if these occur.  · Right total knee arthroplasty 4/8/2019.  Patient received Eliquis 5 mg twice daily postoperatively.  It was anticipated that she would only require brief treatment with Eliquis however her recovery from her knee replacement was prolonged. She did develop some pain and swelling in the right knee and underwent lower extremity Doppler on 6/20/2019 which was negative for DVT, showed some valvular incompetence.  With eventual improvement in mobility, Eliquis discontinued 8/6/2019.    Plan:    1. We will not proceed with any IV iron at this time given stability of ferritin and improvement in hemoglobin  2. In 3 months CBC, iron panel, ferritin and RN review  3. MD visit in 6 months.  1 week prior we will draw labs with CBC, iron panel, ferritin.

## 2020-03-25 ENCOUNTER — TELEPHONE (OUTPATIENT)
Dept: FAMILY MEDICINE CLINIC | Facility: CLINIC | Age: 70
End: 2020-03-25

## 2020-03-25 NOTE — TELEPHONE ENCOUNTER
Daughter informed mom to schedule appt to have forms completed regarding O2. Mom called back and was confused about what to do.    I called daughter back & she said she will schedule once the issue with the virus is over. She will also inform her mom again that she needs the appt in order to get her oxygen.

## 2020-03-31 RX ORDER — PANTOPRAZOLE SODIUM 40 MG/1
40 TABLET, DELAYED RELEASE ORAL DAILY
Qty: 30 TABLET | Refills: 1 | Status: SHIPPED | OUTPATIENT
Start: 2020-03-31 | End: 2020-06-05

## 2020-03-31 RX ORDER — ESCITALOPRAM OXALATE 10 MG/1
TABLET ORAL
Qty: 30 TABLET | Refills: 1 | Status: SHIPPED | OUTPATIENT
Start: 2020-03-31 | End: 2020-06-05

## 2020-04-17 DIAGNOSIS — E78.2 MIXED HYPERLIPIDEMIA: ICD-10-CM

## 2020-04-17 DIAGNOSIS — E11.9 TYPE 2 DIABETES MELLITUS WITHOUT COMPLICATION, WITHOUT LONG-TERM CURRENT USE OF INSULIN (HCC): Primary | ICD-10-CM

## 2020-04-17 DIAGNOSIS — E03.9 ACQUIRED HYPOTHYROIDISM: ICD-10-CM

## 2020-04-17 LAB
ALBUMIN SERPL-MCNC: 4.2 G/DL (ref 3.5–5.2)
ALBUMIN/GLOB SERPL: 1.2 G/DL
ALP SERPL-CCNC: 90 U/L (ref 39–117)
ALT SERPL-CCNC: 13 U/L (ref 1–33)
AST SERPL-CCNC: 11 U/L (ref 1–32)
BILIRUB SERPL-MCNC: 0.4 MG/DL (ref 0.2–1.2)
BUN SERPL-MCNC: 38 MG/DL (ref 8–23)
BUN/CREAT SERPL: 30.9 (ref 7–25)
CALCIUM SERPL-MCNC: 9.9 MG/DL (ref 8.6–10.5)
CHLORIDE SERPL-SCNC: 97 MMOL/L (ref 98–107)
CHOLEST SERPL-MCNC: 214 MG/DL (ref 0–200)
CO2 SERPL-SCNC: 26.9 MMOL/L (ref 22–29)
CREAT SERPL-MCNC: 1.23 MG/DL (ref 0.57–1)
GLOBULIN SER CALC-MCNC: 3.4 GM/DL
GLUCOSE SERPL-MCNC: 177 MG/DL (ref 65–99)
HBA1C MFR BLD: 7.69 % (ref 4.8–5.6)
HDLC SERPL-MCNC: 51 MG/DL (ref 40–60)
LDLC SERPL CALC-MCNC: 117 MG/DL (ref 0–100)
POTASSIUM SERPL-SCNC: 4.2 MMOL/L (ref 3.5–5.2)
PROT SERPL-MCNC: 7.6 G/DL (ref 6–8.5)
SODIUM SERPL-SCNC: 140 MMOL/L (ref 136–145)
T4 FREE SERPL-MCNC: 1.28 NG/DL (ref 0.93–1.7)
TRIGL SERPL-MCNC: 232 MG/DL (ref 0–150)
TSH SERPL DL<=0.005 MIU/L-ACNC: 5.03 UIU/ML (ref 0.27–4.2)
VLDLC SERPL CALC-MCNC: 46.4 MG/DL (ref 5–40)

## 2020-04-20 RX ORDER — LEVOTHYROXINE SODIUM 0.05 MG/1
50 TABLET ORAL DAILY
Qty: 90 TABLET | Refills: 1 | Status: SHIPPED | OUTPATIENT
Start: 2020-04-20 | End: 2020-10-08 | Stop reason: SDUPTHER

## 2020-04-20 RX ORDER — BISOPROLOL FUMARATE 5 MG/1
TABLET, FILM COATED ORAL
Qty: 45 TABLET | Refills: 1 | Status: SHIPPED | OUTPATIENT
Start: 2020-04-20 | End: 2020-10-05

## 2020-05-29 RX ORDER — PANTOPRAZOLE SODIUM 40 MG/1
40 TABLET, DELAYED RELEASE ORAL DAILY
Qty: 30 TABLET | Refills: 1 | OUTPATIENT
Start: 2020-05-29

## 2020-05-29 RX ORDER — ESCITALOPRAM OXALATE 10 MG/1
TABLET ORAL
Qty: 30 TABLET | Refills: 1 | OUTPATIENT
Start: 2020-05-29

## 2020-06-02 ENCOUNTER — TELEPHONE (OUTPATIENT)
Dept: FAMILY MEDICINE CLINIC | Facility: CLINIC | Age: 70
End: 2020-06-02

## 2020-06-02 ENCOUNTER — LAB (OUTPATIENT)
Dept: LAB | Facility: HOSPITAL | Age: 70
End: 2020-06-02

## 2020-06-02 ENCOUNTER — CLINICAL SUPPORT (OUTPATIENT)
Dept: ONCOLOGY | Facility: HOSPITAL | Age: 70
End: 2020-06-02

## 2020-06-02 DIAGNOSIS — D50.8 OTHER IRON DEFICIENCY ANEMIA: ICD-10-CM

## 2020-06-02 LAB
BASOPHILS # BLD AUTO: 0.09 10*3/MM3 (ref 0–0.2)
BASOPHILS NFR BLD AUTO: 0.8 % (ref 0–1.5)
DEPRECATED RDW RBC AUTO: 49.9 FL (ref 37–54)
EOSINOPHIL # BLD AUTO: 0.42 10*3/MM3 (ref 0–0.4)
EOSINOPHIL NFR BLD AUTO: 3.8 % (ref 0.3–6.2)
ERYTHROCYTE [DISTWIDTH] IN BLOOD BY AUTOMATED COUNT: 15.6 % (ref 12.3–15.4)
FERRITIN SERPL-MCNC: 43.5 NG/ML (ref 13–150)
HCT VFR BLD AUTO: 46.7 % (ref 34–46.6)
HGB BLD-MCNC: 13.7 G/DL (ref 12–15.9)
IMM GRANULOCYTES # BLD AUTO: 0.09 10*3/MM3 (ref 0–0.05)
IMM GRANULOCYTES NFR BLD AUTO: 0.8 % (ref 0–0.5)
IRON 24H UR-MRATE: 35 MCG/DL (ref 37–145)
IRON SATN MFR SERPL: 8 % (ref 14–48)
LYMPHOCYTES # BLD AUTO: 1.97 10*3/MM3 (ref 0.7–3.1)
LYMPHOCYTES NFR BLD AUTO: 17.8 % (ref 19.6–45.3)
MCH RBC QN AUTO: 25.9 PG (ref 26.6–33)
MCHC RBC AUTO-ENTMCNC: 29.3 G/DL (ref 31.5–35.7)
MCV RBC AUTO: 88.4 FL (ref 79–97)
MONOCYTES # BLD AUTO: 0.87 10*3/MM3 (ref 0.1–0.9)
MONOCYTES NFR BLD AUTO: 7.9 % (ref 5–12)
NEUTROPHILS # BLD AUTO: 7.64 10*3/MM3 (ref 1.7–7)
NEUTROPHILS NFR BLD AUTO: 68.9 % (ref 42.7–76)
NRBC BLD AUTO-RTO: 0 /100 WBC (ref 0–0.2)
PLATELET # BLD AUTO: 431 10*3/MM3 (ref 140–450)
PMV BLD AUTO: 9.3 FL (ref 6–12)
RBC # BLD AUTO: 5.28 10*6/MM3 (ref 3.77–5.28)
TIBC SERPL-MCNC: 454 MCG/DL (ref 249–505)
TRANSFERRIN SERPL-MCNC: 324 MG/DL (ref 200–360)
WBC NRBC COR # BLD: 11.08 10*3/MM3 (ref 3.4–10.8)

## 2020-06-02 PROCEDURE — 82728 ASSAY OF FERRITIN: CPT

## 2020-06-02 PROCEDURE — 85025 COMPLETE CBC W/AUTO DIFF WBC: CPT

## 2020-06-02 PROCEDURE — 84466 ASSAY OF TRANSFERRIN: CPT

## 2020-06-02 PROCEDURE — 36415 COLL VENOUS BLD VENIPUNCTURE: CPT

## 2020-06-02 PROCEDURE — G0463 HOSPITAL OUTPT CLINIC VISIT: HCPCS

## 2020-06-02 PROCEDURE — 83540 ASSAY OF IRON: CPT

## 2020-06-02 NOTE — TELEPHONE ENCOUNTER
PATIENT STATES: that she would like to have a dermatologist referral. The INS she has is [employer]  Please advise     PATIENT CAN BE REACHED ON:[phone]

## 2020-06-02 NOTE — PROGRESS NOTES
CBC reviewed with pt. Counts WNL. Pt denies any complaints. Copy of labs given to pt. Ferritin and iron still pending today. Advised her that if she requires any iron, we would let her know.    Ferritin 43.5 and iron sat 8%. Message sent to Dr. Leone to see if pt requires IV iron.       Lab Results   Component Value Date    WBC 11.08 (H) 06/02/2020    HGB 13.7 06/02/2020    HCT 46.7 (H) 06/02/2020    MCV 88.4 06/02/2020     06/02/2020

## 2020-06-03 ENCOUNTER — TELEPHONE (OUTPATIENT)
Dept: ONCOLOGY | Facility: CLINIC | Age: 70
End: 2020-06-03

## 2020-06-03 DIAGNOSIS — D50.8 OTHER IRON DEFICIENCY ANEMIA: Primary | ICD-10-CM

## 2020-06-03 NOTE — TELEPHONE ENCOUNTER
----- Message from Raheem Leone Jr., MD sent at 6/3/2020 12:02 AM EDT -----  No current need for iron, recheck at next visit as scheduled  ----- Message -----  From: Yessenia Bobo RN  Sent: 6/2/2020   9:56 AM EDT  To: MD Dr. Vasu Araujo Jr.,  Saw pt today in lab. Pt's Ferritin 43 and iron sat 8%. Hgb however has actually increased. Pt denies any complaints. Does she require iron at this time?

## 2020-06-03 NOTE — TELEPHONE ENCOUNTER
PER ORDER OF DR. WU, PT. DAUGHTER CHELLE NOTIFIED THAT PT. DOES NOT NEED IRON AT THIS TIME.  WILL RECHECK AT THE NEXT VISIT.  INSTRUCTED TO CALL FOR ANY PROBLEMS IN THE INTERIM.  UNDERSTANDING NOTED.

## 2020-06-05 ENCOUNTER — OFFICE VISIT (OUTPATIENT)
Dept: FAMILY MEDICINE CLINIC | Facility: CLINIC | Age: 70
End: 2020-06-05

## 2020-06-05 VITALS
SYSTOLIC BLOOD PRESSURE: 130 MMHG | TEMPERATURE: 98 F | HEART RATE: 78 BPM | WEIGHT: 237 LBS | OXYGEN SATURATION: 98 % | BODY MASS INDEX: 43.61 KG/M2 | HEIGHT: 62 IN | DIASTOLIC BLOOD PRESSURE: 80 MMHG

## 2020-06-05 DIAGNOSIS — D23.9 DERMATOFIBROMA: ICD-10-CM

## 2020-06-05 DIAGNOSIS — L98.9 SKIN LESION OF RIGHT LEG: Primary | ICD-10-CM

## 2020-06-05 PROCEDURE — 99213 OFFICE O/P EST LOW 20 MIN: CPT | Performed by: INTERNAL MEDICINE

## 2020-06-05 RX ORDER — POTASSIUM CHLORIDE 750 MG/1
20 CAPSULE, EXTENDED RELEASE ORAL DAILY
Qty: 60 CAPSULE | Refills: 2 | Status: SHIPPED | OUTPATIENT
Start: 2020-06-05 | End: 2020-09-11

## 2020-06-05 RX ORDER — PANTOPRAZOLE SODIUM 40 MG/1
TABLET, DELAYED RELEASE ORAL
Qty: 90 TABLET | OUTPATIENT
Start: 2020-06-05

## 2020-06-05 RX ORDER — PANTOPRAZOLE SODIUM 40 MG/1
40 TABLET, DELAYED RELEASE ORAL DAILY
Qty: 30 TABLET | Refills: 1 | Status: SHIPPED | OUTPATIENT
Start: 2020-06-05 | End: 2020-07-06

## 2020-06-05 RX ORDER — ESCITALOPRAM OXALATE 10 MG/1
TABLET ORAL
Qty: 30 TABLET | Refills: 1 | Status: SHIPPED | OUTPATIENT
Start: 2020-06-05 | End: 2020-08-10

## 2020-06-05 NOTE — PROGRESS NOTES
Subjective Chief complaint is a bump on her leg  Mariangel Nguyen is a 69 y.o. female.     History of Present Illness Mariangel is here today for complaints of a bump on her right leg.  She noticed this a few days ago.  It was somewhat uncomfortable.  She felt like someone stuck something in her leg.  She does not actually recall any injury to this area.  There is been no purulent drainage.  There is no surrounding erythema.  She is not having fever or chills.  He denies picking at the lesion.    The following portions of the patient's history were reviewed and updated as appropriate: allergies, current medications, past family history, past medical history, past social history, past surgical history and problem list.    Review of Systems   Constitutional: Negative for chills and fever.   Skin: Positive for skin lesions. Negative for color change and rash.       Objective   Physical Exam   Constitutional: She is oriented to person, place, and time.   Neurological: She is alert and oriented to person, place, and time.   Psychiatric:   On the lateral aspect of her right thigh above the knee there is a somewhat firm nodular lesion that appears to be picked at.  It feels more fibrous.  There is no expressible purulence.   Nursing note and vitals reviewed.        Assessment/Plan   Mariangel was seen today for follow-up.    Diagnoses and all orders for this visit:    Skin lesion of right leg    Dermatofibroma    Other orders  -     triamcinolone (KENALOG) 0.1 % ointment; Apply  topically to the appropriate area as directed 2 (Two) Times a Day.      Mariangel is here today for evaluation of a spot on her leg.  There is no purulence expressible from this.  It is hard to tell what this may be because it has been picked at.  I am going to have some triamcinolone cream used for a couple times a day and see her back in a couple of weeks.  My biggest concern would be that it could be some sort of squamous cell skin cancer.

## 2020-06-19 ENCOUNTER — OFFICE VISIT (OUTPATIENT)
Dept: FAMILY MEDICINE CLINIC | Facility: CLINIC | Age: 70
End: 2020-06-19

## 2020-06-19 VITALS
BODY MASS INDEX: 43.61 KG/M2 | HEART RATE: 106 BPM | TEMPERATURE: 97.9 F | WEIGHT: 237 LBS | SYSTOLIC BLOOD PRESSURE: 120 MMHG | OXYGEN SATURATION: 96 % | HEIGHT: 62 IN | DIASTOLIC BLOOD PRESSURE: 80 MMHG

## 2020-06-19 DIAGNOSIS — L98.9 SKIN LESION OF RIGHT LEG: Primary | ICD-10-CM

## 2020-06-19 PROCEDURE — 99213 OFFICE O/P EST LOW 20 MIN: CPT | Performed by: INTERNAL MEDICINE

## 2020-06-19 NOTE — PROGRESS NOTES
Subjective Chief complaint is follow-up on skin lesion  Mariangel Nguyen is a 69 y.o. female.     History of Present Illness Mariangel is here today for follow-up on a skin lesion.  At her last visit there was a picked at skin lesion on her right thigh.  It was really hard to tell what it was.  I did have her use some hydrocortisone type cream on it for a few weeks.  Some of the irritation is gone now just appears to be more ulcerated lesion.    The following portions of the patient's history were reviewed and updated as appropriate: allergies, current medications, past family history, past medical history, past social history, past surgical history and problem list.    Review of Systems   Skin: Positive for color change and skin lesions.       Objective   Physical Exam   Constitutional: She appears well-developed and well-nourished.   Skin:   There is a dime sized erythematous fibrous nodule with some central ulceration on her right thigh.  This may just be an irritated dermatofibroma but we are going to refer her to a dermatologist.   Nursing note and vitals reviewed.        Assessment/Plan   Mariangel was seen today for follow-up.    Diagnoses and all orders for this visit:    Skin lesion    Skin lesion of right leg  -     Ambulatory Referral to Dermatology

## 2020-07-06 ENCOUNTER — TELEPHONE (OUTPATIENT)
Dept: FAMILY MEDICINE CLINIC | Facility: CLINIC | Age: 70
End: 2020-07-06

## 2020-07-06 RX ORDER — PANTOPRAZOLE SODIUM 40 MG/1
TABLET, DELAYED RELEASE ORAL
Qty: 90 TABLET | Refills: 1 | Status: SHIPPED | OUTPATIENT
Start: 2020-07-06 | End: 2020-10-09

## 2020-07-06 NOTE — TELEPHONE ENCOUNTER
PATIENT CALLING ABOUT A REFERRAL TO DERMATOLOGIST. IT HAS BEEN ABOUT 3 WEEKS AND WANTED TO KNOW IF A REFERRAL WAS SENT BECAUSE SHE HAS NOT GOT A CALL.    BEST CONTACT PHONE: [phone]

## 2020-07-07 NOTE — TELEPHONE ENCOUNTER
Patient said Dermatologist is still needing referral. Wants to make sure we send them a physical referral. Same callback number.

## 2020-07-28 DIAGNOSIS — N18.30 CKD (CHRONIC KIDNEY DISEASE) STAGE 3, GFR 30-59 ML/MIN (HCC): ICD-10-CM

## 2020-07-28 DIAGNOSIS — N18.9 CHRONIC KIDNEY DISEASE, UNSPECIFIED CKD STAGE: ICD-10-CM

## 2020-07-28 DIAGNOSIS — R60.9 EDEMA, UNSPECIFIED TYPE: ICD-10-CM

## 2020-07-28 DIAGNOSIS — I10 ESSENTIAL HYPERTENSION: ICD-10-CM

## 2020-07-28 DIAGNOSIS — I10 ESSENTIAL HYPERTENSION: Primary | ICD-10-CM

## 2020-07-29 LAB
25(OH)D3+25(OH)D2 SERPL-MCNC: 34.1 NG/ML (ref 30–100)
ALBUMIN SERPL-MCNC: 4.2 G/DL (ref 3.8–4.8)
ALBUMIN/GLOB SERPL: 1.4 {RATIO} (ref 1.2–2.2)
ALP SERPL-CCNC: 100 IU/L (ref 39–117)
ALT SERPL-CCNC: 11 IU/L (ref 0–32)
APPEARANCE UR: ABNORMAL
AST SERPL-CCNC: 12 IU/L (ref 0–40)
BACTERIA #/AREA URNS HPF: ABNORMAL /[HPF]
BASOPHILS # BLD AUTO: 0.1 X10E3/UL (ref 0–0.2)
BASOPHILS NFR BLD AUTO: 1 %
BILIRUB SERPL-MCNC: 0.4 MG/DL (ref 0–1.2)
BILIRUB UR QL STRIP: NEGATIVE
BUN SERPL-MCNC: 20 MG/DL (ref 8–27)
BUN/CREAT SERPL: 19 (ref 12–28)
CALCIUM SERPL-MCNC: 9.7 MG/DL (ref 8.7–10.3)
CHLORIDE SERPL-SCNC: 96 MMOL/L (ref 96–106)
CO2 SERPL-SCNC: 29 MMOL/L (ref 20–29)
COLOR UR: YELLOW
CREAT SERPL-MCNC: 1.04 MG/DL (ref 0.57–1)
CRYSTALS URNS MICRO: ABNORMAL
EOSINOPHIL # BLD AUTO: 0.3 X10E3/UL (ref 0–0.4)
EOSINOPHIL NFR BLD AUTO: 3 %
EPI CELLS #/AREA URNS HPF: ABNORMAL /HPF (ref 0–10)
ERYTHROCYTE [DISTWIDTH] IN BLOOD BY AUTOMATED COUNT: 14.5 % (ref 11.7–15.4)
FERRITIN SERPL-MCNC: 34 NG/ML (ref 15–150)
GLOBULIN SER CALC-MCNC: 3.1 G/DL (ref 1.5–4.5)
GLUCOSE SERPL-MCNC: 181 MG/DL (ref 65–99)
GLUCOSE UR QL: NEGATIVE
HCT VFR BLD AUTO: 40.7 % (ref 34–46.6)
HGB BLD-MCNC: 12.7 G/DL (ref 11.1–15.9)
HGB UR QL STRIP: ABNORMAL
IMM GRANULOCYTES # BLD AUTO: 0.3 X10E3/UL (ref 0–0.1)
IMM GRANULOCYTES NFR BLD AUTO: 4 %
IRON SATN MFR SERPL: 11 % (ref 15–55)
IRON SERPL-MCNC: 48 UG/DL (ref 27–139)
KETONES UR QL STRIP: NEGATIVE
LEUKOCYTE ESTERASE UR QL STRIP: ABNORMAL
LYMPHOCYTES # BLD AUTO: 1.4 X10E3/UL (ref 0.7–3.1)
LYMPHOCYTES NFR BLD AUTO: 18 %
MAGNESIUM SERPL-MCNC: 1.7 MG/DL (ref 1.6–2.3)
MCH RBC QN AUTO: 25.9 PG (ref 26.6–33)
MCHC RBC AUTO-ENTMCNC: 31.2 G/DL (ref 31.5–35.7)
MCV RBC AUTO: 83 FL (ref 79–97)
MICRO URNS: ABNORMAL
MONOCYTES # BLD AUTO: 0.7 X10E3/UL (ref 0.1–0.9)
MONOCYTES NFR BLD AUTO: 10 %
MUCOUS THREADS URNS QL MICRO: PRESENT
NEUTROPHILS # BLD AUTO: 4.8 X10E3/UL (ref 1.4–7)
NEUTROPHILS NFR BLD AUTO: 64 %
NITRITE UR QL STRIP: NEGATIVE
PH UR STRIP: 6.5 [PH] (ref 5–7.5)
PLATELET # BLD AUTO: 428 X10E3/UL (ref 150–450)
POTASSIUM SERPL-SCNC: 4.4 MMOL/L (ref 3.5–5.2)
PROT SERPL-MCNC: 7.3 G/DL (ref 6–8.5)
PROT UR QL STRIP: ABNORMAL
PTH-INTACT SERPL-MCNC: 40 PG/ML (ref 15–65)
RBC # BLD AUTO: 4.91 X10E6/UL (ref 3.77–5.28)
RBC #/AREA URNS HPF: ABNORMAL /HPF (ref 0–2)
SODIUM SERPL-SCNC: 140 MMOL/L (ref 134–144)
SP GR UR: 1.02 (ref 1–1.03)
TIBC SERPL-MCNC: 434 UG/DL (ref 250–450)
UIBC SERPL-MCNC: 386 UG/DL (ref 118–369)
UNIDENT CRYS URNS QL MICRO: PRESENT
URATE SERPL-MCNC: 7.6 MG/DL (ref 2.5–7.1)
UROBILINOGEN UR STRIP-MCNC: 1 MG/DL (ref 0.2–1)
WBC # BLD AUTO: 7.5 X10E3/UL (ref 3.4–10.8)
WBC #/AREA URNS HPF: >30 /HPF (ref 0–5)

## 2020-08-10 RX ORDER — ESCITALOPRAM OXALATE 10 MG/1
TABLET ORAL
Qty: 30 TABLET | Refills: 4 | Status: SHIPPED | OUTPATIENT
Start: 2020-08-10 | End: 2021-01-07

## 2020-08-26 ENCOUNTER — APPOINTMENT (OUTPATIENT)
Dept: LAB | Facility: HOSPITAL | Age: 70
End: 2020-08-26

## 2020-09-02 ENCOUNTER — APPOINTMENT (OUTPATIENT)
Dept: LAB | Facility: HOSPITAL | Age: 70
End: 2020-09-02

## 2020-09-11 RX ORDER — POTASSIUM CHLORIDE 750 MG/1
20 CAPSULE, EXTENDED RELEASE ORAL DAILY
Qty: 60 CAPSULE | Refills: 2 | Status: SHIPPED | OUTPATIENT
Start: 2020-09-11 | End: 2020-12-17

## 2020-09-15 ENCOUNTER — LAB (OUTPATIENT)
Dept: LAB | Facility: HOSPITAL | Age: 70
End: 2020-09-15

## 2020-09-15 DIAGNOSIS — D50.8 OTHER IRON DEFICIENCY ANEMIA: ICD-10-CM

## 2020-09-15 LAB
BASOPHILS # BLD AUTO: 0.07 10*3/MM3 (ref 0–0.2)
BASOPHILS NFR BLD AUTO: 0.8 % (ref 0–1.5)
DEPRECATED RDW RBC AUTO: 46.9 FL (ref 37–54)
EOSINOPHIL # BLD AUTO: 0.33 10*3/MM3 (ref 0–0.4)
EOSINOPHIL NFR BLD AUTO: 3.6 % (ref 0.3–6.2)
ERYTHROCYTE [DISTWIDTH] IN BLOOD BY AUTOMATED COUNT: 15.1 % (ref 12.3–15.4)
FERRITIN SERPL-MCNC: 48.6 NG/ML (ref 13–150)
HCT VFR BLD AUTO: 43.1 % (ref 34–46.6)
HGB BLD-MCNC: 12.9 G/DL (ref 12–15.9)
IMM GRANULOCYTES # BLD AUTO: 0.05 10*3/MM3 (ref 0–0.05)
IMM GRANULOCYTES NFR BLD AUTO: 0.5 % (ref 0–0.5)
IRON 24H UR-MRATE: 52 MCG/DL (ref 37–145)
IRON SATN MFR SERPL: 11 % (ref 14–48)
LYMPHOCYTES # BLD AUTO: 1.71 10*3/MM3 (ref 0.7–3.1)
LYMPHOCYTES NFR BLD AUTO: 18.6 % (ref 19.6–45.3)
MCH RBC QN AUTO: 25.9 PG (ref 26.6–33)
MCHC RBC AUTO-ENTMCNC: 29.9 G/DL (ref 31.5–35.7)
MCV RBC AUTO: 86.4 FL (ref 79–97)
MONOCYTES # BLD AUTO: 0.93 10*3/MM3 (ref 0.1–0.9)
MONOCYTES NFR BLD AUTO: 10.1 % (ref 5–12)
NEUTROPHILS NFR BLD AUTO: 6.09 10*3/MM3 (ref 1.7–7)
NEUTROPHILS NFR BLD AUTO: 66.4 % (ref 42.7–76)
NRBC BLD AUTO-RTO: 0 /100 WBC (ref 0–0.2)
PLATELET # BLD AUTO: 389 10*3/MM3 (ref 140–450)
PMV BLD AUTO: 9.8 FL (ref 6–12)
RBC # BLD AUTO: 4.99 10*6/MM3 (ref 3.77–5.28)
TIBC SERPL-MCNC: 473 MCG/DL (ref 249–505)
TRANSFERRIN SERPL-MCNC: 338 MG/DL (ref 200–360)
WBC # BLD AUTO: 9.18 10*3/MM3 (ref 3.4–10.8)

## 2020-09-15 PROCEDURE — 36415 COLL VENOUS BLD VENIPUNCTURE: CPT

## 2020-09-15 PROCEDURE — 82728 ASSAY OF FERRITIN: CPT

## 2020-09-15 PROCEDURE — 84466 ASSAY OF TRANSFERRIN: CPT

## 2020-09-15 PROCEDURE — 83540 ASSAY OF IRON: CPT

## 2020-09-15 PROCEDURE — 85025 COMPLETE CBC W/AUTO DIFF WBC: CPT

## 2020-09-17 NOTE — PROGRESS NOTES
Subjective .     REASONS FOR FOLLOWUP:    1. Iron deficiency anemia:   · Long-standing chronic anemia with worsening in 2017 along with declining MCV.    · Associated thrombocytosis and pica symptoms  · Iron deficient 2/6/19 with ferritin less than 5, iron saturation 3%, hemoglobin 8.4, MCV 63.5.  · Received Injectafer 750mg IV 2/12 and 2/19/19  · Stool cards negative for occult blood x3 on 2/27/19  · EGD/colonoscopy 3/4/19 with hiatal hernia, gastritis, benign colonic polyps, no bleeding source identified.  · Initiated oral iron sulfate every other day 9/24/2020  2. Left lower extremity DVT (popliteal/calf) with suspected pulmonary embolism:  · Identified 12/1/17 during hospitalization for small bowel perforation requiring abdominal surgery with subsequent sepsis (provoked).  · Left lower extremity popliteal/calf DVT 12/1/17  · V/Q scan 12/2/17 with intermediate probability for pulmonary embolism  · Continuation of anticoagulation with Eliquis 5 mg twice daily  · Hypercoagulable evaluation 12/6/19 negative  · Due to provoked nature of thrombosis, anticoagulation discontinued 12/27/19  · Anticoagulation Eliquis 5 mg twice daily following right total knee arthroplasty 4/8/19 until fully ambulatory.  Patient with prolonged recovery from surgery, became fully ambulatory and Eliquis discontinued 8/6/2019.  · Increase in bilateral lower extremity edema, lower extremity Doppler 9/24/2020 negative for DVT    HISTORY OF PRESENT ILLNESS:  The patient is a 70 y.o. year old female who is here for follow-up with the above-mentioned history.    History of Present Illness patient returns today in follow-up with labs to review.  In the interval she reports that she has developed progressive lower extremity edema.  She remains in a wheelchair today due to significant difficulty regarding osteoarthritis in her knees.  This is unchanged.  She has no other complaints.  She has not seen any evidence of blood in her stool.    Past  Medical History:   Diagnosis Date   • Acute on chronic systolic CHF (congestive heart failure) (CMS/[facility]) 10/27/2017   • Acute respiratory failure (CMS/[facility]) 2/24/2016   • BRODY (acute kidney injury) (CMS/[facility]) 12/5/2017   • Arthritis     OSTEOARTHRITIS KNEES   • ATN (acute tubular necrosis) (CMS/[facility]) 12/7/2017   • CHF (congestive heart failure) (CMS/[facility])    • Colon polyp    • COPD (chronic obstructive pulmonary disease) (CMS/[facility])    • Diabetes mellitus (CMS/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)    • Sleep apnea    • Small bowel perforation (CMS/[facility]) 1/31/2018   • Thrombocytosis (CMS/[facility]) 2/6/2019     Past Surgical History:   Procedure Laterality Date   • ABDOMINAL SURGERY  11/2017    Delayed closure of abdominal wound-wound vac placement, Dr. Nestor Howe   • BLADDER SURGERY      BLADDER LIFT, 2011   • CARDIAC CATHETERIZATION  2017    Normal coronary arteries, normal overall LVSF with wall motion abnormality   • COLON SURGERY      RECONSTRUCTION, 2001   • COLONOSCOPY N/A 3/4/2019    Procedure: COLONOSCOPY polypectomy;  Surgeon: Nai Luong MD;  Location: [facility] OR;  Service: Gastroenterology   • CYSTOSCOPY URETEROSCOPY LASER LITHOTRIPSY Right 10/23/2017    Procedure: CYSTO RT URETEROSCOPY LASER  STONE BASKETING RIGHT STENT EXCHANGE;  Surgeon: Quinn Cason MD;  Location: [facility] OR;  Service:    • ENDOSCOPY N/A 3/4/2019    Procedure: ESOPHAGOGASTRODUODENOSCOPY WITH BIOPSIES;  Surgeon: Nai Luong MD;  Location: [facility] OR;  Service: Gastroenterology   • EXPLORATORY  LAPAROTOMY     • HYSTERECTOMY      2001   • ROTATOR CUFF REPAIR Right 2016   • STEROID INJECTION KNEE      Had left knee injection 10/09/2018; right knee injection 10/02/2017   • TOTAL KNEE ARTHROPLASTY Right 4/8/2019    Procedure: RIGHT TOTAL KNEE ARTHROPLASTY;  Surgeon: aFrzad Luong MD;  Location: [facility] OR;  Service: Orthopedics         Current Outpatient Medications on File Prior to Visit   Medication Sig Dispense Refill   • acetaminophen (TYLENOL) 325 MG tablet Take 2 tablets by mouth Every 4 (Four) Hours As Needed for Moderate Pain  or Fever (greater than 101 F). 120 tablet 0   • albuterol (PROVENTIL) (2.5 MG/3ML) 0.083% nebulizer solution Take 2.5 mg by nebulization Every 4 (Four) Hours As Needed for Wheezing.     • Apixaban (ELIQUIS PO) Take 5 mg by mouth 2 (Two) Times a Day. Pt unsure of dose.      • bisoprolol (ZEBeta) 5 MG tablet TAKE 1/2 TABLET BY MOUTH EVERY DAY 45 tablet 1   • bumetanide (BUMEX) 2 MG tablet Take 1 tablet by mouth Daily. 30 tablet 5   • dapagliflozin (FARXIGA) 5 MG tablet tablet Take 5 mg by mouth Daily.     • dextrose (GLUTOSE) 40 % gel Take 15 g by mouth Every 15 (Fifteen) Minutes As Needed for Low Blood Sugar (Blood sugar less than 70). 30 each 0   • escitalopram (LEXAPRO) 10 MG tablet TAKE 1 TABLET BY MOUTH EVERY DAY 30 tablet 4   • HYDROcodone-acetaminophen (NORCO) 5-325 MG per tablet Take 1 tablet by mouth Every 6 (Six) Hours As Needed.     • insulin lispro (humaLOG) 100 UNIT/ML injection Inject 0-9 Units under the skin into the appropriate area as directed 4 (Four) Times a Day With Meals & at Bedtime. 1 each 12   • ipratropium-albuterol (DUO-NEB) 0.5-2.5 mg/3 ml nebulizer Take 3 mL by nebulization 3 (Three) Times a Day. 360 mL 0   • levothyroxine (SYNTHROID, LEVOTHROID) 50 MCG tablet Take 1 tablet by mouth Daily. 90 tablet 1   • metFORMIN (GLUCOPHAGE) 1000 MG tablet Take 1,000 mg by mouth 2 (Two) Times a Day With Meals.     • naproxen (NAPROSYN) 500 MG tablet Take 1 tablet  by mouth 2 (Two) Times a Day With Meals. 60 tablet 0   • nystatin (MYCOSTATIN) 946141 UNIT/GM powder Apply  topically to the appropriate area as directed Every 12 (Twelve) Hours. 1 each 0   • ondansetron (ZOFRAN) 4 MG tablet Take 1 tablet by mouth Every 6 (Six) Hours As Needed for Nausea or Vomiting. 60 tablet 0   • ONE TOUCH ULTRA TEST test strip      • ONETOUCH DELICA LANCETS 33G misc      • OZEMPIC, 0.25 OR 0.5 MG/DOSE, 2 MG/1.5ML solution pen-injector INJECT 0.25 MG INTO THE SKIN ONCE WEEKLY FOR 4 WEEKS THEN INCREASE TO 0.5 MG WEEKLY     • pantoprazole (PROTONIX) 40 MG EC tablet TAKE 1 TABLET BY MOUTH EVERY DAY 90 tablet 1   • potassium chloride (MICRO-K) 10 MEQ CR capsule TAKE 2 CAPSULES BY MOUTH DAILY 60 capsule 2   • pravastatin (PRAVACHOL) 40 MG tablet Take 40 mg by mouth Every Night.     • promethazine-dextromethorphan (PROMETHAZINE-DM) 6.25-15 MG/5ML solution Take 5 mL by mouth 4 (Four) Times a Day As Needed for Cough. 118 mL 1   • sennosides-docusate sodium (SENOKOT-S) 8.6-50 MG tablet Take 2 tablets by mouth 2 (Two) Times a Day. 60 tablet 0   • sildenafil (REVATIO) 20 MG tablet Take 20 mg by mouth 3 (Three) Times a Day.     • traMADol (ULTRAM) 50 MG tablet Take 1 tablet by mouth Every 6 (Six) Hours As Needed for Moderate Pain . 12 tablet 0   • triamcinolone (KENALOG) 0.1 % ointment Apply  topically to the appropriate area as directed 2 (Two) Times a Day. 15 g 1   • vitamin D (ERGOCALCIFEROL) 08334 units capsule capsule Take 1 capsule by mouth 1 (One) Time Per Week. 13 capsule 1     No current facility-administered medications on file prior to visit.        ALLERGIES:   No Known Allergies    Social History     Socioeconomic History   • Marital status: Single     Spouse name: Not on file   • Number of children: Not on file   • Years of education: High school   • Highest education level: Not on file   Occupational History     Employer: RETIRED   Tobacco Use   • Smoking status: Never Smoker   • Smokeless  tobacco: Never Used   Substance and Sexual Activity   • Alcohol use: Yes     Comment: 1-2X YEAR   • Drug use: No   • Sexual activity: Defer     Family History   Problem Relation Age of Onset   • Cancer Other    • Stroke Other    • Other Other         LUPUS ANTICOAGULANT   • Rheum arthritis Other    • Heart disease Other    • Heart attack Other    • Lupus Mother    • Stomach cancer Father    • Malig Hyperthermia Neg Hx    • Colon cancer Neg Hx    • Colon polyps Neg Hx               Review of Systems   Constitutional: Negative for activity change, appetite change, fatigue, fever and unexpected weight change.   HENT: Negative for congestion, mouth sores, nosebleeds, sore throat and voice change.    Respiratory: Negative for cough, chest tightness, shortness of breath and wheezing.    Cardiovascular: Positive for leg swelling. Negative for chest pain and palpitations.   Gastrointestinal: Negative for abdominal distention, abdominal pain, blood in stool, constipation, diarrhea, nausea and vomiting.   Endocrine: Negative for cold intolerance and heat intolerance.   Genitourinary: Negative for difficulty urinating, dysuria, frequency and hematuria.   Musculoskeletal: Positive for arthralgias. Negative for back pain, joint swelling and myalgias.   Skin: Negative for rash.   Neurological: Negative for dizziness, syncope, weakness, light-headedness, numbness and headaches.   Hematological: Negative for adenopathy. Does not bruise/bleed easily.   Psychiatric/Behavioral: Negative for confusion and sleep disturbance. The patient is not nervous/anxious.          Objective      Vitals:    09/24/20 1108   Pulse: 61   Temp: 99.5 °F (37.5 °C)   SpO2: 96%      Current Status 3/3/2020   ECOG score 0   Pain 0/10    Physical Exam   Constitutional: She is oriented to person, place, and time. She appears well-developed.   Eyes: Conjunctivae are normal.   Neck: No thyromegaly present.   Cardiovascular: Normal rate and regular rhythm. Exam  reveals no gallop and no friction rub.   No murmur heard.  Pulmonary/Chest: Effort normal and breath sounds normal. No respiratory distress. She has no wheezes.   Abdominal: Soft. Bowel sounds are normal. She exhibits no distension. There is no abdominal tenderness.   Musculoskeletal:      Comments: Increase in bilateral lower extremity edema, 1-2+ with venous stasis changes noted     Lymphadenopathy:        Head (right side): No submandibular adenopathy present.     She has no cervical adenopathy. No inguinal adenopathy noted on the right or left side.        Right: No supraclavicular adenopathy present.        Left: No supraclavicular adenopathy present.   Neurological: She is alert and oriented to person, place, and time. She displays normal reflexes. No cranial nerve deficit. She exhibits normal muscle tone.   Skin: Skin is warm and dry. No rash noted.   Psychiatric: Her behavior is normal.       RECENT LABS:  Hematology WBC   Date Value Ref Range Status   09/15/2020 9.18 3.40 - 10.80 10*3/mm3 Final   07/29/2020 9.87 4.5 - 11.0 10*3/uL Final     RBC   Date Value Ref Range Status   09/15/2020 4.99 3.77 - 5.28 10*6/mm3 Final   07/29/2020 5.28 (H) 4.0 - 5.2 10*6/uL Final     Hemoglobin   Date Value Ref Range Status   09/15/2020 12.9 12.0 - 15.9 g/dL Final   07/29/2020 13.4 12.0 - 16.0 g/dL Final     Hematocrit   Date Value Ref Range Status   09/15/2020 43.1 34.0 - 46.6 % Final   07/29/2020 46.2 (H) 36.0 - 46.0 % Final     Platelets   Date Value Ref Range Status   09/15/2020 389 140 - 450 10*3/mm3 Final   07/29/2020 465 (H) 140 - 440 10*3/uL Final        Lab Results   Component Value Date    GLUCOSE 174 (H) 04/14/2019    BUN 19 07/29/2020    CREATININE 0.9 07/29/2020    EGFRIFNONA 56 (L) 07/28/2020    EGFRIFAFRI 64 07/28/2020    BCR 21.0 (H) 07/29/2020    K 4.5 07/29/2020    CO2 29 07/29/2020    CALCIUM 9.9 07/29/2020    PROTENTOTREF 7.3 07/28/2020    ALBUMIN 4.1 07/29/2020    LABIL2 1.1 07/29/2020    AST 12 (L)  07/29/2020    ALT 12 (L) 07/29/2020     Additional labs reviewed today as outlined below    Assessment/Plan     1. Iron deficiency anemia:  · The patient has had a long-standing degree of anemia.  Anemia appears to have worsened following her hospitalization for bowel obstruction and sepsis in late 2017.  Previous hemoglobin in the 7-8 range during hospitalization in late 2017, subsequent increase into the 9-10 range in early 2018 with gradual decline into the 8-9 range.  · In 2018, the patient developed declining MCV which has gradually decreased to 63.5 2/6/19    · Patient had concurrent thrombocytosis with platelet count in the high 400-500,000 range, subsequently increased to 600,000 range. Related to underlying iron deficiency.  · Patient developed associated PICA symptom of ice craving.  · There was no visible evidence of GI blood loss however patient remains on chronic anticoagulation with Eliquis.  She had never undergone EGD or colonoscopy.  Stool cards negative for occult blood on 2/27/19.    · Anemia evaluation 2/6/19 at initial visit with hemoglobin 8.6, MCV 63.5, platelet count 694,000, iron 18, ferritin less than 5, iron saturation 3%, TIBC 584, B12 380, folate greater than 20, creatinine 0.74, ESR 30, REBECCA negative, haptoglobin 324, .  · In order to facilitate rapid correction of her iron deficiency for anticipated knee surgery, administered IV iron in the form of Injectafer 750mg on 2/12 and 2/19/19.  · EGD and colonoscopy 3/4/19 with no evidence of bleeding source, evidence of hiatal hernia, gastritis, benign colonic polyps.  · Pica symptoms resolved  · Labs on 2/27/2019 with ferritin 617, iron saturation 16%, hemoglobin 10.4  · Labs on 11/26/2019 with stable hemoglobin at 12.8.  Iron studies however showed iron 29, ferritin that declined from 151.9 down to 62.3, iron saturation down to 7% with TIBC 391.    · Labs 2/25/2020 showed improving hemoglobin up to 13.3.  Iron was 33, ferritin stable  at 63.2, iron saturation 8%, TIBC 412.    · Patient returns today in follow-up with labs from 9/15/2020 showing stable hemoglobin at 12.9, iron studies fairly stable with iron 52, ferritin 48.6, iron saturation 11%, TIBC 473.  The patient reports that she has never really tried oral iron in the past.  I did suggest that we attempt treatment with oral iron in the form of iron sulfate every other day to see if this produces any improvement in her values.  Given the stability I think we could check her labs in 6 months prior to her return visit.  2. Thrombocytosis:  · As above, the patient had a platelet count in the high 400-500,000 range since late 2017.  This progressively increased to 809,000 on 2/12/19.  · Secondary to iron deficiency anemia  · Platelet count improved following IV iron administration  · Platelet count currently 389,000  3. Left lower extremity DVT (popliteal/calf) with suspected pulmonary embolism:  · Patient was admitted in November/December 2017 with small bowel perforation requiring abdominal surgery and subsequent sepsis  · Lower extremity Doppler 12/1/17 with popliteal and calf DVT on the left  · V/Q scan 12/2/17 with intermediate probability for pulmonary embolism  · Patient was anticoagulated and has continued on treatment with Eliquis 5mg bid.  She is now over one year out from her thrombosis that was provoked.  · Patient reports that her daughter developed DVT and pulmonary embolism in her 30s and apparently has an IVC filter in place.  She is a poor historian, accuracy is unknown.  Unclear whether her daughter has been evaluated with hypercoagulable labs.  · Given the family history, we did proceed with hypercoagulable evaluation 2/6/19: Negative factor V Leiden mutation, negative prothrombin gene mutation, protein C activity 192%, protein S activity 127%, Antithrombin III greater than 140, lupus anticoagulant negative, anticardiolipin antibody panel negative, anti-beta-2 GP 1 antibody  panel negative.  · Hypercoagulable evaluation was negative.  Given the provoked nature of her previous thrombosis, and having been anticoagulated for over one year, on 2/27/19, recommended that she discontinue anticoagulation.  We did discuss signs and symptoms of recurrent DVT and pulmonary embolism and the need to seek immediate medical attention if these occur.  · Right total knee arthroplasty 4/8/2019.  Patient received Eliquis 5 mg twice daily postoperatively.  It was anticipated that she would only require brief treatment with Eliquis however her recovery from her knee replacement was prolonged. She did develop some pain and swelling in the right knee and underwent lower extremity Doppler on 6/20/2019 which was negative for DVT, showed some valvular incompetence.  With eventual improvement in mobility, Eliquis discontinued 8/6/2019.  · Patient with recent increase in bilateral lower extremity edema.  We did pursue bilateral lower extremity Dopplers today which fortunately showed no evidence of DVT.    Plan:    1. We did pursue bilateral lower extremity Doppler today which was negative for DVT  2. Patient will begin oral iron sulfate every other day  3. MD visit in 6 months.  1 week prior we will draw labs with CBC, iron panel, ferritin.

## 2020-09-24 ENCOUNTER — OFFICE VISIT (OUTPATIENT)
Dept: ONCOLOGY | Facility: CLINIC | Age: 70
End: 2020-09-24

## 2020-09-24 ENCOUNTER — APPOINTMENT (OUTPATIENT)
Dept: LAB | Facility: HOSPITAL | Age: 70
End: 2020-09-24

## 2020-09-24 ENCOUNTER — HOSPITAL ENCOUNTER (OUTPATIENT)
Dept: CARDIOLOGY | Facility: HOSPITAL | Age: 70
Discharge: HOME | End: 2020-09-24
Admitting: INTERNAL MEDICINE

## 2020-09-24 VITALS — WEIGHT: 254.1 LBS | TEMPERATURE: 99.5 F | HEART RATE: 61 BPM | BODY MASS INDEX: 46.46 KG/M2 | OXYGEN SATURATION: 96 %

## 2020-09-24 DIAGNOSIS — Z86.718 HISTORY OF DVT (DEEP VEIN THROMBOSIS): ICD-10-CM

## 2020-09-24 DIAGNOSIS — D50.8 OTHER IRON DEFICIENCY ANEMIA: Primary | ICD-10-CM

## 2020-09-24 DIAGNOSIS — D50.8 OTHER IRON DEFICIENCY ANEMIA: ICD-10-CM

## 2020-09-24 LAB

## 2020-09-24 PROCEDURE — 99213 OFFICE O/P EST LOW 20 MIN: CPT | Performed by: INTERNAL MEDICINE

## 2020-09-24 PROCEDURE — 93970 EXTREMITY STUDY: CPT

## 2020-10-05 ENCOUNTER — TELEPHONE (OUTPATIENT)
Dept: FAMILY MEDICINE CLINIC | Facility: CLINIC | Age: 70
End: 2020-10-05

## 2020-10-05 RX ORDER — BISOPROLOL FUMARATE 5 MG/1
TABLET, FILM COATED ORAL
Qty: 45 TABLET | Refills: 1 | Status: SHIPPED | OUTPATIENT
Start: 2020-10-05 | End: 2021-04-19

## 2020-10-08 RX ORDER — LEVOTHYROXINE SODIUM 0.05 MG/1
50 TABLET ORAL DAILY
Qty: 90 TABLET | Refills: 1 | Status: SHIPPED | OUTPATIENT
Start: 2020-10-08 | End: 2021-04-09

## 2020-10-09 ENCOUNTER — OFFICE VISIT (OUTPATIENT)
Dept: FAMILY MEDICINE CLINIC | Facility: CLINIC | Age: 70
End: 2020-10-09

## 2020-10-09 VITALS
HEART RATE: 82 BPM | DIASTOLIC BLOOD PRESSURE: 80 MMHG | WEIGHT: 241 LBS | BODY MASS INDEX: 44.35 KG/M2 | OXYGEN SATURATION: 93 % | TEMPERATURE: 98 F | SYSTOLIC BLOOD PRESSURE: 130 MMHG | HEIGHT: 62 IN

## 2020-10-09 DIAGNOSIS — I27.20 PULMONARY HTN (HCC): ICD-10-CM

## 2020-10-09 DIAGNOSIS — E11.9 TYPE 2 DIABETES MELLITUS WITHOUT COMPLICATION, WITHOUT LONG-TERM CURRENT USE OF INSULIN (HCC): ICD-10-CM

## 2020-10-09 DIAGNOSIS — I50.33 ACUTE ON CHRONIC DIASTOLIC CHF (CONGESTIVE HEART FAILURE) (HCC): Primary | ICD-10-CM

## 2020-10-09 DIAGNOSIS — I51.89 DIASTOLIC DYSFUNCTION: ICD-10-CM

## 2020-10-09 DIAGNOSIS — J44.9 OSA AND COPD OVERLAP SYNDROME (HCC): ICD-10-CM

## 2020-10-09 DIAGNOSIS — G47.33 OSA AND COPD OVERLAP SYNDROME (HCC): ICD-10-CM

## 2020-10-09 PROBLEM — E87.70 FLUID OVERLOAD: Status: ACTIVE | Noted: 2020-10-09

## 2020-10-09 PROBLEM — N18.30 STAGE 3 CHRONIC KIDNEY DISEASE (HCC): Status: ACTIVE | Noted: 2020-10-09

## 2020-10-09 PROBLEM — I07.1 TRICUSPID VALVE REGURGITATION: Status: ACTIVE | Noted: 2020-10-09

## 2020-10-09 PROCEDURE — 99214 OFFICE O/P EST MOD 30 MIN: CPT | Performed by: INTERNAL MEDICINE

## 2020-10-09 RX ORDER — TORSEMIDE 20 MG/1
40 TABLET ORAL
COMMUNITY
Start: 2020-10-05 | End: 2020-12-07 | Stop reason: SDUPTHER

## 2020-10-09 RX ORDER — METFORMIN HYDROCHLORIDE 500 MG/1
1000 TABLET, EXTENDED RELEASE ORAL 2 TIMES DAILY
Qty: 120 TABLET | Refills: 5 | Status: SHIPPED | OUTPATIENT
Start: 2020-10-09 | End: 2021-04-19

## 2020-10-09 NOTE — PROGRESS NOTES
Subjective Chief complaint is follow-up from the hospital  Mariangel Nguyen is a 70 y.o. female.     History of Present Illness Mariangel is here today for follow-up.  She was admitted to [facility] on September 30.  She was having increased leg swelling and shortness of breath.  She was treated with IV diuresis.  She responded well to that.  She has lost approximately 14 pounds since admission.  She was discharged on torsemide and place of her bumetanide.  For some reason her potassium was discontinued but she has been having a lot of loose bowel movements likely due to a switch from the long-acting metformin to the short acting metformin.  Her discharge potassium looked normal and I think she should continue this for now.  She was also given an inhaler.  Her dose of bisoprolol was adjusted to 5 mg.  Current outpatient and discharge medications have been reconciled for the patient.  Reviewed by: Waqar Burton MD  Her chest x-ray showed considerable pulmonary vascular congestion.  Her EKG showed no changes.  Her initial CBC looked okay.  Unfortunately she has not been able to afford the medicines needed to control her diabetes.  Her hemoglobin A1c is 9.0 as of her admission to the hospital.  The Metformin that is a short acting metformin seems to be causing her excess of explosive liquid bowel movements.  The following portions of the patient's history were reviewed and updated as appropriate: allergies, current medications, past family history, past medical history, past social history, past surgical history and problem list.    Review of Systems   Constitutional: Negative for chills and fever.   Respiratory: Positive for shortness of breath. Negative for cough and chest tightness.    Cardiovascular: Positive for leg swelling. Negative for chest pain.   Genitourinary: Positive for frequency.       Objective   Physical Exam  Vitals signs and nursing note reviewed. Exam conducted with a chaperone  present.   Neck:      Vascular: No carotid bruit or JVD.   Cardiovascular:      Rate and Rhythm: Normal rate and regular rhythm.      Pulses: Normal pulses.   Pulmonary:      Effort: Pulmonary effort is normal.      Breath sounds: No wheezing or rales.   Abdominal:      Comments: There is no obvious ascites   Musculoskeletal:      Comments: She has very minimal pretibial edema   Neurological:      Mental Status: She is alert.           Assessment/Plan   Mariangel was seen today for follow-up.    Diagnoses and all orders for this visit:    Acute on chronic diastolic CHF (congestive heart failure) (CMS/HCC)    Diastolic dysfunction    KAELA and COPD overlap syndrome (CMS/HCC)    Pulmonary HTN (CMS/HCC)    Type 2 diabetes mellitus without complication, without long-term current use of insulin (CMS/HCC)    Other orders  -     metFORMIN ER (GLUCOPHAGE-XR) 500 MG 24 hr tablet; Take 2 tablets by mouth 2 (Two) Times a Day.    Mariangel is here today for follow-up after hospital visit.  She appears to be stable in terms of her congestive heart failure.  Her biggest problem seems to be diarrhea and loose stools likely from the short acting metformin.  I am going to see if we can get her on a long-acting metformin.  It would be nice if her insurance company would cover some of the medicines that I think would do better for her in terms of her heart failure.  She would probably do better with something such as Jardiance.  I am going to leave her off her pantoprazole because that can also cause diarrhea.  It was stopped at the hospital as well.  She does not really have much in way of indigestion or heartburn.  I am going to see her back in 1 month.

## 2020-11-13 ENCOUNTER — OFFICE VISIT (OUTPATIENT)
Dept: FAMILY MEDICINE CLINIC | Facility: CLINIC | Age: 70
End: 2020-11-13

## 2020-11-13 VITALS
BODY MASS INDEX: 45.08 KG/M2 | WEIGHT: 245 LBS | OXYGEN SATURATION: 95 % | TEMPERATURE: 98.2 F | SYSTOLIC BLOOD PRESSURE: 120 MMHG | HEIGHT: 62 IN | DIASTOLIC BLOOD PRESSURE: 80 MMHG | HEART RATE: 94 BPM

## 2020-11-13 DIAGNOSIS — I50.33 ACUTE ON CHRONIC DIASTOLIC CHF (CONGESTIVE HEART FAILURE) (HCC): ICD-10-CM

## 2020-11-13 DIAGNOSIS — E11.9 TYPE 2 DIABETES MELLITUS WITHOUT COMPLICATION, WITHOUT LONG-TERM CURRENT USE OF INSULIN (HCC): ICD-10-CM

## 2020-11-13 DIAGNOSIS — R19.7 DIARRHEA, UNSPECIFIED TYPE: Primary | ICD-10-CM

## 2020-11-13 PROCEDURE — 99213 OFFICE O/P EST LOW 20 MIN: CPT | Performed by: INTERNAL MEDICINE

## 2020-11-13 RX ORDER — GLIMEPIRIDE 2 MG/1
TABLET ORAL
COMMUNITY
Start: 2020-11-03 | End: 2021-05-26 | Stop reason: SDUPTHER

## 2020-11-13 RX ORDER — BUMETANIDE 2 MG/1
TABLET ORAL
COMMUNITY
Start: 2020-11-11 | End: 2020-12-08

## 2020-11-13 RX ORDER — LISINOPRIL 10 MG/1
TABLET ORAL DAILY
COMMUNITY
Start: 2020-10-18 | End: 2021-06-02 | Stop reason: SDUPTHER

## 2020-11-13 NOTE — PROGRESS NOTES
Subjective Chief complaint is follow-up on diarrhea  Mariangel Nguyen is a 70 y.o. female.     History of Present Illness Mariangel is here today for follow-up.  Since her last visit we did switch her over to long-acting Metformin.  Her diarrhea seems to have stopped.  We also stopped her pantoprazole because that can cause diarrhea and she seems to be doing okay without it in terms of her reflux.  She is taking her bumetanide and her fluid balance seems to be doing well.  She is not having chest pain or shortness of breath over her baseline.    The following portions of the patient's history were reviewed and updated as appropriate: allergies, current medications, past family history, past medical history, past social history, past surgical history and problem list.    Review of Systems   Constitutional: Negative for chills and fever.   Respiratory: Negative for cough.    Cardiovascular: Positive for leg swelling.       Objective   Physical Exam  Vitals signs and nursing note reviewed.   Neck:      Vascular: No carotid bruit.   Cardiovascular:      Rate and Rhythm: Normal rate and regular rhythm.      Heart sounds: No murmur.   Pulmonary:      Breath sounds: No wheezing or rales.   Musculoskeletal:      Comments: She has minimal edema of the lower extremities slightly worse on the left than the right           Assessment/Plan   Diagnoses and all orders for this visit:    1. Diarrhea, unspecified type (Primary)    2. Type 2 diabetes mellitus without complication, without long-term current use of insulin (CMS/[facility])    3. Acute on chronic diastolic CHF (congestive heart failure) (CMS/[facility])    Mariangel is here today for follow-up.  She seems to be in a stable fluid balance on her heart failure.  Her endocrinologist will check on the status of her diabetes at her next appointment in a month or so.  Her diarrhea has resolved and we will just observe this for now.  I will see her back in 3 months

## 2020-11-16 NOTE — PLAN OF CARE
Problem: Patient Care Overview  Goal: Plan of Care Review  Outcome: Ongoing (interventions implemented as appropriate)   04/14/19 0998   Coping/Psychosocial   Plan of Care Reviewed With patient   OTHER   Outcome Summary Pt walked further today, 80'Laureen and did TKA exercises after. Mild complaints of pain.           78

## 2020-12-07 NOTE — TELEPHONE ENCOUNTER
Caller: Mariangel Nguyen    Relationship: Self    Best call back number: [phone]    Medication needed:   Requested Prescriptions     Pending Prescriptions Disp Refills   • torsemide (DEMADEX) 20 MG tablet        Sig: Take 2 tablets by mouth.       When do you need the refill by: ASAP     Does the patient have less than a 3 day supply:  [x] Yes  [] No    What is the patient's preferred pharmacy: [id number] [place], [place] - [facility] [address] AT [facility] [address] [facility] - [phone] [facility] [phone] FX

## 2020-12-08 RX ORDER — TORSEMIDE 20 MG/1
40 TABLET ORAL DAILY
Qty: 60 TABLET | Refills: 5 | Status: SHIPPED | OUTPATIENT
Start: 2020-12-08 | End: 2021-10-21

## 2020-12-17 RX ORDER — POTASSIUM CHLORIDE 750 MG/1
CAPSULE, EXTENDED RELEASE ORAL
Qty: 60 CAPSULE | Refills: 2 | Status: SHIPPED | OUTPATIENT
Start: 2020-12-17 | End: 2021-04-13

## 2021-01-07 RX ORDER — ESCITALOPRAM OXALATE 10 MG/1
TABLET ORAL
Qty: 30 TABLET | Refills: 4 | Status: SHIPPED | OUTPATIENT
Start: 2021-01-07 | End: 2021-06-23

## 2021-01-29 NOTE — TELEPHONE ENCOUNTER
Darlyn with [facility] needs new orders.Pt being discharged from [facility] today. Need new orders for physical therapy, skilled therapy and  occupational therapy. Best call back [phone].     I advised that I was not aware the patient was even in the hospital.  Therefore I do not know what she actually needs.  I advised to do a one-time evaluation and see where she stands.  Currently she is ambulating okay and she knows how to use her medicines.  
Patient Refused

## 2021-02-26 ENCOUNTER — OFFICE VISIT (OUTPATIENT)
Dept: FAMILY MEDICINE CLINIC | Facility: CLINIC | Age: 71
End: 2021-02-26

## 2021-02-26 VITALS
SYSTOLIC BLOOD PRESSURE: 130 MMHG | WEIGHT: 248 LBS | BODY MASS INDEX: 45.64 KG/M2 | HEIGHT: 62 IN | DIASTOLIC BLOOD PRESSURE: 80 MMHG | HEART RATE: 80 BPM | OXYGEN SATURATION: 94 % | TEMPERATURE: 97.1 F

## 2021-02-26 DIAGNOSIS — I27.20 PULMONARY HTN (HCC): ICD-10-CM

## 2021-02-26 DIAGNOSIS — E11.9 TYPE 2 DIABETES MELLITUS WITHOUT COMPLICATION, WITHOUT LONG-TERM CURRENT USE OF INSULIN (HCC): Primary | ICD-10-CM

## 2021-02-26 DIAGNOSIS — E78.2 MIXED HYPERLIPIDEMIA: ICD-10-CM

## 2021-02-26 DIAGNOSIS — J44.9 CHRONIC OBSTRUCTIVE PULMONARY DISEASE, UNSPECIFIED COPD TYPE (HCC): ICD-10-CM

## 2021-02-26 DIAGNOSIS — I10 ESSENTIAL HYPERTENSION: ICD-10-CM

## 2021-02-26 PROCEDURE — 99213 OFFICE O/P EST LOW 20 MIN: CPT | Performed by: INTERNAL MEDICINE

## 2021-02-26 RX ORDER — ERGOCALCIFEROL 1.25 MG/1
50000 CAPSULE ORAL
COMMUNITY
Start: 2020-12-07

## 2021-02-26 RX ORDER — BUMETANIDE 2 MG/1
2 TABLET ORAL 2 TIMES DAILY
COMMUNITY
Start: 2020-12-09 | End: 2021-02-26 | Stop reason: ALTCHOICE

## 2021-02-26 NOTE — PROGRESS NOTES
Subjective Chief complaint is checkup on blood pressure and diabetes  Mariangel Nguyen is a 70 y.o. female.     History of Present Illness Mariangel is here today for checkup.  She does have hypertension and is on bisoprolol for this.  Her blood pressure appears to be well controlled today.  She does have COPD and does have some pulmonary hypertension.  This creates some chronic leg swelling but it is no different than usual.  She continues to use her inhalers.  She has some non-insulin-dependent diabetes.  She is having trouble affording the Jardiance and we will try to get her some samples of that today.  Her hyperlipidemia has been fairly well controlled with pravastatin.  Her  recently passed away from complications of multiple medical illnesses.  The following portions of the patient's history were reviewed and updated as appropriate: allergies, current medications, past family history, past medical history, past social history, past surgical history and problem list.    Review of Systems   Constitutional: Negative for chills and fever.   Respiratory: Negative for chest tightness and shortness of breath.    Cardiovascular: Positive for leg swelling.       Objective   Physical Exam  Constitutional:       Appearance: Normal appearance.   Cardiovascular:      Rate and Rhythm: Normal rate and regular rhythm.   Pulmonary:      Effort: Pulmonary effort is normal.      Breath sounds: No wheezing or rales.   Musculoskeletal:      Right lower leg: Edema present.      Left lower leg: Edema present.   Neurological:      Mental Status: She is alert.           Assessment/Plan   Diagnoses and all orders for this visit:    1. Type 2 diabetes mellitus without complication, without long-term current use of insulin (CMS/[facility]) (Primary)  -     Comprehensive Metabolic Panel  -     Hemoglobin A1c    2. Essential hypertension  -     CBC & Differential    3. Mixed hyperlipidemia  -     Lipid Panel    4. Chronic obstructive  pulmonary disease, unspecified COPD type (CMS/HCC)    5. Pulmonary HTN (CMS/HCC)    Mariangel is here today for follow-up on her diabetes.  She has other problems that are also remaining stable.  We are going to give her some samples of Jardiance and check some lab work today.

## 2021-02-27 LAB
ALBUMIN SERPL-MCNC: 4.2 G/DL (ref 3.5–5.2)
ALBUMIN/GLOB SERPL: 1.2 G/DL
ALP SERPL-CCNC: 96 U/L (ref 39–117)
ALT SERPL-CCNC: 13 U/L (ref 1–33)
AST SERPL-CCNC: 17 U/L (ref 1–32)
BASOPHILS # BLD AUTO: ABNORMAL 10*3/UL
BASOPHILS # BLD MANUAL: 0 10*3/MM3 (ref 0–0.2)
BASOPHILS NFR BLD MANUAL: 0 % (ref 0–1.5)
BILIRUB SERPL-MCNC: 0.3 MG/DL (ref 0–1.2)
BUN SERPL-MCNC: 23 MG/DL (ref 8–23)
BUN/CREAT SERPL: 17.7 (ref 7–25)
CALCIUM SERPL-MCNC: 10.3 MG/DL (ref 8.6–10.5)
CHLORIDE SERPL-SCNC: 94 MMOL/L (ref 98–107)
CHOLEST SERPL-MCNC: 232 MG/DL (ref 0–200)
CO2 SERPL-SCNC: 30.8 MMOL/L (ref 22–29)
CREAT SERPL-MCNC: 1.3 MG/DL (ref 0.57–1)
DIFFERENTIAL COMMENT: ABNORMAL
EOSINOPHIL # BLD AUTO: ABNORMAL 10*3/UL
EOSINOPHIL # BLD MANUAL: 0.24 10*3/MM3 (ref 0–0.4)
EOSINOPHIL NFR BLD AUTO: ABNORMAL %
EOSINOPHIL NFR BLD MANUAL: 3 % (ref 0.3–6.2)
ERYTHROCYTE [DISTWIDTH] IN BLOOD BY AUTOMATED COUNT: 13.2 % (ref 12.3–15.4)
GLOBULIN SER CALC-MCNC: 3.4 GM/DL
GLUCOSE SERPL-MCNC: 179 MG/DL (ref 65–99)
HBA1C MFR BLD: 7.7 % (ref 4.8–5.6)
HCT VFR BLD AUTO: 42.2 % (ref 34–46.6)
HDLC SERPL-MCNC: 56 MG/DL (ref 40–60)
HGB BLD-MCNC: 13.2 G/DL (ref 12–15.9)
LDLC SERPL CALC-MCNC: 118 MG/DL (ref 0–100)
LYMPHOCYTES # BLD AUTO: ABNORMAL 10*3/UL
LYMPHOCYTES # BLD MANUAL: 1.37 10*3/MM3 (ref 0.7–3.1)
LYMPHOCYTES NFR BLD AUTO: ABNORMAL %
LYMPHOCYTES NFR BLD MANUAL: 17 % (ref 19.6–45.3)
MCH RBC QN AUTO: 27.6 PG (ref 26.6–33)
MCHC RBC AUTO-ENTMCNC: 31.3 G/DL (ref 31.5–35.7)
MCV RBC AUTO: 88.3 FL (ref 79–97)
MONOCYTES # BLD MANUAL: 0.88 10*3/MM3 (ref 0.1–0.9)
MONOCYTES NFR BLD AUTO: ABNORMAL %
MONOCYTES NFR BLD MANUAL: 11 % (ref 5–12)
NEUTROPHILS # BLD MANUAL: 5.54 10*3/MM3 (ref 1.7–7)
NEUTROPHILS NFR BLD AUTO: ABNORMAL %
NEUTROPHILS NFR BLD MANUAL: 69 % (ref 42.7–76)
PLATELET # BLD AUTO: 385 10*3/MM3 (ref 140–450)
PLATELET BLD QL SMEAR: ABNORMAL
POTASSIUM SERPL-SCNC: 4.6 MMOL/L (ref 3.5–5.2)
PROT SERPL-MCNC: 7.6 G/DL (ref 6–8.5)
RBC # BLD AUTO: 4.78 10*6/MM3 (ref 3.77–5.28)
RBC MORPH BLD: ABNORMAL
SODIUM SERPL-SCNC: 141 MMOL/L (ref 136–145)
TRIGL SERPL-MCNC: 332 MG/DL (ref 0–150)
VLDLC SERPL CALC-MCNC: 58 MG/DL (ref 5–40)
WBC # BLD AUTO: 8.03 10*3/MM3 (ref 3.4–10.8)

## 2021-03-05 RX ORDER — PRAVASTATIN SODIUM 40 MG
40 TABLET ORAL NIGHTLY
Qty: 90 TABLET | Refills: 1 | Status: SHIPPED | OUTPATIENT
Start: 2021-03-05 | End: 2021-10-15

## 2021-03-16 ENCOUNTER — TELEPHONE (OUTPATIENT)
Dept: ONCOLOGY | Facility: CLINIC | Age: 71
End: 2021-03-16

## 2021-03-16 NOTE — TELEPHONE ENCOUNTER
Called pt and r/s the labs to Friday, radhika shabazz, also discussed upcoming terrie't with dr astudillo, radhika shabazz, df

## 2021-03-16 NOTE — TELEPHONE ENCOUNTER
Caller: PATIENT    Relationship to patient:     Best call back number: [phone]    Chief complaint: PT CANCELLED HER LAB APPT THROUGH THE AUTOMATED SYSTEM AND WOULD LIKE TO GET SCHEDULED FOR THIS FRI, PLEASE ADVISE?    Type of visit: LAB    Requested date: 3-19-21    If rescheduling, when is the original appointment: 3-17-21    Additional notes:

## 2021-03-17 ENCOUNTER — APPOINTMENT (OUTPATIENT)
Dept: LAB | Facility: HOSPITAL | Age: 71
End: 2021-03-17

## 2021-03-17 ENCOUNTER — TELEPHONE (OUTPATIENT)
Dept: ONCOLOGY | Facility: CLINIC | Age: 71
End: 2021-03-17

## 2021-03-19 ENCOUNTER — LAB (OUTPATIENT)
Dept: LAB | Facility: HOSPITAL | Age: 71
End: 2021-03-19

## 2021-03-19 ENCOUNTER — APPOINTMENT (OUTPATIENT)
Dept: LAB | Facility: HOSPITAL | Age: 71
End: 2021-03-19

## 2021-03-19 DIAGNOSIS — D50.8 OTHER IRON DEFICIENCY ANEMIA: ICD-10-CM

## 2021-03-19 DIAGNOSIS — Z86.718 HISTORY OF DVT (DEEP VEIN THROMBOSIS): ICD-10-CM

## 2021-03-19 LAB
BASOPHILS # BLD AUTO: 0.09 10*3/MM3 (ref 0–0.2)
BASOPHILS NFR BLD AUTO: 0.5 % (ref 0–1.5)
DEPRECATED RDW RBC AUTO: 50.4 FL (ref 37–54)
EOSINOPHIL # BLD AUTO: 0.05 10*3/MM3 (ref 0–0.4)
EOSINOPHIL NFR BLD AUTO: 0.3 % (ref 0.3–6.2)
ERYTHROCYTE [DISTWIDTH] IN BLOOD BY AUTOMATED COUNT: 14.7 % (ref 12.3–15.4)
FERRITIN SERPL-MCNC: 51.4 NG/ML (ref 13–150)
HCT VFR BLD AUTO: 43.3 % (ref 34–46.6)
HGB BLD-MCNC: 12.9 G/DL (ref 12–15.9)
IMM GRANULOCYTES # BLD AUTO: 0.09 10*3/MM3 (ref 0–0.05)
IMM GRANULOCYTES NFR BLD AUTO: 0.5 % (ref 0–0.5)
IRON 24H UR-MRATE: 18 MCG/DL (ref 37–145)
IRON SATN MFR SERPL: 4 % (ref 14–48)
LYMPHOCYTES # BLD AUTO: 0.6 10*3/MM3 (ref 0.7–3.1)
LYMPHOCYTES NFR BLD AUTO: 3.4 % (ref 19.6–45.3)
MCH RBC QN AUTO: 27.7 PG (ref 26.6–33)
MCHC RBC AUTO-ENTMCNC: 29.8 G/DL (ref 31.5–35.7)
MCV RBC AUTO: 93.1 FL (ref 79–97)
MONOCYTES # BLD AUTO: 0.95 10*3/MM3 (ref 0.1–0.9)
MONOCYTES NFR BLD AUTO: 5.4 % (ref 5–12)
NEUTROPHILS NFR BLD AUTO: 15.73 10*3/MM3 (ref 1.7–7)
NEUTROPHILS NFR BLD AUTO: 89.9 % (ref 42.7–76)
NRBC BLD AUTO-RTO: 0 /100 WBC (ref 0–0.2)
PLATELET # BLD AUTO: 360 10*3/MM3 (ref 140–450)
PMV BLD AUTO: 9.4 FL (ref 6–12)
RBC # BLD AUTO: 4.65 10*6/MM3 (ref 3.77–5.28)
TIBC SERPL-MCNC: 451 MCG/DL (ref 249–505)
TRANSFERRIN SERPL-MCNC: 322 MG/DL (ref 200–360)
WBC # BLD AUTO: 17.51 10*3/MM3 (ref 3.4–10.8)

## 2021-03-19 PROCEDURE — 82728 ASSAY OF FERRITIN: CPT

## 2021-03-19 PROCEDURE — 83540 ASSAY OF IRON: CPT

## 2021-03-19 PROCEDURE — 84466 ASSAY OF TRANSFERRIN: CPT

## 2021-03-19 PROCEDURE — 85025 COMPLETE CBC W/AUTO DIFF WBC: CPT

## 2021-03-19 PROCEDURE — 36415 COLL VENOUS BLD VENIPUNCTURE: CPT

## 2021-03-30 ENCOUNTER — TELEPHONE (OUTPATIENT)
Dept: FAMILY MEDICINE CLINIC | Facility: CLINIC | Age: 71
End: 2021-03-30

## 2021-03-30 NOTE — TELEPHONE ENCOUNTER
Caller: Mariangel Nguyen    Relationship to patient: Self    Best call back number: [phone]    New or established patient?  [] New  [x] Established    Date of discharge: 3/24/21    Facility discharged from: [facility]      Specialty Only: Did you see a [facility] provider?    [] Yes  [x] No

## 2021-03-31 ENCOUNTER — TELEPHONE (OUTPATIENT)
Dept: ONCOLOGY | Facility: CLINIC | Age: 71
End: 2021-03-31

## 2021-03-31 NOTE — TELEPHONE ENCOUNTER
Caller: Patrick,Chelle    Relationship to patient: Emergency Contact    Best call back number: [phone]    Chief complaint: PATIENT WAS IN HOSP. FOR HER LAST APPT. ON 3/24/2021     Type of visit: FOLLOW UP 1    Requested date: NONE    If rescheduling, when is the original appointment: 3/24/2021    Additional notes: CHELLE CARES FOR HER MOTHER SO PLEASE CALL HER -[phone] TO KAT., THANK YOU

## 2021-04-07 ENCOUNTER — TELEPHONE (OUTPATIENT)
Dept: FAMILY MEDICINE CLINIC | Facility: CLINIC | Age: 71
End: 2021-04-07

## 2021-04-07 NOTE — TELEPHONE ENCOUNTER
Caller: Mariangel Nguyen    Relationship: Self    Best call back number: [phone]            Which medication are you concerned about: bisoprolol (ZEBeta) 5 MG tablet, lisinopril (PRINIVIL,ZESTRIL) 10 MG tablet, metFORMIN ER (GLUCOPHAGE-XR) 500 MG 24 hr tablet, torsemide (DEMADEX) 20 MG tablet    Who prescribed you this medication: DR GRIJALVA    What are your concerns: PATIENT STATES THAT IN THE HOSPITAL, THEY TOLD HER TO DISCONTINUE THE ABOVE MEDICATIONS.  SHE WOULD LIKE A CALL BACK TO DISCUSS THESE MEDICATIONS AND HOW LONG SHE IS SUPPOSED TO BE OFF OF THEM    I could not find the discharge summary from the most recent admission.  So I cannot figure out why they told her to stop these medications.  She does have an appointment to see me within a few weeks and I did advise her to hold the medicines until she sees me.  If she has any problems with increased swelling or shortness of breath she should give me a call.

## 2021-04-08 ENCOUNTER — TELEPHONE (OUTPATIENT)
Dept: FAMILY MEDICINE CLINIC | Facility: CLINIC | Age: 71
End: 2021-04-08

## 2021-04-08 NOTE — TELEPHONE ENCOUNTER
Caller: Patrick Mariangel ALVARADO    Relationship: Self     Best call back number: [phone]    What is the best time to reach you: ANY    Who are you requesting to speak with (clinical staff, provider,  specific staff member): CLINICAL     What was the call regarding: PATIENT STATED SHE SPOKE WITH DR. GRIJALVA YESTERDAY ABOUT HER MEDICATIONS AND SHE FORGOT TO ASK HIM ABOUT HER WATER PILL. SHE STATED THE HOSPITAL TOOK HER OFF SEVERAL OF HER MEDICATIONS AND HER FEET ARE SWELLING AND SHE REALLY NEEDS TO TAKE HER WATER PILL. SHE SAID SHE NEEDS HIS OK TO CONTINUE THE MEDICATION. PLEASE ADVISE.     Do you require a callback: YES    I spoke to the patient's daughter.  Advised resuming the torsemide 2 tablets daily along with 2 potassium tablets daily.

## 2021-04-09 RX ORDER — LEVOTHYROXINE SODIUM 0.05 MG/1
50 TABLET ORAL DAILY
Qty: 90 TABLET | Refills: 1 | Status: SHIPPED | OUTPATIENT
Start: 2021-04-09 | End: 2021-10-15

## 2021-04-13 ENCOUNTER — LAB (OUTPATIENT)
Dept: LAB | Facility: HOSPITAL | Age: 71
End: 2021-04-13

## 2021-04-13 ENCOUNTER — APPOINTMENT (OUTPATIENT)
Dept: LAB | Facility: HOSPITAL | Age: 71
End: 2021-04-13

## 2021-04-13 DIAGNOSIS — D50.8 OTHER IRON DEFICIENCY ANEMIA: Primary | ICD-10-CM

## 2021-04-13 LAB
BASOPHILS # BLD AUTO: 0.07 10*3/MM3 (ref 0–0.2)
BASOPHILS NFR BLD AUTO: 1 % (ref 0–1.5)
DEPRECATED RDW RBC AUTO: 49.1 FL (ref 37–54)
EOSINOPHIL # BLD AUTO: 0.35 10*3/MM3 (ref 0–0.4)
EOSINOPHIL NFR BLD AUTO: 4.8 % (ref 0.3–6.2)
ERYTHROCYTE [DISTWIDTH] IN BLOOD BY AUTOMATED COUNT: 15 % (ref 12.3–15.4)
FERRITIN SERPL-MCNC: 85.1 NG/ML (ref 13–150)
HCT VFR BLD AUTO: 41.6 % (ref 34–46.6)
HGB BLD-MCNC: 12.6 G/DL (ref 12–15.9)
IMM GRANULOCYTES # BLD AUTO: 0.14 10*3/MM3 (ref 0–0.05)
IMM GRANULOCYTES NFR BLD AUTO: 1.9 % (ref 0–0.5)
IRON 24H UR-MRATE: 46 MCG/DL (ref 37–145)
IRON SATN MFR SERPL: 10 % (ref 14–48)
LYMPHOCYTES # BLD AUTO: 1.52 10*3/MM3 (ref 0.7–3.1)
LYMPHOCYTES NFR BLD AUTO: 20.7 % (ref 19.6–45.3)
MCH RBC QN AUTO: 27.3 PG (ref 26.6–33)
MCHC RBC AUTO-ENTMCNC: 30.3 G/DL (ref 31.5–35.7)
MCV RBC AUTO: 90.2 FL (ref 79–97)
MONOCYTES # BLD AUTO: 0.61 10*3/MM3 (ref 0.1–0.9)
MONOCYTES NFR BLD AUTO: 8.3 % (ref 5–12)
NEUTROPHILS NFR BLD AUTO: 4.66 10*3/MM3 (ref 1.7–7)
NEUTROPHILS NFR BLD AUTO: 63.3 % (ref 42.7–76)
NRBC BLD AUTO-RTO: 0.3 /100 WBC (ref 0–0.2)
PLATELET # BLD AUTO: 369 10*3/MM3 (ref 140–450)
PMV BLD AUTO: 9.3 FL (ref 6–12)
RBC # BLD AUTO: 4.61 10*6/MM3 (ref 3.77–5.28)
TIBC SERPL-MCNC: 445 MCG/DL (ref 249–505)
TRANSFERRIN SERPL-MCNC: 318 MG/DL (ref 200–360)
WBC # BLD AUTO: 7.35 10*3/MM3 (ref 3.4–10.8)

## 2021-04-13 PROCEDURE — 83540 ASSAY OF IRON: CPT

## 2021-04-13 PROCEDURE — 82728 ASSAY OF FERRITIN: CPT

## 2021-04-13 PROCEDURE — 84466 ASSAY OF TRANSFERRIN: CPT

## 2021-04-13 PROCEDURE — 85025 COMPLETE CBC W/AUTO DIFF WBC: CPT

## 2021-04-13 PROCEDURE — 36415 COLL VENOUS BLD VENIPUNCTURE: CPT

## 2021-04-13 RX ORDER — POTASSIUM CHLORIDE 750 MG/1
CAPSULE, EXTENDED RELEASE ORAL
Qty: 60 CAPSULE | Refills: 2 | Status: SHIPPED | OUTPATIENT
Start: 2021-04-13 | End: 2021-05-26 | Stop reason: SDUPTHER

## 2021-04-19 RX ORDER — METFORMIN HYDROCHLORIDE 500 MG/1
TABLET, EXTENDED RELEASE ORAL
Qty: 120 TABLET | Refills: 5 | Status: SHIPPED | OUTPATIENT
Start: 2021-04-19 | End: 2021-05-26

## 2021-04-19 RX ORDER — BISOPROLOL FUMARATE 5 MG/1
TABLET, FILM COATED ORAL
Qty: 45 TABLET | Refills: 1 | Status: SHIPPED | OUTPATIENT
Start: 2021-04-19 | End: 2022-05-09

## 2021-04-19 NOTE — PROGRESS NOTES
"Chief Complaint  Iron deficiency anemia, history of left lower extremity calf DVT with suspected pulmonary embolism    Subjective        History of Present Illness  Patient returns today in follow-up continue on oral iron every other day.  She has had considerable difficulties recently with multiple hospitalizations.  She was hospitalized in September 2020 with CHF exacerbation and has been continuing on diuresis.  She required additional hospitalization in March 2020 again related to volume overload/CHF exacerbation.  She was admitted 4/3-4/6/2021 to [facility] with initial report of GI bleed with dark stools and heme positive stool in the emergency department however ultimately that was not felt to be the case and she did have a urinary tract infection with Klebsiella.  Her hemoglobin had remained in the 11-12 range during that time.  She returns today with repeat labs to review.  She reports that she has been compliant for the most part with oral iron every other day.  She does note dark stools on the iron but has had no report of any obvious episodes of GI bleeding.  She notes normal bowel function and no constipation on the iron.  She does continue with chronic dyspnea, reports continue on 3 L O2 at home.  She is in a wheelchair today, no oxygen in place currently.      Objective   Vital Signs:   /70   Pulse (!) 154   Temp 97.3 °F (36.3 °C) (Temporal)   Resp 18   Ht 157.5 cm (62.01\")   Wt 113 kg (250 lb)   SpO2 93%   BMI 45.71 kg/m²     Physical Exam  Constitutional:       Appearance: She is well-developed.      Comments: Patient is seated in a wheelchair no distress   Eyes:      Conjunctiva/sclera: Conjunctivae normal.   Neck:      Thyroid: No thyromegaly.   Cardiovascular:      Rate and Rhythm: Normal rate and regular rhythm.      Heart sounds: No murmur heard.   No friction rub. No gallop.    Pulmonary:      Effort: No respiratory distress.      Comments: Scattered crackles in the lung bases " bilaterally  Abdominal:      General: Bowel sounds are normal. There is no distension.      Palpations: Abdomen is soft.      Tenderness: There is no abdominal tenderness.   Musculoskeletal:         General: Swelling present.      Comments: 1-2+ bilateral lower extremity edema   Lymphadenopathy:      Head:      Right side of head: No submandibular adenopathy.      Cervical: No cervical adenopathy.      Upper Body:      Right upper body: No supraclavicular adenopathy.      Left upper body: No supraclavicular adenopathy.   Skin:     General: Skin is warm and dry.      Findings: No rash.   Neurological:      Mental Status: She is alert and oriented to person, place, and time.      Cranial Nerves: No cranial nerve deficit.      Motor: No abnormal muscle tone.      Deep Tendon Reflexes: Reflexes normal.   Psychiatric:         Behavior: Behavior normal.     The patient was examined today, unchanged from above    Result Review : Reviewed CBC, iron panel, ferritin from today.  Reviewed multiple records from hospitalizations including discharge summaries, laboratory studies.       Assessment and Plan      1. Iron deficiency anemia:  · The patient has had a long-standing degree of anemia.  Anemia appears to have worsened following her hospitalization for bowel obstruction and sepsis in late 2017.  Previous hemoglobin in the 7-8 range during hospitalization in late 2017, subsequent increase into the 9-10 range in early 2018 with gradual decline into the 8-9 range.  · In 2018, the patient developed declining MCV which has gradually decreased to 63.5 2/6/19    · Patient had concurrent thrombocytosis with platelet count in the high 400-500,000 range, subsequently increased to 600,000 range. Related to underlying iron deficiency.  · Patient developed associated PICA symptom of ice craving.  · There was no visible evidence of GI blood loss however patient remains on chronic anticoagulation with Eliquis.  She had never undergone EGD  or colonoscopy.  Stool cards negative for occult blood on 2/27/19.    · Anemia evaluation 2/6/19 at initial visit with hemoglobin 8.6, MCV 63.5, platelet count 694,000, iron 18, ferritin less than 5, iron saturation 3%, TIBC 584, B12 380, folate greater than 20, creatinine 0.74, ESR 30, REBECCA negative, haptoglobin 324, .  · In order to facilitate rapid correction of her iron deficiency for anticipated knee surgery, administered IV iron in the form of Injectafer 750mg on 2/12 and 2/19/19.  · EGD and colonoscopy 3/4/19 with no evidence of bleeding source, evidence of hiatal hernia, gastritis, benign colonic polyps.  · Pica symptoms resolved  · Labs on 2/27/2019 with ferritin 617, iron saturation 16%, hemoglobin 10.4  · Labs on 11/26/2019 with stable hemoglobin at 12.8.  Iron studies however showed iron 29, ferritin that declined from 151.9 down to 62.3, iron saturation down to 7% with TIBC 391.    · Labs 2/25/2020 showed improving hemoglobin up to 13.3.  Iron was 33, ferritin stable at 63.2, iron saturation 8%, TIBC 412.    · Labs 9/15/2020 showed stable hemoglobin at 12.9, iron studies fairly stable with iron 52, ferritin 48.6, iron saturation 11%, TIBC 473.  The patient reported that she had never really tried oral iron in the past. Initiated oral iron every other day on 9/24/2020.  · Patient returns today in follow-up having had multiple hospitalizations in the interval with CHF exacerbation however more recently was admitted 4/3-4/6/2021 at [facility] with report of heme positive stool in the emergency department and question of GI bleed however ultimately was found to have urinary tract infection.  She returns today with labs showing a hemoglobin of 12.6 and improvement in her iron studies with iron 46, ferritin 85.1, iron saturation 10%, TIBC 445.  She is tolerating oral iron well with no significant GI side effects.  We did discuss the issue of the heme positive stool from emergency department.  I am not  sure that given her underlying comorbidities she is a good candidate for endoscopic evaluation however I did suggest that she return to see Dr. Luong to discuss the situation further.  We will continue on oral iron every other day and she will return here in 6 months with repeat labs the day of the visit.  2. Thrombocytosis:  · As above, the patient had a platelet count in the high 400-500,000 range since late 2017.  This progressively increased to 809,000 on 2/12/19.  · Secondary to iron deficiency anemia  · Platelet count improved following IV iron administration  · Platelet count currently 369,000  3. Left lower extremity DVT (popliteal/calf) with suspected pulmonary embolism:  · Patient was admitted in November/December 2017 with small bowel perforation requiring abdominal surgery and subsequent sepsis  · Lower extremity Doppler 12/1/17 with popliteal and calf DVT on the left  · V/Q scan 12/2/17 with intermediate probability for pulmonary embolism  · Patient was anticoagulated and has continued on treatment with Eliquis 5mg bid.  She is now over one year out from her thrombosis that was provoked.  · Patient reports that her daughter developed DVT and pulmonary embolism in her 30s and apparently has an IVC filter in place.  She is a poor historian, accuracy is unknown.  Unclear whether her daughter has been evaluated with hypercoagulable labs.  · Given the family history, we did proceed with hypercoagulable evaluation 2/6/19: Negative factor V Leiden mutation, negative prothrombin gene mutation, protein C activity 192%, protein S activity 127%, Antithrombin III greater than 140, lupus anticoagulant negative, anticardiolipin antibody panel negative, anti-beta-2 GP 1 antibody panel negative.  · Hypercoagulable evaluation was negative.  Given the provoked nature of her previous thrombosis, and having been anticoagulated for over one year, on 2/27/19, recommended that she discontinue anticoagulation.  We did discuss  signs and symptoms of recurrent DVT and pulmonary embolism and the need to seek immediate medical attention if these occur.  · Right total knee arthroplasty 4/8/2019.  Patient received Eliquis 5 mg twice daily postoperatively.  It was anticipated that she would only require brief treatment with Eliquis however her recovery from her knee replacement was prolonged. She did develop some pain and swelling in the right knee and underwent lower extremity Doppler on 6/20/2019 which was negative for DVT, showed some valvular incompetence.  With eventual improvement in mobility, Eliquis discontinued 8/6/2019.     Plan:     1. Continue oral iron every other day  2. We will refer the patient back to Dr. Luong in GI regarding recent identification of heme positive stool in the setting of iron deficiency anemia to consider whether any additional endoscopic evaluation is warranted.  3. MD visit in 6 months, 1 week prior we will draw labs with CBC, iron panel, ferritin.

## 2021-04-20 ENCOUNTER — APPOINTMENT (OUTPATIENT)
Dept: LAB | Facility: HOSPITAL | Age: 71
End: 2021-04-20

## 2021-04-20 ENCOUNTER — OFFICE VISIT (OUTPATIENT)
Dept: ONCOLOGY | Facility: CLINIC | Age: 71
End: 2021-04-20

## 2021-04-20 VITALS
SYSTOLIC BLOOD PRESSURE: 105 MMHG | DIASTOLIC BLOOD PRESSURE: 70 MMHG | RESPIRATION RATE: 18 BRPM | HEART RATE: 154 BPM | WEIGHT: 250 LBS | TEMPERATURE: 97.3 F | HEIGHT: 62 IN | OXYGEN SATURATION: 93 % | BODY MASS INDEX: 46.01 KG/M2

## 2021-04-20 DIAGNOSIS — D50.8 OTHER IRON DEFICIENCY ANEMIA: Primary | ICD-10-CM

## 2021-04-20 PROCEDURE — 99214 OFFICE O/P EST MOD 30 MIN: CPT | Performed by: INTERNAL MEDICINE

## 2021-04-26 ENCOUNTER — OFFICE VISIT (OUTPATIENT)
Dept: FAMILY MEDICINE CLINIC | Facility: CLINIC | Age: 71
End: 2021-04-26

## 2021-04-26 VITALS
HEIGHT: 62 IN | OXYGEN SATURATION: 87 % | SYSTOLIC BLOOD PRESSURE: 148 MMHG | BODY MASS INDEX: 47.44 KG/M2 | HEART RATE: 79 BPM | TEMPERATURE: 96.9 F | DIASTOLIC BLOOD PRESSURE: 82 MMHG | WEIGHT: 257.8 LBS

## 2021-04-26 DIAGNOSIS — I50.33 ACUTE ON CHRONIC DIASTOLIC CHF (CONGESTIVE HEART FAILURE) (HCC): Primary | ICD-10-CM

## 2021-04-26 DIAGNOSIS — I87.2 VENOUS STASIS DERMATITIS OF BOTH LOWER EXTREMITIES: ICD-10-CM

## 2021-04-26 DIAGNOSIS — N39.0 URINARY TRACT INFECTION WITHOUT HEMATURIA, SITE UNSPECIFIED: ICD-10-CM

## 2021-04-26 DIAGNOSIS — A41.9 SEPTICEMIA (HCC): ICD-10-CM

## 2021-04-26 PROCEDURE — 99214 OFFICE O/P EST MOD 30 MIN: CPT | Performed by: INTERNAL MEDICINE

## 2021-04-26 RX ORDER — PANTOPRAZOLE SODIUM 40 MG/1
40 TABLET, DELAYED RELEASE ORAL
COMMUNITY
Start: 2021-04-06 | End: 2021-05-06

## 2021-04-26 NOTE — PROGRESS NOTES
Subjective Chief complaint is hospital follow-up  Mariangel Nguyen is a 70 y.o. female.     History of Present Illness Mariangel is here today for hospital follow-up.  She has been admitted twice since I last saw her.  She was admitted around 19 March to [facility] for congestive heart failure.  She was treated with IV diuretics.  They also thought she had some degree of a exacerbation of COPD at that time.  By the time of discharge she seemed to be doing well.  2 days after discharge apparently she began getting confused.  She returned to the emergency room where she was found to have a UTI with Klebsiella pneumonia.  That was also growing in her blood.  She was then treated with IV hydration and intravenous antibiotics.  Unfortunately she has gained approximately 7 to 10 pounds since being discharged.  Her legs are more swollen.  She is getting some redness on the anterior aspect of her legs.    The following portions of the patient's history were reviewed and updated as appropriate: allergies, current medications, past family history, past medical history, past social history, past surgical history and problem list.    Review of Systems   Constitutional: Negative for chills and fever.   Respiratory: Negative for chest tightness and shortness of breath.    Cardiovascular: Positive for leg swelling.   Genitourinary: Negative for dysuria and hematuria.       Objective   Physical Exam  Vitals and nursing note reviewed.   Constitutional:       Appearance: Normal appearance.   Cardiovascular:      Rate and Rhythm: Normal rate and regular rhythm.   Pulmonary:      Breath sounds: No wheezing, rhonchi or rales.   Musculoskeletal:      Right lower leg: Edema present.      Left lower leg: Edema present.   Skin:     Comments: She has changes consistent with stasis dermatitis on both lower extremities   Neurological:      Mental Status: She is alert.           Assessment/Plan   Diagnoses and all orders for this  visit:    1. Acute on chronic diastolic CHF (congestive heart failure) (CMS/HCC) (Primary)    2. Urinary tract infection without hematuria, site unspecified    3. Septicemia (CMS/HCC)    4. Venous stasis dermatitis of both lower extremities    Other orders  -     triamcinolone (KENALOG) 0.1 % ointment; Apply  topically to the appropriate area as directed 2 (Two) Times a Day.  Dispense: 30 g; Refill: 2    Mariangel is here today for follow-up.  She seems to be doing fairly well overall.  I am concerned that her weight has gone back up.  Her O2 saturation was low here today but she is not wearing her oxygen.  I do not hear any fluid on her lungs but I am going to have her increase her torsemide to 3 tablets daily for the next 5 days.  I will try to see her back in 1 week.  For her stasis dermatitis we are going to prescribe some triamcinolone.

## 2021-04-29 ENCOUNTER — TELEPHONE (OUTPATIENT)
Dept: FAMILY MEDICINE CLINIC | Facility: CLINIC | Age: 71
End: 2021-04-29

## 2021-04-29 NOTE — TELEPHONE ENCOUNTER
PATIENTS DAUGHTER IS ASKING ABOUT HOW MUCH OINTMENT SHE SHOULD BE APPLYING TO PATIENTS FEET. PATIENTS FEET HAVE GONE FROM RED TO PINK, SO THEY ARE GETTING BETTER. BUT THEY ARE ALMOST OUT OF OINTMENT.    DAUGHTER WANTS TO KNOW IF PATIENT SHOULD BE UP WALKING AROUND TO HELP REDUCE FLUID. WALKING IS PAINFUL BUT SHE COULD TRY.    PATIENT DOES NOT DO WELL ON PAIN MEDICATION. SWELLING IN FEET IN NOT MUCH BETTER.     IS IT OKAY FOR DAUGHTER TO SHAVE PATIENTS LEGS.     DAUGHTER WANTS TO KNOW IF GABAPENTIN WOULD HELP WITH HER PAIN.    PATIENT HAS APPOINTMENT ON 5/3.    PLEASE ADVISE: [phone]    Advised the appropriate amount of triamcinolone. Advised tylenol es 2  Tid.  I would not use gabapentin until we get her swelling under control.

## 2021-05-13 ENCOUNTER — READMISSION MANAGEMENT (OUTPATIENT)
Dept: CALL CENTER | Facility: HOSPITAL | Age: 71
End: 2021-05-13

## 2021-05-13 ENCOUNTER — TELEPHONE (OUTPATIENT)
Dept: FAMILY MEDICINE CLINIC | Facility: CLINIC | Age: 71
End: 2021-05-13

## 2021-05-13 NOTE — OUTREACH NOTE
Prep Survey      Responses   [facility] patient discharged from?  Non-BH   Is LACE score < 7 ?  Non-BH Discharge   Emergency Room discharge w/ pulse ox?  No   Eligibility  [facility]   Date of Admission  05/03/21   Date of Discharge  05/13/21   Discharge diagnosis  Acute Metabolic Encephalopathy   Does the patient have one of the following disease processes/diagnoses(primary or secondary)?  Other   Prep survey completed?  Yes          Emy Boswell RN

## 2021-05-13 NOTE — TELEPHONE ENCOUNTER
Caller: [facility]    Relationship to patient:     Best call back number: [phone]    New or established patient?  [] New  [x] Established    Date of discharge: 5/13/21    Facility discharged from: [facility]    Length of stay (If applicable): ADMITTED ON 5/3/21    Specialty Only: Did you see a [facility] provider?    [] Yes  [x] No  If so, who?

## 2021-05-14 ENCOUNTER — TRANSITIONAL CARE MANAGEMENT TELEPHONE ENCOUNTER (OUTPATIENT)
Dept: CALL CENTER | Facility: HOSPITAL | Age: 71
End: 2021-05-14

## 2021-05-14 NOTE — OUTREACH NOTE
Call Center TCM Note      Responses   [facility] patient discharged from?  Non-   Does the patient have one of the following disease processes/diagnoses(primary or secondary)?  Other   TCM attempt successful?  Yes   Discharge diagnosis  Acute Metabolic Encephalopathy   Meds reviewed with patient/caregiver?  Yes   Is the patient having any side effects they believe may be caused by any medication additions or changes?  No   Does the patient have all medications ordered at discharge?  Yes   Is the patient taking all medications as directed (includes completed medication regime)?  Yes   Does the patient have a primary care provider?   Yes   Does the patient have an appointment with their PCP within 7 days of discharge?  Greater than 7 days   Comments regarding PCP  TCM FWP with PCP Dr Burton is 05/24/2021   What is preventing the patient from scheduling follow up appointments within 7 days of discharge?  Earlier appointment not available   Has the patient kept scheduled appointments due by today?  N/A   Has home health visited the patient within 72 hours of discharge?  N/A   Psychosocial issues?  No   Did the patient receive a copy of their discharge instructions?  Yes   Nursing interventions  Reviewed instructions with patient   What is the patient's perception of their health status since discharge?  Improving   Is the patient/caregiver able to teach back signs and symptoms related to disease process for when to call PCP?  Yes   Is the patient/caregiver able to teach back signs and symptoms related to disease process for when to call 911?  Yes   Is the patient/caregiver able to teach back the hierarchy of who to call/visit for symptoms/problems? PCP, Specialist, Home health nurse, Urgent Care, ED, 911  Yes   If the patient is a current smoker, are they able to teach back resources for cessation?  Not a smoker   TCM call completed?  Yes   Wrap up additional comments  Pt pretty tired still, resting alot. All  meds in place. Dtr and grandson with pt. SOB is better and AMS resolved. Severe UTI. No questions at this time. Pt d/c from [facility] on 05/13/2021. TCM FWP with PCP Dr Burton is 05/24/2021          Tasha Carrington MA    5/14/2021, 11:06 EDT

## 2021-05-17 ENCOUNTER — TELEPHONE (OUTPATIENT)
Dept: FAMILY MEDICINE CLINIC | Facility: CLINIC | Age: 71
End: 2021-05-17

## 2021-05-17 NOTE — TELEPHONE ENCOUNTER
Caller: [facility]    Relationship: [facility]    Best call back number: [phone]    What orders are you requesting (i.e. lab or imaging): VERBAL ORDERS FOR PHYSICAL THERAPY    In what timeframe would the patient need to come in: ASAP    Where will you receive your lab/imaging services: AT HOME    Additional notes: SHE IS JUST NEEDING VERBAL ORDERS IF YOU COULD CALL HER BACK.  Verbal order was given for physical therapy.  They can also do the graded Ace wraps for any swelling that she is having.

## 2021-05-17 NOTE — TELEPHONE ENCOUNTER
Caller: [employer]      Best call back number: [phone]    What was the call regarding: THE PATIENT WAS SEEN WITH [facility]/15/2021 AND HAS SWOLLEN KNEES AND FEET. THEY ARE RED BUT NOT HOT TO TOUCH. POSSIBLE CELLULITIS. THERE ARE NO OPENINGS BUT A STRONG POSSIBILITY. DRAKE FROM [facility] NEEDS TO KNOW IF SHE NEEDS TO WRAP THE PATIENTS LEGS. PLEASE ADVISE.     Do you require a callback: YES    I advised that if there are red but not hot to the touch and that it is likely just some stasis skin changes.  I would rather not give her antibiotics for this and if that is the case.  Also it okayed the physical therapy.

## 2021-05-26 ENCOUNTER — OFFICE VISIT (OUTPATIENT)
Dept: FAMILY MEDICINE CLINIC | Facility: CLINIC | Age: 71
End: 2021-05-26

## 2021-05-26 VITALS
WEIGHT: 247.6 LBS | DIASTOLIC BLOOD PRESSURE: 88 MMHG | OXYGEN SATURATION: 93 % | BODY MASS INDEX: 45.29 KG/M2 | HEART RATE: 84 BPM | SYSTOLIC BLOOD PRESSURE: 134 MMHG

## 2021-05-26 DIAGNOSIS — G93.41 ENCEPHALOPATHY IN SEPSIS: Primary | ICD-10-CM

## 2021-05-26 DIAGNOSIS — E11.65 POORLY CONTROLLED DIABETES MELLITUS (HCC): ICD-10-CM

## 2021-05-26 DIAGNOSIS — N39.0 URINARY TRACT INFECTION WITHOUT HEMATURIA, SITE UNSPECIFIED: ICD-10-CM

## 2021-05-26 DIAGNOSIS — R78.81 BACTEREMIA DUE TO KLEBSIELLA PNEUMONIAE: ICD-10-CM

## 2021-05-26 DIAGNOSIS — B96.1 BACTEREMIA DUE TO KLEBSIELLA PNEUMONIAE: ICD-10-CM

## 2021-05-26 DIAGNOSIS — E03.9 ACQUIRED HYPOTHYROIDISM: ICD-10-CM

## 2021-05-26 PROCEDURE — 99214 OFFICE O/P EST MOD 30 MIN: CPT | Performed by: INTERNAL MEDICINE

## 2021-05-26 RX ORDER — POTASSIUM CHLORIDE 750 MG/1
20 CAPSULE, EXTENDED RELEASE ORAL DAILY
Qty: 60 CAPSULE | Refills: 2 | Status: SHIPPED | OUTPATIENT
Start: 2021-05-26 | End: 2021-11-04

## 2021-05-26 RX ORDER — GLIMEPIRIDE 2 MG/1
2 TABLET ORAL
Qty: 90 TABLET | Refills: 1 | Status: SHIPPED | OUTPATIENT
Start: 2021-05-26 | End: 2022-01-10

## 2021-05-26 RX ORDER — AMIODARONE HYDROCHLORIDE 200 MG/1
200 TABLET ORAL DAILY
COMMUNITY
Start: 2021-05-14 | End: 2021-06-13

## 2021-05-26 NOTE — PROGRESS NOTES
Subjective Complaint is checkup after hospitalization  Mariangel Nguyen is a 70 y.o. female.     History of Present Illness Mariangel is here today for checkup after hospitalization.  She was admitted to [facility] in early May with acute encephalopathy.  She was found to have a Klebsiella pneumonia urinary tract infection but also Klebsiella pneumonia and sepsis with positive blood cultures.  She was treated with IV Cipro.  Apparently she had some sort of superficial thrombophlebitis with this.  She was later switched to oral Cipro but has tolerated that well.  She actually does not have a true allergy to Cipro.  During the course of the hospitalization it seems that she was diuresed approximately 6 pounds.  Her weight today is 247.  Her last weight here was 257.  Her legs are less swollen but she does have some lymphedema changes.  Her A1c test in the hospital was 9.3.  She had some lactic acidosis at the time of admission.  It was likely due to the sepsis but she did have her Metformin discontinued.    The following portions of the patient's history were reviewed and updated as appropriate: allergies, current medications, past family history, past medical history, past social history, past surgical history and problem list.    Review of Systems   Constitutional: Negative for chills and fever.   Respiratory: Positive for shortness of breath. Negative for chest tightness.    Cardiovascular: Positive for leg swelling.   Genitourinary: Negative for dysuria and hematuria.       Objective   Physical Exam  Vitals and nursing note reviewed.   Constitutional:       Appearance: Normal appearance.   Cardiovascular:      Rate and Rhythm: Normal rate and regular rhythm.      Pulses: Normal pulses.      Heart sounds: Normal heart sounds.   Pulmonary:      Effort: Pulmonary effort is normal.   Musculoskeletal:      Right lower leg: Edema present.      Left lower leg: Edema present.      Comments: She currently has some  pretibial edema and some woody stasis skin changes.   Neurological:      Mental Status: She is alert.           Assessment/Plan   Diagnoses and all orders for this visit:    1. Encephalopathy in sepsis (Primary)    2. Urinary tract infection without hematuria, site unspecified  -     Urinalysis With Microscopic - Urine, Clean Catch  -     Urine Culture - Urine, Urine, Clean Catch    3. Bacteremia due to Klebsiella pneumoniae    4. Poorly controlled diabetes mellitus (CMS/[facility])    5. Acquired hypothyroidism  -     T3, Free  -     TSH+Free T4    Other orders  -     glimepiride (AMARYL) 2 MG tablet; Take 1 tablet by mouth Every Morning Before Breakfast.  Dispense: 90 tablet; Refill: 1  -     Empagliflozin 25 MG tablet; Take 25 mg by mouth Daily.  Dispense: 90 tablet; Refill: 1  -     potassium chloride (MICRO-K) 10 MEQ CR capsule; Take 2 capsules by mouth Daily.  Dispense: 60 capsule; Refill: 2    Marianegl is here today for follow-up.  I am going to recheck her urine and make sure this is totally cleared.  Her diabetes is poorly controlled.  It appears she was given Jardiance 10 mg but I am going to have her go up to 25 mg.  I think this will help her sugar as well as her heart failure issues.  I am going to see her back in 1 month.

## 2021-05-27 LAB
T3FREE SERPL-MCNC: 2.7 PG/ML (ref 2–4.4)
T4 FREE SERPL-MCNC: 1.23 NG/DL (ref 0.93–1.7)
TSH SERPL DL<=0.005 MIU/L-ACNC: 5.76 UIU/ML (ref 0.27–4.2)

## 2021-05-28 LAB
APPEARANCE UR: CLEAR
BACTERIA #/AREA URNS HPF: ABNORMAL /HPF
BACTERIA UR CULT: NORMAL
BACTERIA UR CULT: NORMAL
BILIRUB UR QL STRIP: NEGATIVE
CASTS URNS MICRO: ABNORMAL
COLOR UR: YELLOW
EPI CELLS #/AREA URNS HPF: ABNORMAL /HPF
GLUCOSE UR QL: ABNORMAL
HGB UR QL STRIP: ABNORMAL
KETONES UR QL STRIP: NEGATIVE
LEUKOCYTE ESTERASE UR QL STRIP: ABNORMAL
NITRITE UR QL STRIP: NEGATIVE
PH UR STRIP: 7 [PH] (ref 5–8)
PROT UR QL STRIP: ABNORMAL
RBC #/AREA URNS HPF: ABNORMAL /HPF
SP GR UR: 1.02 (ref 1–1.03)
UROBILINOGEN UR STRIP-MCNC: ABNORMAL MG/DL
WBC #/AREA URNS HPF: ABNORMAL /HPF
YEAST #/AREA URNS HPF: ABNORMAL /HPF

## 2021-05-28 RX ORDER — CIPROFLOXACIN 250 MG/1
250 TABLET, FILM COATED ORAL 2 TIMES DAILY
Qty: 10 TABLET | Refills: 0 | Status: SHIPPED | OUTPATIENT
Start: 2021-05-28 | End: 2021-06-18

## 2021-06-02 RX ORDER — LISINOPRIL 10 MG/1
10 TABLET ORAL DAILY
Qty: 90 TABLET | Refills: 1 | Status: SHIPPED | OUTPATIENT
Start: 2021-06-02 | End: 2021-11-29 | Stop reason: DRUGHIGH

## 2021-06-02 NOTE — TELEPHONE ENCOUNTER
Caller: Patrick Mariangel CHRISTIANO    Relationship: Self    Best call back number: [phone]     Medication needed:   Requested Prescriptions     Pending Prescriptions Disp Refills   • lisinopril (PRINIVIL,ZESTRIL) 10 MG tablet       Sig: Take  by mouth Daily.       When do you need the refill by: 06/02/21    What additional details did the patient provide when requesting the medication: MEDICARE CONTACTED THE PATIENT AND WANTED TO KNOW WHY SHE WASN'T TAKING LISINOPRIL ANYMORE AND THAT SHE SHOULD CONTACT HER PROVIDER TO BE PUT BACK ON IT. THE PATIENT COULDN'T REMEMBER WHY SHE WAS TAKEN OFF OF IT.    Does the patient have less than a 3 day supply:  [x] Yes  [] No    What is the patient's preferred pharmacy: [id number] - [place], [place] - [address] AT [facility] [address] - [phone] [facility] [phone] FX

## 2021-06-03 ENCOUNTER — TELEPHONE (OUTPATIENT)
Dept: FAMILY MEDICINE CLINIC | Facility: CLINIC | Age: 71
End: 2021-06-03

## 2021-06-03 NOTE — TELEPHONE ENCOUNTER
Caller: PatrickJeffreyMariangel CHRISTIANO    Relationship: Self    Best call back number: [phone]     What medication are you requesting: SOMETHING FOR COUGH    What are your current symptoms: COUGH    How long have you been experiencing symptoms: YESTERDAY    Have you had these symptoms before:    [x] Yes  [] No    Have you been treated for these symptoms before:   [] Yes  [] No    If a prescription is needed, what is your preferred pharmacy and phone number: [id number] [place], [place] - [address]  AT [facility] [address] - [phone]  - [phone] FX     Additional notes:    I spoke to the patient.  Her cough began last night.  She is not any more short of breath than usual.  She is not having fever or chills.  I did advise her to try some Mucinex DM.

## 2021-06-04 ENCOUNTER — OFFICE VISIT (OUTPATIENT)
Dept: FAMILY MEDICINE CLINIC | Facility: CLINIC | Age: 71
End: 2021-06-04

## 2021-06-04 VITALS
WEIGHT: 247 LBS | SYSTOLIC BLOOD PRESSURE: 140 MMHG | TEMPERATURE: 98.3 F | DIASTOLIC BLOOD PRESSURE: 100 MMHG | RESPIRATION RATE: 16 BRPM | BODY MASS INDEX: 45.45 KG/M2 | HEIGHT: 62 IN

## 2021-06-04 DIAGNOSIS — I10 ESSENTIAL HYPERTENSION: ICD-10-CM

## 2021-06-04 DIAGNOSIS — T50.905A: Primary | ICD-10-CM

## 2021-06-04 PROCEDURE — 99214 OFFICE O/P EST MOD 30 MIN: CPT | Performed by: INTERNAL MEDICINE

## 2021-06-04 RX ORDER — HYDROCHLOROTHIAZIDE 25 MG/1
25 TABLET ORAL DAILY
Qty: 30 TABLET | Refills: 1 | Status: SHIPPED | OUTPATIENT
Start: 2021-06-04 | End: 2021-07-12

## 2021-06-04 RX ORDER — DEXTROMETHORPHAN POLISTIREX 30 MG/5ML
60 SUSPENSION ORAL EVERY 12 HOURS SCHEDULED
Qty: 280 ML | Refills: 0 | Status: SHIPPED | OUTPATIENT
Start: 2021-06-04 | End: 2021-06-18

## 2021-06-08 ENCOUNTER — TELEPHONE (OUTPATIENT)
Dept: FAMILY MEDICINE CLINIC | Facility: CLINIC | Age: 71
End: 2021-06-08

## 2021-06-08 NOTE — PROGRESS NOTES
06/04/2021    CC: Cough (deep dry cough.  Mucinex does not help)  .        HPI  Cough  This is a chronic problem. The current episode started in the past 7 days. The problem has been unchanged. The problem occurs every few minutes. The cough is non-productive. Associated symptoms include a sore throat. She has tried OTC cough suppressant for the symptoms. The treatment provided no relief.        Subjective   Mariangel Nguyen is a 70 y.o. female.      The following portions of the patient's history were reviewed and updated as appropriate: allergies, current medications, past family history, past medical history, past social history, past surgical history and problem list.    Problem List  Patient Active Problem List   Diagnosis   • Diabetes mellitus (CMS/[facility])   • Edema   • Hyperlipidemia   • Hypertension   • Rotator cuff tear arthropathy   • Venous stasis dermatitis   • Varicose veins   • Acute pyelonephritis   • Benign essential HTN   • COPD (chronic obstructive pulmonary disease) (CMS/[facility])   • Goiter   • Hx of lithotripsy   • Low vitamin D level   • Lymphedema   • Neuropathy   • KAELA and COPD overlap syndrome (CMS/[facility])   • Pulmonary HTN (CMS/[facility])   • Recurrent nephrolithiasis   • Acute deep vein thrombosis (DVT) of popliteal vein of left lower extremity (CMS/[facility])   • Microcytic anemia   • Iron deficiency anemia   • Encounter for screening for malignant neoplasm of colon   • Anxiety and depression   • History of total right knee replacement   • Acute on chronic diastolic CHF (congestive heart failure) (CMS/[facility])   • Diastolic dysfunction   • Fluid overload   • Stage 3 chronic kidney disease (CMS/[facility])   • Tricuspid valve regurgitation       Past Medical History  Past Medical History:   Diagnosis Date   • Acute on chronic systolic CHF (congestive heart failure) (CMS/[facility]) 10/27/2017   • Acute respiratory failure (CMS/[facility]) 2/24/2016   • BRODY (acute kidney injury) (CMS/[facility]) 12/5/2017   • Arthritis     OSTEOARTHRITIS  KNEES   • ATN (acute tubular necrosis) (CMS/HCC) 12/7/2017   • CHF (congestive heart failure) (CMS/HCC)    • Colon polyp    • COPD (chronic obstructive pulmonary disease) (CMS/HCC)    • Diabetes mellitus (CMS/HCC)    • Disease of thyroid gland    • H/O Abnormal Pap smear of cervix    • History of DVT (deep vein thrombosis)    • History of kidney stones    • History of pulmonary embolus (PE)    • History of sepsis    • History of small bowel obstruction    • History of snoring    • Hyperlipidemia    • Hypertension    • Knee pain, bilateral    • Neuropathy    • 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 Nestor Howe   • BLADDER SURGERY      BLADDER LIFT, 2011   • CARDIAC CATHETERIZATION  2017    Normal coronary arteries, normal overall LVSF with wall motion abnormality   • COLON SURGERY      RECONSTRUCTION, 2001   • COLONOSCOPY N/A 3/4/2019    Procedure: COLONOSCOPY polypectomy;  Surgeon: Nai Luong MD;  Location: [facility] OR;  Service: Gastroenterology   • CYSTOSCOPY URETEROSCOPY LASER LITHOTRIPSY Right 10/23/2017    Procedure: CYSTO RT URETEROSCOPY LASER  STONE BASKETING RIGHT STENT EXCHANGE;  Surgeon: Quinn Cason MD;  Location: [facility] OR;  Service:    • ENDOSCOPY N/A 3/4/2019    Procedure: ESOPHAGOGASTRODUODENOSCOPY WITH BIOPSIES;  Surgeon: Nai Luong MD;  Location: [facility] OR;  Service: Gastroenterology   • EXPLORATORY LAPAROTOMY     • HYSTERECTOMY      2001   • ROTATOR CUFF REPAIR Right 2016   • STEROID INJECTION KNEE      Had left knee injection 10/09/2018; right knee injection 10/02/2017   • TOTAL KNEE ARTHROPLASTY Right 4/8/2019    Procedure: RIGHT  TOTAL KNEE ARTHROPLASTY;  Surgeon: Farzad Luong MD;  Location: [facility] MAIN OR;  Service: Orthopedics       Family History  Family History   Problem Relation Age of Onset   • Cancer Other    • Stroke Other    • Other Other         LUPUS ANTICOAGULANT   • Rheum arthritis Other    • Heart disease Other    • Heart attack Other    • Lupus Mother    • Stomach cancer Father    • Malig Hyperthermia Neg Hx    • Colon cancer Neg Hx    • Colon polyps Neg Hx        Social History  Social History    Tobacco Use      Smoking status: Never Smoker      Smokeless tobacco: Never Used       Is the Patient a current tobacco user? No    Allergies  Allergies   Allergen Reactions   • Ciprofloxacin Unknown - Low Severity     PHLEBITIS; no IV, oral ok       Current Medications    Current Outpatient Medications:   •  acetaminophen (TYLENOL) 325 MG tablet, Take 2 tablets by mouth Every 4 (Four) Hours As Needed for Moderate Pain  or Fever (greater than 101 F)., Disp: 120 tablet, Rfl: 0  •  albuterol (PROVENTIL) (2.5 MG/3ML) 0.083% nebulizer solution, Take 2.5 mg by nebulization Every 4 (Four) Hours As Needed for Wheezing., Disp: , Rfl:   •  amiodarone (PACERONE) 200 MG tablet, Take 200 mg by mouth Daily., Disp: , Rfl:   •  bisoprolol (ZEBeta) 5 MG tablet, TAKE 1/2 TABLET BY MOUTH EVERY DAY, Disp: 45 tablet, Rfl: 1  •  ciprofloxacin (Cipro) 250 MG tablet, Take 1 tablet by mouth 2 (Two) Times a Day., Disp: 10 tablet, Rfl: 0  •  Empagliflozin 25 MG tablet, Take 25 mg by mouth Daily., Disp: 90 tablet, Rfl: 1  •  escitalopram (LEXAPRO) 10 MG tablet, TAKE 1 TABLET BY MOUTH EVERY DAY, Disp: 30 tablet, Rfl: 4  •  glimepiride (AMARYL) 2 MG tablet, Take 1 tablet by mouth Every Morning Before Breakfast., Disp: 90 tablet, Rfl: 1  •  Insulin Pen Needle 31G X 5 MM misc, Inject 1 each under the skin into the appropriate area as directed., Disp: , Rfl:   •  ipratropium-albuterol (DUO-NEB) 0.5-2.5 mg/3 ml nebulizer, Take 3 mL by nebulization 3 (Three) Times  a Day., Disp: 360 mL, Rfl: 0  •  levothyroxine (SYNTHROID, LEVOTHROID) 50 MCG tablet, TAKE 1 TABLET BY MOUTH DAILY, Disp: 90 tablet, Rfl: 1  •  lisinopril (PRINIVIL,ZESTRIL) 10 MG tablet, Take 1 tablet by mouth Daily., Disp: 90 tablet, Rfl: 1  •  ONE TOUCH ULTRA TEST test strip, , Disp: , Rfl:   •  ONETOUCH DELICA LANCETS 33G misc, , Disp: , Rfl:   •  potassium chloride (MICRO-K) 10 MEQ CR capsule, Take 2 capsules by mouth Daily., Disp: 60 capsule, Rfl: 2  •  pravastatin (PRAVACHOL) 40 MG tablet, Take 1 tablet by mouth Every Night., Disp: 90 tablet, Rfl: 1  •  sildenafil (REVATIO) 20 MG tablet, Take 20 mg by mouth 3 (Three) Times a Day., Disp: , Rfl:   •  triamcinolone (KENALOG) 0.1 % ointment, Apply  topically to the appropriate area as directed 2 (Two) Times a Day., Disp: 30 g, Rfl: 2  •  vitamin D (ERGOCALCIFEROL) 1.25 MG (50141 UT) capsule capsule, Take 50,000 Units by mouth., Disp: , Rfl:   •  dextromethorphan polistirex ER (Delsym) 30 MG/5ML Suspension Extended Release oral suspension, Take 10 mL by mouth Every 12 (Twelve) Hours., Disp: 280 mL, Rfl: 0  •  hydroCHLOROthiazide (HYDRODIURIL) 25 MG tablet, Take 1 tablet by mouth Daily., Disp: 30 tablet, Rfl: 1  •  torsemide (DEMADEX) 20 MG tablet, Take 2 tablets by mouth Daily for 30 days., Disp: 60 tablet, Rfl: 5     Review of System  Review of Systems   Constitutional: Negative.    HENT: Positive for sore throat.    Respiratory: Positive for cough.    Cardiovascular: Negative.    Gastrointestinal: Negative.    Endocrine: Negative.      I have reviewed and confirmed the accuracy of the ROS as documented by the MA/LPN/RN Joshua Lujan MD    Vitals:    06/04/21 1556   BP: 140/100   Resp: 16   Temp: 98.3 °F (36.8 °C)     Body mass index is 45.18 kg/m².    Objective     Physical Exam  Physical Exam  Constitutional:       Appearance: Normal appearance.   HENT:      Head: Normocephalic and atraumatic.   Cardiovascular:      Rate and Rhythm: Normal rate and regular  rhythm.      Pulses: Normal pulses.      Heart sounds: Normal heart sounds.   Pulmonary:      Effort: Pulmonary effort is normal.      Breath sounds: Normal breath sounds.   Neurological:      Mental Status: She is alert.         Assessment/Plan      This pleasant 70-year-old who is normally seen by Dr. Burton presents at his time with a three-day history of nonproductive coughing.  Significantly she relates that she has paroxysms of coughing and has hoarseness.  She is tryng over-the-counter products but they have not helped with the cough.  She relats that she's not had problems of this type in the past.  Review of her medications suggest that this cold be an ace-related cough that is reaction to her lisinopril        Diagnoses and all orders for this visit:    1. Allergic reaction of correct medicinal substance properly administered (Primary)    2. Essential hypertension    Other orders  -     hydroCHLOROthiazide (HYDRODIURIL) 25 MG tablet; Take 1 tablet by mouth Daily.  Dispense: 30 tablet; Refill: 1  -     dextromethorphan polistirex ER (Delsym) 30 MG/5ML Suspension Extended Release oral suspension; Take 10 mL by mouth Every 12 (Twelve) Hours.  Dispense: 280 mL; Refill: 0      Plan:  1.)  Start HCTZ 25 mg 1 tab by mouth every morning  #2.)  DC lisinopril  #3.)  Delsym 1 teaspoon twice a day  #4.)  Follow-up in the next week or so  #5.)  She is to notify us if her symptoms worsen.       Joshua Lujan MD  06/04/2021

## 2021-06-08 NOTE — TELEPHONE ENCOUNTER
Caller: Patrick Mariangel CHRISTIANO    Relationship: Self    Best call back number: [phone]    What medication are you requesting: A COUGH MEDICINE     What are your current symptoms: CONSISTENT COUGH - CAME IN ON 06/04/21 WAS PRESCRIBED dextromethorphan polistirex ER (Delsym) 30 MG/5ML Suspension Extended Release oral suspension     NEEDS SOMETHING STRONGER    How long have you been experiencing symptoms: 2 WEEKS    Have you had these symptoms before:  [] Yes  [x] No    Have you been treated for these symptoms before:  [] Yes  [x] No    If a prescription is needed, what is your preferred pharmacy and phone number:      [facility] #[id number] - [place], [place] - [address] AT [facility] [address] - [phone]  - [phone] FX  [phone]    Additional notes:    I spoke with the patient the cough is a little bit better with the Delsym.  She is not having fever or chills.  I am going to give her some Tessalon Perles that she may also use.

## 2021-06-09 RX ORDER — BENZONATATE 100 MG/1
100 CAPSULE ORAL 3 TIMES DAILY PRN
Qty: 30 CAPSULE | Refills: 1 | Status: SHIPPED | OUTPATIENT
Start: 2021-06-09 | End: 2021-10-04

## 2021-06-16 ENCOUNTER — TELEPHONE (OUTPATIENT)
Dept: FAMILY MEDICINE CLINIC | Facility: CLINIC | Age: 71
End: 2021-06-16

## 2021-06-16 NOTE — TELEPHONE ENCOUNTER
Caller: Mariangel Nguyen    Relationship: Self    Best call back number: [phone]    What medication are you requesting: COUGH MEDICATION    What are your current symptoms:     How long have you been experiencing symptoms:     Have you had these symptoms before:    [] Yes  [] No    Have you been treated for these symptoms before:   [] Yes  [] No    If a prescription is needed, what is your preferred pharmacy and phone number: [facility] #[id number] - [place], [place] - [address] AT [facility] [address]  - [phone] [facility] [phone] FX     Additional notes: PATIENT STATES THAT THE COUGH MEDICATION THAT WAS PRESCRIBED IS NOT WORKING.      She has been tried on various medications.  She has not short of breath and not having any fevers.  She reports the cough is dry.  Advised to just use plain Mucinex.

## 2021-06-18 ENCOUNTER — OFFICE VISIT (OUTPATIENT)
Dept: FAMILY MEDICINE CLINIC | Facility: CLINIC | Age: 71
End: 2021-06-18

## 2021-06-18 ENCOUNTER — TELEPHONE (OUTPATIENT)
Dept: FAMILY MEDICINE CLINIC | Facility: CLINIC | Age: 71
End: 2021-06-18

## 2021-06-18 VITALS
WEIGHT: 238.2 LBS | SYSTOLIC BLOOD PRESSURE: 116 MMHG | DIASTOLIC BLOOD PRESSURE: 84 MMHG | HEIGHT: 62 IN | TEMPERATURE: 98.6 F | OXYGEN SATURATION: 98 % | RESPIRATION RATE: 20 BRPM | HEART RATE: 76 BPM | BODY MASS INDEX: 43.84 KG/M2

## 2021-06-18 DIAGNOSIS — R05.9 COUGH: Primary | ICD-10-CM

## 2021-06-18 PROCEDURE — 99214 OFFICE O/P EST MOD 30 MIN: CPT | Performed by: INTERNAL MEDICINE

## 2021-06-18 RX ORDER — DOXYCYCLINE HYCLATE 100 MG/1
100 CAPSULE ORAL 2 TIMES DAILY
Qty: 14 CAPSULE | Refills: 0 | Status: SHIPPED | OUTPATIENT
Start: 2021-06-18 | End: 2021-06-28

## 2021-06-18 RX ORDER — AMIODARONE HYDROCHLORIDE 200 MG/1
200 TABLET ORAL DAILY
COMMUNITY
Start: 2021-05-14 | End: 2021-10-04

## 2021-06-18 RX ORDER — BUMETANIDE 2 MG/1
2 TABLET ORAL
COMMUNITY
Start: 2021-05-13 | End: 2022-02-14 | Stop reason: SDUPTHER

## 2021-06-18 RX ORDER — MIDODRINE HYDROCHLORIDE 10 MG/1
10 TABLET ORAL
COMMUNITY
Start: 2021-05-13 | End: 2021-11-04

## 2021-06-18 NOTE — TELEPHONE ENCOUNTER
PATIENT CALLED STATING THAT THE HYDROcod Polst-CPM Polst ER (TUSSIONEX PENNKINETIC) 10-8 MG/5ML ER suspension IS NOT COVERED BY HER INSURANCE AND SHE NEEDS AN ALTERNATE MEDICATION SENT TO THE PHARMACY.    PLEASE ADVISE  [phone]    [facility] #[id number] - [place], [place] - [address] AT [facility] [address] - [phone] PH - [phone] FX  [phone]    I spoke to the patient.  Advise she may want to try pain for this out-of-pocket.  I do not think I can use Hydromet because of the cholinergic effects.

## 2021-06-18 NOTE — PROGRESS NOTES
Subjective Chief complaint is cough  Mariangel Nguyen is a 70 y.o. female.     History of Present Illness Mariangel is here today for ongoing cough.  She has had it now for several weeks.  She initially saw Dr. Arizmendi was prescribe some Delsym.  I later tried her on some Tessalon Perles and some Bromfed.  Neither of these seem to help.  The cough is intermittently productive but the phlegm is clear.  Not having fever or chills.  She is not having shortness of breath.  She did recently start using her nebulizer which may have helped a little bit with the cough.  She is unsure whether she is still taking the lisinopril or whether her cardiologist stopped that.  The following portions of the patient's history were reviewed and updated as appropriate: allergies, current medications, past family history, past medical history, past social history, past surgical history and problem list.    Review of Systems   Constitutional: Negative for chills and fever.   Respiratory: Positive for cough. Negative for chest tightness, shortness of breath and wheezing.        Objective   Physical Exam  Vitals and nursing note reviewed.   HENT:      Right Ear: Tympanic membrane and ear canal normal.      Left Ear: Tympanic membrane and ear canal normal.      Nose: Congestion present.      Mouth/Throat:      Mouth: Mucous membranes are dry.      Pharynx: Oropharynx is clear.   Cardiovascular:      Rate and Rhythm: Normal rate and regular rhythm.   Pulmonary:      Effort: Pulmonary effort is normal.      Breath sounds: No wheezing, rhonchi or rales.           Assessment/Plan   Diagnoses and all orders for this visit:    1. Cough (Primary)  -     HYDROcod Polst-CPM Polst ER (TUSSIONEX PENNKINETIC) 10-8 MG/5ML ER suspension; Take 5 mL by mouth Every 12 (Twelve) Hours As Needed for Cough.  Dispense: 118 mL; Refill: 0    Other orders  -     doxycycline (VIBRAMYCIN) 100 MG capsule; Take 1 capsule by mouth 2 (Two) Times a Day.  Dispense: 14  capsule; Refill: 0      Mariangel is here today for cough that has not improved with various nonnarcotic cough suppressants.  I am going to treat her with some doxycycline and some Tussionex.  I do not find any evidence of heart failure on exam today.  I am going to have her check regarding whether she is taking lisinopril.  If her cough does not improve with what we are treating now we may consider stopping that

## 2021-06-23 RX ORDER — ESCITALOPRAM OXALATE 10 MG/1
TABLET ORAL
Qty: 30 TABLET | Refills: 4 | Status: SHIPPED | OUTPATIENT
Start: 2021-06-23 | End: 2021-06-25 | Stop reason: SDUPTHER

## 2021-06-25 DIAGNOSIS — F41.9 ANXIETY AND DEPRESSION: Primary | ICD-10-CM

## 2021-06-25 DIAGNOSIS — F32.A ANXIETY AND DEPRESSION: Primary | ICD-10-CM

## 2021-06-25 RX ORDER — ESCITALOPRAM OXALATE 10 MG/1
10 TABLET ORAL DAILY
Qty: 30 TABLET | Refills: 4 | Status: SHIPPED | OUTPATIENT
Start: 2021-06-25 | End: 2022-06-29

## 2021-06-28 RX ORDER — DOXYCYCLINE HYCLATE 100 MG/1
CAPSULE ORAL
Qty: 14 CAPSULE | Refills: 0 | Status: SHIPPED | OUTPATIENT
Start: 2021-06-28 | End: 2021-10-04

## 2021-08-16 RX ORDER — HYDROCHLOROTHIAZIDE 25 MG/1
25 TABLET ORAL DAILY
Qty: 90 TABLET | OUTPATIENT
Start: 2021-08-16

## 2021-09-22 ENCOUNTER — TELEPHONE (OUTPATIENT)
Dept: FAMILY MEDICINE CLINIC | Facility: CLINIC | Age: 71
End: 2021-09-22

## 2021-09-28 ENCOUNTER — APPOINTMENT (OUTPATIENT)
Dept: LAB | Facility: HOSPITAL | Age: 71
End: 2021-09-28

## 2021-09-29 ENCOUNTER — LAB (OUTPATIENT)
Dept: LAB | Facility: HOSPITAL | Age: 71
End: 2021-09-29

## 2021-09-29 DIAGNOSIS — D50.8 OTHER IRON DEFICIENCY ANEMIA: ICD-10-CM

## 2021-09-29 LAB
BASOPHILS # BLD AUTO: 0.07 10*3/MM3 (ref 0–0.2)
BASOPHILS NFR BLD AUTO: 0.8 % (ref 0–1.5)
DEPRECATED RDW RBC AUTO: 49.2 FL (ref 37–54)
EOSINOPHIL # BLD AUTO: 0.34 10*3/MM3 (ref 0–0.4)
EOSINOPHIL NFR BLD AUTO: 4.1 % (ref 0.3–6.2)
ERYTHROCYTE [DISTWIDTH] IN BLOOD BY AUTOMATED COUNT: 15.9 % (ref 12.3–15.4)
FERRITIN SERPL-MCNC: 57.9 NG/ML (ref 13–150)
HCT VFR BLD AUTO: 41.2 % (ref 34–46.6)
HGB BLD-MCNC: 11.7 G/DL (ref 12–15.9)
IMM GRANULOCYTES # BLD AUTO: 0.05 10*3/MM3 (ref 0–0.05)
IMM GRANULOCYTES NFR BLD AUTO: 0.6 % (ref 0–0.5)
IRON 24H UR-MRATE: 43 MCG/DL (ref 37–145)
IRON SATN MFR SERPL: 10 % (ref 14–48)
LYMPHOCYTES # BLD AUTO: 1.06 10*3/MM3 (ref 0.7–3.1)
LYMPHOCYTES NFR BLD AUTO: 12.9 % (ref 19.6–45.3)
MCH RBC QN AUTO: 24.1 PG (ref 26.6–33)
MCHC RBC AUTO-ENTMCNC: 28.4 G/DL (ref 31.5–35.7)
MCV RBC AUTO: 84.8 FL (ref 79–97)
MONOCYTES # BLD AUTO: 0.78 10*3/MM3 (ref 0.1–0.9)
MONOCYTES NFR BLD AUTO: 9.5 % (ref 5–12)
NEUTROPHILS NFR BLD AUTO: 5.94 10*3/MM3 (ref 1.7–7)
NEUTROPHILS NFR BLD AUTO: 72.1 % (ref 42.7–76)
NRBC BLD AUTO-RTO: 0 /100 WBC (ref 0–0.2)
PLATELET # BLD AUTO: 478 10*3/MM3 (ref 140–450)
PMV BLD AUTO: 9.5 FL (ref 6–12)
RBC # BLD AUTO: 4.86 10*6/MM3 (ref 3.77–5.28)
TIBC SERPL-MCNC: 426 MCG/DL (ref 249–505)
TRANSFERRIN SERPL-MCNC: 304 MG/DL (ref 200–360)
WBC # BLD AUTO: 8.24 10*3/MM3 (ref 3.4–10.8)

## 2021-09-29 PROCEDURE — 36415 COLL VENOUS BLD VENIPUNCTURE: CPT

## 2021-09-29 PROCEDURE — 85025 COMPLETE CBC W/AUTO DIFF WBC: CPT

## 2021-09-29 PROCEDURE — 84466 ASSAY OF TRANSFERRIN: CPT

## 2021-09-29 PROCEDURE — 83540 ASSAY OF IRON: CPT

## 2021-09-29 PROCEDURE — 82728 ASSAY OF FERRITIN: CPT

## 2021-10-04 ENCOUNTER — OFFICE VISIT (OUTPATIENT)
Dept: FAMILY MEDICINE CLINIC | Facility: CLINIC | Age: 71
End: 2021-10-04

## 2021-10-04 VITALS
WEIGHT: 249 LBS | TEMPERATURE: 97.6 F | SYSTOLIC BLOOD PRESSURE: 124 MMHG | DIASTOLIC BLOOD PRESSURE: 88 MMHG | BODY MASS INDEX: 45.82 KG/M2 | OXYGEN SATURATION: 95 % | HEIGHT: 62 IN | HEART RATE: 87 BPM

## 2021-10-04 DIAGNOSIS — I87.2 VENOUS STASIS DERMATITIS OF BOTH LOWER EXTREMITIES: ICD-10-CM

## 2021-10-04 DIAGNOSIS — I50.33 ACUTE ON CHRONIC DIASTOLIC CHF (CONGESTIVE HEART FAILURE) (HCC): Primary | ICD-10-CM

## 2021-10-04 DIAGNOSIS — R60.0 LOCALIZED EDEMA: ICD-10-CM

## 2021-10-04 PROCEDURE — 99214 OFFICE O/P EST MOD 30 MIN: CPT | Performed by: INTERNAL MEDICINE

## 2021-10-04 NOTE — PROGRESS NOTES
Subjective Complaint is hospital follow-up  Mariangel Nguyen is a 71 y.o. female.     History of Present Illness Mariangel is here today for follow-up.  She was admitted to a [place] facility on the 17th.  She was discharged with a diagnosis of congestive heart failure and cellulitis.  She was sent home with 5 additional days of clindamycin orally.  She is now concerned because her legs have a lot of scaling on them.  Are somewhat painful.  She is not having fever or chills.  Her breathing is at baseline.    The following portions of the patient's history were reviewed and updated as appropriate: allergies, current medications, past family history, past medical history, past social history, past surgical history and problem list.    Review of Systems   Constitutional: Negative for chills and fever.   Respiratory: Negative for chest tightness and shortness of breath.    Cardiovascular: Positive for leg swelling.       Objective   Physical Exam  Vitals and nursing note reviewed.   Cardiovascular:      Rate and Rhythm: Normal rate.   Pulmonary:      Effort: Pulmonary effort is normal.      Breath sounds: No wheezing.   Musculoskeletal:      Right lower leg: Edema present.      Left lower leg: Edema present.   Skin:     Comments: She has considerable scale and woody stasis skin changes of both legs.  There is some minor erythema but no warmth.  This has more the appearance of stasis dermatitis rather than cellulitis.           Assessment/Plan   Diagnoses and all orders for this visit:    1. Acute on chronic diastolic CHF (congestive heart failure) (HCC) (Primary)    2. Localized edema    3. Venous stasis dermatitis of both lower extremities    Mariangel is here today for leg problem.  We are going to treat her for some stasis dermatitis.  I have given her a prescription to be compounded and have advised him to go to "[facility]" pharmacy.

## 2021-10-05 ENCOUNTER — TELEPHONE (OUTPATIENT)
Dept: ONCOLOGY | Facility: CLINIC | Age: 71
End: 2021-10-05

## 2021-10-05 ENCOUNTER — OFFICE VISIT (OUTPATIENT)
Dept: ONCOLOGY | Facility: CLINIC | Age: 71
End: 2021-10-05

## 2021-10-05 ENCOUNTER — APPOINTMENT (OUTPATIENT)
Dept: LAB | Facility: HOSPITAL | Age: 71
End: 2021-10-05

## 2021-10-05 VITALS
HEART RATE: 89 BPM | WEIGHT: 250 LBS | SYSTOLIC BLOOD PRESSURE: 135 MMHG | RESPIRATION RATE: 18 BRPM | OXYGEN SATURATION: 90 % | TEMPERATURE: 97.7 F | HEIGHT: 62 IN | BODY MASS INDEX: 46.01 KG/M2 | DIASTOLIC BLOOD PRESSURE: 81 MMHG

## 2021-10-05 DIAGNOSIS — D50.8 OTHER IRON DEFICIENCY ANEMIA: Primary | ICD-10-CM

## 2021-10-05 PROCEDURE — 99214 OFFICE O/P EST MOD 30 MIN: CPT | Performed by: INTERNAL MEDICINE

## 2021-10-05 NOTE — TELEPHONE ENCOUNTER
Called pt to let her know her daughter may assist her to the office and accompany her for her appointment, per Dr. Leone.

## 2021-10-12 ENCOUNTER — TELEPHONE (OUTPATIENT)
Dept: FAMILY MEDICINE CLINIC | Facility: CLINIC | Age: 71
End: 2021-10-12

## 2021-10-12 NOTE — TELEPHONE ENCOUNTER
Caller: Tita Nguyen   NO  VERBAL ON FILE     Relationship: Emergency Contact    Medication requested (name and dosage): triamcinolone (KENALOG) 0.1 % ointment    Pharmacy where request should be sent: [id number] [place] [place] - [address] AT [facility] [address] - [phone]  - [phone] FX     Additional details provided by patient:   DAUGHTER IS UNSURE IF PATIENT NEEDS A REFILL OF THIS MEDICATION. DAUGHTER STATES IT IS $50 AND PATIENT CANT AFFORD IT, HOWEVER PATIENTS LEGS ARE STILL RED AND WARM TO THE TOUCH ALONG WITH BLISTERS ON HER LEGS.     Best call back number: [phone]    Does the patient have less than a 3 day supply:  [x] Yes  [] No    Sondra Del Toro Rep   10/12/21 13:54 EDT     I advised an office visit if she is still having redness and blisters

## 2021-10-15 ENCOUNTER — TELEPHONE (OUTPATIENT)
Dept: FAMILY MEDICINE CLINIC | Facility: CLINIC | Age: 71
End: 2021-10-15

## 2021-10-15 RX ORDER — LEVOTHYROXINE SODIUM 0.05 MG/1
50 TABLET ORAL DAILY
Qty: 90 TABLET | Refills: 1 | Status: SHIPPED | OUTPATIENT
Start: 2021-10-15 | End: 2022-03-31

## 2021-10-15 RX ORDER — PRAVASTATIN SODIUM 40 MG
40 TABLET ORAL NIGHTLY
Qty: 90 TABLET | Refills: 1 | Status: SHIPPED | OUTPATIENT
Start: 2021-10-15 | End: 2022-01-31 | Stop reason: ALTCHOICE

## 2021-10-15 NOTE — TELEPHONE ENCOUNTER
PT'S DAUGHTER CALLED AND ASKED FOR ORDERS TO HAVE HER MOTHER'S LEGS WRAPPED DUE TO SWELLING. THE ORDER IS TO GO TO [facility].      THANK YOU

## 2021-10-18 ENCOUNTER — TELEPHONE (OUTPATIENT)
Dept: FAMILY MEDICINE CLINIC | Facility: CLINIC | Age: 71
End: 2021-10-18

## 2021-10-18 NOTE — TELEPHONE ENCOUNTER
JADYN FROM [facility] IS THE NURSE WHO WAS NEEDING THE ORDERS TO WRAP THIS PATIENTS LEGS. SHE CAN BE REACHED @ [phone]      THANK YOU    I spoke to Jadyn.  She is going to do Kerlix wrap and Ace bandage.  I did advise that was okay.  She did not seem to think there was any infection.  She has had some blisters but only minimal erythema.

## 2021-10-19 ENCOUNTER — TELEPHONE (OUTPATIENT)
Dept: FAMILY MEDICINE CLINIC | Facility: CLINIC | Age: 71
End: 2021-10-19

## 2021-10-19 NOTE — TELEPHONE ENCOUNTER
Caller: Edita Mcgowan    Relationship: Emergency Contact    Best call back number: [phone]     What is the best time to reach you: ANYTIME    Who are you requesting to speak with (clinical staff, provider,  specific staff member): DR GRIJALVA OR MA    What was the call regarding: PATIENT STATES SHE WOULD LIKE TO KNOW IF THERE ARE ANY SAMPLES OF JARDIANCE AVAILABLE    Do you require a callback: YES

## 2021-10-21 ENCOUNTER — OFFICE VISIT (OUTPATIENT)
Dept: FAMILY MEDICINE CLINIC | Facility: CLINIC | Age: 71
End: 2021-10-21

## 2021-10-21 VITALS
BODY MASS INDEX: 46.01 KG/M2 | OXYGEN SATURATION: 85 % | HEART RATE: 83 BPM | SYSTOLIC BLOOD PRESSURE: 118 MMHG | WEIGHT: 250 LBS | HEIGHT: 62 IN | DIASTOLIC BLOOD PRESSURE: 74 MMHG

## 2021-10-21 DIAGNOSIS — I50.32 CHRONIC DIASTOLIC (CONGESTIVE) HEART FAILURE (HCC): ICD-10-CM

## 2021-10-21 DIAGNOSIS — I87.2 VENOUS STASIS DERMATITIS OF BOTH LOWER EXTREMITIES: ICD-10-CM

## 2021-10-21 DIAGNOSIS — I87.303 STASIS EDEMA OF BOTH LOWER EXTREMITIES: Primary | ICD-10-CM

## 2021-10-21 PROBLEM — I50.33 ACUTE ON CHRONIC DIASTOLIC CHF (CONGESTIVE HEART FAILURE) (HCC): Status: RESOLVED | Noted: 2020-09-30 | Resolved: 2021-10-21

## 2021-10-21 PROBLEM — I82.432 ACUTE DEEP VEIN THROMBOSIS (DVT) OF POPLITEAL VEIN OF LEFT LOWER EXTREMITY (HCC): Status: RESOLVED | Noted: 2019-02-06 | Resolved: 2021-10-21

## 2021-10-21 PROCEDURE — 99214 OFFICE O/P EST MOD 30 MIN: CPT | Performed by: INTERNAL MEDICINE

## 2021-10-21 RX ORDER — DOXYCYCLINE HYCLATE 100 MG/1
100 CAPSULE ORAL 2 TIMES DAILY
Qty: 14 CAPSULE | Refills: 0 | Status: SHIPPED | OUTPATIENT
Start: 2021-10-21 | End: 2021-11-04

## 2021-10-21 NOTE — PROGRESS NOTES
Subjective Chief complaint is redness and blisters on her leg  Mariangel Nguyen is a 71 y.o. female.     History of Present Illness Mariangel is here today for complaints of redness and blistering on her legs.  She has chronic diastolic congestive heart failure.  She has had some chronic kidney disease and a previous left lower extremity DVT.  Her legs have been swelling little more than usual.  Her weight is up a few pounds from few days ago.  Her oxygen saturation is a little bit lower today.  Her lab work through the heart failure clinic showed her most recent BNP to be normal.  He could not afford the triamcinolone ointment for her legs.    The following portions of the patient's history were reviewed and updated as appropriate: allergies, current medications, past family history, past medical history, past social history, past surgical history and problem list.    Review of Systems   Constitutional: Negative for chills and fever.   Respiratory: Positive for shortness of breath.    Cardiovascular: Positive for leg swelling.   Skin: Positive for color change.       Objective   Physical Exam  Vitals and nursing note reviewed.   Constitutional:       Appearance: Normal appearance.   Cardiovascular:      Rate and Rhythm: Normal rate and regular rhythm.   Pulmonary:      Effort: Pulmonary effort is normal.      Breath sounds: No wheezing or rales.   Musculoskeletal:      Right lower leg: Edema present.      Left lower leg: Edema present.      Comments: She has some bilateral lower extremity edema.  It is worse on the left than the right possibly from her prior deep vein thrombosis.   Skin:     Comments: She has woody stasis skin changes with some erythema intermixed.  It is hard to tell how much of this is stasis dermatitis versus possible cellulitis.   Neurological:      Mental Status: She is alert.           Assessment/Plan   Diagnoses and all orders for this visit:    1. Stasis edema of both lower extremities  (Primary)    2. Venous stasis dermatitis of both lower extremities    3. Chronic diastolic (congestive) heart failure (HCC)    Other orders  -     doxycycline (VIBRAMYCIN) 100 MG capsule; Take 1 capsule by mouth 2 (Two) Times a Day.  Dispense: 14 capsule; Refill: 0    Mariangel is here today for her legs.  It is hard to tell how much of this is stasis dermatitis versus cellulitis.  I am a little bit concerned about her weight being up a few pounds and her oxygen level being a little lower than usual.  Her lungs are clear however.  Her last BNP was okay.  I am going to have her use an additional bumetanide for now through the weekend.  I am going to put her on some doxycycline.  I did advise her to continue the Aquaphor ointment but to use some hydrocortisone cream on top of that twice daily.  I will see her back in few weeks.

## 2021-10-25 ENCOUNTER — TELEPHONE (OUTPATIENT)
Dept: FAMILY MEDICINE CLINIC | Facility: CLINIC | Age: 71
End: 2021-10-25

## 2021-10-25 RX ORDER — FLUCONAZOLE 200 MG/1
200 TABLET ORAL WEEKLY
Qty: 2 TABLET | Refills: 0 | Status: SHIPPED | OUTPATIENT
Start: 2021-10-25 | End: 2021-11-04

## 2021-10-25 NOTE — TELEPHONE ENCOUNTER
Caller: Tita Nguyen    Relationship: Emergency Contact    Best call back number: [phone]    What medication are you requesting:     What are your current symptoms: YEAST INFECTION, VAGINAL ITCHING AND BURNING    How long have you been experiencing symptoms: THIS MORNING    Have you had these symptoms before:    [] Yes  [] No    Have you been treated for these symptoms before:   [] Yes  [] No    If a prescription is needed, what is your preferred pharmacy and phone number: [facility] #[id number] [place], [place] - [address] AT [facility] [address]  - [phone]  - [phone]     Additional notes: SHE STATES THAT THE PATIENT IS ON AN ANTIBIOTIC AND STATES THAT HER CARE NURSE CAME BY TODAY AND ALSO BELIEVES THAT THE PATIENT HAS A YEAST INFECTION    Advised she may need insulin for her sugar. Sent diflucan

## 2021-10-25 NOTE — TELEPHONE ENCOUNTER
CHELLE, PATIENT'S DAUGHTER, CALLED TO REQUEST PORTABLE OXYGEN TANKS FOR THE PATIENT BE CALLED IN TO [facility]. SHE STATES THAT THE ONE THAT SHE HAS IS HEAVY.

## 2021-10-29 ENCOUNTER — TELEPHONE (OUTPATIENT)
Dept: FAMILY MEDICINE CLINIC | Facility: CLINIC | Age: 71
End: 2021-10-29

## 2021-10-29 NOTE — TELEPHONE ENCOUNTER
Caller: Edita Mcgowan    Relationship: Emergency Contact    Best call back number: [phone]    What orders are you requesting (i.e. lab or imaging): HOME HEALTH FOR BATHING    In what timeframe would the patient need to come in: ASAP    Where will you receive your lab/imaging services: HOME HEALTH    Additional notes: PATIENT'S DAUGHTER WOULD LIKE TO KNOW IF THERE IS ANY OPTION FOR SOMEONE COMING IN TO THE HOME TO HELP WITH BATHING.  PLEASE CALL AND ADVISE

## 2021-11-03 ENCOUNTER — TELEPHONE (OUTPATIENT)
Dept: FAMILY MEDICINE CLINIC | Facility: CLINIC | Age: 71
End: 2021-11-03

## 2021-11-03 RX ORDER — SILDENAFIL CITRATE 20 MG/1
20 TABLET ORAL 3 TIMES DAILY
Qty: 90 TABLET | Refills: 5 | Status: SHIPPED | OUTPATIENT
Start: 2021-11-03 | End: 2021-11-19 | Stop reason: SDUPTHER

## 2021-11-03 NOTE — TELEPHONE ENCOUNTER
DAUGHTER'S ARE ASKING IF YOU CAN GO OVER HER MEDS TOMORROW AT HER APPT.  BOTH DAUGHTER'S CAN NOT COME TO APPT  SHE IS HAVING SHORT TERM MEMORY LOSS.  SHE ASK'S THE SAME QUESTION 5 TIMES, THEY ARE NOW GETTING CONCERNED AND THEY SAID SHE WILL NOT ADMIT TO IT. ALSO SHE NEEDS A REFILL OF   *SILDENAFIL 20MG 1 TID#?  SHE WILL BE SEEING PULMONOLOGIST SOON BUT SHE IS OUT.  PHARM#[phone] SARTHAK CORBETT [phone]

## 2021-11-04 ENCOUNTER — TELEPHONE (OUTPATIENT)
Dept: FAMILY MEDICINE CLINIC | Facility: CLINIC | Age: 71
End: 2021-11-04

## 2021-11-04 ENCOUNTER — OFFICE VISIT (OUTPATIENT)
Dept: FAMILY MEDICINE CLINIC | Facility: CLINIC | Age: 71
End: 2021-11-04

## 2021-11-04 VITALS
SYSTOLIC BLOOD PRESSURE: 162 MMHG | OXYGEN SATURATION: 82 % | BODY MASS INDEX: 45.27 KG/M2 | WEIGHT: 246 LBS | HEART RATE: 84 BPM | HEIGHT: 62 IN | DIASTOLIC BLOOD PRESSURE: 84 MMHG

## 2021-11-04 DIAGNOSIS — R41.3 MEMORY PROBLEM: ICD-10-CM

## 2021-11-04 DIAGNOSIS — F32.A ANXIETY AND DEPRESSION: ICD-10-CM

## 2021-11-04 DIAGNOSIS — I87.2 VENOUS STASIS DERMATITIS OF BOTH LOWER EXTREMITIES: ICD-10-CM

## 2021-11-04 DIAGNOSIS — E11.9 TYPE 2 DIABETES MELLITUS WITHOUT COMPLICATION, WITHOUT LONG-TERM CURRENT USE OF INSULIN (HCC): Primary | ICD-10-CM

## 2021-11-04 DIAGNOSIS — F41.9 ANXIETY AND DEPRESSION: ICD-10-CM

## 2021-11-04 PROCEDURE — 99213 OFFICE O/P EST LOW 20 MIN: CPT | Performed by: INTERNAL MEDICINE

## 2021-11-04 RX ORDER — SPIRONOLACTONE 25 MG/1
25 TABLET ORAL DAILY
COMMUNITY
Start: 2021-10-04 | End: 2021-11-04

## 2021-11-04 NOTE — PROGRESS NOTES
Subjective Complaint is follow-up on leg swelling  Mariangel Nguyen is a 71 y.o. female.     History of Present Illness Mariangel is here today for follow-up on her leg swelling. She is on some bumetanide 2 mg daily. She is no longer on any potassium. Her weight is down by 4 pounds since her last visit. Her legs look like they are doing okay in terms of her swelling. She has been out of her sildenafil for several days. Her oxygen level is low today but she usually wears oxygen and is not wearing it here today. I did advise that she needs to wear her oxygen routinely. She is experiencing some memory issues according to the patient's daughter. She seems to be repeating herself or asking the same questions over and over.    The following portions of the patient's history were reviewed and updated as appropriate: allergies, current medications, past family history, past medical history, past social history, past surgical history and problem list.    Review of Systems   Constitutional: Negative for chills and fever.   Respiratory: Negative for chest tightness and shortness of breath.    Cardiovascular: Positive for leg swelling.   Skin: Positive for dry skin.       Objective   Physical Exam  Vitals and nursing note reviewed.   Cardiovascular:      Rate and Rhythm: Normal rate and regular rhythm.   Pulmonary:      Effort: Pulmonary effort is normal.      Breath sounds: No wheezing, rhonchi or rales.   Musculoskeletal:      Right lower leg: Edema present.      Left lower leg: Edema present.   Skin:     Comments: She has changes of stasis dermatitis bilaterally. I do not see any cellulitis.   Neurological:      Comments: She is alert to year, month, and day. She could not remember the calendar date. She was able to adequately complete clock drawing without problems.           Assessment/Plan   Diagnoses and all orders for this visit:    1. Type 2 diabetes mellitus without complication, without long-term current use of  insulin (HCC) (Primary)  -     Basic Metabolic Panel  -     Hemoglobin A1c    2. Venous stasis dermatitis of both lower extremities    3. Memory problem      Mariangel is here today for follow-up. We are going to check on status of her diabetes. Her venous stasis looks stable. I think her memory problems are more related to some anxiety. I think she asks the same questions over and over for reassurance.

## 2021-11-04 NOTE — TELEPHONE ENCOUNTER
PT FORGOT TO ASK YOU IF YOU WOULD INCREASE HER LEXAPRO FOR HER ANXIETY?  SHE IS CURRENTLY ON 10MG.  PHARMACY#[phone]  PLEASE ADVISE [phone]

## 2021-11-04 NOTE — TELEPHONE ENCOUNTER
Hub staff attempted to follow warm transfer process and was unsuccessful     Caller: Tita Nguyen    Relationship to patient: Emergency Contact    Best call back number: [phone]     Patient is needing: THE PATIENT'S DAUGHTER WAS RETURNING A CALL BACK TO [employer]. THE HUB ATTEMPTED TO WARM TRANSFER PER HER REQUEST.

## 2021-11-05 LAB
BUN SERPL-MCNC: 14 MG/DL (ref 8–27)
BUN/CREAT SERPL: 14 (ref 12–28)
CALCIUM SERPL-MCNC: 9.8 MG/DL (ref 8.7–10.3)
CHLORIDE SERPL-SCNC: 99 MMOL/L (ref 96–106)
CO2 SERPL-SCNC: 27 MMOL/L (ref 20–29)
CREAT SERPL-MCNC: 1.02 MG/DL (ref 0.57–1)
GLUCOSE SERPL-MCNC: 289 MG/DL (ref 65–99)
HBA1C MFR BLD: 9.9 % (ref 4.8–5.6)
POTASSIUM SERPL-SCNC: 5.6 MMOL/L (ref 3.5–5.2)
SODIUM SERPL-SCNC: 142 MMOL/L (ref 134–144)

## 2021-11-09 ENCOUNTER — TELEPHONE (OUTPATIENT)
Dept: FAMILY MEDICINE CLINIC | Facility: CLINIC | Age: 71
End: 2021-11-09

## 2021-11-09 NOTE — TELEPHONE ENCOUNTER
Pts daughter has tested positive for Covid and they live together. The patient is getting tested today but has had a cough that started yesterday and kept her up all night. I advised that she needed to quarantine but she would like recommendations on something OTC she can take for her cough. Please advise.     I spoke to the patient's daughter.  She is already yet the emergency room and is positive for Covid.

## 2021-11-10 ENCOUNTER — TELEPHONE (OUTPATIENT)
Dept: FAMILY MEDICINE CLINIC | Facility: CLINIC | Age: 71
End: 2021-11-10

## 2021-11-10 NOTE — TELEPHONE ENCOUNTER
Caller: Tita Nguyen    Relationship to patient: Emergency Contact    Best call back number: [phone]    Date of positive COVID19 test: 11/9/2021    COVID19 symptoms: DIZZINESS, FEVER, SOB, CONFUSION, FATIGUE, WEAKNESS     What is the patients preferred pharmacy:   [facility] #[id number] [place], [place] - [address] AT [facility] [address] - [phone]  - [phone]   [phone]    I spoke to the patient's daughter.  She is already at the emergency room.

## 2021-11-17 ENCOUNTER — TELEPHONE (OUTPATIENT)
Dept: FAMILY MEDICINE CLINIC | Facility: CLINIC | Age: 71
End: 2021-11-17

## 2021-11-17 NOTE — TELEPHONE ENCOUNTER
Caller: Tita Nguyen    Relationship to patient: Emergency Contact    Best call back number: [phone]    Patient is needing: DAUGHTER CALLED AGAIN STATING THAT PATIENT IS GOING TO ED FOR WORSENING COUGH

## 2021-11-17 NOTE — TELEPHONE ENCOUNTER
Caller: Tita Nguyen    Relationship: Emergency Contact    Best call back number: [phone]    What is the best time to reach you: ANYTIME    Who are you requesting to speak with (clinical staff, provider,  specific staff member): MA OR DOCTOR     What was the call regarding: PATIENTS DAUGHTER STATES PATIENT IS STILL HAVING SERVE COVID SYMPTOMS BUT SHE HAS MESSED UP TAKING THE STEROID PACK AND THEY ARE NOT SURE WHAT TO DO     Do you require a callback: YES

## 2021-11-18 ENCOUNTER — TELEPHONE (OUTPATIENT)
Dept: FAMILY MEDICINE CLINIC | Facility: CLINIC | Age: 71
End: 2021-11-18

## 2021-11-18 NOTE — TELEPHONE ENCOUNTER
Caller: Juan M Edita    Relationship: Emergency Contact    Best call back number: [phone]    What medications are you currently taking:   PRESCRIBED A Z-PACK FROM [facility] 11/13 -TOOK TWO FIRST DAY TABLETS, THEN TOOK ONE THE NEXT DAY    PRESCRIBED PREDNISONE 20MG TABLETS TWICE A DAY FOR 5 DAYS FROM [facility] 11/18 (SEPARATE ER VISIT)    PATIENT WAS IN THE HOSPITAL TWICE FOR COVID.     Which medication are you concerned about: PREDNISONE.     Who prescribed you this medication: 2 DIFFERENT [place] ER DOCTORS    What are your concerns: WHICH PRESCRIPTION OF PREDNISONE PATIENT SHOULD BE TAKING.     PLEASE ADVISE WHICH MEDICATION SHOULD BE TAKEN BY PATIENT.   Sounds like the patient may have been given a Medrol dose pack as well as prednisone.  I am not sure she really has a Z-Orion.  I advised the daughter to check the actual medicines and call with them.

## 2021-11-19 ENCOUNTER — TELEPHONE (OUTPATIENT)
Dept: FAMILY MEDICINE CLINIC | Facility: CLINIC | Age: 71
End: 2021-11-19

## 2021-11-19 RX ORDER — SILDENAFIL CITRATE 20 MG/1
20 TABLET ORAL 3 TIMES DAILY
Qty: 90 TABLET | Refills: 5 | Status: SHIPPED | OUTPATIENT
Start: 2021-11-19 | End: 2022-01-10

## 2021-11-19 NOTE — TELEPHONE ENCOUNTER
PATIENTS DAUGHTER MADHURI STATES DR. GRIJALVA SENT sildenafil (REVATIO) 20 MG tablet TO [facility] BUT ITS TO EXPENSIVE THERE SO MADHURI IS REQUESTING US TO CANCEL THE SCRIPT AT [facility] AND RESEND IT TO [address], [place] - [facility] [address] AT [facility] [address] - [phone]  - [phone] FX  [phone] WHERE SHE CAN USE THE [employer] CARD AND GET IT FOR 18 DOLLARS INSTEAD  AT [facility].       MADHURI> [phone]

## 2021-11-29 ENCOUNTER — OFFICE VISIT (OUTPATIENT)
Dept: FAMILY MEDICINE CLINIC | Facility: CLINIC | Age: 71
End: 2021-11-29

## 2021-11-29 VITALS
HEIGHT: 62 IN | SYSTOLIC BLOOD PRESSURE: 110 MMHG | OXYGEN SATURATION: 77 % | WEIGHT: 233 LBS | HEART RATE: 86 BPM | BODY MASS INDEX: 42.88 KG/M2 | DIASTOLIC BLOOD PRESSURE: 64 MMHG

## 2021-11-29 DIAGNOSIS — R05.8 POST-VIRAL COUGH SYNDROME: Primary | ICD-10-CM

## 2021-11-29 DIAGNOSIS — R05.9 COUGH: ICD-10-CM

## 2021-11-29 PROCEDURE — 99214 OFFICE O/P EST MOD 30 MIN: CPT | Performed by: INTERNAL MEDICINE

## 2021-11-29 RX ORDER — LISINOPRIL 2.5 MG/1
2.5 TABLET ORAL DAILY
Qty: 90 TABLET | Refills: 1 | Status: SHIPPED | OUTPATIENT
Start: 2021-11-29 | End: 2022-03-31

## 2021-11-29 NOTE — PROGRESS NOTES
Subjective  Mariangel Nguyen is a 71 y.o. female.  She complains of cough    History of Present Illness Mariangel is here today for a nonproductive cough.  It is intermittent.  Sometimes it seems to be worse at night.  She does have a history of congestive heart failure with pulmonary hypertension.  She did have a admission to the hospital at [facility].  She had a CAT scan on 17th of this month.  It did show typical groundglass infiltrates consistent with her diagnosis of Covid at that time.  Tessalon Perles did not really seem to help her cough.    The following portions of the patient's history were reviewed and updated as appropriate: allergies, current medications, past family history, past medical history, past social history, past surgical history and problem list.    Review of Systems   Constitutional: Positive for fatigue. Negative for chills and fever.   Respiratory: Positive for cough and shortness of breath.    Cardiovascular: Positive for leg swelling.       Objective   Physical Exam  Vitals and nursing note reviewed.   Cardiovascular:      Rate and Rhythm: Normal rate and regular rhythm.   Pulmonary:      Effort: Pulmonary effort is normal.      Breath sounds: No wheezing, rhonchi or rales.   Musculoskeletal:      Right lower leg: Edema present.      Left lower leg: Edema present.   Neurological:      Mental Status: She is alert.           Assessment/Plan   Diagnoses and all orders for this visit:    1. Post-viral cough syndrome (Primary)    2. Cough  -     HYDROcod Polst-CPM Polst ER (TUSSIONEX PENNKINETIC) 10-8 MG/5ML ER suspension; Take 5 mL by mouth Every 12 (Twelve) Hours As Needed for Cough.  Dispense: 118 mL; Refill: 0      Mariangel is here today for cough    Suspect this is going to be a post viral cough syndrome.  The other possibility could be congestive heart failure.  I do not hear any congestive changes in her lungs.  Her oxygen saturation was low today but she is supposed to be wearing home  O2.  She does not bring it with her when she comes to the office.  Advise she needs to be wearing this 24/7.  She also has not been terribly compliant with trying to keep her legs elevated.

## 2021-12-01 ENCOUNTER — TELEPHONE (OUTPATIENT)
Dept: FAMILY MEDICINE CLINIC | Facility: CLINIC | Age: 71
End: 2021-12-01

## 2021-12-01 NOTE — TELEPHONE ENCOUNTER
Called pt and she said that the medication is $83.00 and she can't afford that can she take Delsym or can you send in something else?

## 2021-12-01 NOTE — TELEPHONE ENCOUNTER
Caller: Mariangel Nguyen    Relationship: Self    Best call back number: [phone]    Who are you requesting to speak with (clinical staff, provider,  specific staff member): CLINICAL STAFF     What was the call regarding: WANTING TO KNOW IF IT'S OK TO TAKE DELYSUM OVER THE COUNTER. THE PRESCRIPTION CALLED IN TO THE PHARMACY IS TOO EXPENSIVE AND PATIENT CAN NOT AFFORD IT PLEASE CALL AND ADVISE     Do you require a callback: YES

## 2021-12-01 NOTE — TELEPHONE ENCOUNTER
Caller: Mariangel Nguyen    Relationship: Self    Best call back number: [phone]    What medication are you requesting: COUGH MEDICATION    What are your current symptoms: DRY COUGH    How long have you been experiencing symptoms: COUPLE OF DAYS NOW    Have you had these symptoms before:    [x] Yes  [] No    Have you been treated for these symptoms before:   [x] Yes  [] No    If a prescription is needed, what is your preferred pharmacy and phone number: [facility] #[id number] - [place], [place] - [address] AT [facility] [address] - [phone]  - [phone] FX

## 2021-12-07 ENCOUNTER — TELEPHONE (OUTPATIENT)
Dept: FAMILY MEDICINE CLINIC | Facility: CLINIC | Age: 71
End: 2021-12-07

## 2021-12-07 NOTE — TELEPHONE ENCOUNTER
Called pt, pts daughter answered I  Had asked if she thought her mother would want to schedule mammogram since her daughter comes to all her appointments and pts daughter said doesn't think mother will because she is struggling to get her mother to go to dr appointments

## 2022-01-04 ENCOUNTER — TELEPHONE (OUTPATIENT)
Dept: FAMILY MEDICINE CLINIC | Facility: CLINIC | Age: 72
End: 2022-01-04

## 2022-01-04 RX ORDER — FLUCONAZOLE 200 MG/1
200 TABLET ORAL DAILY
Qty: 10 TABLET | Refills: 0 | Status: SHIPPED | OUTPATIENT
Start: 2022-01-04 | End: 2022-05-09

## 2022-01-04 NOTE — TELEPHONE ENCOUNTER
Caller: Edita Mcgowan    Relationship: Emergency Contact    Best call back number: [phone] (H)    What medication are you requesting: SOMETHING TO TREAT A YEAST INFECTION     What are your current symptoms: GROIN AREA, UP UNDER BELLY FLAB, SMELLY, VISIBLE INFECTION     How long have you been experiencing symptoms: HAS BEEN GOING ON MONTHS, BUT HAS WORSENED AND IS MAJOR NOW - STATES THE YEAST IS ON THE SURFACE    PINK IN COLOR   Have you had these symptoms before:  [] Yes  [x] No    Have you been treated for these symptoms before:  [] Yes  [x] No    If a prescription is needed, what is your preferred pharmacy and phone number:      [facility] #[id number] - [place], [place] - [address]  AT [facility] [address] - [phone]  - [phone] FX  [phone]    Additional notes:    DAUGHTER CALLED IN STATING THE PATIENT HAS AN APPOINTMENT ON Thursday 01/06/22 WITH , AND ARE SEEING IF MEDICINE CAN BE CALLED IN TODAY.    DAUGHTER STATES THE PATIENT SMELLS TERRIBLE AND YOU CAN VISIBLY SEE THE YEAST ON THE SURFACE OF THE SKIN. THAT THE CONDITION IS THE WORST ITS EVER BEEN.    THEY HAVE BEEN PUTTING A POWDER ON THE EFFECTED AREA AND IT IS NOT HELPING AT ALL.    PLEASE CALL AND ADVISE DAUGHTER [phone]

## 2022-01-10 ENCOUNTER — OFFICE VISIT (OUTPATIENT)
Dept: FAMILY MEDICINE CLINIC | Facility: CLINIC | Age: 72
End: 2022-01-10

## 2022-01-10 ENCOUNTER — TELEPHONE (OUTPATIENT)
Dept: FAMILY MEDICINE CLINIC | Facility: CLINIC | Age: 72
End: 2022-01-10

## 2022-01-10 VITALS
DIASTOLIC BLOOD PRESSURE: 58 MMHG | HEIGHT: 62 IN | BODY MASS INDEX: 41.59 KG/M2 | OXYGEN SATURATION: 93 % | SYSTOLIC BLOOD PRESSURE: 104 MMHG | WEIGHT: 226 LBS | HEART RATE: 61 BPM

## 2022-01-10 DIAGNOSIS — R41.3 MEMORY PROBLEM: Primary | ICD-10-CM

## 2022-01-10 DIAGNOSIS — R13.10 DYSPHAGIA, UNSPECIFIED TYPE: ICD-10-CM

## 2022-01-10 DIAGNOSIS — L30.4 INTERTRIGO: ICD-10-CM

## 2022-01-10 PROCEDURE — 99214 OFFICE O/P EST MOD 30 MIN: CPT | Performed by: INTERNAL MEDICINE

## 2022-01-10 RX ORDER — APIXABAN 5 MG/1
TABLET, FILM COATED ORAL
COMMUNITY
Start: 2021-12-20 | End: 2022-02-14 | Stop reason: SDUPTHER

## 2022-01-10 RX ORDER — DIGOXIN 125 MCG
125 TABLET ORAL DAILY
COMMUNITY
Start: 2021-12-07 | End: 2022-02-08

## 2022-01-10 RX ORDER — ATORVASTATIN CALCIUM 40 MG/1
1 TABLET, FILM COATED ORAL DAILY
COMMUNITY
Start: 2021-12-20 | End: 2022-01-19

## 2022-01-10 RX ORDER — METOLAZONE 5 MG/1
TABLET ORAL
COMMUNITY
Start: 2021-12-20 | End: 2022-03-31

## 2022-01-10 RX ORDER — SPIRONOLACTONE 25 MG/1
1 TABLET ORAL DAILY
COMMUNITY
Start: 2021-10-04 | End: 2022-03-31

## 2022-01-10 NOTE — TELEPHONE ENCOUNTER
Caller: Tita Nguyen    Relationship to patient: Emergency Contact    Best call back number: [phone]    Patient is needing: DAUGHTER FORGOT TO ASK AT APPOINTMENT 1-10-22:   WHAT TO DO, PATIENT IS NOT EATING MUCH, MAYBE A BITE OR TWO.  PATIENT IS VERY WEAK.     PLEASE ADVISE

## 2022-01-10 NOTE — PROGRESS NOTES
Subjective Chief complaint is memory problems  Mariangel Nguyen is a 71 y.o. female.     History of Present Illness Mariangel is here today for memory problems.  Her daughter reports that she is forgetting things very quickly after discussing them.  She will forget whether she took her pills or not.  She is on 13 different medications.  I did review her medications.  I am going to have her stop the Viagra for her pulmonary hypertension.  Her last echocardiogram showed a fairly normal right ventricular pressure.  I am going to have her stop her Jardiance because of a yeast infection in the groin.  Because she is doing insulin I am going to have her stop her glimepiride but increase her insulin up to 20 units daily.  I did advise him to contact her cardiologist regarding does she really need the digoxin and how many of the diuretics that she needs to be on.  Her leg swelling seems to be doing a little bit better.  She is having trouble swallowing pills.  She is not necessarily having trouble with food.    The following portions of the patient's history were reviewed and updated as appropriate: allergies, current medications, past family history, past medical history, past social history, past surgical history and problem list.    Review of Systems   Constitutional: Negative for chills and fever.   HENT: Positive for trouble swallowing.    Respiratory: Negative for cough.    Cardiovascular: Positive for leg swelling.   Gastrointestinal: Negative for abdominal pain.   Neurological: Positive for memory problem.       Objective   Physical Exam  Vitals and nursing note reviewed.   Constitutional:       Appearance: Normal appearance.   Cardiovascular:      Rate and Rhythm: Normal rate.   Pulmonary:      Effort: Pulmonary effort is normal.      Breath sounds: No wheezing, rhonchi or rales.   Skin:     Comments: She has intertrigo in both groins left worse than right   Neurological:      General: No focal deficit present.       Mental Status: She is alert.      Comments: ACE 3 mini cog score was 16 out of 30           Assessment/Plan   Diagnoses and all orders for this visit:    1. Memory problem (Primary)    2. Intertrigo    3. Dysphagia, unspecified type  -     FL Esophagram Complete Single Contrast; Future      Mariangel is here today for several problems.  I believe she has developing dementia.  Some of this is made worse by anxiety.  She seemed to have some performance anxiety on the test.  For her intertrigo I am going to stop the Jardiance.  I am going to have her continue the miconazole.  She still has a few days of fluconazole.  I am going to have her increase her insulin to 20 units of long-acting insulin daily.  We are going to stop her glimepiride.  For the dysphagia I am going to order a swallow test.  She is on a number of medications.  I do not want to add anything else at this time in terms of medicines for dementia.  If she can eliminate some medicines through her cardiologist we may get this a try.  I did advise the daughter that they may need to start thinking about other options for care.

## 2022-01-13 ENCOUNTER — TELEPHONE (OUTPATIENT)
Dept: FAMILY MEDICINE CLINIC | Facility: CLINIC | Age: 72
End: 2022-01-13

## 2022-01-13 ENCOUNTER — READMISSION MANAGEMENT (OUTPATIENT)
Dept: CALL CENTER | Facility: HOSPITAL | Age: 72
End: 2022-01-13

## 2022-01-13 NOTE — TELEPHONE ENCOUNTER
Caller: NURSE FROM [facility]          Best call back number: [phone]     New or established patient?  [] New  [x] Established    Date of discharge: 01/15/22    Facility discharged from: [facility]    Diagnosis/Symptoms: ACUTE KIDNEY INJURY    Length of stay (If applicable): 01/12/21 WAS ADMITTED.

## 2022-01-13 NOTE — OUTREACH NOTE
Prep Survey      Responses   [facility] facility patient discharged from? Non-BH   Is LACE score < 7 ? Non-BH Discharge   Emergency Room discharge w/ pulse ox? No   Eligibility [facility]    Date of Admission 01/12/22   Date of Discharge 01/15/22   Discharge diagnosis acute kidney injury   Does the patient have one of the following disease processes/diagnoses(primary or secondary)? Other   Prep survey completed? Yes          Kayley Prescott RN

## 2022-01-17 ENCOUNTER — TRANSITIONAL CARE MANAGEMENT TELEPHONE ENCOUNTER (OUTPATIENT)
Dept: CALL CENTER | Facility: HOSPITAL | Age: 72
End: 2022-01-17

## 2022-01-17 NOTE — OUTREACH NOTE
Call Center TCM Note      Responses   [facility] [place] patient discharged from? Non-BH   Does the patient have one of the following disease processes/diagnoses(primary or secondary)? Other   TCM attempt successful? No   Unsuccessful attempts Attempt 1   Revoked Reason Other   Call Status Comments Per dtr pt is not d/c from [facility] on 01/15/2022, pt is still admitted.           Tasha Carrington MA    1/17/2022, 11:41 EST

## 2022-01-27 ENCOUNTER — APPOINTMENT (OUTPATIENT)
Dept: GENERAL RADIOLOGY | Facility: HOSPITAL | Age: 72
End: 2022-01-27

## 2022-01-27 ENCOUNTER — TELEPHONE (OUTPATIENT)
Dept: FAMILY MEDICINE CLINIC | Facility: CLINIC | Age: 72
End: 2022-01-27

## 2022-01-27 RX ORDER — MEMANTINE HYDROCHLORIDE 5 MG/1
TABLET ORAL
Qty: 66 TABLET | Refills: 0 | Status: SHIPPED | OUTPATIENT
Start: 2022-01-27 | End: 2022-02-23

## 2022-01-27 NOTE — TELEPHONE ENCOUNTER
DAUGHTER MADHURI CALLED AND NEEDS TO TALK TO DR GRIJALVA ABOUT HER MOTHER'S DEMENTIA. IT HAS GOTTEN A LOT WORSE. THE HOSPITAL SUGGESTED SHE BE ON DEMENTIA MEDICATION.    PLEASE ADVISE  [phone]     We are going to try Namenda.  We will taper it slowly.  Advised coming back in 1 month

## 2022-01-31 RX ORDER — ATORVASTATIN CALCIUM 40 MG/1
40 TABLET, FILM COATED ORAL DAILY
Qty: 90 TABLET | Refills: 1 | Status: SHIPPED | OUTPATIENT
Start: 2022-01-31 | End: 2022-03-31

## 2022-02-01 ENCOUNTER — LAB (OUTPATIENT)
Dept: LAB | Facility: HOSPITAL | Age: 72
End: 2022-02-01

## 2022-02-01 DIAGNOSIS — D50.8 OTHER IRON DEFICIENCY ANEMIA: ICD-10-CM

## 2022-02-01 LAB
BASOPHILS # BLD AUTO: 0.12 10*3/MM3 (ref 0–0.2)
BASOPHILS NFR BLD AUTO: 1.6 % (ref 0–1.5)
DEPRECATED RDW RBC AUTO: 57 FL (ref 37–54)
EOSINOPHIL # BLD AUTO: 0.39 10*3/MM3 (ref 0–0.4)
EOSINOPHIL NFR BLD AUTO: 5.3 % (ref 0.3–6.2)
ERYTHROCYTE [DISTWIDTH] IN BLOOD BY AUTOMATED COUNT: 17.5 % (ref 12.3–15.4)
FERRITIN SERPL-MCNC: 49.9 NG/ML (ref 13–150)
HCT VFR BLD AUTO: 37.1 % (ref 34–46.6)
HGB BLD-MCNC: 11.1 G/DL (ref 12–15.9)
IMM GRANULOCYTES # BLD AUTO: 0.14 10*3/MM3 (ref 0–0.05)
IMM GRANULOCYTES NFR BLD AUTO: 1.9 % (ref 0–0.5)
IRON 24H UR-MRATE: 32 MCG/DL (ref 37–145)
IRON SATN MFR SERPL: 7 % (ref 14–48)
LYMPHOCYTES # BLD AUTO: 0.84 10*3/MM3 (ref 0.7–3.1)
LYMPHOCYTES NFR BLD AUTO: 11.3 % (ref 19.6–45.3)
MCH RBC QN AUTO: 26.7 PG (ref 26.6–33)
MCHC RBC AUTO-ENTMCNC: 29.9 G/DL (ref 31.5–35.7)
MCV RBC AUTO: 89.2 FL (ref 79–97)
MONOCYTES # BLD AUTO: 0.74 10*3/MM3 (ref 0.1–0.9)
MONOCYTES NFR BLD AUTO: 10 % (ref 5–12)
NEUTROPHILS NFR BLD AUTO: 5.18 10*3/MM3 (ref 1.7–7)
NEUTROPHILS NFR BLD AUTO: 69.9 % (ref 42.7–76)
NRBC BLD AUTO-RTO: 0 /100 WBC (ref 0–0.2)
PLATELET # BLD AUTO: 418 10*3/MM3 (ref 140–450)
PMV BLD AUTO: 9.2 FL (ref 6–12)
RBC # BLD AUTO: 4.16 10*6/MM3 (ref 3.77–5.28)
TIBC SERPL-MCNC: 430 MCG/DL (ref 249–505)
TRANSFERRIN SERPL-MCNC: 307 MG/DL (ref 200–360)
WBC NRBC COR # BLD: 7.41 10*3/MM3 (ref 3.4–10.8)

## 2022-02-01 PROCEDURE — 82728 ASSAY OF FERRITIN: CPT

## 2022-02-01 PROCEDURE — 83540 ASSAY OF IRON: CPT

## 2022-02-01 PROCEDURE — 85025 COMPLETE CBC W/AUTO DIFF WBC: CPT

## 2022-02-01 PROCEDURE — 84466 ASSAY OF TRANSFERRIN: CPT

## 2022-02-01 PROCEDURE — 36415 COLL VENOUS BLD VENIPUNCTURE: CPT

## 2022-02-07 NOTE — PROGRESS NOTES
Chief Complaint  Iron deficiency anemia, history of left lower extremity calf DVT with suspected pulmonary embolism    Subjective        History of Present Illness  Patient returns today for a 4-month interval follow-up visit.  In the interval she has had multiple medical issues and hospitalizations.  She was hospitalized in the [place] system 11/9-11/13/2021 with CHF exacerbation and COVID-19 pneumonia.  She subsequently was hospitalized 12/9-12/20/2021 due to bilateral pulmonary emboli that required thrombectomy.  CT angiogram chest on 12/9/2021 showed extensive bilateral pulmonary emboli with right heart strain.  Lower extremity Dopplers on 12/10/2021 showed superficial thrombus but no DVT on the right, no evidence of DVT on the left.  Echo on 12/10/2021 showed ejection fraction 33%, dilated RV with systolic dysfunction, pulmonary hypertension (RVSP 45-50).  She had new onset atrial flutter as well.  Thrombosis was felt to be related to recent COVID-19 pneumonia.  Patient was anticoagulated with Eliquis which was continued at discharge.  Patient was subsequently admitted again 1/12-1/18/2022 with acute kidney injury.  She had question of blood in her urine versus stool.  Creatinine was up to 4.19.  Renal function improved with hydration.  It was felt that she likely had a UTI.  Since then, she has been fairly stable.  She has had some increasing issues with memory and possible early dementia.  She is accompanied today by her daughter.  She is in a wheelchair again today.  She has oxygen in place, maintained on chronic 3 L nasal cannula.  She reports stable chronic dyspnea on exertion.  She has not experienced any recent bleeding issues on anticoagulation.  She notes that she has not been taking her oral iron on a regular basis and her daughter reports that she likely has not been taking this at all.  She notes normal bowel function.      Objective   Vital Signs:   /87   Pulse 60   Temp 97.7 °F (36.5 °C)  "(Temporal)   Resp 18   Ht 157.5 cm (62.01\")   Wt 107 kg (235 lb 12.8 oz)   SpO2 95%   BMI 43.12 kg/m²     Physical Exam  Constitutional:       Appearance: She is well-developed.      Comments: Patient is seated in a wheelchair no distress with oxygen in place, 3 L nasal cannula   Eyes:      Conjunctiva/sclera: Conjunctivae normal.   Neck:      Thyroid: No thyromegaly.   Cardiovascular:      Rate and Rhythm: Normal rate and regular rhythm.      Heart sounds: No murmur heard.  No friction rub. No gallop.    Pulmonary:      Effort: No respiratory distress.      Breath sounds: Normal breath sounds.   Chest:   Breasts:      Right: No supraclavicular adenopathy.      Left: No supraclavicular adenopathy.       Abdominal:      General: Bowel sounds are normal. There is no distension.      Palpations: Abdomen is soft.      Tenderness: There is no abdominal tenderness.   Musculoskeletal:         General: Swelling present.      Comments: 2+ bilateral lower extremity edema.  Diffuse erythroderma involving the lower extremities consistent with a stasis dermatitis   Lymphadenopathy:      Head:      Right side of head: No submandibular adenopathy.      Cervical: No cervical adenopathy.      Upper Body:      Right upper body: No supraclavicular adenopathy.      Left upper body: No supraclavicular adenopathy.   Skin:     General: Skin is warm and dry.      Findings: No rash.   Neurological:      Mental Status: She is alert and oriented to person, place, and time.      Cranial Nerves: No cranial nerve deficit.      Motor: No abnormal muscle tone.      Deep Tendon Reflexes: Reflexes normal.   Psychiatric:         Behavior: Behavior normal.         Result Review : Reviewed labs 2/1/2022 with CBC, iron panel, ferritin.  Reviewed multiple previous hospital records including discharge summaries, progress notes, laboratory studies, scan results as outlined above and below.     Assessment and Plan      1. Iron deficiency anemia:  · The " patient has had a long-standing degree of anemia.  Anemia appears to have worsened following her hospitalization for bowel obstruction and sepsis in late 2017.  Previous hemoglobin in the 7-8 range during hospitalization in late 2017, subsequent increase into the 9-10 range in early 2018 with gradual decline into the 8-9 range.  · In 2018, the patient developed declining MCV which has gradually decreased to 63.5 2/6/19    · Patient had concurrent thrombocytosis with platelet count in the high 400-500,000 range, subsequently increased to 600,000 range. Related to underlying iron deficiency.  · Patient developed associated PICA symptom of ice craving.  · There was no visible evidence of GI blood loss however patient remains on chronic anticoagulation with Eliquis.  She had never undergone EGD or colonoscopy.  Stool cards negative for occult blood on 2/27/19.    · Anemia evaluation 2/6/19 at initial visit with hemoglobin 8.6, MCV 63.5, platelet count 694,000, iron 18, ferritin less than 5, iron saturation 3%, TIBC 584, B12 380, folate greater than 20, creatinine 0.74, ESR 30, REBECCA negative, haptoglobin 324, .  · In order to facilitate rapid correction of her iron deficiency for anticipated knee surgery, administered IV iron in the form of Injectafer 750mg on 2/12 and 2/19/19.  · EGD and colonoscopy 3/4/19 with no evidence of bleeding source, evidence of hiatal hernia, gastritis, benign colonic polyps.  · Pica symptoms resolved  · Labs on 2/27/2019 with ferritin 617, iron saturation 16%, hemoglobin 10.4  · Labs on 11/26/2019 with stable hemoglobin at 12.8.  Iron studies however showed iron 29, ferritin that declined from 151.9 down to 62.3, iron saturation down to 7% with TIBC 391.    · Labs 2/25/2020 showed improving hemoglobin up to 13.3.  Iron was 33, ferritin stable at 63.2, iron saturation 8%, TIBC 412.    · Labs 9/15/2020 showed stable hemoglobin at 12.9, iron studies fairly stable with iron 52, ferritin  48.6, iron saturation 11%, TIBC 473.  The patient reported that she had never really tried oral iron in the past. Initiated oral iron every other day on 9/24/2020.  · Patient was admitted 4/3-4/6/2021 at [facility] with report of heme positive stool in the emergency department and question of GI bleed however ultimately was found to have urinary tract infection.  Labs on 9/29/2021 showed hemoglobin of 12.6 and improvement in her iron studies with iron 46, ferritin 85.1, iron saturation 10%, TIBC 445.  She was tolerating oral iron well with no significant GI side effects.  We did discuss the issue of the heme positive stool from emergency department.  Given her underlying comorbidities she was not felt to be a good candidate for endoscopic evaluation.  · Labs on 9/29/2021 with hemoglobin 11.7, iron 43, ferritin 57.9, iron saturation 10%, TIBC 426.  The patient was to have been taking oral iron every other day however she noted that she often did not take the medication.  She was asked to resume oral iron and take daily beginning 10/5/2021.  · Patient with multiple intercurrent illnesses with hospitalization 11/9-11/13/2021 for CHF exacerbation and COVID-19 pneumonia.  Admission 12/9-12/20/2021 for pulmonary embolism with acute cor pulmonale requiring thrombectomy.  Hospitalization 1/12-1/18/2022 for acute kidney injury/dehydration.  · Patient returns today in follow-up with labs to review.  As noted above she has had multiple hospitalizations recently with serious medical issues including pulmonary embolism in December for which she is now continuing on Eliquis 5 mg twice daily.  There has been no obvious evidence of GI blood loss.  Patient is unsure whether she has been taking oral iron and her daughter believes that likely she has not been taking this at all.  Labs from 2/1/2022 with hemoglobin 11.1 and that is somewhat improved from recent values in the 9-10 range during recent hospitalization.  Iron studies show  decline with iron 32, ferritin 49.9, iron saturation 7%, TIBC 130.  We discussed options of attempting to take oral iron (which the patient has not been compliant with) versus use of IV iron.  Patient would like to attempt treatment with oral iron daily and her daughter will ensure that this is placed in her pillbox regularly.  She was instructed to call if she cannot tolerate the oral iron and we will proceed on with IV iron instead.  I will have her return in 3 months with repeat labs to review.  2. Thrombocytosis:  · As above, the patient had a platelet count in the high 400-500,000 range since late 2017.  This progressively increased to 809,000 on 2/12/19.  · Secondary to iron deficiency anemia  · Platelet count improved following IV iron administration  · Platelet count currently 418,000 from 2/1/2022  3. Left lower extremity DVT (popliteal/calf) with suspected pulmonary embolism and subsequent bilateral pulmonary embolism with cor pulmonale requiring thrombectomy 12/9/2021.:  · Patient was admitted in November/December 2017 with small bowel perforation requiring abdominal surgery and subsequent sepsis  · Lower extremity Doppler 12/1/17 with popliteal and calf DVT on the left  · V/Q scan 12/2/17 with intermediate probability for pulmonary embolism  · Patient was anticoagulated and has continued on treatment with Eliquis 5mg bid.  She is now over one year out from her thrombosis that was provoked.  · Patient reports that her daughter developed DVT and pulmonary embolism in her 30s and apparently has an IVC filter in place.  She is a poor historian, accuracy is unknown.  Unclear whether her daughter has been evaluated with hypercoagulable labs.  · Given the family history, we did proceed with hypercoagulable evaluation 2/6/19: Negative factor V Leiden mutation, negative prothrombin gene mutation, protein C activity 192%, protein S activity 127%, Antithrombin III greater than 140, lupus anticoagulant negative,  anticardiolipin antibody panel negative, anti-beta-2 GP 1 antibody panel negative.  · Hypercoagulable evaluation was negative.  Given the provoked nature of her previous thrombosis, and having been anticoagulated for over one year, on 2/27/19, recommended that she discontinue anticoagulation.  We did discuss signs and symptoms of recurrent DVT and pulmonary embolism and the need to seek immediate medical attention if these occur.  · Right total knee arthroplasty 4/8/2019.  Patient received Eliquis 5 mg twice daily postoperatively.  It was anticipated that she would only require brief treatment with Eliquis however her recovery from her knee replacement was prolonged. She did develop some pain and swelling in the right knee and underwent lower extremity Doppler on 6/20/2019 which was negative for DVT, showed some valvular incompetence.  With eventual improvement in mobility, Eliquis discontinued 8/6/2019.  · Patient hospitalized 12/9-12/20/2021 due to bilateral pulmonary emboli that required thrombectomy.  CT angiogram chest on 12/9/2021 showed extensive bilateral pulmonary emboli with right heart strain.  Lower extremity Dopplers on 12/10/2021 showed superficial thrombus but no DVT on the right, no evidence of DVT on the left.  Echo on 12/10/2021 showed ejection fraction 33%, dilated RV with systolic dysfunction, pulmonary hypertension (RVSP 45-50).  She had new onset atrial flutter as well.  Thrombosis was felt to be related to recent COVID-19 pneumonia.  Patient was anticoagulated with Eliquis which was continued at discharge.  · Patient is continuing on Eliquis 5 mg twice daily.  There has been no obvious evidence of GI blood loss.  At this point given the recurrent and severe nature of thrombosis in addition to her limited functional status and underlying poor pulmonary reserve it is recommended that she remain on chronic anticoagulation.  4. CHF  5. Bilateral lower extremity stasis dermatitis  6. COPD  · 3 L O2  nasal cannula chronically     Plan:     1. As discussed previously we will increase oral iron dosing to once daily iron sulfate OTC (patient's daughter is going to ensure that this is in her pillbox).  Patient does not appear to have been taking iron on any regular basis recently.  2. The patient will notify us if she has difficulty tolerating oral iron from a GI standpoint and we will consider the use of IV iron.  3. Patient is continuing on chronic anticoagulation at this point with Eliquis 5 mg twice daily, prescribed by PCP.  Would recommend long-term anticoagulation unless there is a significant contraindication in the future.  4. MD visit in 3 months.  Labs 1 week prior with CBC, CMP, iron panel, ferritin.     I did spend 40 minutes in total time caring for the patient today, time spent in review of records, face-to-face consultation, coordination of care, placement of orders, completion of documentation.

## 2022-02-08 ENCOUNTER — OFFICE VISIT (OUTPATIENT)
Dept: ONCOLOGY | Facility: CLINIC | Age: 72
End: 2022-02-08

## 2022-02-08 ENCOUNTER — APPOINTMENT (OUTPATIENT)
Dept: LAB | Facility: HOSPITAL | Age: 72
End: 2022-02-08

## 2022-02-08 VITALS
TEMPERATURE: 97.7 F | SYSTOLIC BLOOD PRESSURE: 147 MMHG | DIASTOLIC BLOOD PRESSURE: 87 MMHG | RESPIRATION RATE: 18 BRPM | OXYGEN SATURATION: 95 % | HEART RATE: 60 BPM | WEIGHT: 235.8 LBS | HEIGHT: 62 IN | BODY MASS INDEX: 43.39 KG/M2

## 2022-02-08 DIAGNOSIS — D50.8 OTHER IRON DEFICIENCY ANEMIA: Primary | ICD-10-CM

## 2022-02-08 PROCEDURE — 99215 OFFICE O/P EST HI 40 MIN: CPT | Performed by: INTERNAL MEDICINE

## 2022-02-14 ENCOUNTER — TELEPHONE (OUTPATIENT)
Dept: FAMILY MEDICINE CLINIC | Facility: CLINIC | Age: 72
End: 2022-02-14

## 2022-02-14 RX ORDER — BUMETANIDE 2 MG/1
2 TABLET ORAL 2 TIMES DAILY
Qty: 60 TABLET | Refills: 5 | Status: SHIPPED | OUTPATIENT
Start: 2022-02-14 | End: 2022-04-01 | Stop reason: SDUPTHER

## 2022-02-14 RX ORDER — APIXABAN 5 MG/1
5 TABLET, FILM COATED ORAL 2 TIMES DAILY
Qty: 60 TABLET | Refills: 5 | Status: SHIPPED | OUTPATIENT
Start: 2022-02-14 | End: 2022-05-12 | Stop reason: SDUPTHER

## 2022-02-14 NOTE — TELEPHONE ENCOUNTER
Caller: Tita Nguyen    Relationship: Emergency Contact    Best call back number: [phone]     Requested Prescriptions:       Eliquis 5 MG tablet tablet   bumetanide (BUMEX) 2 MG table      Pharmacy where request should be sent:    [facility] #[id number] [place], [place] - [facility] [address] [facility] AT [facility] [address] - [phone] PH - [phone] FX  [phone]  Additional details provided by patient: PATIENT'S DAUGHTER IS CALLING TO REQUEST THE ABOVE MEDICATIONS.  SHE STATES THE DIRECTIONS PRESCRIBED WHEN IN HOSPITAL IS FOR 1 MG TWICE DAILY.    Does the patient have less than a 3 day supply:  [x] Yes  [] No    Gladis Collins, RegSched Rep   02/14/22 10:54 EST       PLEASE ADVISE.

## 2022-02-15 ENCOUNTER — TELEPHONE (OUTPATIENT)
Dept: FAMILY MEDICINE CLINIC | Facility: CLINIC | Age: 72
End: 2022-02-15

## 2022-02-15 NOTE — TELEPHONE ENCOUNTER
Caller: Tita Nguyen    Relationship to patient: Emergency Contact    Best call back number: [phone] (H)      Chief complaint: CONGESTIVE HEART FAILURE/ STRANGE FLUID LEAKING FROM THE LEGS     Patient directed to call 911 or go to their nearest emergency room.     Patient verbalized understanding: [x] Yes  [] No  If no, why?    Additional notes:    PATIENT'S DAUGHTER CALLED, ON BH VERBAL, WITH A FEW CONCERNS ABOUT THE PATIENT.      SHE STATED THE PATIENT HAS COPD AND CONGESTIVE HEART FAILURE, BUT WAS HAVING ISSUES WITH HER LEGS THAT STARTED YESTERDAY 2/14/22. PATIENT HAS BEEN STRUGGLING WITH SWELLING IN HER LEGS AND YESTERDAY. THEY NOTICED THE SKIN PEELING, AND ALSO NOTICED THAT A BUNCH OF CLEAR LIQUID WAS LEAKING FROM THE PATIENT'S LEGS. THAT THE LEGS ARE PAINFUL TO THE TOUCH, AND THE PATIENT HAS ALWAYS HAD DIFFICULTY WALKING WITH THEM DUE TO THE SWELLING AND THE PAIN, BUT THE FLUIDS LEAKING WAS NEW. NO HEAT TO THE TOUCH, THAT THEY KNOW OF, BUT DAUGHTER HADN'T TESTED THAT THEORY IN FEAR OF THE TOUCHING THE LIQUIDS COMING FROM THE LEGS.     WITH THIS INFORMATION I ADVISED THE PATIENT TO GO TO THE NEAREST EMERGENCY ROOM AND THEY AGREE AND ARE GOIG- THEY WILL CALL THE PCP AFTERWARDS TO PROVIDE UPDATED INFORMATION ON THE SITUATION.

## 2022-02-17 ENCOUNTER — APPOINTMENT (OUTPATIENT)
Dept: GENERAL RADIOLOGY | Facility: HOSPITAL | Age: 72
End: 2022-02-17

## 2022-02-18 ENCOUNTER — TELEPHONE (OUTPATIENT)
Dept: FAMILY MEDICINE CLINIC | Facility: CLINIC | Age: 72
End: 2022-02-18

## 2022-02-18 NOTE — TELEPHONE ENCOUNTER
Call to see what should she do patient just got out of hospital and had a fall and blood sugar was 466 the EMS checked her out but didn't take her to hospital can you call and advise what she needs to do for the patient. Patient will be in for appointment this Monday could you give a call when you can     The patient was discharged from [facility] after an admission for leg swelling and weeping.  She was treated with increased diuretics and also antibiotics.  She apparently did not receive any steroids.  I cannot see what her actual blood sugars were running there but apparently her previous dose of insulin that I was not aware she was taking was reduced from 20 units to 15 units.  Advised to go up to 22 units.

## 2022-02-21 ENCOUNTER — OFFICE VISIT (OUTPATIENT)
Dept: FAMILY MEDICINE CLINIC | Facility: CLINIC | Age: 72
End: 2022-02-21

## 2022-02-21 VITALS
WEIGHT: 233 LBS | DIASTOLIC BLOOD PRESSURE: 82 MMHG | HEART RATE: 66 BPM | BODY MASS INDEX: 42.88 KG/M2 | HEIGHT: 62 IN | RESPIRATION RATE: 16 BRPM | SYSTOLIC BLOOD PRESSURE: 138 MMHG | TEMPERATURE: 97.5 F | OXYGEN SATURATION: 99 %

## 2022-02-21 DIAGNOSIS — L03.119 CELLULITIS OF LOWER EXTREMITY, UNSPECIFIED LATERALITY: Primary | ICD-10-CM

## 2022-02-21 DIAGNOSIS — I87.2 VENOUS STASIS DERMATITIS OF BOTH LOWER EXTREMITIES: ICD-10-CM

## 2022-02-21 PROCEDURE — 99213 OFFICE O/P EST LOW 20 MIN: CPT | Performed by: INTERNAL MEDICINE

## 2022-02-21 RX ORDER — AMIODARONE HYDROCHLORIDE 200 MG/1
200 TABLET ORAL 2 TIMES DAILY
COMMUNITY

## 2022-02-21 RX ORDER — DOXYCYCLINE HYCLATE 50 MG/1
324 CAPSULE, GELATIN COATED ORAL DAILY
COMMUNITY

## 2022-02-21 RX ORDER — INSULIN GLARGINE 100 [IU]/ML
20 INJECTION, SOLUTION SUBCUTANEOUS 2 TIMES DAILY
COMMUNITY
Start: 2022-02-17 | End: 2022-05-09

## 2022-02-21 NOTE — PROGRESS NOTES
Subjective Chief complaint is follow-up after hospital admission  Mariangel Nguyen is a 71 y.o. female.     History of Present Illness Mariangel is here today for follow-up.  She was admitted again to [facility] with swelling and weeping in her legs.  She was treated with antibiotics and sent home on clindamycin.  She also was given a cream to put on it but that I cannot find the discharge summary.  Previously we have used a combination of triamcinolone and Aquaphor and I did advise him to go back to using that.  Her BNP test was not terribly elevated.  It was 201.4.  Her creatinine looked okay.    The following portions of the patient's history were reviewed and updated as appropriate: allergies, current medications, past family history, past medical history, past social history, past surgical history and problem list.    Review of Systems   Constitutional: Negative for chills and fever.       Objective   Physical Exam  Constitutional:       Appearance: Normal appearance.   Cardiovascular:      Rate and Rhythm: Normal rate and regular rhythm.   Pulmonary:      Breath sounds: No wheezing, rhonchi or rales.   Musculoskeletal:      Right lower leg: No edema.      Left lower leg: No edema.   Skin:     Comments: She has stasis dermatitis skin changes of both lower extremities.  This is woody and scabbed.   Neurological:      Mental Status: She is alert.           Assessment/Plan   Diagnoses and all orders for this visit:    1. Cellulitis of lower extremity, unspecified laterality (Primary)    2. Venous stasis dermatitis of both lower extremities    Other orders  -     triamcinolone (KENALOG) 0.1 % ointment; Apply  topically to the appropriate area as directed 2 (Two) Times a Day.  Dispense: 80 g; Refill: 1    Mariangel is here today for follow-up.  I did advise her that she is really going to need to concentrate on taking her water pills as prescribed and keeping her legs elevated at or above the level of the heart as  we have previously discussed.  She is going to use Kenalog and Aquaphor topically.  She is going to finish up her clindamycin.  I am going to see her back in 2 weeks

## 2022-02-22 ENCOUNTER — TELEPHONE (OUTPATIENT)
Dept: FAMILY MEDICINE CLINIC | Facility: CLINIC | Age: 72
End: 2022-02-22

## 2022-02-22 NOTE — TELEPHONE ENCOUNTER
Caller: Tita Nguyen    Relationship: Emergency Contact    Best call back number:[phone] (H)     What medication are you requesting: PAIN MEDICATION    What are your current symptoms: PAIN IN HER LEGS    How long have you been experiencing symptoms:     Have you had these symptoms before:    [x] Yes  [] No    Have you been treated for these symptoms before:   [x] Yes  [] No    If a prescription is needed, what is your preferred pharmacy and phone number: [facility] #[id number] [place], [place] - [address]  AT [facility] [address] - [phone]  - [phone] FX     Additional notes:    I attempted to return the call.  There was no answer.  I left a message to call if she is still having problems      The patient's daughter called back.  She is still having some leg pain from the open sores.  I am going to give a few days of hydrocodone.  The Tylenol that she has been taking is not helping.

## 2022-02-23 RX ORDER — MEMANTINE HYDROCHLORIDE 5 MG/1
TABLET ORAL
Qty: 66 TABLET | Refills: 0 | Status: SHIPPED | OUTPATIENT
Start: 2022-02-23 | End: 2022-03-14 | Stop reason: DRUGHIGH

## 2022-02-23 RX ORDER — HYDROCODONE BITARTRATE AND ACETAMINOPHEN 5; 325 MG/1; MG/1
1 TABLET ORAL EVERY 6 HOURS PRN
Qty: 12 TABLET | Refills: 0 | Status: SHIPPED | OUTPATIENT
Start: 2022-02-23 | End: 2022-05-09

## 2022-03-08 ENCOUNTER — APPOINTMENT (OUTPATIENT)
Dept: GENERAL RADIOLOGY | Facility: HOSPITAL | Age: 72
End: 2022-03-08

## 2022-03-14 ENCOUNTER — OFFICE VISIT (OUTPATIENT)
Dept: FAMILY MEDICINE CLINIC | Facility: CLINIC | Age: 72
End: 2022-03-14

## 2022-03-14 VITALS
DIASTOLIC BLOOD PRESSURE: 78 MMHG | WEIGHT: 233 LBS | OXYGEN SATURATION: 84 % | HEIGHT: 62 IN | BODY MASS INDEX: 42.88 KG/M2 | HEART RATE: 58 BPM | SYSTOLIC BLOOD PRESSURE: 116 MMHG

## 2022-03-14 DIAGNOSIS — I10 PRIMARY HYPERTENSION: ICD-10-CM

## 2022-03-14 DIAGNOSIS — R79.9 ABNORMAL FINDING OF BLOOD CHEMISTRY, UNSPECIFIED: ICD-10-CM

## 2022-03-14 DIAGNOSIS — N18.30 STAGE 3 CHRONIC KIDNEY DISEASE, UNSPECIFIED WHETHER STAGE 3A OR 3B CKD: ICD-10-CM

## 2022-03-14 DIAGNOSIS — R60.9 EDEMA, UNSPECIFIED TYPE: ICD-10-CM

## 2022-03-14 DIAGNOSIS — E11.65 POORLY CONTROLLED DIABETES MELLITUS: Primary | ICD-10-CM

## 2022-03-14 PROCEDURE — 99214 OFFICE O/P EST MOD 30 MIN: CPT | Performed by: INTERNAL MEDICINE

## 2022-03-14 RX ORDER — LISINOPRIL 10 MG/1
TABLET ORAL
COMMUNITY
Start: 2022-03-12 | End: 2022-03-31

## 2022-03-14 RX ORDER — PEN NEEDLE, DIABETIC 32GX 5/32"
NEEDLE, DISPOSABLE MISCELLANEOUS
COMMUNITY
Start: 2022-03-12

## 2022-03-14 RX ORDER — EMPAGLIFLOZIN 10 MG/1
TABLET, FILM COATED ORAL
COMMUNITY
Start: 2022-03-12 | End: 2022-04-01 | Stop reason: SDUPTHER

## 2022-03-14 RX ORDER — INSULIN ASPART 100 [IU]/ML
INJECTION, SOLUTION INTRAVENOUS; SUBCUTANEOUS
COMMUNITY
Start: 2022-03-12 | End: 2022-05-09

## 2022-03-14 RX ORDER — MEMANTINE HYDROCHLORIDE 10 MG/1
10 TABLET ORAL 2 TIMES DAILY
COMMUNITY
End: 2022-03-31

## 2022-03-14 NOTE — PROGRESS NOTES
Subjective Chief complaint is follow-up after hospitalization  Mariangel Nguyen is a 71 y.o. female.     History of Present Illness Mariangel is here today for follow-up.  She was admitted again to the hospital at [facility] this time for a urinary tract infection.  She completed intravenous Rocephin while there.  She had a hemoglobin A1c of 12.9.  She obviously is not taking care of her self in terms of her diabetes.  Jardiance was ordered for both her heart failure and her diabetes.  She was also placed on mealtime insulin in addition to the Basaglar that she was already taking.  I did review with the patient and her daughter the steady rise in her hemoglobin A1c despite us giving her medications to treat it.  This either indicates that she is not taking the medicines or not following the diet or some sort of combination of both.  I did advise that if she does not take care of her diabetes she is basically killing herself.  We did give her one of our diabetic handout in terms of meal planning.  She does have chronic kidney disease.  Her nephrologist wanted some lab work done here today.    The following portions of the patient's history were reviewed and updated as appropriate: allergies, current medications, past family history, past medical history, past social history, past surgical history and problem list.    Review of Systems   Respiratory: Negative for chest tightness and shortness of breath.    Cardiovascular:        Leg swelling is doing better with her legs wrapped.       Objective   Physical Exam  Vitals and nursing note reviewed.   Cardiovascular:      Rate and Rhythm: Regular rhythm. Bradycardia present.   Pulmonary:      Effort: Pulmonary effort is normal.      Breath sounds: No wheezing, rhonchi or rales.   Musculoskeletal:      Right lower leg: No edema.      Left lower leg: No edema.           Assessment/Plan   Diagnoses and all orders for this visit:    1. Poorly controlled diabetes mellitus (HCC)  (Primary)  -     Microalbumin / Creatinine Urine Ratio - Urine, Clean Catch    2. Stage 3 chronic kidney disease, unspecified whether stage 3a or 3b CKD (HCC)  -     PTH, Intact  -     Iron Profile  -     Ferritin  -     Uric Acid    3. Primary hypertension    4. Edema, unspecified type    5. Abnormal finding of blood chemistry, unspecified   -     Iron Profile  -     Ferritin    Mariangel is here today for follow-up.  She has been noncompliant with her diabetes.  We did reiterate the need for good control.  I am going to see her back in 1 month.

## 2022-03-15 LAB
ALBUMIN/CREAT UR: 181 MG/G CREAT (ref 0–29)
CREAT UR-MCNC: 49.8 MG/DL
FERRITIN SERPL-MCNC: 66 NG/ML (ref 15–150)
IRON SATN MFR SERPL: 9 % (ref 15–55)
IRON SERPL-MCNC: 36 UG/DL (ref 27–139)
MICROALBUMIN UR-MCNC: 90.1 UG/ML
PTH-INTACT SERPL-MCNC: 21 PG/ML (ref 15–65)
TIBC SERPL-MCNC: 388 UG/DL (ref 250–450)
UIBC SERPL-MCNC: 352 UG/DL (ref 118–369)
URATE SERPL-MCNC: 7.2 MG/DL (ref 3–7.2)

## 2022-03-31 ENCOUNTER — OFFICE VISIT (OUTPATIENT)
Dept: FAMILY MEDICINE CLINIC | Facility: CLINIC | Age: 72
End: 2022-03-31

## 2022-03-31 VITALS
HEIGHT: 62 IN | OXYGEN SATURATION: 94 % | DIASTOLIC BLOOD PRESSURE: 72 MMHG | SYSTOLIC BLOOD PRESSURE: 118 MMHG | BODY MASS INDEX: 42.88 KG/M2 | HEART RATE: 63 BPM | WEIGHT: 233 LBS

## 2022-03-31 DIAGNOSIS — I10 PRIMARY HYPERTENSION: Primary | ICD-10-CM

## 2022-03-31 DIAGNOSIS — R80.9 PROTEINURIA, UNSPECIFIED TYPE: ICD-10-CM

## 2022-03-31 DIAGNOSIS — Z79.4 TYPE 2 DIABETES MELLITUS WITHOUT COMPLICATION, WITH LONG-TERM CURRENT USE OF INSULIN: ICD-10-CM

## 2022-03-31 DIAGNOSIS — N18.9 ANEMIA DUE TO CHRONIC KIDNEY DISEASE, UNSPECIFIED CKD STAGE: ICD-10-CM

## 2022-03-31 DIAGNOSIS — N18.2 CKD (CHRONIC KIDNEY DISEASE), SYMPTOM MANAGEMENT ONLY, STAGE 2 (MILD): ICD-10-CM

## 2022-03-31 DIAGNOSIS — D63.1 ANEMIA DUE TO CHRONIC KIDNEY DISEASE, UNSPECIFIED CKD STAGE: ICD-10-CM

## 2022-03-31 DIAGNOSIS — E11.9 TYPE 2 DIABETES MELLITUS WITHOUT COMPLICATION, WITH LONG-TERM CURRENT USE OF INSULIN: ICD-10-CM

## 2022-03-31 PROCEDURE — 99214 OFFICE O/P EST MOD 30 MIN: CPT | Performed by: INTERNAL MEDICINE

## 2022-03-31 RX ORDER — ATORVASTATIN CALCIUM 80 MG/1
80 TABLET, FILM COATED ORAL NIGHTLY
COMMUNITY
Start: 2022-03-14

## 2022-03-31 RX ORDER — SPIRONOLACTONE 25 MG/1
12.5 TABLET ORAL DAILY
Status: SHIPPED
Start: 2022-03-31 | End: 2022-06-01 | Stop reason: SDUPTHER

## 2022-03-31 RX ORDER — LEVOTHYROXINE SODIUM 0.03 MG/1
25 TABLET ORAL DAILY
COMMUNITY
Start: 2022-03-14

## 2022-03-31 RX ORDER — MEMANTINE HYDROCHLORIDE 5 MG/1
TABLET ORAL
COMMUNITY
Start: 2022-03-18 | End: 2022-04-07

## 2022-04-01 RX ORDER — EMPAGLIFLOZIN 10 MG/1
10 TABLET, FILM COATED ORAL DAILY
Qty: 30 TABLET | Refills: 5 | Status: SHIPPED | OUTPATIENT
Start: 2022-04-01 | End: 2022-05-12 | Stop reason: SDUPTHER

## 2022-04-01 RX ORDER — BUMETANIDE 2 MG/1
2 TABLET ORAL 2 TIMES DAILY
Qty: 60 TABLET | Refills: 5 | Status: SHIPPED | OUTPATIENT
Start: 2022-04-01

## 2022-04-01 NOTE — TELEPHONE ENCOUNTER
Caller: Tita Nguyen    Relationship: Emergency Contact    Best call back number: [phone]    Requested Prescriptions:   Requested Prescriptions     Pending Prescriptions Disp Refills   • Jardiance 10 MG tablet tablet 30 tablet    • bumetanide (BUMEX) 2 MG tablet 60 tablet 5     Sig: Take 1 tablet by mouth 2 (Two) Times a Day.        Pharmacy where request should be sent:  [id number] - [place], [place] - [address] [facility] AT [facility] [address] [facility] - [phone]  - [phone] FX    Additional details provided by patient: PATIENT HAS ENOUGH MEDICATION TO LAST HER UNTIL Thursday, 04/07/2022.     Does the patient have less than a 3 day supply:  [] Yes  [x] No    Sondra Barton Rep   04/01/22 14:29 EDT

## 2022-04-01 NOTE — TELEPHONE ENCOUNTER
Rx Refill Note  Requested Prescriptions     Pending Prescriptions Disp Refills   • Jardiance 10 MG tablet tablet 30 tablet    • bumetanide (BUMEX) 2 MG tablet 60 tablet 5     Sig: Take 1 tablet by mouth 2 (Two) Times a Day.      Last office visit with prescribing clinician: 3/31/2022      Next office visit with prescribing clinician: 4/14/2022            Raphael Parks MA  04/01/22, 14:38 EDT

## 2022-04-07 RX ORDER — MEMANTINE HYDROCHLORIDE 5 MG/1
TABLET ORAL
Qty: 66 TABLET | Refills: 0 | Status: SHIPPED | OUTPATIENT
Start: 2022-04-07 | End: 2022-04-20

## 2022-04-07 NOTE — TELEPHONE ENCOUNTER
PATIENT'S DAUGHTER CHELLE CALLED FOR MEDICATION REFILL OF     memantine (NAMENDA) 5 MG tablet  SHE IS TAKING 10 MG TWO TABLETS IN THE MORNING AND TWO TABLETS  AT NIGHT.     SHE WILL BE OUT Sunday      [facility] #[id number] - [place], [place] - [address] AT [facility] [address] - [phone]  - [phone] FX  [phone]    CALL BACK NUMBER [phone]

## 2022-04-08 RX ORDER — MEMANTINE HYDROCHLORIDE 5 MG/1
TABLET ORAL
Qty: 212 TABLET | OUTPATIENT
Start: 2022-04-08

## 2022-04-11 PROBLEM — E10.22 CKD STAGE 2 DUE TO TYPE 1 DIABETES MELLITUS (HCC): Status: ACTIVE | Noted: 2022-04-11

## 2022-04-11 PROBLEM — N18.2 CKD STAGE 2 DUE TO TYPE 1 DIABETES MELLITUS: Status: ACTIVE | Noted: 2022-04-11

## 2022-04-19 ENCOUNTER — TELEPHONE (OUTPATIENT)
Dept: FAMILY MEDICINE CLINIC | Facility: CLINIC | Age: 72
End: 2022-04-19

## 2022-04-19 DIAGNOSIS — L98.9 SKIN LESION: Primary | ICD-10-CM

## 2022-04-19 NOTE — TELEPHONE ENCOUNTER
PATIENT STATES: THAT SHE WOULD LIKE A CALL BACK TO GO OVER HER MEDS PLEASE ADVISE      PATIENT CAN BE REACHED ON: [phone]  Advised I would send new script for memantine 10 mg bid. To check with cardiology re eliquis samples

## 2022-04-19 NOTE — TELEPHONE ENCOUNTER
Rx Refill Note  Requested Prescriptions     Pending Prescriptions Disp Refills   • memantine (NAMENDA) 5 MG tablet [Pharmacy Med Name: MEMANTINE 5MG TABLETS] 212 tablet      Sig: TAKE 1 TABLET BY MOUTH ONCE DAILY X 7 DAYS,THEN 1 TWICE DAILY X 7 DAYS,THEN 2 EVERY MORNING AND 1 EVERY EVENING X 7 DAYS,THEN 2 TWICE DAILY      Last office visit with prescribing clinician: 3/31/2022      Next office visit with prescribing clinician: 4/19/2022            Thu Reddy MA  04/19/22, 13:57 EDT

## 2022-04-20 ENCOUNTER — TELEPHONE (OUTPATIENT)
Dept: FAMILY MEDICINE CLINIC | Facility: CLINIC | Age: 72
End: 2022-04-20

## 2022-04-20 RX ORDER — MEMANTINE HYDROCHLORIDE 10 MG/1
5 TABLET ORAL 2 TIMES DAILY
Qty: 180 TABLET | Refills: 1 | Status: SHIPPED | OUTPATIENT
Start: 2022-04-20 | End: 2022-06-01 | Stop reason: SDUPTHER

## 2022-04-20 RX ORDER — MEMANTINE HYDROCHLORIDE 5 MG/1
5 TABLET ORAL 2 TIMES DAILY
Qty: 180 TABLET | Refills: 1 | Status: SHIPPED | OUTPATIENT
Start: 2022-04-20 | End: 2022-04-20 | Stop reason: SDUPTHER

## 2022-04-20 NOTE — TELEPHONE ENCOUNTER
Caller: Tita Nguyen    Relationship: Emergency Contact    Best call back number: [phone]    What is the medical concern/diagnosis: CYST ON RIGHT LEG     What specialty or service is being requested: WOUND CARE     What is the provider, practice or medical service name:   [facility]    What is the office phone number: [phone]    Any additional details: PATIENT HAS APPT ON Friday     Not sure which wound care clinic she is going to but they referred her for a skin lesion to [facility].  I advised that I would prefer she see a [facility] physician regarding that and I will make a referral.

## 2022-04-26 ENCOUNTER — LAB (OUTPATIENT)
Dept: LAB | Facility: HOSPITAL | Age: 72
End: 2022-04-26

## 2022-04-26 DIAGNOSIS — D50.8 OTHER IRON DEFICIENCY ANEMIA: ICD-10-CM

## 2022-04-26 LAB
ALBUMIN SERPL-MCNC: 4 G/DL (ref 3.5–5.2)
ALBUMIN/GLOB SERPL: 1.1 G/DL (ref 1.1–2.4)
ALP SERPL-CCNC: 101 U/L (ref 38–116)
ALT SERPL W P-5'-P-CCNC: 12 U/L (ref 0–33)
ANION GAP SERPL CALCULATED.3IONS-SCNC: 11.6 MMOL/L (ref 5–15)
AST SERPL-CCNC: 12 U/L (ref 0–32)
BASOPHILS # BLD AUTO: 0.09 10*3/MM3 (ref 0–0.2)
BASOPHILS NFR BLD AUTO: 1 % (ref 0–1.5)
BILIRUB SERPL-MCNC: 0.4 MG/DL (ref 0.2–1.2)
BUN SERPL-MCNC: 25 MG/DL (ref 6–20)
BUN/CREAT SERPL: 22.3 (ref 7.3–30)
CALCIUM SPEC-SCNC: 9.5 MG/DL (ref 8.5–10.2)
CHLORIDE SERPL-SCNC: 95 MMOL/L (ref 98–107)
CO2 SERPL-SCNC: 34.4 MMOL/L (ref 22–29)
CREAT SERPL-MCNC: 1.12 MG/DL (ref 0.6–1.1)
DEPRECATED RDW RBC AUTO: 48.8 FL (ref 37–54)
EGFRCR SERPLBLD CKD-EPI 2021: 53.3 ML/MIN/1.73
EOSINOPHIL # BLD AUTO: 0.51 10*3/MM3 (ref 0–0.4)
EOSINOPHIL NFR BLD AUTO: 5.4 % (ref 0.3–6.2)
ERYTHROCYTE [DISTWIDTH] IN BLOOD BY AUTOMATED COUNT: 15.3 % (ref 12.3–15.4)
FERRITIN SERPL-MCNC: 47.5 NG/ML (ref 13–150)
GLOBULIN UR ELPH-MCNC: 3.8 GM/DL (ref 1.8–3.5)
GLUCOSE SERPL-MCNC: 62 MG/DL (ref 74–124)
HCT VFR BLD AUTO: 42.3 % (ref 34–46.6)
HGB BLD-MCNC: 12.8 G/DL (ref 12–15.9)
IMM GRANULOCYTES # BLD AUTO: 0.1 10*3/MM3 (ref 0–0.05)
IMM GRANULOCYTES NFR BLD AUTO: 1.1 % (ref 0–0.5)
IRON 24H UR-MRATE: 46 MCG/DL (ref 37–145)
IRON SATN MFR SERPL: 11 % (ref 14–48)
LYMPHOCYTES # BLD AUTO: 1.47 10*3/MM3 (ref 0.7–3.1)
LYMPHOCYTES NFR BLD AUTO: 15.6 % (ref 19.6–45.3)
MCH RBC QN AUTO: 26.5 PG (ref 26.6–33)
MCHC RBC AUTO-ENTMCNC: 30.3 G/DL (ref 31.5–35.7)
MCV RBC AUTO: 87.6 FL (ref 79–97)
MONOCYTES # BLD AUTO: 0.74 10*3/MM3 (ref 0.1–0.9)
MONOCYTES NFR BLD AUTO: 7.8 % (ref 5–12)
NEUTROPHILS NFR BLD AUTO: 6.54 10*3/MM3 (ref 1.7–7)
NEUTROPHILS NFR BLD AUTO: 69.1 % (ref 42.7–76)
NRBC BLD AUTO-RTO: 0 /100 WBC (ref 0–0.2)
PLATELET # BLD AUTO: 390 10*3/MM3 (ref 140–450)
PMV BLD AUTO: 9 FL (ref 6–12)
POTASSIUM SERPL-SCNC: 3.8 MMOL/L (ref 3.5–4.7)
PROT SERPL-MCNC: 7.8 G/DL (ref 6.3–8)
RBC # BLD AUTO: 4.83 10*6/MM3 (ref 3.77–5.28)
SODIUM SERPL-SCNC: 141 MMOL/L (ref 134–145)
TIBC SERPL-MCNC: 420 MCG/DL (ref 249–505)
TRANSFERRIN SERPL-MCNC: 300 MG/DL (ref 200–360)
WBC NRBC COR # BLD: 9.45 10*3/MM3 (ref 3.4–10.8)

## 2022-04-26 PROCEDURE — 36415 COLL VENOUS BLD VENIPUNCTURE: CPT

## 2022-04-26 PROCEDURE — 82728 ASSAY OF FERRITIN: CPT

## 2022-04-26 PROCEDURE — 80053 COMPREHEN METABOLIC PANEL: CPT

## 2022-04-26 PROCEDURE — 83540 ASSAY OF IRON: CPT

## 2022-04-26 PROCEDURE — 84466 ASSAY OF TRANSFERRIN: CPT

## 2022-04-26 PROCEDURE — 85025 COMPLETE CBC W/AUTO DIFF WBC: CPT

## 2022-05-02 NOTE — PROGRESS NOTES
"Chief Complaint  Iron deficiency anemia, history of left lower extremity calf DVT with suspected pulmonary embolism and subsequent bilateral pulmonary emboli with cor pulmonale requiring thrombectomy 12/9/2021 (precipitated by COVID-19)    Subjective        History of Present Illness  Patient returns today in follow-up with laboratory studies to review.  Fortunately she has done quite well in the interval, has not required any additional hospitalization.  She has been following up closely with her PCP Dr. Burton.  She has been continuing on anticoagulation with Eliquis 5 mg twice daily.  She has been continuing with routine wrapping of her bilateral lower extremities with improvement in her edema.  She remains in a wheelchair today.  She is able to ambulate with the use of a walker around her house.  At her last visit, we had recommended oral iron daily and her daughter is managing medications and ensuring that she has been taking this.  She has not experienced any GI side effects.  She has had no clinical evidence of GI blood loss.  She does report that she has developed an ulceration in her right leg and is going to be seeing Dr. Hobbs in general surgery to evaluate.      Objective   Vital Signs:   /75   Pulse 65   Temp 97.5 °F (36.4 °C) (Temporal)   Resp 18   Ht 157.5 cm (62.01\")   Wt 106 kg (232 lb 12.8 oz)   SpO2 90% Comment: 3 L  BMI 42.57 kg/m²     Physical Exam  Constitutional:       Appearance: She is well-developed.      Comments: Patient is seated in a wheelchair, no distress, no oxygen in place today.   Eyes:      Conjunctiva/sclera: Conjunctivae normal.   Neck:      Thyroid: No thyromegaly.   Cardiovascular:      Rate and Rhythm: Normal rate and regular rhythm.      Heart sounds: No murmur heard.    No friction rub. No gallop.   Pulmonary:      Effort: No respiratory distress.      Breath sounds: Normal breath sounds.   Chest:   Breasts:      Right: No supraclavicular adenopathy.      " Left: No supraclavicular adenopathy.       Abdominal:      General: Bowel sounds are normal. There is no distension.      Palpations: Abdomen is soft.      Tenderness: There is no abdominal tenderness.   Musculoskeletal:         General: Swelling present.      Comments: Bilateral lower extremities are wrapped   Lymphadenopathy:      Head:      Right side of head: No submandibular adenopathy.      Cervical: No cervical adenopathy.      Upper Body:      Right upper body: No supraclavicular adenopathy.      Left upper body: No supraclavicular adenopathy.   Skin:     General: Skin is warm and dry.      Findings: No rash.   Neurological:      Mental Status: She is alert and oriented to person, place, and time.      Cranial Nerves: No cranial nerve deficit.      Motor: No abnormal muscle tone.      Deep Tendon Reflexes: Reflexes normal.   Psychiatric:         Behavior: Behavior normal.         Result Review : Reviewed labs from 4/26/2022 with CBC, CMP, iron panel, ferritin.     Assessment and Plan      1. Iron deficiency anemia:  · The patient has had a long-standing degree of anemia.  Anemia appears to have worsened following her hospitalization for bowel obstruction and sepsis in late 2017.  Previous hemoglobin in the 7-8 range during hospitalization in late 2017, subsequent increase into the 9-10 range in early 2018 with gradual decline into the 8-9 range.  · In 2018, the patient developed declining MCV which has gradually decreased to 63.5 2/6/19    · Patient had concurrent thrombocytosis with platelet count in the high 400-500,000 range, subsequently increased to 600,000 range. Related to underlying iron deficiency.  · Patient developed associated PICA symptom of ice craving.  · There was no visible evidence of GI blood loss however patient remains on chronic anticoagulation with Eliquis.  She had never undergone EGD or colonoscopy.  Stool cards negative for occult blood on 2/27/19.    · Anemia evaluation 2/6/19 at  initial visit with hemoglobin 8.6, MCV 63.5, platelet count 694,000, iron 18, ferritin less than 5, iron saturation 3%, TIBC 584, B12 380, folate greater than 20, creatinine 0.74, ESR 30, REBECCA negative, haptoglobin 324, .  · In order to facilitate rapid correction of her iron deficiency for anticipated knee surgery, administered IV iron in the form of Injectafer 750mg on 2/12 and 2/19/19.  · EGD and colonoscopy 3/4/19 with no evidence of bleeding source, evidence of hiatal hernia, gastritis, benign colonic polyps.  · Pica symptoms resolved  · Labs on 2/27/2019 with ferritin 617, iron saturation 16%, hemoglobin 10.4  · Labs on 11/26/2019 with stable hemoglobin at 12.8.  Iron studies however showed iron 29, ferritin that declined from 151.9 down to 62.3, iron saturation down to 7% with TIBC 391.    · Labs 2/25/2020 showed improving hemoglobin up to 13.3.  Iron was 33, ferritin stable at 63.2, iron saturation 8%, TIBC 412.    · Labs 9/15/2020 showed stable hemoglobin at 12.9, iron studies fairly stable with iron 52, ferritin 48.6, iron saturation 11%, TIBC 473.  The patient reported that she had never really tried oral iron in the past. Initiated oral iron every other day on 9/24/2020.  · Patient was admitted 4/3-4/6/2021 at [facility] with report of heme positive stool in the emergency department and question of GI bleed however ultimately was found to have urinary tract infection.  Labs on 9/29/2021 showed hemoglobin of 12.6 and improvement in her iron studies with iron 46, ferritin 85.1, iron saturation 10%, TIBC 445.  She was tolerating oral iron well with no significant GI side effects.  We did discuss the issue of the heme positive stool from emergency department.  Given her underlying comorbidities she was not felt to be a good candidate for endoscopic evaluation.  · Labs on 9/29/2021 with hemoglobin 11.7, iron 43, ferritin 57.9, iron saturation 10%, TIBC 426.  The patient was to have been taking oral iron  every other day however she noted that she often did not take the medication.  She was asked to resume oral iron and take daily beginning 10/5/2021.  · Patient with multiple intercurrent illnesses with hospitalization 11/9-11/13/2021 for CHF exacerbation and COVID-19 pneumonia.  Admission 12/9-12/20/2021 for pulmonary embolism with acute cor pulmonale requiring thrombectomy.  Hospitalization 1/12-1/18/2022 for acute kidney injury/dehydration.  · Labs on 2/1/2022 with hemoglobin 11.1 (improved from recent values in the 9-10 range during recent hospitalization) with iron studies that showed decline with iron 32, ferritin 49.9, iron saturation 7%, TIBC 130.  Resumed oral iron once daily (had not been taking).  · Today patient returns in follow-up with labs to review from 4/26/2022.  Her daughter has ensured that she has been routinely taking her oral iron daily.  She has tolerated this well with no GI side effects.  Labs show a hemoglobin that is improved significantly up to 12.8.  Iron studies showed iron 46, ferritin down slightly at 47.5, iron saturation improved slightly at 11% with TIBC 420.  With tolerance of oral iron daily and normalization of hemoglobin, we will not pursue IV iron at this time.  We will continue her oral iron and I will see her back in 4 months in follow-up with repeat labs to review.  2. Thrombocytosis:  · As above, the patient had a platelet count in the high 400-500,000 range since late 2017.  This progressively increased to 809,000 on 2/12/19.  · Secondary to iron deficiency anemia  · Platelet count improved following IV iron administration  · Platelet count currently 390,000  3. Left lower extremity DVT (popliteal/calf) with suspected pulmonary embolism (in December 2017) and subsequent bilateral pulmonary embolism with cor pulmonale requiring thrombectomy 12/9/2021.:  · Patient was admitted in November/December 2017 with small bowel perforation requiring abdominal surgery and subsequent  sepsis  · Lower extremity Doppler 12/1/17 with popliteal and calf DVT on the left  · V/Q scan 12/2/17 with intermediate probability for pulmonary embolism  · Patient was anticoagulated and has continued on treatment with Eliquis 5mg bid.  She is now over one year out from her thrombosis that was provoked.  · Patient reports that her daughter developed DVT and pulmonary embolism in her 30s and apparently has an IVC filter in place.  She is a poor historian, accuracy is unknown.  Unclear whether her daughter has been evaluated with hypercoagulable labs.  · Given the family history, we did proceed with hypercoagulable evaluation 2/6/19: Negative factor V Leiden mutation, negative prothrombin gene mutation, protein C activity 192%, protein S activity 127%, Antithrombin III greater than 140, lupus anticoagulant negative, anticardiolipin antibody panel negative, anti-beta-2 GP 1 antibody panel negative.  · Hypercoagulable evaluation was negative.  Given the provoked nature of her previous thrombosis, and having been anticoagulated for over one year, on 2/27/19, recommended that she discontinue anticoagulation.  We did discuss signs and symptoms of recurrent DVT and pulmonary embolism and the need to seek immediate medical attention if these occur.  · Right total knee arthroplasty 4/8/2019.  Patient received Eliquis 5 mg twice daily postoperatively.  It was anticipated that she would only require brief treatment with Eliquis however her recovery from her knee replacement was prolonged. She did develop some pain and swelling in the right knee and underwent lower extremity Doppler on 6/20/2019 which was negative for DVT, showed some valvular incompetence.  With eventual improvement in mobility, Eliquis discontinued 8/6/2019.  · Patient hospitalized 12/9-12/20/2021 due to bilateral pulmonary emboli that required thrombectomy.  CT angiogram chest on 12/9/2021 showed extensive bilateral pulmonary emboli with right heart strain.   Lower extremity Dopplers on 12/10/2021 showed superficial thrombus but no DVT on the right, no evidence of DVT on the left.  Echo on 12/10/2021 showed ejection fraction 33%, dilated RV with systolic dysfunction, pulmonary hypertension (RVSP 45-50).  She had new onset atrial flutter as well.  Thrombosis was felt to be related to recent COVID-19 pneumonia.  Patient was anticoagulated with Eliquis which was continued at discharge.  · Given the recurrent and severe nature of thrombosis in addition to her limited functional status and underlying poor pulmonary reserve it is recommended that she remain on chronic anticoagulation.  · Patient returns continuing on Eliquis 5 mg twice daily.  She is tolerating this well.  She has had no clinical evidence of blood loss.  4. CHF  5. Bilateral lower extremity stasis dermatitis  6. COPD  · Patient is off of oxygen     Plan:     1. Patient will continue on oral iron once daily (patient's daughter is ensuring that she is taking iron daily).  2. Patient continues on chronic anticoagulation with Eliquis 5 mg twice daily.  Anticipate long-term anticoagulation unless there is a significant contraindication in the future.  3. MD visit in 4 months.  Labs 1 week prior with CBC, CMP, iron panel, ferritin.

## 2022-05-03 ENCOUNTER — APPOINTMENT (OUTPATIENT)
Dept: LAB | Facility: HOSPITAL | Age: 72
End: 2022-05-03

## 2022-05-03 ENCOUNTER — TELEPHONE (OUTPATIENT)
Dept: FAMILY MEDICINE CLINIC | Facility: CLINIC | Age: 72
End: 2022-05-03

## 2022-05-03 ENCOUNTER — OFFICE VISIT (OUTPATIENT)
Dept: ONCOLOGY | Facility: CLINIC | Age: 72
End: 2022-05-03

## 2022-05-03 VITALS
BODY MASS INDEX: 42.84 KG/M2 | OXYGEN SATURATION: 90 % | WEIGHT: 232.8 LBS | HEART RATE: 65 BPM | TEMPERATURE: 97.5 F | SYSTOLIC BLOOD PRESSURE: 133 MMHG | RESPIRATION RATE: 18 BRPM | HEIGHT: 62 IN | DIASTOLIC BLOOD PRESSURE: 75 MMHG

## 2022-05-03 DIAGNOSIS — D50.8 OTHER IRON DEFICIENCY ANEMIA: Primary | ICD-10-CM

## 2022-05-03 PROCEDURE — 99214 OFFICE O/P EST MOD 30 MIN: CPT | Performed by: INTERNAL MEDICINE

## 2022-05-03 NOTE — TELEPHONE ENCOUNTER
Caller: Tita Nguyen    Relationship: Emergency Contact    Best call back number: [phone]    What medications are you currently taking:   Current Outpatient Medications on File Prior to Visit   Medication Sig Dispense Refill   • acetaminophen (TYLENOL) 325 MG tablet Take 2 tablets by mouth Every 4 (Four) Hours As Needed for Moderate Pain  or Fever (greater than 101 F). 120 tablet 0   • albuterol (PROVENTIL) (2.5 MG/3ML) 0.083% nebulizer solution Take 2.5 mg by nebulization Every 4 (Four) Hours As Needed for Wheezing.     • amiodarone (PACERONE) 200 MG tablet Take 200 mg by mouth Daily.     • atorvastatin (LIPITOR) 80 MG tablet Take 80 mg by mouth Every Night.     • BD Pen Needle Robyn U/F 32G X 4 MM misc      • bisoprolol (ZEBeta) 5 MG tablet TAKE 1/2 TABLET BY MOUTH EVERY DAY 45 tablet 1   • bumetanide (BUMEX) 2 MG tablet Take 1 tablet by mouth 2 (Two) Times a Day. 60 tablet 5   • dilTIAZem (CARDIZEM) 30 MG tablet Take 1 tablet by mouth 2 (Two) Times a Day. 180 tablet 1   • Eliquis 5 MG tablet tablet Take 1 tablet by mouth 2 (Two) Times a Day. 60 tablet 5   • escitalopram (LEXAPRO) 10 MG tablet Take 1 tablet by mouth Daily. 30 tablet 4   • ferrous gluconate (FERGON) 324 MG tablet Take 324 mg by mouth Daily.     • fluconazole (Diflucan) 200 MG tablet Take 1 tablet by mouth Daily. 10 tablet 0   • HYDROcod Polst-CPM Polst ER (TUSSIONEX PENNKINETIC) 10-8 MG/5ML ER suspension Take 5 mL by mouth Every 12 (Twelve) Hours As Needed for Cough. 118 mL 0   • HYDROcodone-acetaminophen (NORCO) 5-325 MG per tablet Take 1 tablet by mouth Every 6 (Six) Hours As Needed for Moderate Pain  or Severe Pain . 12 tablet 0   • Insulin Glargine (BASAGLAR KWIKPEN) 100 UNIT/ML injection pen Inject 20 Units under the skin into the appropriate area as directed 2 (Two) Times a Day.     • Jardiance 10 MG tablet tablet Take 1 tablet by mouth Daily. 30 tablet 5   • levothyroxine (SYNTHROID, LEVOTHROID) 25 MCG tablet Take 25 mcg by  mouth Daily.     • memantine (NAMENDA) 10 MG tablet Take 0.5 tablets by mouth 2 (Two) Times a Day. 180 tablet 1   • metFORMIN (GLUCOPHAGE) 500 MG tablet Take 1,000 mg by mouth 2 (Two) Times a Day.     • miconazole (MICOTIN) 2 % cream Apply 1 application topically to the appropriate area as directed 2 (Two) Times a Day. 56 g 1   • NovoLOG FlexPen 100 UNIT/ML solution pen-injector sc pen 10 units breakfast and lunch and 20 units dinner     • ONE TOUCH ULTRA TEST test strip      • ONETOUCH DELICA LANCETS 33G misc      • spironolactone (ALDACTONE) 25 MG tablet Take 0.5 tablets by mouth Daily.     • triamcinolone (KENALOG) 0.1 % ointment Apply  topically to the appropriate area as directed 2 (Two) Times a Day. 80 g 1   • vitamin D (ERGOCALCIFEROL) 1.25 MG (53737 UT) capsule capsule Take 50,000 Units by mouth.       No current facility-administered medications on file prior to visit.        Which medication are you concerned about: Jardiance 10 MG tablet tablet    Who prescribed you this medication: DR GRIJALVA    What are your concerns: PATIENT'S DAUGHTER STATES THIS IS $200 FOR A THIRTY DAY SUPPLY. DO WE HAVE SAMPLES WE CAN GIVE HER OR A WAY TO GET THE COST LOWER? PLEASE ADVISE. PATIENT WILL BE OUT ON Thursday.     I advised that the cardiologist instituted this medicine based on her heart failure as well as her diabetes.  She can contact the cardiologist and see if they have samples.  We do not.  Also advised her to see if she can get on the website for the medication and see if there is a patient assistance program.  If not she can call here and we can put her in touch with the

## 2022-05-04 ENCOUNTER — TELEPHONE (OUTPATIENT)
Dept: FAMILY MEDICINE CLINIC | Facility: CLINIC | Age: 72
End: 2022-05-04

## 2022-05-04 NOTE — TELEPHONE ENCOUNTER
"Tita called the cardiologist and he said juardiance was for diabetes only \"not her heart\". If you have any questions call Tita @ [phone]  "

## 2022-05-05 ENCOUNTER — OFFICE VISIT (OUTPATIENT)
Dept: SURGERY | Facility: CLINIC | Age: 72
End: 2022-05-05

## 2022-05-05 VITALS — HEIGHT: 62 IN | BODY MASS INDEX: 42.69 KG/M2 | WEIGHT: 232 LBS

## 2022-05-05 DIAGNOSIS — C44.90 SKIN CANCER: Primary | ICD-10-CM

## 2022-05-05 PROCEDURE — 99204 OFFICE O/P NEW MOD 45 MIN: CPT | Performed by: SURGERY

## 2022-05-05 NOTE — PROGRESS NOTES
Subjective   Mariangel Nguyen is a 71 y.o. female who presents to the office in surgical consultation from Waqar Burton MD for an ulcerated lesion of the right lower extremity.    History of Present Illness     The patient has multiple medical problems and has venous stasis disease of her lower extremities with pedal edema.  She has been undergoing wound care with wrappings of her legs to try to address the swelling in the venous stasis.  Her daughter has been performing the care of her lower extremity and has successfully significantly decreased the edema.  As the leg has become less swollen and ulcerated lesion became apparent along the anterior medial surface of the right lower extremity.  That lesion has not changed in size but it is intermittently bleeding.  She takes Eliquis for history of DVTs and a pulmonary embolus.    She has significant heart disease but does not have chest pain or shortness of breath.    Review of Systems   Constitutional: Negative for fatigue and fever.   Respiratory: Negative for chest tightness and shortness of breath.    Cardiovascular: Negative for chest pain and palpitations.   Gastrointestinal: Negative for abdominal pain, blood in stool, constipation, diarrhea, nausea and vomiting.     Past Medical History:   Diagnosis Date   • Acute deep vein thrombosis (DVT) of popliteal vein of left lower extremity ([facility]) 2/6/2019   • Acute on chronic diastolic CHF (congestive heart failure) ([facility]) 9/30/2020   • Acute on chronic systolic CHF (congestive heart failure) ([facility]) 10/27/2017   • Acute respiratory failure ([facility]) 2/24/2016   • BRODY (acute kidney injury) ([facility]) 12/5/2017   • Arthritis     OSTEOARTHRITIS KNEES   • ATN (acute tubular necrosis) ([facility]) 12/7/2017   • CHF (congestive heart failure) ([facility])    • Colon polyp    • COPD (chronic obstructive pulmonary disease) ([facility])    • Diabetes mellitus (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 Nestor Howe   • BLADDER SURGERY      BLADDER LIFT, 2011   • CARDIAC CATHETERIZATION  2017    Normal coronary arteries, normal overall LVSF with wall motion abnormality   • COLON SURGERY      RECONSTRUCTION, 2001   • COLONOSCOPY N/A 3/4/2019    Procedure: COLONOSCOPY polypectomy;  Surgeon: Nai Luong MD;  Location: [facility] OR;  Service: Gastroenterology   • CYSTOSCOPY URETEROSCOPY LASER LITHOTRIPSY Right 10/23/2017    Procedure: CYSTO RT URETEROSCOPY LASER  STONE BASKETING RIGHT STENT EXCHANGE;  Surgeon: Quinn Cason MD;  Location: [facility] OR;  Service:    • ENDOSCOPY N/A 3/4/2019    Procedure: ESOPHAGOGASTRODUODENOSCOPY WITH BIOPSIES;  Surgeon: aNi Luong MD;  Location: [facility] OR;  Service: Gastroenterology   • EXPLORATORY LAPAROTOMY     • HYSTERECTOMY      2001   • ROTATOR CUFF REPAIR Right 2016   • STEROID INJECTION KNEE      Had left knee injection 10/09/2018; right knee injection 10/02/2017   • TOTAL KNEE ARTHROPLASTY Right 4/8/2019    Procedure: RIGHT TOTAL KNEE ARTHROPLASTY;  Surgeon: Farzad Luong MD;  Location: [facility] OR;  Service: Orthopedics     Family History   Problem Relation Age of Onset   • Cancer Other    • Stroke Other    • Other Other         LUPUS ANTICOAGULANT   • Rheum arthritis Other    • Heart disease Other    • Heart attack Other    • Lupus Mother    •  Stomach cancer Father    • Malig Hyperthermia Neg Hx    • Colon cancer Neg Hx    • Colon polyps Neg Hx      Social History     Socioeconomic History   • Marital status: Single   • Years of education: High school   Tobacco Use   • Smoking status: Never Smoker   • Smokeless tobacco: Never Used   Vaping Use   • Vaping Use: Never used   Substance and Sexual Activity   • Alcohol use: Yes     Comment: 1-2X YEAR   • Drug use: No   • Sexual activity: Defer       Objective   Physical Exam  Constitutional:       Appearance: She is not ill-appearing or toxic-appearing.   Skin:     Comments: There are venous stasis changes with discoloration to both lower extremities.  There is a 1 cm raised ulcerated lesion along the right lower extremity just medial to the tibia with a scab overlying the ulcer.  The lesion is raised with a central ulcer and no surrounding cellulitis.   Neurological:      Mental Status: She is alert.   Psychiatric:         Behavior: Behavior is cooperative.         Assessment/Plan       The encounter diagnosis was Skin cancer.    The patient has a skin lesion of the right lower extremity that is consistent with a skin cancer, either squamous cell carcinoma or basal cell carcinoma.  This was discussed with the patient and her daughter.  She will need an excision of the lesion.  She will have to hold Eliquis for 2 days prior to the excision.  It will need to be performed in the operating room due to her multiple medical problems and her chronic venous stasis disease of her lower extremity.  She will be scheduled for an excision of the right lower extremity skin cancer.  The patient understands the indications, alternatives, risks, and benefits of the procedure and wishes to proceed.

## 2022-05-05 NOTE — H&P (VIEW-ONLY)
Subjective   Mariangel Nguyen is a 71 y.o. female who presents to the office in surgical consultation from Waqar Burton MD for an ulcerated lesion of the right lower extremity.    History of Present Illness     The patient has multiple medical problems and has venous stasis disease of her lower extremities with pedal edema.  She has been undergoing wound care with wrappings of her legs to try to address the swelling in the venous stasis.  Her daughter has been performing the care of her lower extremity and has successfully significantly decreased the edema.  As the leg has become less swollen and ulcerated lesion became apparent along the anterior medial surface of the right lower extremity.  That lesion has not changed in size but it is intermittently bleeding.  She takes Eliquis for history of DVTs and a pulmonary embolus.    She has significant heart disease but does not have chest pain or shortness of breath.    Review of Systems   Constitutional: Negative for fatigue and fever.   Respiratory: Negative for chest tightness and shortness of breath.    Cardiovascular: Negative for chest pain and palpitations.   Gastrointestinal: Negative for abdominal pain, blood in stool, constipation, diarrhea, nausea and vomiting.     Past Medical History:   Diagnosis Date   • Acute deep vein thrombosis (DVT) of popliteal vein of left lower extremity ([facility]) 2/6/2019   • Acute on chronic diastolic CHF (congestive heart failure) ([facility]) 9/30/2020   • Acute on chronic systolic CHF (congestive heart failure) ([facility]) 10/27/2017   • Acute respiratory failure ([facility]) 2/24/2016   • BRODY (acute kidney injury) ([facility]) 12/5/2017   • Arthritis     OSTEOARTHRITIS KNEES   • ATN (acute tubular necrosis) ([facility]) 12/7/2017   • CHF (congestive heart failure) ([facility])    • Colon polyp    • COPD (chronic obstructive pulmonary disease) ([facility])    • Diabetes mellitus ([facility])    • Disease of thyroid gland    • H/O Abnormal Pap smear of cervix    • History  of DVT (deep vein thrombosis)    • History of kidney stones    • History of pulmonary embolus (PE)    • History of sepsis    • History of small bowel obstruction    • History of snoring    • Hyperlipidemia    • Hypertension    • Knee pain, bilateral    • Neuropathy    • 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 Nestor Howe   • BLADDER SURGERY      BLADDER LIFT, 2011   • CARDIAC CATHETERIZATION  2017    Normal coronary arteries, normal overall LVSF with wall motion abnormality   • COLON SURGERY      RECONSTRUCTION, 2001   • COLONOSCOPY N/A 3/4/2019    Procedure: COLONOSCOPY polypectomy;  Surgeon: Nai Luong MD;  Location: [facility] OR;  Service: Gastroenterology   • CYSTOSCOPY URETEROSCOPY LASER LITHOTRIPSY Right 10/23/2017    Procedure: CYSTO RT URETEROSCOPY LASER  STONE BASKETING RIGHT STENT EXCHANGE;  Surgeon: Quinn Cason MD;  Location: [facility] OR;  Service:    • ENDOSCOPY N/A 3/4/2019    Procedure: ESOPHAGOGASTRODUODENOSCOPY WITH BIOPSIES;  Surgeon: Nai Luong MD;  Location: [facility] OR;  Service: Gastroenterology   • EXPLORATORY LAPAROTOMY     • HYSTERECTOMY      2001   • ROTATOR CUFF REPAIR Right 2016   • STEROID INJECTION KNEE      Had left knee injection 10/09/2018; right knee injection 10/02/2017   • TOTAL KNEE ARTHROPLASTY Right 4/8/2019    Procedure: RIGHT TOTAL KNEE ARTHROPLASTY;  Surgeon: Farzad Luong MD;  Location: [facility] OR;  Service: Orthopedics     Family History   Problem Relation Age of Onset   • Cancer Other    • Stroke Other    • Other Other         LUPUS ANTICOAGULANT   • Rheum arthritis Other    • Heart disease Other    • Heart attack Other    • Lupus Mother    •  Stomach cancer Father    • Malig Hyperthermia Neg Hx    • Colon cancer Neg Hx    • Colon polyps Neg Hx      Social History     Socioeconomic History   • Marital status: Single   • Years of education: High school   Tobacco Use   • Smoking status: Never Smoker   • Smokeless tobacco: Never Used   Vaping Use   • Vaping Use: Never used   Substance and Sexual Activity   • Alcohol use: Yes     Comment: 1-2X YEAR   • Drug use: No   • Sexual activity: Defer       Objective   Physical Exam  Constitutional:       Appearance: She is not ill-appearing or toxic-appearing.   Skin:     Comments: There are venous stasis changes with discoloration to both lower extremities.  There is a 1 cm raised ulcerated lesion along the right lower extremity just medial to the tibia with a scab overlying the ulcer.  The lesion is raised with a central ulcer and no surrounding cellulitis.   Neurological:      Mental Status: She is alert.   Psychiatric:         Behavior: Behavior is cooperative.         Assessment/Plan       The encounter diagnosis was Skin cancer.    The patient has a skin lesion of the right lower extremity that is consistent with a skin cancer, either squamous cell carcinoma or basal cell carcinoma.  This was discussed with the patient and her daughter.  She will need an excision of the lesion.  She will have to hold Eliquis for 2 days prior to the excision.  It will need to be performed in the operating room due to her multiple medical problems and her chronic venous stasis disease of her lower extremity.  She will be scheduled for an excision of the right lower extremity skin cancer.  The patient understands the indications, alternatives, risks, and benefits of the procedure and wishes to proceed.

## 2022-05-09 ENCOUNTER — PRE-ADMISSION TESTING (OUTPATIENT)
Dept: PREADMISSION TESTING | Facility: HOSPITAL | Age: 72
End: 2022-05-09

## 2022-05-09 VITALS
OXYGEN SATURATION: 91 % | WEIGHT: 239 LBS | TEMPERATURE: 98 F | RESPIRATION RATE: 20 BRPM | HEART RATE: 60 BPM | SYSTOLIC BLOOD PRESSURE: 159 MMHG | BODY MASS INDEX: 43.98 KG/M2 | DIASTOLIC BLOOD PRESSURE: 95 MMHG | HEIGHT: 62 IN

## 2022-05-09 DIAGNOSIS — C44.90 SKIN CANCER: ICD-10-CM

## 2022-05-09 LAB — SARS-COV-2 ORF1AB RESP QL NAA+PROBE: NOT DETECTED

## 2022-05-09 PROCEDURE — C9803 HOPD COVID-19 SPEC COLLECT: HCPCS

## 2022-05-09 PROCEDURE — U0004 COV-19 TEST NON-CDC HGH THRU: HCPCS

## 2022-05-09 RX ORDER — CHLORHEXIDINE GLUCONATE 500 MG/1
CLOTH TOPICAL
COMMUNITY
End: 2022-05-11 | Stop reason: HOSPADM

## 2022-05-09 RX ORDER — INSULIN DETEMIR 100 [IU]/ML
22 INJECTION, SOLUTION SUBCUTANEOUS DAILY
COMMUNITY
End: 2022-05-12 | Stop reason: SDUPTHER

## 2022-05-09 RX ORDER — INSULIN ASPART 100 [IU]/ML
INJECTION, SOLUTION INTRAVENOUS; SUBCUTANEOUS
Status: ON HOLD | COMMUNITY
End: 2022-05-11

## 2022-05-09 NOTE — DISCHARGE INSTRUCTIONS
ARRIVE DAY OF SURGERY AT  12:30 AM          Take the following medications the morning of surgery:  AMIODARONE, LEVOTHYROXINE, MEMANTINE, ESCITALOPRAM      If you are on prescription narcotic pain medication to control your pain you may also take that medication the morning of surgery.    General Instructions:  Do not eat solid food after midnight the night before surgery.  You may drink clear liquids day of surgery but must stop at least one hour before your hospital arrival time.  It is beneficial for you to have a clear drink that contains carbohydrates the day of surgery.  We suggest a 12 to 20 ounce bottle of Gatorade or Powerade for non-diabetic patients or a 12 to 20 ounce bottle of G2 or Powerade Zero for diabetic patients. (Pediatric patients, are not advised to drink a 12 to 20 ounce carbohydrate drink)    Clear liquids are liquids you can see through.  Nothing red in color.     Plain water                               Sports drinks  Sodas                                   Gelatin (Jell-O)  Fruit juices without pulp such as white grape juice and apple juice  Popsicles that contain no fruit or yogurt  Tea or coffee (no cream or milk added)  Gatorade / Powerade  G2 / Powerade Zero    Infants may have breast milk up to four hours before surgery.  Infants drinking formula may drink formula up to six hours before surgery.   Patients who avoid smoking, chewing tobacco and alcohol for 4 weeks prior to surgery have a reduced risk of post-operative complications.  Quit smoking as many days before surgery as you can.  Do not smoke, use chewing tobacco or drink alcohol the day of surgery.   If applicable bring your C-PAP/ BI-PAP machine.  Bring any papers given to you in the doctor’s office.  Wear clean comfortable clothes.  Do not wear contact lenses, false eyelashes or make-up.  Bring a case for your glasses.   Bring crutches or walker if applicable.  Remove all piercings.  Leave jewelry and any other valuables at  home.  Hair extensions with metal clips must be removed prior to surgery.  The Pre-Admission Testing nurse will instruct you to bring medications if unable to obtain an accurate list in Pre-Admission Testing.            Preventing a Surgical Site Infection:  For 2 to 3 days before surgery, avoid shaving with a razor because the razor can irritate skin and make it easier to develop an infection.    Any areas of open skin can increase the risk of a post-operative wound infection by allowing bacteria to enter and travel throughout the body.  Notify your surgeon if you have any skin wounds / rashes even if it is not near the expected surgical site.  The area will need assessed to determine if surgery should be delayed until it is healed.  The night prior to surgery shower using a fresh bar of anti-bacterial soap (such as Dial) and clean washcloth.  Sleep in a clean bed with clean clothing.  Do not allow pets to sleep with you.  Shower on the morning of surgery using a fresh bar of anti-bacterial soap (such as Dial) and clean washcloth.  Dry with a clean towel and dress in clean clothing.  Ask your surgeon if you will be receiving antibiotics prior to surgery.  Make sure you, your family, and all healthcare providers clean their hands with soap and water or an alcohol based hand  before caring for you or your wound.    Day of surgery:  Your arrival time is approximately two hours before your scheduled surgery time.  Upon arrival, a Pre-op nurse and Anesthesiologist will review your health history, obtain vital signs, and answer questions you may have.  The only belongings needed at this time will be a list of your home medications and if applicable your C-PAP/BI-PAP machine.  A Pre-op nurse will start an IV and you may receive medication in preparation for surgery, including something to help you relax.     Please be aware that surgery does come with discomfort.  We want to make every effort to control your  discomfort so please discuss any uncontrolled symptoms with your nurse.   Your doctor will most likely have prescribed pain medications.      If you are going home after surgery you will receive individualized written care instructions before being discharged.  A responsible adult must drive you to and from the hospital on the day of your surgery and stay with you for 24 hours.  Discharge prescriptions can be filled by the hospital pharmacy during regular pharmacy hours.  If you are having surgery late in the day/evening your prescription may be e-prescribed to your pharmacy.  Please verify your pharmacy hours or chose a 24 hour pharmacy to avoid not having access to your prescription because your pharmacy has closed for the day.    If you are staying overnight following surgery, you will be transported to your hospital room following the recovery period.  [facility] has all private rooms.    If you have any questions please call Pre-Admission Testing at [phone].  Deductibles and co-payments are collected on the day of service. Please be prepared to pay the required co-pay, deductible or deposit on the day of service as defined by your plan.    Patient Education for Self-Quarantine Process    Following your COVID testing, we strongly recommend that you wear a mask when you are with other people and practice social distancing.   Limit your activities to only required outings.  Wash your hands with soap and water frequently for at least 20 seconds.   Avoid touching your eyes, nose and mouth with unwashed hands.  Do not share anything - utensils, drinking glasses, food from the same bowl.   Sanitize household surfaces daily. Include all high touch areas (door handles, light switches, phones, countertops, etc.)    Call your surgeon immediately if you experience any of the following symptoms:  Sore Throat  Shortness of Breath or difficulty breathing  Cough  Chills  Body soreness or muscle  pain  Headache  Fever  New loss of taste or smell  Do not arrive for your surgery ill.  Your procedure will need to be rescheduled to another time.  You will need to call your physician before the day of surgery to avoid any unnecessary exposure to hospital staff as well as other patients.         CHLORHEXIDINE CLOTH INSTRUCTIONS  The morning of surgery follow these instructions using the Chlorhexidine cloths you've been given.  These steps reduce bacteria on the body.  Do not use the cloths near your eyes, ears mouth, genitalia or on open wounds.  Throw the cloths away after use but do not try to flush them down a toilet.      Open and remove one cloth at a time from the package.    Leave the cloth unfolded and begin the bathing.  Massage the skin with the cloths using gentle pressure to remove bacteria.  Do not scrub harshly.   Follow the steps below with one 2% CHG cloth per area (6 total cloths).  One cloth for neck, shoulders and chest.  One cloth for both arms, hands, fingers and underarms (do underarms last).  One cloth for the abdomen followed by groin.  One cloth for right leg and foot including between the toes.  One cloth for left leg and foot including between the toes.  The last cloth is to be used for the back of the neck, back and buttocks.    Allow the CHG to air dry 3 minutes on the skin which will give it time to work and decrease the chance of irritation.  The skin may feel sticky until it is dry.  Do not rinse with water or any other liquid or you will lose the beneficial effects of the CHG.  If mild skin irritation occurs, do rinse the skin to remove the CHG.  Report this to the nurse at time of admission.  Do not apply lotions, creams, ointments, deodorants or perfumes after using the clothes. Dress in clean clothes before coming to the hospital.

## 2022-05-11 ENCOUNTER — ANESTHESIA (OUTPATIENT)
Dept: PERIOP | Facility: HOSPITAL | Age: 72
End: 2022-05-11

## 2022-05-11 ENCOUNTER — HOSPITAL ENCOUNTER (OUTPATIENT)
Facility: HOSPITAL | Age: 72
Setting detail: HOSPITAL OUTPATIENT SURGERY
Discharge: HOME | End: 2022-05-11
Attending: SURGERY | Admitting: SURGERY

## 2022-05-11 ENCOUNTER — ANESTHESIA EVENT (OUTPATIENT)
Dept: PERIOP | Facility: HOSPITAL | Age: 72
End: 2022-05-11

## 2022-05-11 VITALS
SYSTOLIC BLOOD PRESSURE: 136 MMHG | RESPIRATION RATE: 18 BRPM | TEMPERATURE: 97.6 F | OXYGEN SATURATION: 93 % | DIASTOLIC BLOOD PRESSURE: 78 MMHG | WEIGHT: 235.67 LBS | HEART RATE: 62 BPM | HEIGHT: 62 IN | BODY MASS INDEX: 43.37 KG/M2

## 2022-05-11 DIAGNOSIS — C44.90 SKIN CANCER: ICD-10-CM

## 2022-05-11 LAB
GLUCOSE BLDC GLUCOMTR-MCNC: 147 MG/DL (ref 70–130)
GLUCOSE BLDC GLUCOMTR-MCNC: 160 MG/DL (ref 70–130)

## 2022-05-11 PROCEDURE — 25010000002 PROPOFOL 10 MG/ML EMULSION: Performed by: ANESTHESIOLOGY

## 2022-05-11 PROCEDURE — 0 LIDOCAINE 1 % SOLUTION: Performed by: SURGERY

## 2022-05-11 PROCEDURE — 11601 EXC TR-EXT MAL+MARG 0.6-1 CM: CPT | Performed by: SURGERY

## 2022-05-11 PROCEDURE — 82962 GLUCOSE BLOOD TEST: CPT

## 2022-05-11 PROCEDURE — 88305 TISSUE EXAM BY PATHOLOGIST: CPT | Performed by: SURGERY

## 2022-05-11 PROCEDURE — 25010000002 CEFAZOLIN IN DEXTROSE 2-4 GM/100ML-% SOLUTION: Performed by: SURGERY

## 2022-05-11 RX ORDER — SODIUM CHLORIDE 0.9 % (FLUSH) 0.9 %
3-10 SYRINGE (ML) INJECTION AS NEEDED
Status: DISCONTINUED | OUTPATIENT
Start: 2022-05-11 | End: 2022-05-11 | Stop reason: HOSPADM

## 2022-05-11 RX ORDER — HYDROCODONE BITARTRATE AND ACETAMINOPHEN 5; 325 MG/1; MG/1
TABLET ORAL
Qty: 10 TABLET | Refills: 0 | Status: SHIPPED | OUTPATIENT
Start: 2022-05-11 | End: 2022-06-30

## 2022-05-11 RX ORDER — PROPOFOL 10 MG/ML
VIAL (ML) INTRAVENOUS AS NEEDED
Status: DISCONTINUED | OUTPATIENT
Start: 2022-05-11 | End: 2022-05-11 | Stop reason: SURG

## 2022-05-11 RX ORDER — ACETAMINOPHEN 500 MG
500 TABLET ORAL ONCE
Status: COMPLETED | OUTPATIENT
Start: 2022-05-11 | End: 2022-05-11

## 2022-05-11 RX ORDER — CEFAZOLIN SODIUM 2 G/100ML
2 INJECTION, SOLUTION INTRAVENOUS ONCE
Status: COMPLETED | OUTPATIENT
Start: 2022-05-11 | End: 2022-05-11

## 2022-05-11 RX ORDER — EPHEDRINE SULFATE 50 MG/ML
5 INJECTION, SOLUTION INTRAVENOUS ONCE AS NEEDED
Status: DISCONTINUED | OUTPATIENT
Start: 2022-05-11 | End: 2022-05-11 | Stop reason: HOSPADM

## 2022-05-11 RX ORDER — FENTANYL CITRATE 50 UG/ML
25 INJECTION, SOLUTION INTRAMUSCULAR; INTRAVENOUS
Status: DISCONTINUED | OUTPATIENT
Start: 2022-05-11 | End: 2022-05-11 | Stop reason: HOSPADM

## 2022-05-11 RX ORDER — FAMOTIDINE 10 MG/ML
20 INJECTION, SOLUTION INTRAVENOUS ONCE
Status: COMPLETED | OUTPATIENT
Start: 2022-05-11 | End: 2022-05-11

## 2022-05-11 RX ORDER — SODIUM CHLORIDE, SODIUM LACTATE, POTASSIUM CHLORIDE, CALCIUM CHLORIDE 600; 310; 30; 20 MG/100ML; MG/100ML; MG/100ML; MG/100ML
9 INJECTION, SOLUTION INTRAVENOUS CONTINUOUS
Status: DISCONTINUED | OUTPATIENT
Start: 2022-05-11 | End: 2022-05-11 | Stop reason: HOSPADM

## 2022-05-11 RX ORDER — HYDRALAZINE HYDROCHLORIDE 20 MG/ML
10 INJECTION INTRAMUSCULAR; INTRAVENOUS
Status: DISCONTINUED | OUTPATIENT
Start: 2022-05-11 | End: 2022-05-11 | Stop reason: HOSPADM

## 2022-05-11 RX ORDER — LIDOCAINE HYDROCHLORIDE 10 MG/ML
INJECTION, SOLUTION INFILTRATION; PERINEURAL AS NEEDED
Status: DISCONTINUED | OUTPATIENT
Start: 2022-05-11 | End: 2022-05-11 | Stop reason: HOSPADM

## 2022-05-11 RX ORDER — LABETALOL HYDROCHLORIDE 5 MG/ML
5 INJECTION, SOLUTION INTRAVENOUS
Status: DISCONTINUED | OUTPATIENT
Start: 2022-05-11 | End: 2022-05-11 | Stop reason: HOSPADM

## 2022-05-11 RX ORDER — HYDROCODONE BITARTRATE AND ACETAMINOPHEN 5; 325 MG/1; MG/1
1 TABLET ORAL ONCE AS NEEDED
Status: DISCONTINUED | OUTPATIENT
Start: 2022-05-11 | End: 2022-05-11 | Stop reason: HOSPADM

## 2022-05-11 RX ORDER — MAGNESIUM HYDROXIDE 1200 MG/15ML
LIQUID ORAL AS NEEDED
Status: DISCONTINUED | OUTPATIENT
Start: 2022-05-11 | End: 2022-05-11 | Stop reason: HOSPADM

## 2022-05-11 RX ORDER — SODIUM CHLORIDE 0.9 % (FLUSH) 0.9 %
3 SYRINGE (ML) INJECTION EVERY 12 HOURS SCHEDULED
Status: DISCONTINUED | OUTPATIENT
Start: 2022-05-11 | End: 2022-05-11 | Stop reason: HOSPADM

## 2022-05-11 RX ORDER — LIDOCAINE HYDROCHLORIDE 20 MG/ML
INJECTION, SOLUTION INFILTRATION; PERINEURAL AS NEEDED
Status: DISCONTINUED | OUTPATIENT
Start: 2022-05-11 | End: 2022-05-11 | Stop reason: SURG

## 2022-05-11 RX ORDER — FLUMAZENIL 0.1 MG/ML
0.2 INJECTION INTRAVENOUS AS NEEDED
Status: DISCONTINUED | OUTPATIENT
Start: 2022-05-11 | End: 2022-05-11 | Stop reason: HOSPADM

## 2022-05-11 RX ORDER — DIPHENHYDRAMINE HYDROCHLORIDE 50 MG/ML
6.25 INJECTION INTRAMUSCULAR; INTRAVENOUS
Status: DISCONTINUED | OUTPATIENT
Start: 2022-05-11 | End: 2022-05-11 | Stop reason: HOSPADM

## 2022-05-11 RX ORDER — OXYCODONE HYDROCHLORIDE AND ACETAMINOPHEN 5; 325 MG/1; MG/1
1 TABLET ORAL ONCE AS NEEDED
Status: DISCONTINUED | OUTPATIENT
Start: 2022-05-11 | End: 2022-05-11 | Stop reason: HOSPADM

## 2022-05-11 RX ORDER — ONDANSETRON 2 MG/ML
4 INJECTION INTRAMUSCULAR; INTRAVENOUS ONCE AS NEEDED
Status: DISCONTINUED | OUTPATIENT
Start: 2022-05-11 | End: 2022-05-11 | Stop reason: HOSPADM

## 2022-05-11 RX ORDER — HYDROMORPHONE HYDROCHLORIDE 1 MG/ML
0.25 INJECTION, SOLUTION INTRAMUSCULAR; INTRAVENOUS; SUBCUTANEOUS
Status: DISCONTINUED | OUTPATIENT
Start: 2022-05-11 | End: 2022-05-11 | Stop reason: HOSPADM

## 2022-05-11 RX ORDER — NALOXONE HCL 0.4 MG/ML
0.4 VIAL (ML) INJECTION AS NEEDED
Status: DISCONTINUED | OUTPATIENT
Start: 2022-05-11 | End: 2022-05-11 | Stop reason: HOSPADM

## 2022-05-11 RX ADMIN — ACETAMINOPHEN 500 MG: 500 TABLET ORAL at 14:07

## 2022-05-11 RX ADMIN — LIDOCAINE HYDROCHLORIDE 60 MG: 20 INJECTION, SOLUTION INFILTRATION; PERINEURAL at 16:26

## 2022-05-11 RX ADMIN — PROPOFOL 100 MCG/KG/MIN: 10 INJECTION, EMULSION INTRAVENOUS at 16:26

## 2022-05-11 RX ADMIN — SODIUM CHLORIDE, POTASSIUM CHLORIDE, SODIUM LACTATE AND CALCIUM CHLORIDE 9 ML/HR: 600; 310; 30; 20 INJECTION, SOLUTION INTRAVENOUS at 14:07

## 2022-05-11 RX ADMIN — FAMOTIDINE 20 MG: 10 INJECTION INTRAVENOUS at 14:07

## 2022-05-11 RX ADMIN — PROPOFOL 50 MG: 10 INJECTION, EMULSION INTRAVENOUS at 16:26

## 2022-05-11 RX ADMIN — CEFAZOLIN SODIUM 2 G: 2 INJECTION, SOLUTION INTRAVENOUS at 16:15

## 2022-05-11 NOTE — ANESTHESIA POSTPROCEDURE EVALUATION
"Patient: Mariangel Nguyen    Procedure Summary     Date: 05/11/22 Room / Location: [facility] OR 06 / [facility] MAIN OR    Anesthesia Start: 1619 Anesthesia Stop: 1704    Procedure: Excision right lower extremity skin cancer (Right ) Diagnosis:       Skin cancer      (Skin cancer [C44.90])    Surgeons: Joshua Hobbs Jr., MD Provider: Dejan Ferreira MD    Anesthesia Type: MAC ASA Status: 3          Anesthesia Type: MAC    Vitals  No vitals data found for the desired time range.          Post Anesthesia Care and Evaluation    Patient location during evaluation: bedside  Patient participation: complete - patient participated  Level of consciousness: awake  Pain management: adequate  Airway patency: patent  Anesthetic complications: No anesthetic complications    Cardiovascular status: acceptable  Respiratory status: acceptable and nasal cannula (on 3L/min same as home baseline)  Hydration status: acceptable    Comments: */92 (BP Location: Right arm, Patient Position: Lying)   Pulse 61   Temp 36.6 °C (97.8 °F) (Oral)   Resp 20   Ht 157.5 cm (62\")   Wt 107 kg (235 lb 10.8 oz)   LMP  (LMP Unknown)   SpO2 95%   BMI 43.10 kg/m²         "

## 2022-05-11 NOTE — OP NOTE
Surgeon: Joshua Hobbs Jr., M.D.    Assistant: Gatito Hardin CSA    An assistant was necessary and provided valuable retraction and exposure, as well as wound closure.    Pre-Operative Diagnosis:     Skin cancer [C44.90]    Post-Operative Diagnosis:    Skin cancer    Procedure Performed:     Excision of right lower extremity 1 cm skin cancer    Indications:     The patient is a pleasant 71-year-old female who has developed an ulcerated lesion of her right lower extremity that is consistent with a skin cancer.  She presents for excision of the skin cancer.  The patient understands the indications, alternatives, risks, and benefits of the procedure and wishes to proceed.    Procedure:     The patient was identified and taken to the operating room where she was placed in the supine position on the operative bed.  She underwent a MAC anesthesia and was appropriate monitored throughout the case by the anesthesia personnel.  Her right lower extremity was prepped and draped in the standard surgical fashion.  Margins around the skin cancer were marked with a marking pen.  The area was then infiltrated with 1% lidocaine without epinephrine.  An elliptical incision was made with a scalpel carried through the skin into the subcutaneous tissue.  The subcutaneous tissue was divided using Bovie electrocautery and the skin edge was completely elevated off the underlying subcutaneous tissue.  There were 2 areas of bleeding that were controlled with a 3-0 Vicryl suture ligature.  The specimen was marked as to the superior and lateral margin and then passed off for pathology.  The skin was closed with a 4-0 nylon in an interrupted fashion.  Sterile dressing was applied.  The sponge, needle, and instrument counts were correct at the end of the case.  The patient taught the procedure well and was transferred to the recovery room in stable condition.    Estimated Blood Loss:      minimal    Specimens:     Order Name Source Comment  Collection Info Order Time   PREGNANCY, URINE  POCT. PRN for all menstruating females.  5/11/2022  1:17 PM     Release to patient   Immediate        TISSUE PATHOLOGY EXAM Leg, Right  Collected By: Joshua Hobbs Jr., MD 5/11/2022  4:49 PM     Release to patient   Immediate            Joshua Hobbs Jr., M.D.

## 2022-05-11 NOTE — DISCHARGE INSTRUCTIONS
Dr. Joshua Hobbs  [address]  [place], [place] [id number] [phone]    Discharge Instructions for Minor Surgery      Go home, rest and take it easy today; however, you should get up and move about several times today to reduce the risk of developing a clot in your legs.      You may experience some dizziness or memory loss from the anesthesia.  This may last for the next 24 hours.  Someone should plan on staying with you for the first 24 hours for your safety.    Do not make any important legal decisions or sign any legal papers for the next 24 hours.      Eat and drink lightly today.  Start off with liquids, jello, soup, crackers or other bland foods at first. You may advance your diet tomorrow as tolerated as long as you do not experience any nausea or vomiting.     You may remove your outer dressings in 2 days.  The white tapes called steri-strips should stay in place.  They will fall off on their own in 1-2 weeks.  Do not worry if they come off sooner.      You may notice some bleeding/drainage on your outer dressings. A little bloody drainage is normal. If the bleeding/drainage is such that the bandage cannot absorb it, remove the dressing, apply clean gauze and apply firm pressure for a full 15 minutes.  If the bleeding continues, please call me.    You may shower tomorrow.  No tub baths until your incisions are completely healed.         You have received a prescription for a narcotic pain medicine, as you will have some pain following surgery.   You will not be totally pain free, but your pain medicine should make the pain tolerable.  Please take your pain medicine as prescribed and always take your pills with food to prevent nausea. If you are having severe pain that cannot be controlled by the pain medicine, please contact me.      It is not unusual to experience pain/discomfort in your shoulders or under your ribs after surgery.  It is from the gas used during the laparoscopic procedure  and usually lasts 1-3 days.  The prescription pain medicine is used to treat the surgical pain and does not typically alleviate this “gassy” pain.     No driving for 24 hours and for as long as you are taking your prescription pain medicine.      You will need to call the office at [phone] to schedule a follow-up appointment in 2 weeks.     Remember to contact me for any of the following:    Fever > 101 degrees  Severe pain that cannot be controlled by taking your pain pills  Severe nausea or vomiting   Significant bleeding of your incisions  Drainage that has a bad smell or is yellow or green in appearance  Any other questions or concerns

## 2022-05-11 NOTE — ANESTHESIA PREPROCEDURE EVALUATION
Anesthesia Evaluation     NPO Solid Status: > 8 hours  NPO Liquid Status: > 2 hours           Airway   Mallampati: II  Neck ROM: full  no difficulty expected  Dental      Comment: Missing multiple teeth, front teeth ok    Pulmonary - normal exam   (+) COPD, home oxygen (3L/min), sleep apnea on CPAP,     ROS comment: Hypoxic respiratory failure  PE comment: nonlabored  Cardiovascular - normal exam  Exercise tolerance: poor (<4 METS)    Rhythm: regular  Rate: normal    (+) hypertension, CHF , DVT, hyperlipidemia,     ROS comment: Pulmonary HTN      Echo (3/25/22):  -Normal LV systolic function   -EF=64%   -Moderate tricuspid regurgitation   -Trace mitral regurgitation   -Moderate pulmonary HTN with an RVSP of 60 mmHg      Neuro/Psych  (+) numbness (peripheral neuropathy), psychiatric history Anxiety and Depression, dementia,    GI/Hepatic/Renal/Endo    (+) morbid obesity,  renal disease (stage 3 CKD; h/o ATN & BRODY) stones, diabetes mellitus type 2 using insulin, thyroid problem (goiter) hypothyroidism    ROS Comment: H/o small bowel perf. & omental infarction    Musculoskeletal     (+) arthralgias,   Abdominal    Substance History      OB/GYN          Other   arthritis, blood dyscrasia anemia,   history of cancer (skin cancer)    ROS/Med Hx Other: H/o COVID19                  Anesthesia Plan    ASA 3     MAC       Anesthetic plan, all risks, benefits, and alternatives have been provided, discussed and informed consent has been obtained with: patient.        CODE STATUS:

## 2022-05-12 ENCOUNTER — TELEPHONE (OUTPATIENT)
Dept: ONCOLOGY | Facility: CLINIC | Age: 72
End: 2022-05-12

## 2022-05-12 RX ORDER — INSULIN DETEMIR 100 [IU]/ML
22 INJECTION, SOLUTION SUBCUTANEOUS DAILY
Qty: 3 PEN | Refills: 5 | Status: SHIPPED | OUTPATIENT
Start: 2022-05-12 | End: 2022-06-30

## 2022-05-12 RX ORDER — APIXABAN 5 MG/1
5 TABLET, FILM COATED ORAL 2 TIMES DAILY
Qty: 60 TABLET | Refills: 5 | Status: SHIPPED | OUTPATIENT
Start: 2022-05-12

## 2022-05-12 RX ORDER — EMPAGLIFLOZIN 10 MG/1
10 TABLET, FILM COATED ORAL DAILY
Qty: 30 TABLET | Refills: 5 | Status: SHIPPED | OUTPATIENT
Start: 2022-05-12

## 2022-05-12 NOTE — TELEPHONE ENCOUNTER
Caller: Edita Mcgowan    Relationship: DAUGHTER    Best call back number: [phone]    What medications are you currently taking:   Current Outpatient Medications on File Prior to Visit   Medication Sig Dispense Refill   • acetaminophen (TYLENOL) 325 MG tablet Take 2 tablets by mouth Every 4 (Four) Hours As Needed for Moderate Pain  or Fever (greater than 101 F). 120 tablet 0   • amiodarone (PACERONE) 200 MG tablet Take 200 mg by mouth 2 (Two) Times a Day.     • atorvastatin (LIPITOR) 80 MG tablet Take 80 mg by mouth Every Night.     • BD Pen Needle Robyn U/F 32G X 4 MM misc      • bumetanide (BUMEX) 2 MG tablet Take 1 tablet by mouth 2 (Two) Times a Day. (Patient taking differently: Take 1 mg by mouth 3 (Three) Times a Day As Needed. 2-3 TIMES PER DAY) 60 tablet 5   • Eliquis 5 MG tablet tablet Take 1 tablet by mouth 2 (Two) Times a Day. 60 tablet 5   • escitalopram (LEXAPRO) 10 MG tablet Take 1 tablet by mouth Daily. 30 tablet 4   • ferrous gluconate (FERGON) 324 MG tablet Take 324 mg by mouth Daily.     • HYDROcodone-acetaminophen (NORCO) 5-325 MG per tablet Take 1 tablet by mouth every 6 hours as needed for pain. 10 tablet 0   • Insulin Aspart, w/Niacinamide, (FIASP FLEXTOUCH SC) Inject 10 Units under the skin into the appropriate area as directed Daily With Breakfast, Lunch & Dinner. 10 units with breakfast, 10 units with lunch and 15 units with dinner     • insulin detemir (Levemir) 100 UNIT/ML injection Inject 22 Units under the skin into the appropriate area as directed Daily.     • Jardiance 10 MG tablet tablet Take 1 tablet by mouth Daily. 30 tablet 5   • levothyroxine (SYNTHROID, LEVOTHROID) 25 MCG tablet Take 25 mcg by mouth Daily.     • memantine (NAMENDA) 10 MG tablet Take 0.5 tablets by mouth 2 (Two) Times a Day. (Patient taking differently: Take 10 mg by mouth 2 (Two) Times a Day.) 180 tablet 1   • metFORMIN (GLUCOPHAGE) 1000 MG tablet Take 1,000 mg by mouth 2 (Two) Times a Day With Meals.     •  miconazole (MICOTIN) 2 % cream Apply 1 application topically to the appropriate area as directed 2 (Two) Times a Day. 56 g 1   • ONE TOUCH ULTRA TEST test strip      • ONETOUCH DELICA LANCETS 33G misc      • spironolactone (ALDACTONE) 25 MG tablet Take 0.5 tablets by mouth Daily.     • triamcinolone (KENALOG) 0.1 % ointment Apply  topically to the appropriate area as directed 2 (Two) Times a Day. 80 g 1   • vitamin D (ERGOCALCIFEROL) 1.25 MG (85512 UT) capsule capsule Take 50,000 Units by mouth Every 7 (Seven) Days. TAKES ON FRIDAYS       Current Facility-Administered Medications on File Prior to Visit   Medication Dose Route Frequency Provider Last Rate Last Admin   • [COMPLETED] acetaminophen (TYLENOL) tablet 500 mg  500 mg Oral Once Dejan Ferreira MD   500 mg at 05/11/22 1407   • [COMPLETED] ceFAZolin in dextrose (ANCEF) IVPB solution 2 g  2 g Intravenous Once Joshua Hobbs Jr., MD   Stopped at 05/11/22 1636   • [COMPLETED] famotidine (PEPCID) injection 20 mg  20 mg Intravenous Once Dejan Ferreira MD   20 mg at 05/11/22 1407   • [DISCONTINUED] diphenhydrAMINE (BENADRYL) injection 6.25 mg  6.25 mg Intravenous Q15 Min PRN Dejan Ferreira MD       • [DISCONTINUED] ePHEDrine injection 5 mg  5 mg Intravenous Once PRN Dejan Ferreira MD       • [DISCONTINUED] fentaNYL citrate (PF) (SUBLIMAZE) injection 25 mcg  25 mcg Intravenous Q15 Min PRN Dejan Ferriera MD       • [DISCONTINUED] flumazenil (ROMAZICON) injection 0.2 mg  0.2 mg Intravenous PRN Dejan Ferreira MD       • [DISCONTINUED] hydrALAZINE (APRESOLINE) injection 10 mg  10 mg Intravenous Q10 Min PRN Dejan Ferreira MD       • [DISCONTINUED] HYDROcodone-acetaminophen (NORCO) 5-325 MG per tablet 1 tablet  1 tablet Oral Once PRN Dejan Ferreira MD       • [DISCONTINUED] HYDROmorphone (DILAUDID) injection 0.25 mg  0.25 mg Intravenous Q10 Min PRN Dejan Ferreira MD       • [DISCONTINUED]  labetalol (NORMODYNE,TRANDATE) injection 5 mg  5 mg Intravenous Q5 Min PRN Dejan Ferreira MD       • [DISCONTINUED] lactated ringers infusion  9 mL/hr Intravenous Continuous Dejan Ferreira MD 9 mL/hr at 05/11/22 1407 Restarted at 05/11/22 1619   • [DISCONTINUED] lidocaine (XYLOCAINE) 1 % injection    PRN Joshua Hobbs Jr., MD   30 mL at 05/11/22 1617   • [DISCONTINUED] lidocaine (XYLOCAINE) 2% injection   Injection PRN Dejan Ferreira MD   60 mg at 05/11/22 1626   • [DISCONTINUED] naloxone (NARCAN) injection 0.4 mg  0.4 mg Intravenous PRN Dejan Ferreira MD       • [DISCONTINUED] ondansetron (ZOFRAN) injection 4 mg  4 mg Intravenous Once PRN Dejan Ferreira MD       • [DISCONTINUED] oxyCODONE-acetaminophen (PERCOCET) 5-325 MG per tablet 1 tablet  1 tablet Oral Once PRN Dejan Ferreira MD       • [DISCONTINUED] propofol (DIPRIVAN) infusion 10 mg/mL 100 mL   Intravenous Continuous PRN Dejan Ferreira MD   Stopped at 05/11/22 1650   • [DISCONTINUED] Propofol (DIPRIVAN) injection   Intravenous PRN Dejan Ferreira MD   50 mg at 05/11/22 1626   • [DISCONTINUED] sodium chloride (NS) irrigation solution    PRN Joshua Hobbs Jr., MD   1,000 mL at 05/11/22 1617   • [DISCONTINUED] sodium chloride 0.9 % flush 3 mL  3 mL Intravenous Q12H Dejan Ferreira MD       • [DISCONTINUED] sodium chloride 0.9 % flush 3-10 mL  3-10 mL Intravenous PRN Dejan Ferreira MD          Which medication are you concerned about: ELIQUIS 5MG, LEVEMIR, JARDIANCE 10MG    Who prescribed you this medication: DR. WU    What are your concerns: NEED HARD COPY PRESP. FOR THESE MEDICATIONS MAILED TO HER HOME.  MADHURI IS ALSO INQUIRING ABOUT HELP WITH MEDICATIONS AS PATIENT IS STRAPPED & HOPING TO CONNECT WITH A .

## 2022-05-13 LAB
LAB AP CASE REPORT: NORMAL
PATH REPORT.FINAL DX SPEC: NORMAL
PATH REPORT.GROSS SPEC: NORMAL

## 2022-05-24 ENCOUNTER — OFFICE VISIT (OUTPATIENT)
Dept: SURGERY | Facility: CLINIC | Age: 72
End: 2022-05-24

## 2022-05-24 VITALS — WEIGHT: 235 LBS | BODY MASS INDEX: 43.24 KG/M2 | HEIGHT: 62 IN

## 2022-05-24 DIAGNOSIS — Z48.89 POSTOPERATIVE VISIT: Primary | ICD-10-CM

## 2022-05-24 PROCEDURE — 99024 POSTOP FOLLOW-UP VISIT: CPT | Performed by: SURGERY

## 2022-05-24 NOTE — PROGRESS NOTES
Subjective   Mariangel Nguyen is a 71 y.o. female who returns to the office after undergoing an excision of a right lower extremity skin cancer on 5/11/2022.     History of Present Illness     The patient is recovering well with no significant postop symptoms.  Her daughter has been wrapping her leg and keeping the edema down and there has been no problems with the incision.    The pathology returned as a well differentiated keratinizing squamous cell carcinoma, keratoacanthoma type with clear margins.    Review of Systems   Constitutional: Negative for activity change, appetite change, fatigue and fever.   Respiratory: Negative for chest tightness and shortness of breath.    Cardiovascular: Negative for chest pain and palpitations.   Gastrointestinal: Negative for abdominal pain, constipation, diarrhea and nausea.   Skin: Negative for rash and wound.   Psychiatric/Behavioral: Negative for agitation and confusion.       Objective   Physical Exam  Constitutional:       General: She is not in acute distress.     Appearance: Normal appearance. She is not ill-appearing or toxic-appearing.   Pulmonary:      Effort: Pulmonary effort is normal. No respiratory distress.   Skin:     Comments: Incision: intact with no evidence of infection.   Neurological:      Mental Status: She is alert.   Psychiatric:         Behavior: Behavior normal.         Assessment & Plan   The encounter diagnosis was Postoperative visit.    1.  Postoperative visit: The patient is recovering well from the excision of a squamous cell carcinoma of the right lower extremity.  The wound is healing well and the sutures were removed.  No further management is necessary for the malignancy as excision alone is sufficient treatment.  Her daughter was advised to resume normal management of her chronic venous stasis.  She will follow-up on an as-needed basis.

## 2022-06-01 RX ORDER — MEMANTINE HYDROCHLORIDE 10 MG/1
5 TABLET ORAL 2 TIMES DAILY
Qty: 180 TABLET | Refills: 1 | Status: SHIPPED | OUTPATIENT
Start: 2022-06-01 | End: 2022-06-01 | Stop reason: SDUPTHER

## 2022-06-01 RX ORDER — MEMANTINE HYDROCHLORIDE 10 MG/1
10 TABLET ORAL 2 TIMES DAILY
Qty: 180 TABLET | Refills: 1 | Status: SHIPPED | OUTPATIENT
Start: 2022-06-01

## 2022-06-01 RX ORDER — SPIRONOLACTONE 25 MG/1
12.5 TABLET ORAL DAILY
Qty: 45 TABLET | Refills: 1 | Status: SHIPPED | OUTPATIENT
Start: 2022-06-01

## 2022-06-01 RX ORDER — SPIRONOLACTONE 25 MG/1
12.5 TABLET ORAL DAILY
Start: 2022-06-01 | End: 2022-06-01 | Stop reason: SDUPTHER

## 2022-06-01 NOTE — TELEPHONE ENCOUNTER
Caller: Mariangel Nguyen    Relationship: Self    Best call back number: [phone]       Requested Prescriptions:   Requested Prescriptions     Pending Prescriptions Disp Refills   • spironolactone (ALDACTONE) 25 MG tablet       Sig: Take 0.5 tablets by mouth Daily.   • memantine (NAMENDA) 10 MG tablet 180 tablet 1     Sig: Take 0.5 tablets by mouth 2 (Two) Times a Day.        Pharmacy where request should be sent:  [facility] #[id number] [place] [address] [address] [facility] AT [facility] [address] - [phone]  - [phone] FX        Additional details provided by patient: PATIENT IS NEEDING A REFILL AND THE DOSAGES UPDATED     Does the patient have less than a 3 day supply:  [] Yes  [x] No    Sondra Jesus Rep   06/01/22 11:02 EDT

## 2022-06-22 ENCOUNTER — TELEPHONE (OUTPATIENT)
Dept: FAMILY MEDICINE CLINIC | Facility: CLINIC | Age: 72
End: 2022-06-22

## 2022-06-22 NOTE — TELEPHONE ENCOUNTER
Caller: Tita Nguyen    Relationship: Emergency Contact    Best call back number: [phone]    What orders are you requesting (i.e. lab or imaging): ORDER FOR REPLACEMENT WHEELCHAIR     Where will you receive your lab/imaging services: PCP RECOMMENDATION     Additional notes: PT'S WHEELCHAIR IS BROKE AND NEEDS AN UPDATED ORDER TO GET A REPLACEMENT.       PT'S DAUGHTER ALSO WANTS INFORMATION ABOUT SHOWER AIDS. STATED THAT PT MUST STEP OVER INTO TUB AND IT IS TOO HIGH. PT'S DAUGHTER MUST HELP HER  AND PT HAS A VERY HARD TIME GETTING IN.     A message was left regarding script for wheelchair

## 2022-06-23 ENCOUNTER — TELEPHONE (OUTPATIENT)
Dept: FAMILY MEDICINE CLINIC | Facility: CLINIC | Age: 72
End: 2022-06-23

## 2022-06-23 NOTE — TELEPHONE ENCOUNTER
Caller: Tita Nguyen    Relationship: Emergency Contact    Best call back number: [phone]    What is the best time to reach you: ANY    Who are you requesting to speak with (clinical staff, provider,  specific staff member): CLINICAL    Do you know the name of the person who called: TITA PATIENTS DAUGHTER    What was the call regarding: PATIENTS DAUGHTER CALLED STATED THAT THE MESSAGE THAT WAS LEFT BY DR GRIJALVA WAS DELETED PRIOR TO LISTENING TO IT.    TITA WAS INQUIRING TO FIND OUT ABOUT THE WHEELCHAIR AND THE WALK IN TUB.  TITA ASKED IF A PRIOR AUTH WAS GOING TO BE NEEDED.    Do you require a callback: YES    I spoke with Tita.  I advised I could write an order for but it is no guarantee that insurance will pay for the building of a low step over shower.

## 2022-06-29 DIAGNOSIS — F41.9 ANXIETY AND DEPRESSION: ICD-10-CM

## 2022-06-29 DIAGNOSIS — F32.A ANXIETY AND DEPRESSION: ICD-10-CM

## 2022-06-29 RX ORDER — ESCITALOPRAM OXALATE 10 MG/1
TABLET ORAL
Qty: 30 TABLET | Refills: 4 | Status: SHIPPED | OUTPATIENT
Start: 2022-06-29 | End: 2023-01-16

## 2022-06-30 ENCOUNTER — OFFICE VISIT (OUTPATIENT)
Dept: FAMILY MEDICINE CLINIC | Facility: CLINIC | Age: 72
End: 2022-06-30

## 2022-06-30 VITALS
WEIGHT: 240 LBS | BODY MASS INDEX: 44.16 KG/M2 | HEIGHT: 62 IN | SYSTOLIC BLOOD PRESSURE: 130 MMHG | DIASTOLIC BLOOD PRESSURE: 80 MMHG | HEART RATE: 75 BPM | OXYGEN SATURATION: 94 %

## 2022-06-30 DIAGNOSIS — J44.9 CHRONIC OBSTRUCTIVE PULMONARY DISEASE, UNSPECIFIED COPD TYPE: ICD-10-CM

## 2022-06-30 DIAGNOSIS — J44.9 OSA AND COPD OVERLAP SYNDROME: ICD-10-CM

## 2022-06-30 DIAGNOSIS — I10 BENIGN ESSENTIAL HTN: Primary | ICD-10-CM

## 2022-06-30 DIAGNOSIS — E10.22 CKD STAGE 2 DUE TO TYPE 1 DIABETES MELLITUS: ICD-10-CM

## 2022-06-30 DIAGNOSIS — G47.33 OSA AND COPD OVERLAP SYNDROME: ICD-10-CM

## 2022-06-30 DIAGNOSIS — N18.2 CKD STAGE 2 DUE TO TYPE 1 DIABETES MELLITUS: ICD-10-CM

## 2022-06-30 DIAGNOSIS — Z79.4 TYPE 2 DIABETES MELLITUS WITHOUT COMPLICATION, WITH LONG-TERM CURRENT USE OF INSULIN: ICD-10-CM

## 2022-06-30 DIAGNOSIS — E11.9 TYPE 2 DIABETES MELLITUS WITHOUT COMPLICATION, WITH LONG-TERM CURRENT USE OF INSULIN: ICD-10-CM

## 2022-06-30 DIAGNOSIS — L85.8 CUTANEOUS HORN: ICD-10-CM

## 2022-06-30 PROCEDURE — 99213 OFFICE O/P EST LOW 20 MIN: CPT | Performed by: INTERNAL MEDICINE

## 2022-06-30 RX ORDER — INSULIN ASPART 100 [IU]/ML
20 INJECTION, SUSPENSION SUBCUTANEOUS 3 TIMES DAILY
COMMUNITY
Start: 2022-06-22 | End: 2023-06-22

## 2022-06-30 RX ORDER — GLIPIZIDE 5 MG/1
TABLET, FILM COATED, EXTENDED RELEASE ORAL
COMMUNITY
Start: 2022-06-22

## 2022-06-30 NOTE — PROGRESS NOTES
Subjective  Chief complaint is follow-up on blood pressure but also possible wheelchair  Mariangel Nguyen is a 71 y.o. female.     History of Present Illness Mariangel is here today for follow-up on her blood pressure.  She has multiple medical problems that make it difficult for her to get around including her chronic obstructive pulmonary disease and chronic congestive heart failure.  She does reportedly use a walker during the day.  She is having difficulty getting to and from appointments because she cannot walk long enough to get into the doctor's office.  They are looking for a wheelchair for this assistance.  She also has a spot on her arm that has been growing.  It is been present for some time.  Additionally she is not sleeping well at night with her dementia.  She may be doing a little bit of sundowning.  Her daughter does report that she sometimes gets angry and agitated.    The following portions of the patient's history were reviewed and updated as appropriate: allergies, current medications, past family history, past medical history, past social history, past surgical history and problem list.    Review of Systems   Respiratory: Positive for shortness of breath. Negative for chest tightness.    Cardiovascular: Negative for leg swelling.   Neurological: Positive for weakness.   Psychiatric/Behavioral: Positive for agitation.       Objective   Physical Exam  Vitals and nursing note reviewed.   Constitutional:       Appearance: Normal appearance.   Cardiovascular:      Rate and Rhythm: Normal rate and regular rhythm.   Pulmonary:      Effort: Pulmonary effort is normal.      Breath sounds: No wheezing, rhonchi or rales.   Musculoskeletal:      Right lower leg: No edema.      Left lower leg: No edema.   Skin:     Comments: There is a cutaneous horn on her left arm.   Neurological:      Mental Status: She is alert.   Psychiatric:         Mood and Affect: Mood normal.         Behavior: Behavior normal.            Assessment & Plan   Diagnoses and all orders for this visit:    1. Benign essential HTN (Primary)    2. Chronic obstructive pulmonary disease, unspecified COPD type (HCC)    3. KAELA and COPD overlap syndrome (HCC)    4. 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 Type 2 diabetes mellitus without complication, with long-term current use of insulin (HCC)    6. Cutaneous horn  -     Ambulatory Referral to Dermatology    Mariangel is here today for follow-up.  She has multiple medical problems.  She recently had her diabetes checked by her endocrinologist and her hemoglobin A1c was reportedly 7.0.  We are going to refer her to a dermatologist for the cutaneous horn.  No changes are going to be made for her blood pressure or COPD.  She would benefit from a wheelchair to assist her to and from appointments.

## 2022-07-01 ENCOUNTER — TELEPHONE (OUTPATIENT)
Dept: FAMILY MEDICINE CLINIC | Facility: CLINIC | Age: 72
End: 2022-07-01

## 2022-07-01 RX ORDER — TRAZODONE HYDROCHLORIDE 50 MG/1
50 TABLET ORAL NIGHTLY
Qty: 30 TABLET | Refills: 5 | Status: SHIPPED | OUTPATIENT
Start: 2022-07-01

## 2022-07-01 NOTE — TELEPHONE ENCOUNTER
Caller: Tita Nguyen    Relationship: Emergency Contact    Best call back number: [phone]    What medication are you requesting: TITA BELIEVES IT IS CALLED TRAZEDONE    What are your current symptoms: TO HELP GET HER SLEEPING CYCLE ON TRACK    If a prescription is needed, what is your preferred pharmacy and phone number: [facility] #[id number] - [place], [place] - [address] AT [facility] [address]  - [phone] [facility] [phone] FX     Additional notes: PATIENT AND DAUGHTER TALKED TO DR GRIJALVA ABOUT THIS MEDICATION YESTERDAY, THE PHARMACY HAS NOT RECEIVED ANYTHING YET. THEY WOULD LIKE TO  BEFORE THE END OF DAY TODAY.

## 2022-08-16 ENCOUNTER — APPOINTMENT (OUTPATIENT)
Dept: LAB | Facility: HOSPITAL | Age: 72
End: 2022-08-16

## 2022-08-23 ENCOUNTER — APPOINTMENT (OUTPATIENT)
Dept: LAB | Facility: HOSPITAL | Age: 72
End: 2022-08-23

## 2022-08-26 ENCOUNTER — TELEPHONE (OUTPATIENT)
Dept: FAMILY MEDICINE CLINIC | Facility: CLINIC | Age: 72
End: 2022-08-26

## 2022-08-26 ENCOUNTER — APPOINTMENT (OUTPATIENT)
Dept: LAB | Facility: HOSPITAL | Age: 72
End: 2022-08-26

## 2022-08-26 ENCOUNTER — TELEPHONE (OUTPATIENT)
Dept: ONCOLOGY | Facility: CLINIC | Age: 72
End: 2022-08-26

## 2022-08-26 NOTE — TELEPHONE ENCOUNTER
Phoned [facility] in [place], spoke with manager Tanisha. Gave verbal orders from Dr. Leone for labs to be drawn next week and results faxed to our office. Orders given for CBC, CMP, iron profile and ferritin. Tanisha r/v orders and stated the labs would be drawn on Tuesday during the next scheduled visit to patient's home.

## 2022-08-26 NOTE — TELEPHONE ENCOUNTER
Caller: NICOLE     Relationship: [facility]     Best call back number:[phone]    What orders are you requesting (i.e. lab or imaging): VERBAL ORDER OCCUPATIONAL THERAPY     In what timeframe would the patient need to come in: 1 VISIT NEXT WEEK-IN HOME    A verbal order was given for the above Occupational Therapy.

## 2022-08-26 NOTE — TELEPHONE ENCOUNTER
Returned call to patient's daughter. Informed her that Dr. Leone is agreeable to patient having labs drawn by home health and switching patient's appointment to a video visit. Patient's daughter v/u.

## 2022-08-26 NOTE — TELEPHONE ENCOUNTER
Caller: Tita Nguyen    Relationship: DAUGHTER    Best call back number: [phone]    What is the best time to reach you: ASAP    Who are you requesting to speak with (clinical staff, provider,  specific staff member): DR. WU'S NURSE    Do you know the name of the person who called: TITA    What was the call regarding: PATIENT HAS AN APPT. ON 9/2/2022 BUT JUST GOT HOME FROM [facility], TITA STATES SHE IS NOT ABLE TO GET PATIENT OUT OF THE HOUSE & THEY ARE HAVING [facility]ARE [facility]ERS COME IN, SHE IS NOT SURE WHAT TO DO ABOUT APPTS. WITH DR. WU, STATES REHAB WAS NO HELP & THAT PATIENT IS VERY WEAK.    Do you require a callback: YES, PLEASE CALL TO DISCUSS.

## 2022-09-01 NOTE — PROGRESS NOTES
Chief Complaint  Iron deficiency anemia, history of left lower extremity calf DVT with suspected pulmonary embolism and subsequent bilateral pulmonary emboli with cor pulmonale requiring thrombectomy 12/9/2021 (precipitated by COVID-19)    Subjective        History of Present Illness  Patient is evaluated today via video visit.  She is having difficulty with mobility following hospitalization 7/15 - 7/19/2022 at which time she experienced a fall with injury to her left knee.  She required stay in rehab for approximately 1 month.  She is now back at home with her daughter.  She notes continued limitation in her mobility which necessitated the video visit today and we did have labs drawn by [facility] on 8/29/2022 which we are reviewing today.  Unfortunately ferritin level was not performed as requested.  Patient does note that she continues with dyspnea on exertion, has oxygen chronically in place.  She continues on chronic anticoagulation with Eliquis 5 mg twice daily.  She reports that her medications have remained fairly stable.  She is taking Bumex currently twice daily with reported resolution of her lower extremity edema.  She does report some generalized weakness.  She has not noted any blood in her stool.  She continues on oral iron daily and reports tolerating this well, no significant constipation issues.    Objective   Vital Signs:   There were no vitals taken for this visit.    Physical Exam  Constitutional:       Comments: Patient has oxygen in place, no current dyspnea   Neurological:      Mental Status: She is alert.   Psychiatric:         Mood and Affect: Mood normal.         Behavior: Behavior normal.         Result Review : Reviewed CBC, CMP, iron panel from 8/29/2022.     Assessment and Plan      1. Iron deficiency anemia:  · The patient has had a long-standing degree of anemia.  Anemia appears to have worsened following her hospitalization for bowel obstruction and sepsis in late 2017.  Previous  hemoglobin in the 7-8 range during hospitalization in late 2017, subsequent increase into the 9-10 range in early 2018 with gradual decline into the 8-9 range.  · In 2018, the patient developed declining MCV which has gradually decreased to 63.5 2/6/19    · Patient had concurrent thrombocytosis with platelet count in the high 400-500,000 range, subsequently increased to 600,000 range. Related to underlying iron deficiency.  · Patient developed associated PICA symptom of ice craving.  · There was no visible evidence of GI blood loss however patient remains on chronic anticoagulation with Eliquis.  She had never undergone EGD or colonoscopy.  Stool cards negative for occult blood on 2/27/19.    · Anemia evaluation 2/6/19 at initial visit with hemoglobin 8.6, MCV 63.5, platelet count 694,000, iron 18, ferritin less than 5, iron saturation 3%, TIBC 584, B12 380, folate greater than 20, creatinine 0.74, ESR 30, REBECCA negative, haptoglobin 324, .  · In order to facilitate rapid correction of her iron deficiency for anticipated knee surgery, administered IV iron in the form of Injectafer 750mg on 2/12 and 2/19/19.  · EGD and colonoscopy 3/4/19 with no evidence of bleeding source, evidence of hiatal hernia, gastritis, benign colonic polyps.  · Pica symptoms resolved  · Labs on 2/27/2019 with ferritin 617, iron saturation 16%, hemoglobin 10.4  · Labs on 11/26/2019 with stable hemoglobin at 12.8.  Iron studies however showed iron 29, ferritin that declined from 151.9 down to 62.3, iron saturation down to 7% with TIBC 391.    · Labs 2/25/2020 showed improving hemoglobin up to 13.3.  Iron was 33, ferritin stable at 63.2, iron saturation 8%, TIBC 412.    · Labs 9/15/2020 showed stable hemoglobin at 12.9, iron studies fairly stable with iron 52, ferritin 48.6, iron saturation 11%, TIBC 473.  The patient reported that she had never really tried oral iron in the past. Initiated oral iron every other day on  9/24/2020.  · Patient was admitted 4/3-4/6/2021 at [facility] with report of heme positive stool in the emergency department and question of GI bleed however ultimately was found to have urinary tract infection.  Labs on 9/29/2021 showed hemoglobin of 12.6 and improvement in her iron studies with iron 46, ferritin 85.1, iron saturation 10%, TIBC 445.  She was tolerating oral iron well with no significant GI side effects.  We did discuss the issue of the heme positive stool from emergency department.  Given her underlying comorbidities she was not felt to be a good candidate for endoscopic evaluation.  · Labs on 9/29/2021 with hemoglobin 11.7, iron 43, ferritin 57.9, iron saturation 10%, TIBC 426.  The patient was to have been taking oral iron every other day however she noted that she often did not take the medication.  She was asked to resume oral iron and take daily beginning 10/5/2021.  · Patient with multiple intercurrent illnesses with hospitalization 11/9-11/13/2021 for CHF exacerbation and COVID-19 pneumonia.  Admission 12/9-12/20/2021 for pulmonary embolism with acute cor pulmonale requiring thrombectomy.  Hospitalization 1/12-1/18/2022 for acute kidney injury/dehydration.  · Labs on 2/1/2022 with hemoglobin 11.1 (improved from recent values in the 9-10 range during recent hospitalization) with iron studies that showed decline with iron 32, ferritin 49.9, iron saturation 7%, TIBC 130.  Resumed oral iron once daily (had not been taking).  · Patient is seen in video visit today with labs to review that were drawn by [facility] on 8/29/2022.  She is still having difficulty with her mobility following recent hospitalization 7/15 - 7/19/2022 and subsequent 1 month stay in rehab facility.  She is now back at home and continuing to recover.  She is continuing on oral iron daily which she tolerates well, no significant GI side effects.  Labs from 8/29/2022 showed hemoglobin of 11.7 which may be slightly low related  to her additional recent medical issues.  Iron was 29, iron saturation 18%, TIBC low at 158 (supportive of anemia of chronic disease).  Unfortunately a ferritin was not obtained and we will ask home health to draw this next week.  I did ask her to remain on her oral iron at this time.  I will notify her if the ferritin level changes recommendations at all next week.  Otherwise I will see her back in 6 months for follow-up with labs 1 week prior including CBC, iron panel, ferritin.  2. Thrombocytosis:  · As above, the patient had a platelet count in the high 400-500,000 range since late 2017.  This progressively increased to 809,000 on 2/12/19.  · Secondary to iron deficiency anemia  · Platelet count improved following IV iron administration  · Platelet count currently 369,000  3. Left lower extremity DVT (popliteal/calf) with suspected pulmonary embolism (in December 2017) and subsequent bilateral pulmonary embolism with cor pulmonale requiring thrombectomy 12/9/2021.:  · Patient was admitted in November/December 2017 with small bowel perforation requiring abdominal surgery and subsequent sepsis  · Lower extremity Doppler 12/1/17 with popliteal and calf DVT on the left  · V/Q scan 12/2/17 with intermediate probability for pulmonary embolism  · Patient was anticoagulated and has continued on treatment with Eliquis 5mg bid.  She is now over one year out from her thrombosis that was provoked.  · Patient reports that her daughter developed DVT and pulmonary embolism in her 30s and apparently has an IVC filter in place.  She is a poor historian, accuracy is unknown.  Unclear whether her daughter has been evaluated with hypercoagulable labs.  · Given the family history, we did proceed with hypercoagulable evaluation 2/6/19: Negative factor V Leiden mutation, negative prothrombin gene mutation, protein C activity 192%, protein S activity 127%, Antithrombin III greater than 140, lupus anticoagulant negative, anticardiolipin  antibody panel negative, anti-beta-2 GP 1 antibody panel negative.  · Hypercoagulable evaluation was negative.  Given the provoked nature of her previous thrombosis, and having been anticoagulated for over one year, on 2/27/19, recommended that she discontinue anticoagulation.  We did discuss signs and symptoms of recurrent DVT and pulmonary embolism and the need to seek immediate medical attention if these occur.  · Right total knee arthroplasty 4/8/2019.  Patient received Eliquis 5 mg twice daily postoperatively.  It was anticipated that she would only require brief treatment with Eliquis however her recovery from her knee replacement was prolonged. She did develop some pain and swelling in the right knee and underwent lower extremity Doppler on 6/20/2019 which was negative for DVT, showed some valvular incompetence.  With eventual improvement in mobility, Eliquis discontinued 8/6/2019.  · Patient hospitalized 12/9-12/20/2021 due to bilateral pulmonary emboli that required thrombectomy.  CT angiogram chest on 12/9/2021 showed extensive bilateral pulmonary emboli with right heart strain.  Lower extremity Dopplers on 12/10/2021 showed superficial thrombus but no DVT on the right, no evidence of DVT on the left.  Echo on 12/10/2021 showed ejection fraction 33%, dilated RV with systolic dysfunction, pulmonary hypertension (RVSP 45-50).  She had new onset atrial flutter as well.  Thrombosis was felt to be related to recent COVID-19 pneumonia.  Patient was anticoagulated with Eliquis which was continued at discharge.  · Given the recurrent and severe nature of thrombosis in addition to her limited functional status and underlying poor pulmonary reserve it is recommended that she remain on chronic anticoagulation.  · Patient returns continuing on Eliquis 5 mg twice daily.  She is tolerating this well.  She has had no clinical evidence of blood loss.  4. CHF  5. Bilateral lower extremity stasis  dermatitis  6. COPD  · Patient is currently back on oxygen continuously     Plan:     1. Patient will continue on oral iron once daily (patient's daughter is ensuring that she is taking iron daily).  2. We will have home health draw ferritin level next week and I will notify patient if there is any change in recommendations based on that result.  3. Patient continues on chronic anticoagulation with Eliquis 5 mg twice daily.  Anticipate long-term anticoagulation unless there is a significant contraindication in the future.  4. MD visit in 6 months.  Labs 1 week prior with CBC, CMP, iron panel, ferritin.     I did spend 9 minutes in video visit with patient today in face-to-face contact.  I did spend a total of 20 minutes in the patient's care today, additional time spent in review of records, coordination of care, placement of orders, completion of documentation.

## 2022-09-02 ENCOUNTER — TELEMEDICINE (OUTPATIENT)
Dept: ONCOLOGY | Facility: CLINIC | Age: 72
End: 2022-09-02

## 2022-09-02 ENCOUNTER — TELEPHONE (OUTPATIENT)
Dept: FAMILY MEDICINE CLINIC | Facility: CLINIC | Age: 72
End: 2022-09-02

## 2022-09-02 ENCOUNTER — APPOINTMENT (OUTPATIENT)
Dept: LAB | Facility: HOSPITAL | Age: 72
End: 2022-09-02

## 2022-09-02 DIAGNOSIS — D50.8 OTHER IRON DEFICIENCY ANEMIA: Primary | ICD-10-CM

## 2022-09-02 PROCEDURE — 99213 OFFICE O/P EST LOW 20 MIN: CPT | Performed by: INTERNAL MEDICINE

## 2022-09-02 NOTE — TELEPHONE ENCOUNTER
Caller: NICOLE     Relationship to patient: [facility]     Best call back number: [phone]    Patient is needing: WAS CALLING IN TO LET DR. GRIJALVA KNOW THAT THE PATIENT HAS BEEN DISCHARGED FROM OCCUPATIONAL THERAPY

## 2022-09-16 ENCOUNTER — TELEPHONE (OUTPATIENT)
Dept: ONCOLOGY | Facility: CLINIC | Age: 72
End: 2022-09-16

## 2022-09-16 DIAGNOSIS — D50.8 OTHER IRON DEFICIENCY ANEMIA: Primary | ICD-10-CM

## 2022-09-16 PROBLEM — K90.9 MALABSORPTION OF IRON: Status: ACTIVE | Noted: 2022-09-16

## 2022-09-16 NOTE — TELEPHONE ENCOUNTER
Spoke with patient's daughter Tita to inform her that patient's ferritin level has dropped to 16. Per Dr. Leone, oral iron is not effective, and we will pursue approval for IV iron. Also advised patient's daughter that we would like to re-check patient's labs in about 2 months and still plan for her to follow up with Dr. Leone in 6 months as previously discussed. Patient's daughter v/u. Supportive plan for IV iron entered and scheduling notified.

## 2022-09-21 ENCOUNTER — TELEPHONE (OUTPATIENT)
Dept: ONCOLOGY | Facility: CLINIC | Age: 72
End: 2022-09-21

## 2022-09-21 NOTE — TELEPHONE ENCOUNTER
Returned call to daughter with the information that  She will be starting her Iron Infusions on 9/26/2022.  She v/u.

## 2022-09-21 NOTE — PROGRESS NOTES
Patient has iron deficiency that is unresponsive to oral iron and therefore as underlying malabsorption.  Therefore we are going to pursue treatment with IV iron.

## 2022-09-21 NOTE — TELEPHONE ENCOUNTER
Caller: Tita Nguyen    Relationship: Emergency Contact    Best call back number: [phone]    What is the best time to reach you: [phone]    Who are you requesting to speak with (clinical staff, provider,  specific staff member): DOCTOR, NURSE     What was the call regarding: TITA CALLED ASKING WHEN WILL DR. WU JONNATHAN THE IRON INFUSIONS FOR JORGE AS SHE IS REALLY WEAK?    CALL TO DISCUSS    Do you require a callback: YES

## 2022-09-26 ENCOUNTER — INFUSION (OUTPATIENT)
Dept: ONCOLOGY | Facility: HOSPITAL | Age: 72
End: 2022-09-26

## 2022-09-26 VITALS
RESPIRATION RATE: 18 BRPM | TEMPERATURE: 97.7 F | WEIGHT: 242 LBS | OXYGEN SATURATION: 92 % | BODY MASS INDEX: 44.25 KG/M2 | SYSTOLIC BLOOD PRESSURE: 121 MMHG | HEART RATE: 61 BPM | DIASTOLIC BLOOD PRESSURE: 81 MMHG

## 2022-09-26 DIAGNOSIS — K90.9 MALABSORPTION OF IRON: Primary | ICD-10-CM

## 2022-09-26 DIAGNOSIS — D50.9 IRON DEFICIENCY ANEMIA, UNSPECIFIED IRON DEFICIENCY ANEMIA TYPE: ICD-10-CM

## 2022-09-26 PROCEDURE — 63710000001 DIPHENHYDRAMINE PER 50 MG: Performed by: INTERNAL MEDICINE

## 2022-09-26 PROCEDURE — 96365 THER/PROPH/DIAG IV INF INIT: CPT

## 2022-09-26 PROCEDURE — 96366 THER/PROPH/DIAG IV INF ADDON: CPT

## 2022-09-26 PROCEDURE — 25010000002 IRON SUCROSE PER 1 MG: Performed by: INTERNAL MEDICINE

## 2022-09-26 RX ORDER — DIPHENHYDRAMINE HCL 25 MG
25 CAPSULE ORAL ONCE
Status: COMPLETED | OUTPATIENT
Start: 2022-09-26 | End: 2022-09-26

## 2022-09-26 RX ORDER — SODIUM CHLORIDE 9 MG/ML
250 INJECTION, SOLUTION INTRAVENOUS ONCE
Status: COMPLETED | OUTPATIENT
Start: 2022-09-26 | End: 2022-09-26

## 2022-09-26 RX ORDER — ACETAMINOPHEN 325 MG/1
650 TABLET ORAL ONCE
Status: COMPLETED | OUTPATIENT
Start: 2022-09-26 | End: 2022-09-26

## 2022-09-26 RX ADMIN — DIPHENHYDRAMINE HYDROCHLORIDE 25 MG: 25 CAPSULE ORAL at 13:25

## 2022-09-26 RX ADMIN — SODIUM CHLORIDE 250 ML: 9 INJECTION, SOLUTION INTRAVENOUS at 14:03

## 2022-09-26 RX ADMIN — ACETAMINOPHEN 650 MG: 325 TABLET, FILM COATED ORAL at 13:25

## 2022-09-26 RX ADMIN — IRON SUCROSE 300 MG: 20 INJECTION, SOLUTION INTRAVENOUS at 14:04

## 2022-10-03 ENCOUNTER — INFUSION (OUTPATIENT)
Dept: ONCOLOGY | Facility: HOSPITAL | Age: 72
End: 2022-10-03

## 2022-10-03 VITALS
TEMPERATURE: 97.8 F | RESPIRATION RATE: 18 BRPM | BODY MASS INDEX: 44.25 KG/M2 | SYSTOLIC BLOOD PRESSURE: 114 MMHG | DIASTOLIC BLOOD PRESSURE: 66 MMHG | WEIGHT: 242 LBS | OXYGEN SATURATION: 92 % | HEART RATE: 63 BPM

## 2022-10-03 DIAGNOSIS — K90.9 MALABSORPTION OF IRON: Primary | ICD-10-CM

## 2022-10-03 DIAGNOSIS — D50.9 IRON DEFICIENCY ANEMIA, UNSPECIFIED IRON DEFICIENCY ANEMIA TYPE: ICD-10-CM

## 2022-10-03 PROCEDURE — 96366 THER/PROPH/DIAG IV INF ADDON: CPT

## 2022-10-03 PROCEDURE — 96365 THER/PROPH/DIAG IV INF INIT: CPT

## 2022-10-03 PROCEDURE — 63710000001 DIPHENHYDRAMINE PER 50 MG: Performed by: INTERNAL MEDICINE

## 2022-10-03 PROCEDURE — 25010000002 IRON SUCROSE PER 1 MG: Performed by: INTERNAL MEDICINE

## 2022-10-03 RX ORDER — SODIUM CHLORIDE 9 MG/ML
250 INJECTION, SOLUTION INTRAVENOUS ONCE
Status: COMPLETED | OUTPATIENT
Start: 2022-10-03 | End: 2022-10-03

## 2022-10-03 RX ORDER — DIPHENHYDRAMINE HCL 25 MG
25 CAPSULE ORAL ONCE
Status: COMPLETED | OUTPATIENT
Start: 2022-10-03 | End: 2022-10-03

## 2022-10-03 RX ORDER — ACETAMINOPHEN 325 MG/1
650 TABLET ORAL ONCE
Status: COMPLETED | OUTPATIENT
Start: 2022-10-03 | End: 2022-10-03

## 2022-10-03 RX ADMIN — ACETAMINOPHEN 650 MG: 325 TABLET, FILM COATED ORAL at 13:20

## 2022-10-03 RX ADMIN — SODIUM CHLORIDE 250 ML: 9 INJECTION, SOLUTION INTRAVENOUS at 14:04

## 2022-10-03 RX ADMIN — IRON SUCROSE 300 MG: 20 INJECTION, SOLUTION INTRAVENOUS at 14:05

## 2022-10-03 RX ADMIN — DIPHENHYDRAMINE HYDROCHLORIDE 25 MG: 25 CAPSULE ORAL at 13:20

## 2022-10-04 ENCOUNTER — TELEPHONE (OUTPATIENT)
Dept: FAMILY MEDICINE CLINIC | Facility: CLINIC | Age: 72
End: 2022-10-04

## 2022-10-04 NOTE — TELEPHONE ENCOUNTER
Provider: AJAY GRIJALVA     Caller: ROMAN     Relationship to Patient: [facility]     Phone Number: [phone]    Reason for Call: LETTING DR. GRIJALVA KNOW THIS PATIENT WAS SEEN IN THE EMERGENCY ROOM ON [place] 10.02.22 DUE TO THE PATIENT HAVING A FALL. PATIENT RECEIVED CAT SCAN AND WAS DIAGNOSED WITH AN ACUTE SINUS INFECTION. PATIENT HAS BEEN PRESCRIBED ANTIBIOTICS TO TAKE FOR 5 DAYS.

## 2022-10-06 RX ORDER — ACETAMINOPHEN 325 MG/1
650 TABLET ORAL ONCE
Status: CANCELLED | OUTPATIENT
Start: 2022-10-17

## 2022-10-06 RX ORDER — SODIUM CHLORIDE 9 MG/ML
250 INJECTION, SOLUTION INTRAVENOUS ONCE
Status: CANCELLED | OUTPATIENT
Start: 2022-10-17

## 2022-10-06 RX ORDER — DIPHENHYDRAMINE HCL 25 MG
25 CAPSULE ORAL ONCE
Status: CANCELLED | OUTPATIENT
Start: 2022-10-17

## 2022-10-10 ENCOUNTER — APPOINTMENT (OUTPATIENT)
Dept: ONCOLOGY | Facility: HOSPITAL | Age: 72
End: 2022-10-10

## 2022-10-10 ENCOUNTER — TELEPHONE (OUTPATIENT)
Dept: FAMILY MEDICINE CLINIC | Facility: CLINIC | Age: 72
End: 2022-10-10

## 2022-10-10 NOTE — TELEPHONE ENCOUNTER
Caller: ROMAN    Relationship: [facility]    Best call back number: [phone]    What was the call regarding: PATIENT FELL AGAIN LAST NIGHT, EMS CHECKED HER BUT SHE DID NOT GO TO ER. [facility] CHECKED HER TODAY AS WELL, SHE SEEMED OKAY.     Do you require a callback: YES

## 2022-10-17 ENCOUNTER — INFUSION (OUTPATIENT)
Dept: ONCOLOGY | Facility: HOSPITAL | Age: 72
End: 2022-10-17

## 2022-10-17 VITALS
RESPIRATION RATE: 16 BRPM | HEART RATE: 72 BPM | DIASTOLIC BLOOD PRESSURE: 88 MMHG | TEMPERATURE: 97.8 F | SYSTOLIC BLOOD PRESSURE: 158 MMHG | OXYGEN SATURATION: 92 % | BODY MASS INDEX: 44.25 KG/M2 | WEIGHT: 242 LBS

## 2022-10-17 DIAGNOSIS — K90.9 MALABSORPTION OF IRON: Primary | ICD-10-CM

## 2022-10-17 DIAGNOSIS — D50.9 IRON DEFICIENCY ANEMIA, UNSPECIFIED IRON DEFICIENCY ANEMIA TYPE: ICD-10-CM

## 2022-10-17 PROCEDURE — 96365 THER/PROPH/DIAG IV INF INIT: CPT

## 2022-10-17 PROCEDURE — 96366 THER/PROPH/DIAG IV INF ADDON: CPT

## 2022-10-17 PROCEDURE — 25010000002 IRON SUCROSE PER 1 MG: Performed by: INTERNAL MEDICINE

## 2022-10-17 PROCEDURE — 63710000001 DIPHENHYDRAMINE PER 50 MG: Performed by: INTERNAL MEDICINE

## 2022-10-17 RX ORDER — ACETAMINOPHEN 325 MG/1
650 TABLET ORAL ONCE
Status: COMPLETED | OUTPATIENT
Start: 2022-10-17 | End: 2022-10-17

## 2022-10-17 RX ORDER — SODIUM CHLORIDE 9 MG/ML
250 INJECTION, SOLUTION INTRAVENOUS ONCE
Status: COMPLETED | OUTPATIENT
Start: 2022-10-17 | End: 2022-10-17

## 2022-10-17 RX ORDER — DIPHENHYDRAMINE HCL 25 MG
25 CAPSULE ORAL ONCE
Status: COMPLETED | OUTPATIENT
Start: 2022-10-17 | End: 2022-10-17

## 2022-10-17 RX ADMIN — ACETAMINOPHEN 650 MG: 325 TABLET, FILM COATED ORAL at 13:28

## 2022-10-17 RX ADMIN — DIPHENHYDRAMINE HYDROCHLORIDE 25 MG: 25 CAPSULE ORAL at 13:28

## 2022-10-17 RX ADMIN — IRON SUCROSE 300 MG: 20 INJECTION, SOLUTION INTRAVENOUS at 14:02

## 2022-10-17 RX ADMIN — SODIUM CHLORIDE 250 ML: 9 INJECTION, SOLUTION INTRAVENOUS at 14:00

## 2022-10-18 ENCOUNTER — TELEPHONE (OUTPATIENT)
Dept: FAMILY MEDICINE CLINIC | Facility: CLINIC | Age: 72
End: 2022-10-18

## 2022-10-18 NOTE — TELEPHONE ENCOUNTER
Caller: ROMAN    Relationship: [facility]    Best call back number: [phone]    What orders are you requesting (i.e. lab or imaging): VERBAL ORDERS    Additional notes: ROMAN FROM [facility] IS REQUESTING ADDITIONAL VERBAL ORDERS FOR NURSING VISITS ONCE A WEEK FOR 8 WEEKS AS SHE FEELS THE PATIENT IS NOT YET READY TO BE DISCHARGED.

## 2022-10-24 ENCOUNTER — INFUSION (OUTPATIENT)
Dept: ONCOLOGY | Facility: HOSPITAL | Age: 72
End: 2022-10-24

## 2022-10-24 VITALS
HEART RATE: 65 BPM | DIASTOLIC BLOOD PRESSURE: 90 MMHG | TEMPERATURE: 98.2 F | BODY MASS INDEX: 44.25 KG/M2 | SYSTOLIC BLOOD PRESSURE: 159 MMHG | WEIGHT: 242 LBS | OXYGEN SATURATION: 97 % | RESPIRATION RATE: 18 BRPM

## 2022-10-24 DIAGNOSIS — D50.9 IRON DEFICIENCY ANEMIA, UNSPECIFIED IRON DEFICIENCY ANEMIA TYPE: ICD-10-CM

## 2022-10-24 DIAGNOSIS — K90.9 MALABSORPTION OF IRON: Primary | ICD-10-CM

## 2022-10-24 PROCEDURE — 96365 THER/PROPH/DIAG IV INF INIT: CPT

## 2022-10-24 PROCEDURE — 63710000001 DIPHENHYDRAMINE PER 50 MG: Performed by: INTERNAL MEDICINE

## 2022-10-24 PROCEDURE — 25010000002 IRON SUCROSE PER 1 MG: Performed by: INTERNAL MEDICINE

## 2022-10-24 PROCEDURE — 96366 THER/PROPH/DIAG IV INF ADDON: CPT

## 2022-10-24 RX ORDER — SODIUM CHLORIDE 9 MG/ML
250 INJECTION, SOLUTION INTRAVENOUS ONCE
Status: COMPLETED | OUTPATIENT
Start: 2022-10-24 | End: 2022-10-24

## 2022-10-24 RX ORDER — DIPHENHYDRAMINE HCL 25 MG
25 CAPSULE ORAL ONCE
Status: COMPLETED | OUTPATIENT
Start: 2022-10-24 | End: 2022-10-24

## 2022-10-24 RX ORDER — ACETAMINOPHEN 325 MG/1
650 TABLET ORAL ONCE
Status: COMPLETED | OUTPATIENT
Start: 2022-10-24 | End: 2022-10-24

## 2022-10-24 RX ADMIN — ACETAMINOPHEN 650 MG: 325 TABLET, FILM COATED ORAL at 13:08

## 2022-10-24 RX ADMIN — DIPHENHYDRAMINE HYDROCHLORIDE 25 MG: 25 CAPSULE ORAL at 13:08

## 2022-10-24 RX ADMIN — SODIUM CHLORIDE 250 ML: 9 INJECTION, SOLUTION INTRAVENOUS at 13:17

## 2022-10-24 RX ADMIN — IRON SUCROSE 300 MG: 20 INJECTION, SOLUTION INTRAVENOUS at 13:37

## 2022-11-15 ENCOUNTER — TELEPHONE (OUTPATIENT)
Dept: FAMILY MEDICINE CLINIC | Facility: CLINIC | Age: 72
End: 2022-11-15

## 2022-11-15 NOTE — TELEPHONE ENCOUNTER
Caller: ROMAN    Relationship: Home Health    Best call back number: [phone]    What orders are you requesting (i.e. lab or imaging): VERBAL HOME HEALTH ORDERS FOR EXTENDED HOME HEALTH VISITS    ONE MORE TIME FOR THIS WEEK AND ONE VISIT A WEEK FOR FOUR MORE WEEKS     In what timeframe would the patient need to come in: ASAP    Where will you receive your lab/imaging services: [facility]    Additional notes: ROMAN WITH [facility] CALLED AND SAID THE PATIENT WAS DUE TO BE DISCHARGED THIS WEEK FROM HOME HEALTH BUT HAS HAD A BIT OF A BACKSET AFTER EXPERIENCING A URINARY TRACT INFECTION AND THE HOME HEALTH NURSE WOULD LIKE ORDERS TO SEE HER AN ADDITIONAL TIME THIS WEEK, AND THEN TO SEE HER ONCE A WEEK FOR THE NEXT FOUR WEEKS

## 2023-01-14 DIAGNOSIS — F32.A ANXIETY AND DEPRESSION: ICD-10-CM

## 2023-01-14 DIAGNOSIS — F41.9 ANXIETY AND DEPRESSION: ICD-10-CM

## 2023-01-16 RX ORDER — ESCITALOPRAM OXALATE 10 MG/1
TABLET ORAL
Qty: 30 TABLET | Refills: 4 | Status: SHIPPED | OUTPATIENT
Start: 2023-01-16

## 2023-02-17 ENCOUNTER — TELEPHONE (OUTPATIENT)
Dept: ONCOLOGY | Facility: CLINIC | Age: 73
End: 2023-02-17
Payer: MEDICARE

## 2023-02-17 NOTE — TELEPHONE ENCOUNTER
Called to see if pt wanted to r/s appt she cancelled with Dr. Leone - [facility] ms with our telephone number to call back to r/s

## 2024-01-17 NOTE — ED PROVIDER NOTES
" EMERGENCY DEPARTMENT ENCOUNTER    CHIEF COMPLAINT  Chief Complaint: right flank pain  History given by: patient  History limited by: none  Room Number: 36/36  PMD: Waqar Burton MD     HPI:  Pt is a 67 y.o. female who presents complaining of right flank pain and believes she's has a kidney stone. The pain worsened this morning at around 0600. She is scheduled to have vascular recovery surgery on the 23rd of this month. Pt denies any pain or burning with urination or blood in urine. Denies fever or chills. Pt has a previous lithotripsy for kidney stones.     Duration:  One day  Onset: sudden  Timing: constant  Location: right flank  Radiation: none  Quality: \"pain\"  Intensity/Severity: moderate  Progression: worsening  Associated Symptoms: none  Aggravating Factors: none  Alleviating Factors: none  Previous Episodes: pt has hx of nephro  Treatment before arrival: Pt reports taking a \"pain pill\" this morning.     PAST MEDICAL HISTORY  Active Ambulatory Problems     Diagnosis Date Noted   • Diabetes mellitus 01/29/2016   • Edema 01/29/2016   • Hyperlipidemia 01/29/2016   • Hypertension 01/29/2016   • Rotator cuff tear arthropathy 01/29/2016   • Venous stasis dermatitis 01/29/2016   • Varicose veins 01/29/2016     Resolved Ambulatory Problems     Diagnosis Date Noted   • No Resolved Ambulatory Problems     Past Medical History:   Diagnosis Date   • Diabetes mellitus    • Hyperlipidemia    • Hypertension        PAST SURGICAL HISTORY  Past Surgical History:   Procedure Laterality Date   • BLADDER SURGERY      BLADDER LIFT, 2011   • COLON SURGERY      RECONSTRUCTION, 2001   • HYSTERECTOMY      2001       FAMILY HISTORY  Family History   Problem Relation Age of Onset   • Cancer Other    • Stroke Other    • Other Other      LUPUS ANTICOAGULANT   • Rheum arthritis Other        SOCIAL HISTORY  Social History     Social History   • Marital status: Single     Spouse name: N/A   • Number of children: N/A   • Years of " education: N/A     Occupational History   • Not on file.     Social History Main Topics   • Smoking status: Never Smoker   • Smokeless tobacco: Not on file   • Alcohol use Not on file   • Drug use: Not on file   • Sexual activity: Not on file     Other Topics Concern   • Not on file     Social History Narrative   • No narrative on file       ALLERGIES  Review of patient's allergies indicates no known allergies.    REVIEW OF SYSTEMS  Review of Systems   Constitutional: Negative for fever.   HENT: Negative for sore throat.    Eyes: Negative.    Respiratory: Negative for cough and shortness of breath.    Cardiovascular: Negative for chest pain.   Gastrointestinal: Negative for abdominal pain, diarrhea and vomiting.   Genitourinary: Negative for difficulty urinating, dyspareunia, dysuria and hematuria.   Musculoskeletal: Negative for neck pain.        Right flank pain   Skin: Negative for rash.   Allergic/Immunologic: Negative.    Neurological: Negative for weakness, numbness and headaches.   Hematological: Negative.    Psychiatric/Behavioral: Negative.    All other systems reviewed and are negative.      PHYSICAL EXAM  ED Triage Vitals   Temp Heart Rate Resp BP SpO2   10/12/17 1600 10/12/17 1600 10/12/17 1600 10/12/17 1606 10/12/17 1600   98.4 °F (36.9 °C) 83 18 204/114 91 %      Temp src Heart Rate Source Patient Position BP Location FiO2 (%)   -- 10/12/17 1809 10/12/17 1606 10/12/17 1606 --    Monitor Sitting Left arm        Physical Exam   Constitutional: She is oriented to person, place, and time and well-developed, well-nourished, and in no distress. No distress.   HENT:   Head: Normocephalic and atraumatic.   Eyes: EOM are normal. Pupils are equal, round, and reactive to light.   Neck: Normal range of motion. Neck supple.   Cardiovascular: Normal rate, regular rhythm and normal heart sounds.    Pulmonary/Chest: Effort normal and breath sounds normal. No respiratory distress.   Abdominal: Soft. There is tenderness  (mild right sided). There is CVA tenderness (right). There is no rebound and no guarding.   Musculoskeletal: Normal range of motion. She exhibits no edema.   Neurological: She is alert and oriented to person, place, and time. She has normal sensation and normal strength.   Skin: Skin is warm and dry. No rash noted.   Psychiatric: Mood and affect normal.   Nursing note and vitals reviewed.      LAB RESULTS  Lab Results (last 24 hours)     Procedure Component Value Units Date/Time    Urinalysis With / Culture If Indicated - Urine, Clean Catch [[id number]]  (Abnormal) Collected:  10/12/17 1615    Specimen:  Urine from Urine, Clean Catch Updated:  10/12/17 1701     Color, UA Yellow     Appearance, UA Cloudy (A)     pH, UA 6.0     Specific Gravity, UA 1.023     Glucose, UA Negative     Ketones, UA Negative     Bilirubin, UA Negative     Blood, UA Large (3+) (A)     Protein, UA >=300 mg/dL (3+) (A)     Leuk Esterase, UA Large (3+) (A)     Nitrite, UA Negative     Urobilinogen, UA 1.0 E.U./dL    Urinalysis, Microscopic Only - Urine, Clean Catch [[id number]]  (Abnormal) Collected:  10/12/17 1615    Specimen:  Urine from Urine, Clean Catch Updated:  10/12/17 1703     RBC, UA Too Numerous to Count (A) /HPF      WBC, UA Too Numerous to Count (A) /HPF      Bacteria, UA 4+ (A) /HPF      Squamous Epithelial Cells, UA 3-6 (A) /HPF      Hyaline Casts, UA 0-2 /LPF      Methodology Automated Microscopy    Urine Culture - Urine, Urine, Clean Catch [[id number]] Collected:  10/12/17 1615    Specimen:  Urine from Urine, Clean Catch Updated:  10/12/17 1643    CBC & Differential [[id number]] Collected:  10/12/17 1617    Specimen:  Blood Updated:  10/12/17 1648    Narrative:       The following orders were created for panel order CBC & Differential.  Procedure                               Abnormality         Status                     ---------                               -----------         ------                     CBC Auto  Differential[[id number]]        Abnormal            Final result                 Please view results for these tests on the individual orders.    Comprehensive Metabolic Panel [[id number]]  (Abnormal) Collected:  10/12/17 1617    Specimen:  Blood Updated:  10/12/17 1705     Glucose 114 (H) mg/dL      BUN 19 mg/dL      Creatinine 0.83 mg/dL      Sodium 142 mmol/L      Potassium 4.0 mmol/L      Chloride 101 mmol/L      CO2 28.6 mmol/L      Calcium 10.0 mg/dL      Total Protein 8.1 g/dL      Albumin 4.40 g/dL      ALT (SGPT) 21 U/L      AST (SGOT) 14 U/L      Alkaline Phosphatase 90 U/L      Total Bilirubin 0.2 mg/dL      eGFR Non African Amer 69 mL/min/1.73      Globulin 3.7 gm/dL      A/G Ratio 1.2 g/dL      BUN/Creatinine Ratio 22.9     Anion Gap 12.4 mmol/L     Lipase [[id number]]  (Normal) Collected:  10/12/17 1617    Specimen:  Blood Updated:  10/12/17 1705     Lipase 30 U/L     CBC Auto Differential [[id number]]  (Abnormal) Collected:  10/12/17 1617    Specimen:  Blood Updated:  10/12/17 1648     WBC 9.57 10*3/mm3      RBC 4.72 10*6/mm3      Hemoglobin 12.9 g/dL      Hematocrit 42.3 %      MCV 89.6 fL      MCH 27.3 pg      MCHC 30.5 (L) g/dL      RDW 14.8 (H) %      RDW-SD 48.5 fl      MPV 9.6 fL      Platelets 431 10*3/mm3      Neutrophil % 63.7 %      Lymphocyte % 23.7 %      Monocyte % 8.2 %      Eosinophil % 2.8 %      Basophil % 0.6 %      Immature Grans % 1.0 (H) %      Neutrophils, Absolute 6.09 10*3/mm3      Lymphocytes, Absolute 2.27 10*3/mm3      Monocytes, Absolute 0.78 10*3/mm3      Eosinophils, Absolute 0.27 10*3/mm3      Basophils, Absolute 0.06 10*3/mm3      Immature Grans, Absolute 0.10 (H) 10*3/mm3      nRBC 0.2 (H) /100 WBC           I ordered the above labs and reviewed the results    RADIOLOGY  CT Abdomen Pelvis Without Contrast   Final Result           1. Bilateral nephrolithiasis, no hydronephrosis. Right ureteral stent in   place.       2. Periumbilical hernia containing small bowel without  evidence for   obstruction at this time. Colonic diverticulosis. No acute inflammatory   process of bowel is identified. Follow up as indications persist.       3. A borderline prominent retroperitoneal lymph nodes, nonspecific,   suggest clinical correlation and follow-up.           Discussed by telephone with Dr. Bhakta at time of interpretation, 1910,   10/02/2017.               This report was finalized on 10/12/2017 7:14 PM by Dr. Anjel Garcia MD.               I ordered the above noted radiological studies. Interpreted by radiologist. Reviewed by me in PACS.       PROCEDURES  Procedures      PROGRESS AND CONSULTS  ED Course   1835  CT abd/pelvis ordered. Rocephin ordered for infection. IVF ordered for hydration.    1912  Call placed to urology.    1914  BP- 155/100 HR- 61 Temp- 98.4 °F (36.9 °C) O2 sat- 92%  Rechecked the patient who is in NAD and is resting comfortably. Discussed imaging and labs UA showing RBC and WBC in the urine and imaging showing no signs of hydronephrosis. Pt was made aware of the plan to discuss the pt's results with urology.     1950  Consulted Dr. Montez, urology, and he advised to continue to give pt rocephin and for her to follow up with her urologist tomorrow.     1956  BP- 155/100 HR- 61 Temp- 98.4 °F (36.9 °C) O2 sat- 92%  Rechecked the patient who is in NAD and is resting comfortably. Discussed with the pt the plan for treatment and follow up with her urologist tomorrow. Pt denies needing any further pain medications. Pt understands and agrees with the plan, all questions answered.    MEDICAL DECISION MAKING  Results were reviewed/discussed with the patient and they were also made aware of online access. Pt also made aware that some labs, such as cultures, will not be resulted during ER visit and follow up with PMD is necessary.     MDM  Number of Diagnoses or Management Options     Amount and/or Complexity of Data Reviewed  Clinical lab tests: reviewed (UA-RBC too  numerous to count WBC too numerous to count, Creatinine 0.83, BUN 19, WBC 9.57)  Tests in the radiology section of CPT®: reviewed (Stent in place. Non obstructing renal stones on the right no evidence for hydronephrosis and a normal appendix. )  Discussion of test results with the performing providers: yes (Dr. Garcia, radiology)  Independent visualization of images, tracings, or specimens: yes           DIAGNOSIS  Final diagnoses:   Kidney stones   Urinary tract infection in female       DISPOSITION  DISCHARGE    Patient discharged in stable condition.    Reviewed implications of results, diagnosis, meds, responsibility to follow up, warning signs and symptoms of possible worsening, potential complications and reasons to return to ER.    Patient/Family voiced understanding of above instructions.    Discussed plan for discharge, as there is no emergent indication for admission.  Pt/family is agreeable and understands need for follow up and repeat testing.  Pt is aware that discharge does not mean that nothing is wrong but it indicates no emergency is present that requires admission and they must continue care with follow-up as given below or physician of their choice.     FOLLOW-UP  Quinn Cason MD  [address] [place] [address] [place] [id number] [phone]      tomorrow         Medication List      New Prescriptions          sulfamethoxazole-trimethoprim 800-160 MG per tablet   Commonly known as:  BACTRIM DS,SEPTRA DS   Take 1 tablet by mouth 2 (Two) Times a Day.         Stop          diclofenac 50 MG EC tablet   Commonly known as:  VOLTAREN               Latest Documented Vital Signs:  As of 8:25 PM  BP- (!) 178/102 HR- 63 Temp- 98 °F (36.7 °C) (Oral) O2 sat- 94%    --  Documentation assistance provided by rob Tidwell for Raheem Bhakta PA-C.  Information recorded by the rob was done at my direction and has been verified and validated by me.     Pretty Tidwell  10/12/17 2001       Raheem HDEZ  PUMA Bhakta  10/12/17 2025     Home

## (undated) DEVICE — BNDG ELAS ELITE V/CLOSE 6IN 5YD LF STRL

## (undated) DEVICE — LOU MINOR PROCEDURE: Brand: MEDLINE INDUSTRIES, INC.

## (undated) DEVICE — PK KN TOTL 40

## (undated) DEVICE — Device: Brand: DEFENDO AIR/WATER/SUCTION AND BIOPSY VALVE

## (undated) DEVICE — ADHS SKIN DERMABOND TOP ADVANCED

## (undated) DEVICE — FIBR LASR HOLMIUM ACCUMAX 200 1PT USE

## (undated) DEVICE — SUCTION CANISTER, 3000CC,SAFELINER: Brand: DEROYAL

## (undated) DEVICE — SUT MNCRYL 3/0 PS2 18IN MCP497G

## (undated) DEVICE — FRCP BX RADJAW4 NDL 2.8 240CM LG OG BX40

## (undated) DEVICE — MEDI-VAC YANK SUCT HNDL W/TPRD BULBOUS TIP: Brand: CARDINAL HEALTH

## (undated) DEVICE — SOL ISO/ALC RUB 70PCT 4OZ

## (undated) DEVICE — 2108 SERIES SAGITTAL BLADE (19.5 X 1.27 X 81.0MM)

## (undated) DEVICE — JACKT LAB KNIT COLR LG BLU

## (undated) DEVICE — NDL HYPO PRECISIONGLIDE REG 25G 1 1/2

## (undated) DEVICE — TRY SKINPREP DRYPREP

## (undated) DEVICE — SHEATH URETRL ACC NAVIGATOR 13/15F 36CM 5PK

## (undated) DEVICE — RECIPROCATING BLADE, DOUBLE SIDED, OFFSET  (70.0 X 0.64 X 12.6MM)

## (undated) DEVICE — GLV SURG PREMIERPRO ORTHO LTX PF SZ7.5 BRN

## (undated) DEVICE — GLV SURG BIOGEL LTX PF 8

## (undated) DEVICE — APPL CHLORAPREP W/TINT 26ML ORNG

## (undated) DEVICE — SYR LUERLOK 50ML

## (undated) DEVICE — BW-412T DISP COMBO CLEANING BRUSH: Brand: SINGLE USE COMBINATION CLEANING BRUSH

## (undated) DEVICE — 3M™ IOBAN™ 2 ANTIMICROBIAL INCISE DRAPE 6650EZ: Brand: IOBAN™ 2

## (undated) DEVICE — PIN DRL NOHEAD TROC 3.2X75MM BX/4

## (undated) DEVICE — VIAL FORMALIN CAP 10P 40ML

## (undated) DEVICE — OPTIFOAM GENTLE SA, POSTOP, 4X12: Brand: MEDLINE

## (undated) DEVICE — NDL SPINE 18G 31/2IN PNK

## (undated) DEVICE — DRAPE,U/ SHT,SPLIT,PLAS,STERIL: Brand: MEDLINE

## (undated) DEVICE — SUCTION CANISTER, 1000CC,SAFELINER: Brand: DEROYAL

## (undated) DEVICE — THE BITE BLOCK MAXI, LATEX FREE STRAP IS USED TO PROTECT THE ENDOSCOPE INSERTION TUBE FROM BEING BITTEN BY THE PATIENT.

## (undated) DEVICE — TRAP FLD MINIVAC MEGADYNE 100ML

## (undated) DEVICE — GOWN,NON-REINFORCED,SIRUS,SET IN SLV,XL: Brand: MEDLINE

## (undated) DEVICE — GLV SURG SENSICARE W/ALOE PF LF 8 STRL

## (undated) DEVICE — CATH URETRL FLXITP POLLACK STD 5F 70CM

## (undated) DEVICE — STRAP STIRUP WO/ RNG

## (undated) DEVICE — SPNG GZ WOVN 4X4IN 12PLY 10/BX STRL

## (undated) DEVICE — Device

## (undated) DEVICE — GOWN ISOL W/THUMB UNIV BLU BX/15

## (undated) DEVICE — PREP SOL POVIDONE/IODINE BT 4OZ

## (undated) DEVICE — SUT ETHLN 3/0 PS1 18IN 1663H

## (undated) DEVICE — APPL CHLORAPREP HI/LITE 26ML ORNG

## (undated) DEVICE — PRT BIOP SEALS

## (undated) DEVICE — TIDISHIELD UROLOGY DRAIN BAGS FROSTY VINYL STERILE FITS SIEMENS UROSKOP ACCESS 20 PER CASE: Brand: TIDISHIELD

## (undated) DEVICE — SPNG LAP 18X18IN LF STRL PK/5

## (undated) DEVICE — EXTRCT STN NCIRCLE TIPLSS 2.2F1X115CM

## (undated) DEVICE — PK URETSCP 40

## (undated) DEVICE — GLV SURG BIOGEL LTX PF 8 1/2

## (undated) DEVICE — ENCORE® LATEX ORTHO SIZE 8, STERILE LATEX POWDER-FREE SURGICAL GLOVE: Brand: ENCORE

## (undated) DEVICE — MASK,FACE,FLUID RES,SHLD,FOGFREE,TIES: Brand: MEDLINE

## (undated) DEVICE — NITINOL WIRE WITH HYDROPHILIC TIP: Brand: SENSOR

## (undated) DEVICE — TBG PENCL TELESCP MEGADYNE SMOKE EVAC 10FT

## (undated) DEVICE — MASK,FACE,FLUID RESIST,SHLD,EARLOOP: Brand: MEDLINE

## (undated) DEVICE — PATIENT RETURN ELECTRODE, SINGLE-USE, CONTACT QUALITY MONITORING, ADULT, WITH 9FT CORD, FOR PATIENTS WEIGING OVER 33LBS. (15KG): Brand: MEGADYNE

## (undated) DEVICE — BNDG ELAS ELITE V/CLOSE 4IN 5YD LF STRL

## (undated) DEVICE — SYR LL 3CC

## (undated) DEVICE — MEDICINE CUP, GRADUATED, STER: Brand: MEDLINE

## (undated) DEVICE — SCRW HEX PERSONA FML 2.5X25MM PK/2
Type: IMPLANTABLE DEVICE | Site: KNEE | Status: NON-FUNCTIONAL
Removed: 2019-04-08

## (undated) DEVICE — MEDI-VAC NON-CONDUCTIVE SUCTION TUBING: Brand: CARDINAL HEALTH

## (undated) DEVICE — GLV SURG SENSICARE MICRO PF LF 6 STRL

## (undated) DEVICE — OPTIFOAM GENTLE SA, POSTOP, 4X10: Brand: MEDLINE

## (undated) DEVICE — ENDO. PORT CONNECTOR W/VALVE FOR OLYMPUS® SCOPES: Brand: ERBE